# Patient Record
Sex: FEMALE | Race: WHITE | NOT HISPANIC OR LATINO | Employment: PART TIME | URBAN - METROPOLITAN AREA
[De-identification: names, ages, dates, MRNs, and addresses within clinical notes are randomized per-mention and may not be internally consistent; named-entity substitution may affect disease eponyms.]

---

## 2017-01-04 ENCOUNTER — HOSPITAL ENCOUNTER (EMERGENCY)
Facility: CLINIC | Age: 27
Discharge: STILL ADMITTED OR ADMINISTRATIVE | End: 2017-01-06

## 2017-02-27 ENCOUNTER — HOSPITAL ENCOUNTER (INPATIENT)
Facility: CLINIC | Age: 27
LOS: 3 days | Discharge: HOME | DRG: 897 | End: 2017-03-02
Attending: EMERGENCY MEDICINE | Admitting: FAMILY MEDICINE
Payer: COMMERCIAL

## 2017-02-27 DIAGNOSIS — L03.114 CELLULITIS OF LEFT UPPER EXTREMITY: Primary | ICD-10-CM

## 2017-02-27 DIAGNOSIS — F11.93 OPIATE WITHDRAWAL (H): ICD-10-CM

## 2017-02-27 DIAGNOSIS — F11.229 OPIOID DEPENDENCE WITH INTOXICATION WITH COMPLICATION (H): ICD-10-CM

## 2017-02-27 DIAGNOSIS — F19.10 POLYSUBSTANCE ABUSE (H): ICD-10-CM

## 2017-02-27 LAB
ALCOHOL BREATH TEST: 0.2 (ref 0–0.01)
AMPHETAMINES UR QL SCN: ABNORMAL
BARBITURATES UR QL: ABNORMAL
BENZODIAZ UR QL: ABNORMAL
CANNABINOIDS UR QL SCN: ABNORMAL
COCAINE UR QL: ABNORMAL
ETHANOL UR QL SCN: ABNORMAL
HCG UR QL: NEGATIVE
OPIATES UR QL SCN: ABNORMAL

## 2017-02-27 PROCEDURE — 80307 DRUG TEST PRSMV CHEM ANLYZR: CPT | Performed by: FAMILY MEDICINE

## 2017-02-27 PROCEDURE — 76882 US LMTD JT/FCL EVL NVASC XTR: CPT | Mod: LT | Performed by: EMERGENCY MEDICINE

## 2017-02-27 PROCEDURE — 25000132 ZZH RX MED GY IP 250 OP 250 PS 637: Performed by: EMERGENCY MEDICINE

## 2017-02-27 PROCEDURE — 12800008 ZZH R&B CD ADULT

## 2017-02-27 PROCEDURE — 82075 ASSAY OF BREATH ETHANOL: CPT | Performed by: EMERGENCY MEDICINE

## 2017-02-27 PROCEDURE — 25000132 ZZH RX MED GY IP 250 OP 250 PS 637: Performed by: CLINICAL NURSE SPECIALIST

## 2017-02-27 PROCEDURE — HZ2ZZZZ DETOXIFICATION SERVICES FOR SUBSTANCE ABUSE TREATMENT: ICD-10-PCS | Performed by: FAMILY MEDICINE

## 2017-02-27 PROCEDURE — 99285 EMERGENCY DEPT VISIT HI MDM: CPT | Mod: 25 | Performed by: EMERGENCY MEDICINE

## 2017-02-27 PROCEDURE — 99285 EMERGENCY DEPT VISIT HI MDM: CPT | Mod: Z6 | Performed by: EMERGENCY MEDICINE

## 2017-02-27 PROCEDURE — 80320 DRUG SCREEN QUANTALCOHOLS: CPT | Performed by: FAMILY MEDICINE

## 2017-02-27 PROCEDURE — 81025 URINE PREGNANCY TEST: CPT | Performed by: FAMILY MEDICINE

## 2017-02-27 RX ORDER — CEPHALEXIN 500 MG/1
500 CAPSULE ORAL EVERY 6 HOURS SCHEDULED
Status: DISCONTINUED | OUTPATIENT
Start: 2017-02-28 | End: 2017-03-02

## 2017-02-27 RX ORDER — NICOTINE 21 MG/24HR
1 PATCH, TRANSDERMAL 24 HOURS TRANSDERMAL ONCE
Status: COMPLETED | OUTPATIENT
Start: 2017-02-27 | End: 2017-02-27

## 2017-02-27 RX ORDER — HYDROXYZINE HYDROCHLORIDE 25 MG/1
25-50 TABLET, FILM COATED ORAL EVERY 4 HOURS PRN
Status: DISCONTINUED | OUTPATIENT
Start: 2017-02-27 | End: 2017-03-02 | Stop reason: HOSPADM

## 2017-02-27 RX ORDER — LORAZEPAM 1 MG/1
1-2 TABLET ORAL EVERY 4 HOURS PRN
Status: DISCONTINUED | OUTPATIENT
Start: 2017-02-27 | End: 2017-03-02 | Stop reason: HOSPADM

## 2017-02-27 RX ORDER — ACETAMINOPHEN 325 MG/1
650 TABLET ORAL EVERY 4 HOURS PRN
Status: DISCONTINUED | OUTPATIENT
Start: 2017-02-27 | End: 2017-03-02 | Stop reason: HOSPADM

## 2017-02-27 RX ORDER — CALCIUM CARBONATE 500 MG/1
1 TABLET, CHEWABLE ORAL PRN
Status: ON HOLD | COMMUNITY
End: 2017-03-01

## 2017-02-27 RX ORDER — BISACODYL 10 MG
10 SUPPOSITORY, RECTAL RECTAL DAILY PRN
Status: DISCONTINUED | OUTPATIENT
Start: 2017-02-27 | End: 2017-03-02 | Stop reason: HOSPADM

## 2017-02-27 RX ORDER — SULFAMETHOXAZOLE/TRIMETHOPRIM 800-160 MG
1 TABLET ORAL 2 TIMES DAILY
Status: DISCONTINUED | OUTPATIENT
Start: 2017-02-27 | End: 2017-02-27

## 2017-02-27 RX ORDER — ONDANSETRON 4 MG/1
4 TABLET, ORALLY DISINTEGRATING ORAL EVERY 6 HOURS PRN
Status: DISCONTINUED | OUTPATIENT
Start: 2017-02-27 | End: 2017-03-02 | Stop reason: HOSPADM

## 2017-02-27 RX ORDER — TRAZODONE HYDROCHLORIDE 50 MG/1
50 TABLET, FILM COATED ORAL
Status: DISCONTINUED | OUTPATIENT
Start: 2017-02-27 | End: 2017-03-02 | Stop reason: HOSPADM

## 2017-02-27 RX ORDER — CALCIUM CARBONATE 500 MG/1
500 TABLET, CHEWABLE ORAL 2 TIMES DAILY PRN
Status: DISCONTINUED | OUTPATIENT
Start: 2017-02-27 | End: 2017-03-02 | Stop reason: HOSPADM

## 2017-02-27 RX ORDER — NICOTINE 21 MG/24HR
1 PATCH, TRANSDERMAL 24 HOURS TRANSDERMAL DAILY
Status: DISCONTINUED | OUTPATIENT
Start: 2017-02-28 | End: 2017-03-02 | Stop reason: HOSPADM

## 2017-02-27 RX ORDER — SULFAMETHOXAZOLE/TRIMETHOPRIM 800-160 MG
1 TABLET ORAL 2 TIMES DAILY
Status: DISCONTINUED | OUTPATIENT
Start: 2017-02-28 | End: 2017-03-02

## 2017-02-27 RX ORDER — ALUMINA, MAGNESIA, AND SIMETHICONE 2400; 2400; 240 MG/30ML; MG/30ML; MG/30ML
30 SUSPENSION ORAL EVERY 4 HOURS PRN
Status: DISCONTINUED | OUTPATIENT
Start: 2017-02-27 | End: 2017-03-02 | Stop reason: HOSPADM

## 2017-02-27 RX ORDER — IBUPROFEN 600 MG/1
600 TABLET, FILM COATED ORAL EVERY 6 HOURS PRN
Status: DISCONTINUED | OUTPATIENT
Start: 2017-02-27 | End: 2017-03-02 | Stop reason: HOSPADM

## 2017-02-27 RX ORDER — IBUPROFEN 200 MG
400 TABLET ORAL PRN
COMMUNITY
End: 2017-12-06

## 2017-02-27 RX ORDER — CEPHALEXIN 500 MG/1
500 CAPSULE ORAL EVERY 6 HOURS SCHEDULED
Status: DISCONTINUED | OUTPATIENT
Start: 2017-02-27 | End: 2017-02-27

## 2017-02-27 RX ADMIN — TRAZODONE HYDROCHLORIDE 50 MG: 50 TABLET ORAL at 22:40

## 2017-02-27 RX ADMIN — CEPHALEXIN 500 MG: 500 CAPSULE ORAL at 18:47

## 2017-02-27 RX ADMIN — SULFAMETHOXAZOLE AND TRIMETHOPRIM 1 TABLET: 800; 160 TABLET ORAL at 19:34

## 2017-02-27 RX ADMIN — NICOTINE 1 PATCH: 21 PATCH, EXTENDED RELEASE TRANSDERMAL at 20:17

## 2017-02-27 RX ADMIN — CEPHALEXIN 500 MG: 500 CAPSULE ORAL at 23:57

## 2017-02-27 ASSESSMENT — ENCOUNTER SYMPTOMS
FEVER: 0
ABDOMINAL PAIN: 0
SHORTNESS OF BREATH: 0

## 2017-02-27 ASSESSMENT — ACTIVITIES OF DAILY LIVING (ADL)
DRESS: STREET CLOTHES;INDEPENDENT
ORAL_HYGIENE: INDEPENDENT
LAUNDRY: WITH SUPERVISION
GROOMING: INDEPENDENT

## 2017-02-27 NOTE — ED PROVIDER NOTES
History     Chief Complaint   Patient presents with     Addiction Problem     Detox from heroin and alcohol, last use today at 2 pm, last drink one hour ago, has bed on 3A.     Wound Infection     Left arm redness at injection site.     PRADIP Wilder is a 26 year old female presents to the Emergency Department today looking for detox.  She states that currently her drug of choice is heroin.  She is using 1 to 2 g a day via injection.  Last use was a couple hours prior to arrival.  She s been using this for 2 years.  She does state that she uses meth occasionally, a few times a month.  Last use of meth was yesterday.  She also uses marijuana, and alcohol occasionally.  She states she drinks alcohol about once every couple of weeks.  She states that she will kind of use anything that is available.  This includes benzos, though she states that the use of these is not common.  She states she s been through detox in the past.  She states she has had long-term issues with substances.  She denies any suicidal ideation.  Her only acute complaint is pain near her left elbow which she attributes to injecting drugs.  She states that she has had a little bit of pain for a few days, though yesterday when she rolled up her sleeve she noticed quite a bit of redness in the area.  She states the redness looks improved today, though the area is still uncomfortable.  She denies any fevers or any other acute complaints.  Last use was a couple of hours ago and she denies any withdrawal symptoms at this time.  This part of the document was transcribed by Jade Erazo, Medical Scribe.       I have reviewed the Medications, Allergies, Past Medical and Surgical History, and Social History in the [employer] system.    PAST MEDICAL HISTORY:   Past Medical History   Diagnosis Date     Anxiety      Chickenpox      Depression      Depressive disorder      GERD (gastroesophageal reflux disease)      Substance abuse        PAST SURGICAL HISTORY:  History reviewed. No pertinent past surgical history.    FAMILY HISTORY:   Family History   Problem Relation Age of Onset     Allergies Mother      Depression Mother      Asthma No family hx of      C.A.D. No family hx of      DIABETES No family hx of      Hypertension No family hx of      CEREBROVASCULAR DISEASE No family hx of      Breast Cancer No family hx of      Cancer - colorectal No family hx of      Alcohol/Drug Mother      Prostate Cancer Paternal Grandfather        SOCIAL HISTORY:   Social History   Substance Use Topics     Smoking status: Current Every Day Smoker     Packs/day: 1.00     Years: 10.00     Smokeless tobacco: Not on file     Alcohol use Yes     Current Facility-Administered Medications   Medication     cephALEXin (KEFLEX) capsule 500 mg     sulfamethoxazole-trimethoprim (BACTRIM DS/SEPTRA DS) 800-160 MG per tablet 1 tablet     Current Outpatient Prescriptions   Medication     calcium carbonate (TUMS) 500 MG chewable tablet     ibuprofen (ADVIL/MOTRIN) 200 MG tablet        Allergies   Allergen Reactions     Cats      Dogs          Review of Systems   Constitutional: Negative for fever.   Respiratory: Negative for shortness of breath.    Cardiovascular: Negative for chest pain.   Gastrointestinal: Negative for abdominal pain.   All other systems reviewed and are negative.      Physical Exam   Pulse: 109  Heart Rate: 109  Temp: 97.2  F (36.2  C)  Resp: 16  Weight: 63.5 kg (140 lb)  SpO2: 99 %  Physical Exam   Constitutional: No distress.   Appears under the influence   HENT:   Head: Atraumatic.   Mouth/Throat: Oropharynx is clear and moist.   Eyes: Pupils are equal, round, and reactive to light. No scleral icterus.   Cardiovascular: Normal heart sounds and intact distal pulses.    Mildly tachy   Pulmonary/Chest: Breath sounds normal. No respiratory distress.   Abdominal: Soft. There is no tenderness.   Musculoskeletal: She exhibits tenderness. She exhibits no edema.        Arms:  Skin: Skin is  warm. No rash noted. She is not diaphoretic. There is erythema.       ED Course     ED Course     Procedures  Results for orders placed during the hospital encounter of 02/27/17   POC US SOFT TISSUE    Impression No notable fluid collection.             Critical Care time:  none               Labs Ordered and Resulted from Time of ED Arrival Up to the Time of Departure from the ED   DRUG ABUSE SCREEN 6 CHEM DEP URINE ([facility]) - Abnormal; Notable for the following:        Result Value    Amphetamine Qual Urine   (*)     Value: Positive   Cutoff for a positive amphetamine is greater than 500 ng/mL. This is an   unconfirmed screening result to be used for medical purposes only.      Cannabinoids Qual Urine   (*)     Value: Positive   Cutoff for a positive cannabinoid is greater than 50 ng/mL. This is an   unconfirmed screening result to be used for medical purposes only.      Ethanol Qual Urine   (*)     Value: Positive   Cutoff for a positive urine ethanol is greater than 0.05 g/dL.  This is an   unconfirmed screening result to be used for medical purposes only.      Opiates Qualitative Urine   (*)     Value: Positive   Cutoff for a positive opiate is greater than 300 ng/mL. This is an unconfirmed   screening result to be used for medical purposes only.      All other components within normal limits   ALCOHOL BREATH TEST POCT - Abnormal; Notable for the following:     Alcohol Breath Test 0.197 (*)     All other components within normal limits   HCG QUALITATIVE URINE       Assessments & Plan (with Medical Decision Making)   Pt has been using/drinking today. She denies physical complaints other than arm pain. No fluid collection (abscess) seen on bedside u/s -- looks to be cellulitic. Will start keflex and bactrim. Will admit to inpt detox for acute stabilization and treatment.    I have reviewed the nursing notes.    I have reviewed the findings, diagnosis, plan and need for follow up with the patient.    New  Prescriptions    No medications on file       Final diagnoses:   Cellulitis of left upper extremity   Polysubstance abuse   Opioid dependence with intoxication with complication (H)       2/27/2017   [facility], [place], EMERGENCY DEPARTMENT     Phyllis Pandey MD  02/27/17 6739

## 2017-02-27 NOTE — IP AVS SNAPSHOT
[facility]    [address] [place] [id number]    Phone:  [phone]                                       After Visit Summary   2/27/2017    Domenico Wilder    MRN: [id number]           After Visit Summary Signature Page     I have received my discharge instructions, and my questions have been answered. I have discussed any challenges I see with this plan with the nurse or doctor.    ..........................................................................................................................................  Patient/Patient Representative Signature      ..........................................................................................................................................  Patient Representative Print Name and Relationship to Patient    ..................................................               ................................................  Date                                            Time    ..........................................................................................................................................  Reviewed by Signature/Title    ...................................................              ..............................................  Date                                                            Time

## 2017-02-27 NOTE — IP AVS SNAPSHOT
MRN:[id number]                      After Visit Summary   2/27/2017    Domenico Wilder    MRN: [id number]           Thank you!     Thank you for choosing [facility] for your care. Our goal is always to provide you with excellent care.        Patient Information     Date Of Birth          1990        About your hospital stay     You were admitted on:  February 27, 2017 You last received care in the:  [facility]    You were discharged on:  March 2, 2017       Who to Call     For medical emergencies, please call 911.  For non-urgent questions about your medical care, please call your primary care provider or clinic, [phone]          Attending Provider     Provider Specialty    Phyllis Pandey MD Emergency Medicine    Amer, Troy Long MD Family Practice       Primary Care Provider Office Phone # Fax #    [facility] [phone] [address] [facility] [address]        Further instructions from your care team              Behavioral Discharge Planning and Instructions  THANK YOU FOR CHOOSING THE [address]  [phone]    Summary: The patient was detoxed with methadone, getting 30 mg, 40 mg and 40 mg over 3 days. She had moderate withdrawal despite the methadone. She was given some tizanidine, gabapentin. She was on Keflex and Bactrim for a skin infection from her IV injection sites. The patient was stable medically and improved by 03/02/2017. She was to meet with case management to finalize outpatient treatment. She was to go to [facility] tomorrow, 03/03/2017, for methadone maintenance.       Recommendation:  Residential Treatment, psychotherapy, sober support group engagement and active work with a sponsor or  through [place] Recovery Connection (see below for contact information).    Main Diagnosis:   Opioid Dependence    Major Treatments, Procedures and Findings:  You have withdrawn from  opiates  using buprenorphine.  You have had a chemical dependency assessment.  You have had labs drawn and those results have been reviewed with you. Please take a copy of your lab work with you to your next primary care physician appointment.    Symptoms to Report:  If you experience more anxiety, confusion, sleeplessness, deep sadness or thoughts of suicide, notify your treatment team or notify your primary care physician. IF ANY OF THE SYMPTOMS YOU ARE EXPERIENCING ARE A MEDICAL EMERGENCY CALL 911 IMMEDIATELY.     Lifestyle Adjustment: Adjust your lifestyle to get enough sleep, relaxation, exercise and  good nutrition. Continue to develop healthy coping skills to decrease stress and promote a sober living environment. Do not use alcohol, illegal drugs or addictive medications other than what is currently prescribed. AA, NA, and  Sponsor are excellent resources for support. There has been an increase in opioid overdoses and deaths recently. Even after a short period of no drug use a persons tolerance level is lowered. It is not safe to think a person can use the same amount of drugs as they did prior to their period of no drug use. Returning to your drug using environment and contact with your drug using friends puts you at high risk for relapse as well as death associated with an overdose. Www.harmreduction.org    Methadone Follow-Up: March 3rd   Specialized Treatment Services - [facility]      DISCHARGE RESOURCES:  -SMART Recovery - self management for addiction recovery:  www.smartrecovery.org    -Pathways ~ A Health Crisis Resource & Support Center: 369.299.7836.  -[facility] [phone]   -[facility] Behavioral Intake 882-307-1068 or 737-885-3321.  -Crisis Intervention: 733.348.4411 or 477-463-9706 (TTY: 558.186.9267).  Call anytime.  -Suicide Awareness Voices of Education (SAVE) (www.save.org): 212-225-OEVB (9131)  -National Suicide Prevention Line (www.mentalhealthmn.org):  906-014-DKVK (8203)  -National [place] on Mental Illness (www.mn.jadyn.org): 272.335.7838 or 308-510-9655.  -Ruzr1mbnq: text the word LIFE to 76064 for immediate support and crisis intervention  -Mental Health Consumer/Survivor Network of [place] (www.mhcsn.net): 791.403.6692 or 254-013-7267  -Mental Health Association of [place] (www.mentalhealth.org): 946.963.1881 or 405-478-9371     -Substance Abuse and Mental Health Services (www.samhsa.gov)  -Harm Reduction Coalition (www. Harmreduction.org)  -www.prescribetoprevent.org or http://prescribetoprevent.org/video  -Poison control 0-829-530-0821   **[place] Opioid Prevention Coalition: www.opioidcoalition.org    Sober Support Group Information:  AA/NA & Sponsor/Support  -Alcoholics Anonymous (www.alcoholics-anonymous.org): for local information 24 hours/day  -AA Intergroup service office in [place] (http://www.aastpaul.org/) 138.419.4576  -AA Intergroup service office in [facility]: 266.982.8296. (http://www.aamin[place].org/)  -Narcotics Anonymous (www.nam[place].org) (174) 491-9760   **Sober Fun Activities: www.soberCourtview Mediaactivities.[place]/[facility]//[facility] Recovery Connection ([facility])  [facility] connects people seeking recovery to resources that help foster and sustain long-term recovery.  Whether you are seeking resources for treatment, transportation, housing, job training, education, health care or other pathways to recovery, [facility] is a great place to start.    Phone: 531.943.4264.  www.[place]AppGratis.[employer] (Great listing of all types of recovery and non-recovery related resources)    General Medication Instructions:   See your medication sheet(s) for instructions.   Take all medicines as directed.  Make no changes unless your doctor suggests them.   Go to all your doctor visits.  Be sure to have all your required lab tests. This way, your medicines can be refilled on time.  Do not use any drugs not prescribed by your provider.  AA/NA and Sponsors are  "excellent resources for support  Avoid alcohol.    Any follow up concerns:  Nursing questions call the [address] [phone]  Medical Record call [phone]  Outpatient Behavioral Intake call [phone]    The entire treatment team has appreciated the opportunity to work with renee Acuña.  We wish you the best in the future and with your lifelong recovery goals. Please bring this discharge folder with you to all follow up appointments.  It contains your lab results, diagnosis, medication list and discharge recommendations.    THANK YOU FOR CHOOSING THE [facility] .      Pending Results     No orders found from 2017 to 2017.            Statement of Approval     Ordered          17 0821  I have reviewed and agree with all the recommendations and orders detailed in this document.  EFFECTIVE NOW     Approved and electronically signed by:  Troy Fiore MD             Admission Information     Date & Time Provider Department Dept. Phone    2017 Troy Fiore MD [facility] [phone]      Your Vitals Were     Blood Pressure Pulse Temperature Respirations Height Weight    140/95 (BP Location: Left arm) 91 98.6  F (37  C) (Oral) 16 1.651 m (5' 5\") 63.5 kg (140 lb)    Last Period Pulse Oximetry BMI (Body Mass Index)             (LMP Unknown) 100% 23.3 kg/m2         MyChart Information     [employer] lets you send messages to your doctor, view your test results, renew your prescriptions, schedule appointments and more. To sign up, go to [email or web address] . Click on \"Log in\" on the left side of the screen, which will take you to the Welcome page. Then click on \"Sign up Now\" on the right side of the page.     You will be asked to enter the access code listed below, as well as some personal information. Please follow the directions to create your username and password.     Your access code is: [email or web address]  Expires: " 2017  9:58 AM     Your access code will  in 90 days. If you need help or a new code, please call your [facility] clinic or [phone].        Care EveryWhere ID     This is your Care EveryWhere ID. This could be used by other organizations to access your [facility] medical records  [id number]           Review of your medicines      START taking        Dose / Directions    hydrOXYzine 25 MG tablet   Commonly known as:  ATARAX        Dose:  25-50 mg   Take 1-2 tablets (25-50 mg) by mouth every 4 hours as needed for anxiety   Quantity:  60 tablet   Refills:  1       traZODone 50 MG tablet   Commonly known as:  DESYREL        Dose:  50 mg   Take 1 tablet (50 mg) by mouth nightly as needed for sleep   Quantity:  30 tablet   Refills:  0         CONTINUE these medicines which have NOT CHANGED        Dose / Directions    ibuprofen 200 MG tablet   Commonly known as:  ADVIL/MOTRIN        Dose:  400 mg   Take 400 mg by mouth as needed for mild pain   Refills:  0         STOP taking     calcium carbonate 500 MG chewable tablet   Commonly known as:  TUMS           METHADONE HCL PO                Where to get your medicines      These medications were sent to [facility] [address]  [address] [address]     Phone:  [phone]     hydrOXYzine 25 MG tablet    traZODone 50 MG tablet                Protect others around you: Learn how to safely use, store and throw away your medicines at www.disposemymeds.org.             Medication List: This is a list of all your medications and when to take them. Check marks below indicate your daily home schedule. Keep this list as a reference.      Medications           Morning Afternoon Evening Bedtime As Needed    hydrOXYzine 25 MG tablet   Commonly known as:  ATARAX   Take 1-2 tablets (25-50 mg) by mouth every 4 hours as needed for anxiety   Last time this was given:  50 mg on 3/1/2017  8:19 AM                                 ibuprofen 200 MG tablet   Commonly known as:  ADVIL/MOTRIN   Take 400 mg by mouth as needed for mild pain   Last time this was given:  600 mg on 3/1/2017  8:19 AM                                traZODone 50 MG tablet   Commonly known as:  DESYREL   Take 1 tablet (50 mg) by mouth nightly as needed for sleep   Last time this was given:  50 mg on 2/28/2017  9:35 PM

## 2017-02-28 LAB
ALBUMIN SERPL-MCNC: 3.8 G/DL (ref 3.4–5)
ALP SERPL-CCNC: 116 U/L (ref 40–150)
ALT SERPL W P-5'-P-CCNC: 16 U/L (ref 0–50)
ANION GAP SERPL CALCULATED.3IONS-SCNC: 8 MMOL/L (ref 3–14)
AST SERPL W P-5'-P-CCNC: 19 U/L (ref 0–45)
BASOPHILS # BLD AUTO: 0 10E9/L (ref 0–0.2)
BASOPHILS NFR BLD AUTO: 0.4 %
BILIRUB SERPL-MCNC: 0.4 MG/DL (ref 0.2–1.3)
BUN SERPL-MCNC: 13 MG/DL (ref 7–30)
CALCIUM SERPL-MCNC: 9 MG/DL (ref 8.5–10.1)
CHLORIDE SERPL-SCNC: 100 MMOL/L (ref 94–109)
CO2 SERPL-SCNC: 29 MMOL/L (ref 20–32)
CREAT SERPL-MCNC: 0.75 MG/DL (ref 0.52–1.04)
DIFFERENTIAL METHOD BLD: NORMAL
EOSINOPHIL # BLD AUTO: 0.3 10E9/L (ref 0–0.7)
EOSINOPHIL NFR BLD AUTO: 4.1 %
ERYTHROCYTE [DISTWIDTH] IN BLOOD BY AUTOMATED COUNT: 12.9 % (ref 10–15)
GFR SERPL CREATININE-BSD FRML MDRD: NORMAL ML/MIN/1.7M2
GLUCOSE SERPL-MCNC: 94 MG/DL (ref 70–99)
HBV SURFACE AB SERPL IA-ACNC: 49.33 M[IU]/ML
HBV SURFACE AG SERPL QL IA: NONREACTIVE
HCT VFR BLD AUTO: 44.9 % (ref 35–47)
HCV AB SERPL QL IA: ABNORMAL
HGB BLD-MCNC: 15.1 G/DL (ref 11.7–15.7)
HIV 1+2 AB+HIV1 P24 AG SERPL QL IA: NORMAL
IMM GRANULOCYTES # BLD: 0 10E9/L (ref 0–0.4)
IMM GRANULOCYTES NFR BLD: 0.1 %
LYMPHOCYTES # BLD AUTO: 2 10E9/L (ref 0.8–5.3)
LYMPHOCYTES NFR BLD AUTO: 27.4 %
MCH RBC QN AUTO: 29.6 PG (ref 26.5–33)
MCHC RBC AUTO-ENTMCNC: 33.6 G/DL (ref 31.5–36.5)
MCV RBC AUTO: 88 FL (ref 78–100)
MONOCYTES # BLD AUTO: 0.5 10E9/L (ref 0–1.3)
MONOCYTES NFR BLD AUTO: 7.6 %
NEUTROPHILS # BLD AUTO: 4.3 10E9/L (ref 1.6–8.3)
NEUTROPHILS NFR BLD AUTO: 60.4 %
NRBC # BLD AUTO: 0 10*3/UL
NRBC BLD AUTO-RTO: 0 /100
PLATELET # BLD AUTO: 276 10E9/L (ref 150–450)
POTASSIUM SERPL-SCNC: 3.4 MMOL/L (ref 3.4–5.3)
PROT SERPL-MCNC: 7.6 G/DL (ref 6.8–8.8)
RBC # BLD AUTO: 5.1 10E12/L (ref 3.8–5.2)
SODIUM SERPL-SCNC: 137 MMOL/L (ref 133–144)
T4 FREE SERPL-MCNC: 1.41 NG/DL (ref 0.76–1.46)
TSH SERPL DL<=0.005 MIU/L-ACNC: 0.33 MU/L (ref 0.4–4)
WBC # BLD AUTO: 7.2 10E9/L (ref 4–11)

## 2017-02-28 PROCEDURE — 25000132 ZZH RX MED GY IP 250 OP 250 PS 637: Performed by: CLINICAL NURSE SPECIALIST

## 2017-02-28 PROCEDURE — 87340 HEPATITIS B SURFACE AG IA: CPT | Performed by: CLINICAL NURSE SPECIALIST

## 2017-02-28 PROCEDURE — 36415 COLL VENOUS BLD VENIPUNCTURE: CPT | Performed by: CLINICAL NURSE SPECIALIST

## 2017-02-28 PROCEDURE — 25000132 ZZH RX MED GY IP 250 OP 250 PS 637: Performed by: FAMILY MEDICINE

## 2017-02-28 PROCEDURE — 87389 HIV-1 AG W/HIV-1&-2 AB AG IA: CPT | Performed by: CLINICAL NURSE SPECIALIST

## 2017-02-28 PROCEDURE — 84443 ASSAY THYROID STIM HORMONE: CPT | Performed by: CLINICAL NURSE SPECIALIST

## 2017-02-28 PROCEDURE — 99222 1ST HOSP IP/OBS MODERATE 55: CPT | Mod: AI | Performed by: FAMILY MEDICINE

## 2017-02-28 PROCEDURE — 86706 HEP B SURFACE ANTIBODY: CPT | Performed by: CLINICAL NURSE SPECIALIST

## 2017-02-28 PROCEDURE — 80053 COMPREHEN METABOLIC PANEL: CPT | Performed by: CLINICAL NURSE SPECIALIST

## 2017-02-28 PROCEDURE — 86803 HEPATITIS C AB TEST: CPT | Performed by: CLINICAL NURSE SPECIALIST

## 2017-02-28 PROCEDURE — 12800008 ZZH R&B CD ADULT

## 2017-02-28 PROCEDURE — 85025 COMPLETE CBC W/AUTO DIFF WBC: CPT | Performed by: CLINICAL NURSE SPECIALIST

## 2017-02-28 PROCEDURE — 84439 ASSAY OF FREE THYROXINE: CPT | Performed by: CLINICAL NURSE SPECIALIST

## 2017-02-28 PROCEDURE — 87522 HEPATITIS C REVRS TRNSCRPJ: CPT | Performed by: CLINICAL NURSE SPECIALIST

## 2017-02-28 RX ORDER — METHADONE HYDROCHLORIDE 5 MG/5ML
30 SOLUTION ORAL ONCE
Status: COMPLETED | OUTPATIENT
Start: 2017-02-28 | End: 2017-02-28

## 2017-02-28 RX ADMIN — SULFAMETHOXAZOLE AND TRIMETHOPRIM 1 TABLET: 800; 160 TABLET ORAL at 19:59

## 2017-02-28 RX ADMIN — IBUPROFEN 600 MG: 600 TABLET ORAL at 20:00

## 2017-02-28 RX ADMIN — SULFAMETHOXAZOLE AND TRIMETHOPRIM 1 TABLET: 800; 160 TABLET ORAL at 09:46

## 2017-02-28 RX ADMIN — CEPHALEXIN 500 MG: 500 CAPSULE ORAL at 06:05

## 2017-02-28 RX ADMIN — CEPHALEXIN 500 MG: 500 CAPSULE ORAL at 12:28

## 2017-02-28 RX ADMIN — HYDROXYZINE HYDROCHLORIDE 50 MG: 25 TABLET ORAL at 20:00

## 2017-02-28 RX ADMIN — METHADONE HYDROCHLORIDE 30 MG: 5 SOLUTION ORAL at 09:45

## 2017-02-28 RX ADMIN — NICOTINE 1 PATCH: 21 PATCH, EXTENDED RELEASE TRANSDERMAL at 09:46

## 2017-02-28 RX ADMIN — CEPHALEXIN 500 MG: 500 CAPSULE ORAL at 18:16

## 2017-02-28 RX ADMIN — TRAZODONE HYDROCHLORIDE 50 MG: 50 TABLET ORAL at 21:35

## 2017-02-28 ASSESSMENT — ACTIVITIES OF DAILY LIVING (ADL)
GROOMING: INDEPENDENT
ORAL_HYGIENE: INDEPENDENT
DRESS: INDEPENDENT

## 2017-02-28 NOTE — H&P
DATE OF ADMISSION:  02/27/2017       HISTORY OF PRESENT ILLNESS:  Domenico Wilder is a 26-year-old female from [place].  She has a 5-year-old daughter who is staying with her parents.  She is currently unemployed.  She enters [facility] for detoxification and treatment.  She has a history of opioid dependence and has been using for some time.  She went to treatment at [facility] and [facility] in 2016.  She was on Suboxone maintenance from Dr. Hutton.  This was after [facility] and she did well for a period of time.  She did not comply with the Suboxone program and relapsed.  She then entered a methadone maintenance program at [facility].  Her dosage of methadone was small, only getting up to 40 mg per day.  In the meantime, she was supplementing with heroin.  She continued to use.  She would like to get back to using methadone at the [facility] program.  She needs to find a treatment program where she can be safe and not subsequently use when she is on the methadone program.  So a methadone friendly treatment program would be ideal for her.  Another option would be to try again Suboxone maintenance through a Suboxone friendly treatment program.  She last used heroin yesterday.  She can be given methadone today.  She is beginning to feel symptoms of opioid withdrawal.  In addition, she has also used IV meth occasionally, alcohol and marijuana.      PAST MEDICAL HISTORY:  Medical illnesses:  Depression in the past treated with Zoloft and Wellbutrin.  Currently on no medications.  One vaginal delivery.  Denies heart disease, lung disease, liver disease, kidney disease, diabetes or epilepsy.  She had elevated liver functions in the past but was told she does not have hepatitis C.  Will check this again.      PAST SURGICAL HISTORY:  None.  One vaginal delivery.      MEDICATIONS PRIOR TO ADMISSION:  None.      REVIEW OF SYSTEMS:   SKIN:  Erythema left arm from IV drug use.   HEAD:  No headaches.   EYES:  No visual  disturbance.   EARS:  No hearing loss.   MOUTH AND THROAT:  No difficulty swallowing.   PULMONARY:  No cough or shortness of breath.   CARDIOVASCULAR:  No chest pain.   GASTROINTESTINAL:  No nausea, vomiting, diarrhea or constipation.   GENITOURINARY:  Negative.  Patient has amenorrhea for the past couple of years.   MUSCULOSKELETAL:  Negative, no active joint inflammation.   NEUROLOGIC:  Negative.      PHYSICAL EXAMINATION:   VITAL SIGNS:  Temperature 98.4, pulse 97, respirations are 16, blood pressure is 123/69.   GENERAL:  This is a well-developed, well-nourished 26-year-old female in no apparent distress, alert and cooperative and oriented to time, person, and place.   SKIN:  Erythema, left arm near the antecubital fossa without evidence of abscess formation.  Track marks are present.   HEAD:  Normal.   EYES:  Pupils are mid size.  Conjunctivae normal.  Sclerae normal.   EARS:  Normal.   NOSE:  Normal.   MOUTH AND THROAT:  Lips normal.  Tongue normal.  Pharynx normal.   NECK:  Supple.  No masses, no thyroid enlargement.  Lymph nodes normal anterior and posterior cervical region.   PULMONARY:  Lungs clear to auscultation, good inspiration, good expiration.  No wheezes, no rhonchi, no rales.   CARDIOVASCULAR:  Heart regular rate and rhythm, no murmurs.  Good peripheral pulses, no edema.   GASTROINTESTINAL:  Abdomen soft, no distention, tenderness or rigidity.  Bowel sounds normal.  No masses, no organomegaly.   EXTREMITIES:  Full range of motion of all extremities.  No inflammation of the ankles, knees, hips, hands, shoulders or elbows bilaterally.   NEUROLOGIC:  Motor normal.  Sensory normal.  Coordination normal.   MENTAL STATUS:  Oriented to time, place, person and situation.  Attitude is cooperative.  Insight fair, judgment fair.      ASSESSMENT:  Opiate dependence with withdrawal.      PLAN:  Inpatient detoxification.  Will begin with buprenorphine and pursue treatment at a methadone [facility]  program with methadone maintenance at [facility].  An alternative would be a Suboxone friendly program with Suboxone maintenance.  Check labs including TSH to evaluate amenorrhea.  Check liver functions.  Check hepatitis screening.         RHIANNA ROJAS MD             D: 2017 09:47   T: 2017 10:33   MT: loc      Name:     RAUL HERNANDES   MRN:      [id number]        Account:      [id number]   :      1990           Admitted:     [id number]      Document: [id number]

## 2017-02-28 NOTE — PHARMACY-ADMISSION MEDICATION HISTORY
"Admission Medication History status for the 2/27/2017 admission is complete.  See EPIC admission navigator for Prior to Admission medications.    Medication history sources:  Patient     Medication history source reliability: Good    Medication adherence:  Good    Changes made to PTA medication list (reason)  Added:  -tums as needed for heartburn (per patient); last taken over a week ago  -advil as needed for pain (per patient); last taken \"about 2 days ago\"  Deleted: None  Changed: None    Additional medication history information (including reliability of information, actions taken by pharmacist):   -Patient was a good historian, but could not recall which [facility] pharmacy she uses in [place] (although she does not currently use any prescription medications)    Time spent in this activity: 5 minutes    Medication history completed by: RED [facility] Intern    Prior to Admission medications    Medication Sig Last Dose Taking? Auth Provider   calcium carbonate (TUMS) 500 MG chewable tablet Take 1 chew tab by mouth as needed for heartburn Past Week at Unknown time Yes Unknown, Entered By History   ibuprofen (ADVIL/MOTRIN) 200 MG tablet Take 400 mg by mouth as needed for mild pain Past Month at Unknown time Yes Unknown, Entered By History       "

## 2017-02-28 NOTE — PROGRESS NOTES
Pt [facility] from [facility] where she came today for opiate detox.  Pt states she has been using 1 - 2 gram IV heroin daily, last use 12:30 p.m. Today.  Pt also states she started Methadone 40 mg daily through [facility] three weeks ago (this has not been confirmed) - last use 2 days ago.  Pt reports 2 yr heroin history.  Pt has been to [facility] detox x 3 (last July, 2016) and has a residential treatment history x 5 going back to her teenage years.  Stressors include recent loss of her job, her home and custody of her 5 yr old son (her parents have temporary custody).  She currently is staying at various friends homes.  States that she can live with her boyfriend's mother if she is sober.      In addition to opiates, pt's Utox positive for:  amphetamines - pt reports using Meth via IV / smoking 3 - 5 x per month, last uses was yesterday  ETOH - .19 in ED, pt reports drinking 2 - 3 x per month, and that today was unusual  cannabinoids - pt reports smoking marijuana weekly    Denies SI/SIB/HI/halluciantions.  History of SIB on L forearm via cutting 10 + years ago.  On the unit, pt is calm and cooperative.  Opiate withdrawal score is 4.    L arm cellulitis r/t IV drug use.  Ultrasound in ED negative for drainable fluid.  Started on Keflex and Bactrim in ED, to be continued on unit.      Trazodone 50 mg at HS

## 2017-02-28 NOTE — PLAN OF CARE
Problem: General Plan of Care (Inpatient Behavioral)  Goal: Individualization/Patient Specific Goal (IP Behavioral)  The patient and/or their representative will achieve their patient-specific goals related to the plan of care.    The patient-specific goals include: Patient will identify reason(s) for hospitalization from their perspective.  Patient will identify a goal for discharge.  Patient will identify triggers that may increase their risk for relapse.  Patient will identify coping skills they can do to stay well.   Patient will identify their support system to demonstrate readiness for discharge.        Illnesses Management & Recovery  Patient identified - as trigger for relapse.  Patient identified - as a general wellness strategy.  Patient identified - as a warning sign that they are in danger of relapse.  Patient identified - as someone they cont on to get feedback from.  Patient identified - as a way to take action when in danger of relapse.  Patient identified - as a way to cope with stress or other symptoms.

## 2017-02-28 NOTE — PLAN OF CARE
Problem: General Plan of Care (Inpatient Behavioral)  Goal: Team Discussion  Team Plan:   BEHAVIORAL TEAM DISCUSSION     Continued Stay Criteria/Rationale: Heroin detox  Plan: Treatment  Participants: Dr. Fiore; Lindsey Herndon, [facility], LADC  Summary/Recommendation: Writer met with patient to establish a discharge plan. She reports daily use of heroin, has been in treatment several times within the last year. She is in the methadone maintenance program at [facility] and plans to continue to work with them. She is possibly interested in treatment, it would be difficult for her to do an OP program as she does not have an active drivers license.  Discussed programming at [facility], they have an opiate track and will allow patient's to be on methadone/Suboxone maintenance, however they are a 90-day program and she is not sure she even wants to do a 28-30 day program.  Coached patient to fill out paperwork so that a CD assessment can be completed if she wants to do any level of programming. Patient does not require Rule 25 funding, she has [employer].  Medical/Physical: Deferred to medicine  Progress: Initial

## 2017-02-28 NOTE — PROGRESS NOTES
02/27/17 2148   Patient Belongings   Did you bring any home meds/supplements to the hospital?  No   Patient Belongings other (see comments)   Disposition of Belongings storage bin, locked drawer, security   Belongings Search Yes   Clothing Search Yes   Second Staff giovanna munoz   storage bin: 2 tote bags, sharps bag: pencil sharpener, dental floss  No cell phone, no wallet  ADMISSION:  I am responsible for any personal items that are not sent to the safe or pharmacy. [facility] is not responsible for loss, theft or damage of any property in my possession.    Patient Signature _____________________ Date/Time _____________________    Staff Signature _______________________ Date/Time _____________________    2nd Staff person, if patient is unable/unwilling to sign  ___________________________________ Date/Time _____________________  DISCHARGE:  All personal items have been returned to me.    Patient Signature _____________________ Date/Time _____________________    Staff Signature _______________________ Date/Time _____________________

## 2017-03-01 PROCEDURE — 25000132 ZZH RX MED GY IP 250 OP 250 PS 637: Performed by: CLINICAL NURSE SPECIALIST

## 2017-03-01 PROCEDURE — 25000132 ZZH RX MED GY IP 250 OP 250 PS 637: Performed by: FAMILY MEDICINE

## 2017-03-01 PROCEDURE — 25000125 ZZHC RX 250: Performed by: CLINICAL NURSE SPECIALIST

## 2017-03-01 PROCEDURE — 25000128 H RX IP 250 OP 636: Performed by: PSYCHIATRY & NEUROLOGY

## 2017-03-01 PROCEDURE — 12800008 ZZH R&B CD ADULT

## 2017-03-01 PROCEDURE — 99231 SBSQ HOSP IP/OBS SF/LOW 25: CPT | Performed by: FAMILY MEDICINE

## 2017-03-01 RX ORDER — SULFAMETHOXAZOLE/TRIMETHOPRIM 800-160 MG
1 TABLET ORAL 2 TIMES DAILY
Qty: 14 TABLET | Refills: 0 | Status: SHIPPED | OUTPATIENT
Start: 2017-03-01 | End: 2017-03-02

## 2017-03-01 RX ORDER — CEPHALEXIN 500 MG/1
500 CAPSULE ORAL EVERY 6 HOURS
Qty: 28 CAPSULE | Refills: 0 | Status: SHIPPED | OUTPATIENT
Start: 2017-03-01 | End: 2017-03-02

## 2017-03-01 RX ORDER — HYDROXYZINE HYDROCHLORIDE 25 MG/1
25-50 TABLET, FILM COATED ORAL EVERY 4 HOURS PRN
Qty: 60 TABLET | Refills: 1 | Status: SHIPPED | OUTPATIENT
Start: 2017-03-01 | End: 2017-12-06

## 2017-03-01 RX ORDER — METHADONE HYDROCHLORIDE 5 MG/5ML
40 SOLUTION ORAL ONCE
Status: COMPLETED | OUTPATIENT
Start: 2017-03-01 | End: 2017-03-01

## 2017-03-01 RX ORDER — TRAZODONE HYDROCHLORIDE 50 MG/1
50 TABLET, FILM COATED ORAL
Qty: 30 TABLET | Refills: 0 | Status: SHIPPED | OUTPATIENT
Start: 2017-03-01 | End: 2017-12-06

## 2017-03-01 RX ORDER — GABAPENTIN 300 MG/1
300 CAPSULE ORAL 3 TIMES DAILY
Status: DISCONTINUED | OUTPATIENT
Start: 2017-03-01 | End: 2017-03-02

## 2017-03-01 RX ADMIN — HYDROXYZINE HYDROCHLORIDE 50 MG: 25 TABLET ORAL at 08:19

## 2017-03-01 RX ADMIN — CALCIUM CARBONATE (ANTACID) CHEW TAB 500 MG 500 MG: 500 CHEW TAB at 16:25

## 2017-03-01 RX ADMIN — ONDANSETRON 4 MG: 4 TABLET, ORALLY DISINTEGRATING ORAL at 14:44

## 2017-03-01 RX ADMIN — SULFAMETHOXAZOLE AND TRIMETHOPRIM 1 TABLET: 800; 160 TABLET ORAL at 20:39

## 2017-03-01 RX ADMIN — CEPHALEXIN 500 MG: 500 CAPSULE ORAL at 20:40

## 2017-03-01 RX ADMIN — CEPHALEXIN 500 MG: 500 CAPSULE ORAL at 11:11

## 2017-03-01 RX ADMIN — IBUPROFEN 600 MG: 600 TABLET ORAL at 08:19

## 2017-03-01 RX ADMIN — METHADONE HYDROCHLORIDE 40 MG: 5 SOLUTION ORAL at 11:11

## 2017-03-01 RX ADMIN — PROCHLORPERAZINE EDISYLATE 10 MG: 5 INJECTION INTRAMUSCULAR; INTRAVENOUS at 19:52

## 2017-03-01 RX ADMIN — CEPHALEXIN 500 MG: 500 CAPSULE ORAL at 00:07

## 2017-03-01 RX ADMIN — CEPHALEXIN 500 MG: 500 CAPSULE ORAL at 05:59

## 2017-03-01 RX ADMIN — TIZANIDINE 8 MG: 4 TABLET ORAL at 14:44

## 2017-03-01 RX ADMIN — TIZANIDINE 8 MG: 4 TABLET ORAL at 12:17

## 2017-03-01 RX ADMIN — TIZANIDINE 8 MG: 4 TABLET ORAL at 20:39

## 2017-03-01 RX ADMIN — GABAPENTIN 300 MG: 300 CAPSULE ORAL at 14:44

## 2017-03-01 RX ADMIN — LORAZEPAM 1 MG: 1 TABLET ORAL at 20:40

## 2017-03-01 RX ADMIN — NICOTINE 1 PATCH: 21 PATCH, EXTENDED RELEASE TRANSDERMAL at 08:19

## 2017-03-01 RX ADMIN — GABAPENTIN 300 MG: 300 CAPSULE ORAL at 20:39

## 2017-03-01 RX ADMIN — GABAPENTIN 300 MG: 300 CAPSULE ORAL at 12:17

## 2017-03-01 RX ADMIN — CALCIUM CARBONATE (ANTACID) CHEW TAB 500 MG 500 MG: 500 CHEW TAB at 11:34

## 2017-03-01 RX ADMIN — SULFAMETHOXAZOLE AND TRIMETHOPRIM 1 TABLET: 800; 160 TABLET ORAL at 08:19

## 2017-03-01 ASSESSMENT — ACTIVITIES OF DAILY LIVING (ADL): GROOMING: INDEPENDENT

## 2017-03-01 ASSESSMENT — ENCOUNTER SYMPTOMS
NO PATIENT REPORTED PAIN: 1
ANOREXIA: 1
DIETARY ISSUES: ADEQUATE INTAKE
WEAKNESS: 1
ACTIVITY IMPAIRMENT: NORMAL

## 2017-03-01 NOTE — PROGRESS NOTES
Writer met with pt to discuss aftercare. Pt reports she wants to do OP treatment at this time and can utilize a cab to treatment through her insurance. Spoke with pt about doing inpatient treatment at [facility] where she can stay on methadone/Suboxone. Pt was hesitant but paused and reflected that this might be a good choice for her. Pt was tearful and seemed confused about what to do. Encouraged her to complete her paperwork so that Rule 25 assessment can be complete and faxed to treatment location. Pt acknowledged that she will have this complete by this afternoon.  CM continues.     Update: Pt still has not turned in paperwork, reports she is not feeling well. Will follow up Thursday, 3/2.

## 2017-03-01 NOTE — PROGRESS NOTES
"Domenico Wilder is a 26 year old female patient.  1. Cellulitis of left upper extremity    2. Polysubstance abuse    3. Opioid dependence with intoxication with complication (H)    4. Opiate withdrawal (H)      Past Medical History   Diagnosis Date     Anxiety      Chickenpox      Depression      Depressive disorder      GERD (gastroesophageal reflux disease)      Substance abuse      Current Outpatient Prescriptions   Medication Sig Dispense Refill     hydrOXYzine (ATARAX) 25 MG tablet Take 1-2 tablets (25-50 mg) by mouth every 4 hours as needed for anxiety 60 tablet 1     traZODone (DESYREL) 50 MG tablet Take 1 tablet (50 mg) by mouth nightly as needed for sleep 30 tablet 0     cephALEXin (KEFLEX) 500 MG capsule Take 1 capsule (500 mg) by mouth every 6 hours 28 capsule 0     sulfamethoxazole-trimethoprim (BACTRIM DS/SEPTRA DS) 800-160 MG per tablet Take 1 tablet by mouth 2 times daily 14 tablet 0     Allergies   Allergen Reactions     Cats      Dogs      Active Problems:    Opiate withdrawal (H)    Blood pressure 122/83, pulse 85, temperature 98.3  F (36.8  C), temperature source Oral, resp. rate 16, height 5' 5\" (1.651 m), weight 140 lb (63.5 kg), SpO2 100 %, not currently breastfeeding.    Subjective:  Symptoms:  Worsening.  She reports malaise, weakness, anorexia and anxiety.    Diet:  Adequate intake.    Activity level: Normal.    Pain:  She reports no pain.      Objective:  General Appearance:  Uncomfortable, ill-appearing and in no acute distress.    Vital signs: (most recent): Blood pressure 122/83, pulse 85, temperature 98.3  F (36.8  C), temperature source Oral, resp. rate 16, height 5' 5\" (1.651 m), weight 140 lb (63.5 kg), SpO2 100 %, not currently breastfeeding.  Vital signs are normal.    Lungs:  Normal respiratory rate and normal effort.  Breath sounds clear to auscultation.    Heart: Normal rate.  Regular rhythm.    Neurological: Patient is alert and oriented to person, place and time.  Normal " strength.    Skin:  Warm and dry.      Assessment:    Condition: In serious condition.  Improving.   (Opiate Dependence and Withdrawal   ).     Plan:   (Methadone 40 mg today  Discharge tomorrow  [facility] Methadone program    20 minutes were spent with patient with more than 50% of time spent in counseling and coordination of care ).       Troy Fiore  3/1/2017

## 2017-03-02 VITALS
HEIGHT: 65 IN | WEIGHT: 140 LBS | OXYGEN SATURATION: 100 % | SYSTOLIC BLOOD PRESSURE: 134 MMHG | DIASTOLIC BLOOD PRESSURE: 85 MMHG | HEART RATE: 95 BPM | BODY MASS INDEX: 23.32 KG/M2 | TEMPERATURE: 97.5 F | RESPIRATION RATE: 16 BRPM

## 2017-03-02 LAB
HCV RNA SERPL NAA+PROBE-ACNC: NORMAL [IU]/ML
HCV RNA SERPL NAA+PROBE-LOG IU: NORMAL LOG IU/ML

## 2017-03-02 PROCEDURE — 99238 HOSP IP/OBS DSCHRG MGMT 30/<: CPT | Performed by: FAMILY MEDICINE

## 2017-03-02 PROCEDURE — 25000125 ZZHC RX 250: Performed by: CLINICAL NURSE SPECIALIST

## 2017-03-02 PROCEDURE — 25000132 ZZH RX MED GY IP 250 OP 250 PS 637: Performed by: FAMILY MEDICINE

## 2017-03-02 PROCEDURE — 25000132 ZZH RX MED GY IP 250 OP 250 PS 637: Performed by: CLINICAL NURSE SPECIALIST

## 2017-03-02 RX ORDER — METHADONE HYDROCHLORIDE 5 MG/5ML
40 SOLUTION ORAL ONCE
Status: COMPLETED | OUTPATIENT
Start: 2017-03-02 | End: 2017-03-02

## 2017-03-02 RX ADMIN — CEPHALEXIN 500 MG: 500 CAPSULE ORAL at 05:56

## 2017-03-02 RX ADMIN — ONDANSETRON 4 MG: 4 TABLET, ORALLY DISINTEGRATING ORAL at 05:59

## 2017-03-02 RX ADMIN — Medication 40 MG: at 09:02

## 2017-03-02 RX ADMIN — SULFAMETHOXAZOLE AND TRIMETHOPRIM 1 TABLET: 800; 160 TABLET ORAL at 09:55

## 2017-03-02 RX ADMIN — TIZANIDINE 8 MG: 4 TABLET ORAL at 09:55

## 2017-03-02 RX ADMIN — CEPHALEXIN 500 MG: 500 CAPSULE ORAL at 00:03

## 2017-03-02 RX ADMIN — GABAPENTIN 300 MG: 300 CAPSULE ORAL at 09:54

## 2017-03-02 ASSESSMENT — ACTIVITIES OF DAILY LIVING (ADL)
DRESS: STREET CLOTHES
ORAL_HYGIENE: INDEPENDENT
GROOMING: INDEPENDENT

## 2017-03-02 NOTE — DISCHARGE INSTRUCTIONS
Behavioral Discharge Planning and Instructions  THANK YOU FOR CHOOSING THE [facility]  3[facility]  [phone]    Summary: The patient was detoxed with methadone, getting 30 mg, 40 mg and 40 mg over 3 days. She had moderate withdrawal despite the methadone. She was given some tizanidine, gabapentin. She was on Keflex and Bactrim for a skin infection from her IV injection sites. The patient was stable medically and improved by 03/02/2017. She was to meet with case management to finalize outpatient treatment. She was to go to [facility] tomorrow, 03/03/2017, for methadone maintenance.       Recommendation:  Residential Treatment, psychotherapy, sober support group engagement and active work with a sponsor or  through [place] [facility] (see below for contact information).    Main Diagnosis:   Opioid Dependence    Major Treatments, Procedures and Findings:  You have withdrawn from opiates  using buprenorphine.  You have had a chemical dependency assessment.  You have had labs drawn and those results have been reviewed with you. Please take a copy of your lab work with you to your next primary care physician appointment.    Symptoms to Report:  If you experience more anxiety, confusion, sleeplessness, deep sadness or thoughts of suicide, notify your treatment team or notify your primary care physician. IF ANY OF THE SYMPTOMS YOU ARE EXPERIENCING ARE A MEDICAL EMERGENCY CALL 911 IMMEDIATELY.     Lifestyle Adjustment: Adjust your lifestyle to get enough sleep, relaxation, exercise and  good nutrition. Continue to develop healthy coping skills to decrease stress and promote a sober living environment. Do not use alcohol, illegal drugs or addictive medications other than what is currently prescribed. AA, NA, and  Sponsor are excellent resources for support. There has been an increase in opioid overdoses and deaths recently. Even after a short period of no drug use a persons tolerance level  is lowered. It is not safe to think a person can use the same amount of drugs as they did prior to their period of no drug use. Returning to your drug using environment and contact with your drug using friends puts you at high risk for relapse as well as death associated with an overdose. Www.harmreduction.org    Methadone Follow-Up: March 3rd   Specialized Treatment Services - [facility]      DISCHARGE RESOURCES:  -SMART Recovery - self management for addiction recovery:  www.smartrecovery.org    -Pathways ~ A Health Crisis Resource & Support Center: 581.552.5756.  -[facility] [phone]   -[facility] Behavioral Intake 716-189-8197 or 309-070-0886.  -Crisis Intervention: 234.614.6165 or 232-178-8889 (TTY: 758.540.5980).  Call anytime.  -Suicide Awareness Voices of Education (SAVE) (www.save.org): 123-131-RSQA (2590)  -National Suicide Prevention Line (www.mentalhealthmn.org): 095-407-KDRC (9103)  -National [place] on Mental Illness (www.[place].jadyn.org): 249.599.9303 or 231-383-6578.  -Uszw0cega: text the word LIFE to 71054 for immediate support and crisis intervention  -Mental Health Consumer/Survivor Network of [place] (www.mhcsn.net): 219.304.9648 or 949-677-1901  -Mental Health Association of [place] (www.mentalhealth.org): 274.439.5435 or 840-633-7758     -Substance Abuse and Mental Health Services (www.samhsa.gov)  -Harm Reduction Coalition (www. Harmreduction.org)  -www.prescribetoprevent.org or http://prescribetoprevent.org/video  -Poison control 9-775-075-3441   **[place] Opioid Prevention Coalition: www.opioidcoalition.org    Sober Support Group Information:  AA/NA & Sponsor/Support  -Alcoholics Anonymous (www.alcoholics-anonymous.org): for local information 24 hours/day  -AA Intergroup service office in [place] (http://www.aastpaul.org/) 164.547.9537  -AA Intergroup service office in [facility]: 401.320.4826. (http://www.a[place].org/)  -Narcotics Anonymous  (www.[facility].org) (693) 328-6360   **Sober Fun Activities: www.soberMedSolutionsactivities.Slide/[facility]//[facility] ([facility])  [facility] connects people seeking recovery to resources that help foster and sustain long-term recovery.  Whether you are seeking resources for treatment, transportation, housing, job training, education, health care or other pathways to recovery, [facility] is a great place to start.    Phone: 830.168.7625.  www.[place] (Great listing of all types of recovery and non-recovery related resources)    General Medication Instructions:   See your medication sheet(s) for instructions.   Take all medicines as directed.  Make no changes unless your doctor suggests them.   Go to all your doctor visits.  Be sure to have all your required lab tests. This way, your medicines can be refilled on time.  Do not use any drugs not prescribed by your provider.  AA/NA and Sponsors are excellent resources for support  Avoid alcohol.    Any follow up concerns:  Nursing questions call the [address] [phone]  Medical Record call [phone]  Outpatient Behavioral Intake call [phone]    The entire treatment team has appreciated the opportunity to work with you Domenico.  We wish you the best in the future and with your lifelong recovery goals. Please bring this discharge folder with you to all follow up appointments.  It contains your lab results, diagnosis, medication list and discharge recommendations.    THANK YOU FOR CHOOSING THE [facility] .

## 2017-03-02 NOTE — PROGRESS NOTES
Patient discharged at 11:37 to home with follow up appointment with [facility] tomorrow at 11:00 AM. All patient belongings from room, locker, and security sent with patient. This RN went over discharge instructions, teachings, patient's labs, and unit recommendations. This RN went over patient's prescribed medications being sent home with patient by pharmacy. Patient verbalizes and demonstrates understanding of all teachings. Patient denies thoughts of harm towards self or others. Patient denies auditory/visual hallucinations. Pt denies any current medication side effect or medical issues at this time. Patient denies any further questions and is now discharged at this time.

## 2017-03-02 NOTE — PLAN OF CARE
"Problem: General Plan of Care (Inpatient Behavioral)  Goal: Individualization/Patient Specific Goal (IP Behavioral)  The patient and/or their representative will achieve their patient-specific goals related to the plan of care.    The patient-specific goals include: Patient will identify reason(s) for hospitalization from their perspective.  Patient will identify a goal for discharge.  Patient will identify triggers that may increase their risk for relapse.  Patient will identify coping skills they can do to stay well.   Patient will identify their support system to demonstrate readiness for discharge.   MERCEDES Wilder is a 26 year old year old female with a chief complaint of Addiction Problem  S = Situation:   Withdrawal, nausea, vomiting  B  = Background:   Pt complaining of nausea 1 hour after zofran administration--complained of upset stomach, thinking it was from acid--requested and given tums.  Pt ate a small amount of food for dinner--vomited.  Pt given ice pack for head, rinsed mouth.  Lying in bed.  Pt had another emesis 2 hours later--200 cc yellow gastric fluid.  A  =  Assessment:   Vital Signs: /81  Pulse 73  Temp 98.1  F (36.7  C) (Oral)  Resp 16  Ht 1.651 m (5' 5\")  Wt 63.5 kg (140 lb)  LMP  (LMP Unknown)  SpO2 100%  BMI 23.3 kg/m2  Pt is nauseated, continues to vomit.  Lying in bed, unwilling to eat, will take meds with sips.  R =   Request or Recommendation:   Dr. Wilkinson called with symptom report, IM compazine ordered and administered.  Will continue to monitor                    "

## 2017-03-02 NOTE — DISCHARGE SUMMARY
DATE OF DISCHARGE:  2017      Raul Wilder is a 26-year-old female admitted to [facility] for detoxification.  She lives in [place].  She has a 5-year-old daughter staying with her parents.  She is unemployed.  She has a history of opioid dependence.  She has been to treatment at [facility].  She has been on Suboxone maintenance but continued to relapse.  She recently entered a methadone maintenance program at [facility] and wishes to continue.  She has been using heroin while her methadone dose has been slowly increasing.  She has been only on 40 mg per day.  She is interested in continuing the methadone and going to outpatient treatment.      The patient was detoxed with methadone, getting 30 mg, 40 mg and 40 mg over 3 days.  She had moderate withdrawal despite the methadone.  She was given some tizanidine, gabapentin.  She was on Keflex and Bactrim for a skin infection from her IV injection sites.      The patient was stable medically and improved by 2017.  She was to meet with case management to finalize outpatient treatment.  She was to go to [facility] tomorrow, 2017, for methadone maintenance.      Laboratory work during the course of the hospital stay showed a normal chem profile and CBC.  Her hepatitis C RNA was negative but her hepatitis C antibody was positive.  Her HIV was negative.      She will be discharged on no medications.      DISCHARGE DIAGNOSIS:  Opioid dependence.      Thirty minutes were spent with the patient and record in completion of this discharge summary with over 50% of time spent in counseling and coordination of care.         RHIANNA ROJAS MD             D: 2017 08:24   T: 2017 08:53   MT: BELL      Name:     RAUL WILDER   MRN:      [id number]        Account:        [id number]   :      1990           Admit Date:                                       Discharge Date:       Document: [id number]       cc: Phyllis  Katherin Lockhart DO

## 2017-12-06 ENCOUNTER — TELEPHONE (OUTPATIENT)
Dept: BEHAVIORAL HEALTH | Facility: CLINIC | Age: 27
End: 2017-12-06

## 2017-12-06 ENCOUNTER — HOSPITAL ENCOUNTER (INPATIENT)
Facility: CLINIC | Age: 27
LOS: 7 days | Discharge: HOME | End: 2017-12-13
Attending: EMERGENCY MEDICINE | Admitting: PSYCHIATRY & NEUROLOGY
Payer: MEDICAID

## 2017-12-06 DIAGNOSIS — F11.20 OPIOID USE DISORDER, SEVERE, DEPENDENCE (H): Primary | ICD-10-CM

## 2017-12-06 DIAGNOSIS — F11.20 OPIATE DEPENDENCE, CONTINUOUS (H): ICD-10-CM

## 2017-12-06 PROBLEM — F19.20 CHEMICAL DEPENDENCY (H): Status: ACTIVE | Noted: 2017-12-06

## 2017-12-06 LAB
ALCOHOL BREATH TEST: 0.04 (ref 0–0.01)
AMPHETAMINES UR QL SCN: NEGATIVE
BARBITURATES UR QL: NEGATIVE
BENZODIAZ UR QL: NEGATIVE
CANNABINOIDS UR QL SCN: POSITIVE
COCAINE UR QL: NEGATIVE
ETHANOL UR QL SCN: NEGATIVE
HCG UR QL: NEGATIVE
OPIATES UR QL SCN: POSITIVE

## 2017-12-06 PROCEDURE — 25000132 ZZH RX MED GY IP 250 OP 250 PS 637: Performed by: EMERGENCY MEDICINE

## 2017-12-06 PROCEDURE — 81025 URINE PREGNANCY TEST: CPT | Performed by: FAMILY MEDICINE

## 2017-12-06 PROCEDURE — 82075 ASSAY OF BREATH ETHANOL: CPT | Performed by: EMERGENCY MEDICINE

## 2017-12-06 PROCEDURE — 99285 EMERGENCY DEPT VISIT HI MDM: CPT | Mod: 25 | Performed by: EMERGENCY MEDICINE

## 2017-12-06 PROCEDURE — 80320 DRUG SCREEN QUANTALCOHOLS: CPT | Performed by: FAMILY MEDICINE

## 2017-12-06 PROCEDURE — 25000132 ZZH RX MED GY IP 250 OP 250 PS 637: Performed by: PSYCHIATRY & NEUROLOGY

## 2017-12-06 PROCEDURE — 80307 DRUG TEST PRSMV CHEM ANLYZR: CPT | Performed by: FAMILY MEDICINE

## 2017-12-06 PROCEDURE — HZ2ZZZZ DETOXIFICATION SERVICES FOR SUBSTANCE ABUSE TREATMENT: ICD-10-PCS | Performed by: PSYCHIATRY & NEUROLOGY

## 2017-12-06 PROCEDURE — 12800012 ZZH R&B CD MH INTERMEDIATE ADULT

## 2017-12-06 PROCEDURE — 25000125 ZZHC RX 250: Performed by: PSYCHIATRY & NEUROLOGY

## 2017-12-06 PROCEDURE — 25000125 ZZHC RX 250: Performed by: EMERGENCY MEDICINE

## 2017-12-06 PROCEDURE — 99284 EMERGENCY DEPT VISIT MOD MDM: CPT | Mod: Z6 | Performed by: EMERGENCY MEDICINE

## 2017-12-06 RX ORDER — ALUMINA, MAGNESIA, AND SIMETHICONE 2400; 2400; 240 MG/30ML; MG/30ML; MG/30ML
30 SUSPENSION ORAL EVERY 4 HOURS PRN
Status: DISCONTINUED | OUTPATIENT
Start: 2017-12-06 | End: 2017-12-13 | Stop reason: HOSPADM

## 2017-12-06 RX ORDER — BUPRENORPHINE 2 MG/1
4 TABLET SUBLINGUAL ONCE
Status: COMPLETED | OUTPATIENT
Start: 2017-12-06 | End: 2017-12-06

## 2017-12-06 RX ORDER — HYDROXYZINE HYDROCHLORIDE 25 MG/1
50 TABLET, FILM COATED ORAL ONCE
Status: COMPLETED | OUTPATIENT
Start: 2017-12-06 | End: 2017-12-06

## 2017-12-06 RX ORDER — DIAZEPAM 5 MG
5-20 TABLET ORAL EVERY 30 MIN PRN
Status: DISCONTINUED | OUTPATIENT
Start: 2017-12-06 | End: 2017-12-08

## 2017-12-06 RX ORDER — BISACODYL 10 MG
10 SUPPOSITORY, RECTAL RECTAL DAILY PRN
Status: DISCONTINUED | OUTPATIENT
Start: 2017-12-06 | End: 2017-12-13 | Stop reason: HOSPADM

## 2017-12-06 RX ORDER — ONDANSETRON 4 MG/1
4 TABLET, ORALLY DISINTEGRATING ORAL ONCE
Status: COMPLETED | OUTPATIENT
Start: 2017-12-06 | End: 2017-12-06

## 2017-12-06 RX ORDER — ONDANSETRON 4 MG/1
4 TABLET, ORALLY DISINTEGRATING ORAL EVERY 6 HOURS PRN
Status: DISCONTINUED | OUTPATIENT
Start: 2017-12-06 | End: 2017-12-13 | Stop reason: HOSPADM

## 2017-12-06 RX ORDER — NALOXONE HYDROCHLORIDE 0.4 MG/ML
.1-.4 INJECTION, SOLUTION INTRAMUSCULAR; INTRAVENOUS; SUBCUTANEOUS
Status: DISCONTINUED | OUTPATIENT
Start: 2017-12-06 | End: 2017-12-13 | Stop reason: HOSPADM

## 2017-12-06 RX ORDER — TRAZODONE HYDROCHLORIDE 50 MG/1
50 TABLET, FILM COATED ORAL
Status: DISCONTINUED | OUTPATIENT
Start: 2017-12-06 | End: 2017-12-13 | Stop reason: HOSPADM

## 2017-12-06 RX ORDER — CHLORDIAZEPOXIDE HYDROCHLORIDE 25 MG/1
75 CAPSULE, GELATIN COATED ORAL ONCE
Status: COMPLETED | OUTPATIENT
Start: 2017-12-06 | End: 2017-12-06

## 2017-12-06 RX ORDER — HYDROXYZINE HYDROCHLORIDE 25 MG/1
25-50 TABLET, FILM COATED ORAL EVERY 4 HOURS PRN
Status: DISCONTINUED | OUTPATIENT
Start: 2017-12-06 | End: 2017-12-13 | Stop reason: HOSPADM

## 2017-12-06 RX ORDER — BUPRENORPHINE 2 MG/1
4 TABLET SUBLINGUAL 2 TIMES DAILY
Status: DISCONTINUED | OUTPATIENT
Start: 2017-12-07 | End: 2017-12-11

## 2017-12-06 RX ORDER — GABAPENTIN 300 MG/1
300 CAPSULE ORAL ONCE
Status: COMPLETED | OUTPATIENT
Start: 2017-12-06 | End: 2017-12-06

## 2017-12-06 RX ORDER — NICOTINE 21 MG/24HR
1 PATCH, TRANSDERMAL 24 HOURS TRANSDERMAL DAILY
Status: DISCONTINUED | OUTPATIENT
Start: 2017-12-06 | End: 2017-12-13 | Stop reason: HOSPADM

## 2017-12-06 RX ORDER — LANOLIN ALCOHOL/MO/W.PET/CERES
100 CREAM (GRAM) TOPICAL DAILY
Status: DISPENSED | OUTPATIENT
Start: 2017-12-07 | End: 2017-12-10

## 2017-12-06 RX ORDER — ONDANSETRON 2 MG/ML
4 INJECTION INTRAMUSCULAR; INTRAVENOUS ONCE
Status: DISCONTINUED | OUTPATIENT
Start: 2017-12-06 | End: 2017-12-06

## 2017-12-06 RX ORDER — FOLIC ACID 1 MG/1
1 TABLET ORAL DAILY
Status: DISCONTINUED | OUTPATIENT
Start: 2017-12-07 | End: 2017-12-13 | Stop reason: HOSPADM

## 2017-12-06 RX ORDER — MULTIPLE VITAMINS W/ MINERALS TAB 9MG-400MCG
1 TAB ORAL DAILY
Status: DISCONTINUED | OUTPATIENT
Start: 2017-12-07 | End: 2017-12-13 | Stop reason: HOSPADM

## 2017-12-06 RX ORDER — ATENOLOL 50 MG/1
50 TABLET ORAL DAILY PRN
Status: DISCONTINUED | OUTPATIENT
Start: 2017-12-06 | End: 2017-12-13 | Stop reason: HOSPADM

## 2017-12-06 RX ORDER — ACETAMINOPHEN 325 MG/1
650 TABLET ORAL EVERY 4 HOURS PRN
Status: DISCONTINUED | OUTPATIENT
Start: 2017-12-06 | End: 2017-12-13 | Stop reason: HOSPADM

## 2017-12-06 RX ADMIN — GABAPENTIN 300 MG: 300 CAPSULE ORAL at 12:46

## 2017-12-06 RX ADMIN — CLONIDINE 1 PATCH: 0.1 PATCH TRANSDERMAL at 17:21

## 2017-12-06 RX ADMIN — TRAZODONE HYDROCHLORIDE 50 MG: 50 TABLET ORAL at 21:21

## 2017-12-06 RX ADMIN — BUPRENORPHINE HCL 4 MG: 2 TABLET SUBLINGUAL at 21:21

## 2017-12-06 RX ADMIN — ACETAMINOPHEN 650 MG: 325 TABLET, FILM COATED ORAL at 21:21

## 2017-12-06 RX ADMIN — CHLORDIAZEPOXIDE HYDROCHLORIDE 75 MG: 25 CAPSULE ORAL at 12:48

## 2017-12-06 RX ADMIN — ALUMINUM HYDROXIDE, MAGNESIUM HYDROXIDE, AND DIMETHICONE 30 ML: 400; 400; 40 SUSPENSION ORAL at 18:10

## 2017-12-06 RX ADMIN — ONDANSETRON 4 MG: 4 TABLET, ORALLY DISINTEGRATING ORAL at 14:12

## 2017-12-06 RX ADMIN — ONDANSETRON 4 MG: 4 TABLET, ORALLY DISINTEGRATING ORAL at 21:21

## 2017-12-06 RX ADMIN — HYDROXYZINE HYDROCHLORIDE 50 MG: 25 TABLET ORAL at 12:47

## 2017-12-06 RX ADMIN — DIAZEPAM 5 MG: 5 TABLET ORAL at 17:21

## 2017-12-06 RX ADMIN — BUPRENORPHINE HCL 4 MG: 2 TABLET SUBLINGUAL at 17:21

## 2017-12-06 RX ADMIN — DIAZEPAM 10 MG: 5 TABLET ORAL at 20:35

## 2017-12-06 ASSESSMENT — ACTIVITIES OF DAILY LIVING (ADL)
DRESS: STREET CLOTHES;SCRUBS (BEHAVIORAL HEALTH);INDEPENDENT
ORAL_HYGIENE: INDEPENDENT
LAUNDRY: WITH SUPERVISION
GROOMING: INDEPENDENT

## 2017-12-06 NOTE — PHARMACY-ADMISSION MEDICATION HISTORY
Admission medication history interview status for the 12/6/2017 admission is complete. See Epic admission navigator for allergy information, pharmacy, prior to admission medications and immunization status.     Medication history interview sources:  Patient, patient's mother    Changes made to PTA medication list (reason)  Added: none  Deleted: ibuprofen  Changed: none    Additional medication history information (including reliability of information, actions taken by pharmacist): The patient and her mother were reliable historians. The patient reported she does not take any prescription medications, over the counter products, vitamins or supplements.      Prior to Admission medications    None         Medication history completed by: Leila Ferrara, PharmD, BCPP

## 2017-12-06 NOTE — IP AVS SNAPSHOT
[facility]    [address] [place] [id number]    Phone:  [phone]                                       After Visit Summary   12/6/2017    Domenico Wilder    MRN: [id number]           After Visit Summary Signature Page     I have received my discharge instructions, and my questions have been answered. I have discussed any challenges I see with this plan with the nurse or doctor.    ..........................................................................................................................................  Patient/Patient Representative Signature      ..........................................................................................................................................  Patient Representative Print Name and Relationship to Patient    ..................................................               ................................................  Date                                            Time    ..........................................................................................................................................  Reviewed by Signature/Title    ...................................................              ..............................................  Date                                                            Time

## 2017-12-06 NOTE — PROGRESS NOTES
12/06/17 1621   Patient Belongings   Did you bring any home meds/supplements to the hospital?  No   Patient Belongings other (see comments)   Disposition of Belongings Other (see comment)   Belongings Search Yes   Clothing Search Yes   Second Staff Dilma     STORAGE BIN:   2 sweatshirts  A             Admission:  I am responsible for any personal items that are not sent to the safe or pharmacy.  [facility] is not responsible for loss, theft or damage of any property in my possession.    Signature:  _________________________________ Date: _______  Time: _____                                              Staff Signature:  ____________________________ Date: ________  Time: _____      2nd Staff person, if patient is unable/unwilling to sign:    Signature: ________________________________ Date: ________  Time: _____   Discharge:  [facility] has returned all of my personal belongings:    Signature: _________________________________ Date: ________  Time: _____                                          Staff Signature:  ____________________________ Date: ________  Time: _____

## 2017-12-06 NOTE — IP AVS SNAPSHOT
MRN:[id number]                      After Visit Summary   12/6/2017    Domenico Wilder    MRN: [id number]           Thank you!     Thank you for choosing [facility] for your care. Our goal is always to provide you with excellent care.        Patient Information     Date Of Birth          1990        Designated Caregiver       Most Recent Value    Caregiver    Will someone help with your care after discharge? no      About your hospital stay     You were admitted on:  December 6, 2017 You last received care in the:  [facility]    You were discharged on:  December 13, 2017       Who to Call     For medical emergencies, please call 911.  For non-urgent questions about your medical care, please call your primary care provider or clinic, [phone]          Attending Provider     Provider Specialty    Yoan Reyes MD Emergency Medicine    Pablo, Marita Paul MD Pediatrics       Primary Care Provider Office Phone # Fax #    [facility] [phone]      Your next 10 appointments already scheduled     Dec 14, 2017  7:15 AM CST   Treatment with ADULT LODGING PLUS E   [facility] ([facility])    [address] [place] [address]   [phone]            Dec 15, 2017  7:15 AM CST   Treatment with ADULT LODGING PLUS E   [facility] ([facility])    [address] [place] [address]   [phone]            Dec 16, 2017  7:15 AM CST   Treatment with ADULT LODGING PLUS E   [facility] ([facility])    [address] [place] [address]   [phone]            Dec 17, 2017  7:15 AM CST   Treatment with ADULT LODGING PLUS E   [facility] ([facility])     [facility] [address] [address]   [phone]            Dec 18, 2017  7:15 AM CST   Treatment with ADULT LODGING PLUS E   [facility] ([facility])    [address] [place] [address]   [phone]            Dec 19, 2017  7:15 AM CST   Treatment with ADULT LODGING PLUS E   [facility] ([facility])    [address] [place] [address]   [phone]            Dec 20, 2017  7:15 AM CST   Treatment with ADULT LODGING PLUS E   [facility] ([facility])    [address] [place] [address]   [phone]            Dec 21, 2017  7:15 AM CST   Treatment with ADULT LODGING PLUS E   [facility] ([facility])    [address] [place] [address]   [phone]            Dec 22, 2017  7:15 AM CST   Treatment with ADULT LODGING PLUS E   [facility] ([facility])    [address] [place] [address]   [phone]            Dec 23, 2017  7:15 AM CST   Treatment with ADULT LODGING PLUS E   [facility] ([facility])    [address] [place] [address]   [phone]              Further instructions from your care team       Behavioral Discharge Planning and Instructions  THANK YOU FOR CHOOSING THE [facility]  [facility] [place]  [phone]    Summary:   You were admitted to [facility] on 1/6/2017 for detoxification from heroin and alcohol. You are being discharged directly to [facility] on 12/13/2017.  A medical examination was performed that included lab work. Please take care and make your recovery a daily priority,  Meño.  "    Treatment Recommendations per STEVE Arriaga on 12/10/2017: \"Dual disorder treatment.\" Following completion of [facility], recommending [facility] Dual Disorder Outpatient Treatment Program.    Main Diagnosis:  Opioid Dependence    Major Treatments, Procedures and Findings:  You have withdrawn from *** using the appropriate protocols.  You have had blood drawn, and the results have been reviewed with you.  Please take a copy of your lab work with you to your next primary care provider appointment.    Symptoms to Report:  If you experience more anxiety, confusion, sleeplessness, deep sadness or thoughts of suicide, notify your treatment team or notify your primary care physician. IF ANY OF THE SYMPTOMS YOU ARE EXPERIENCING ARE A MEDICAL EMERGENCY CALL 911 IMMEDIATELY.     Primary Provider:  Patient states that she will follow up in less than one month's time [facility] future medical/medication concerns.  Medication-related follow up: [facility] with Dr Manriquez Friday, December 22nd, 2017 at 0900.   [address] [place] [address]  [phone]    Lifestyle Adjustment & Health Action Plan:  1.   Create a daily schedule  2.   Eat Healthy  3.   Plan Enjoyable Sober Activities  4.   Use Problem Solving Skills and Deal with Issues as they Arise.   5.   Be Physically Active  6.   Take your medications as prescribed  7.   Get enough restful sleep  8.   Practice Relaxation  9.   Spend time with Supportive People  10. No use of alcohol, illegal drugs or addictive medications other than what is currently prescribed.   11. AA, NA Sponsor are excellent resources for support    Resources:  [facility] Crisis Resource & Support Center:  651.217.2013.   Support Group:  AA/NA and Sponsor/support.  SMART Recovery - self management for addiction recovery:  www.smartrecovery.org  National [place] on Mental Illness (www.mn.jadyn.org): 803.456.9187 or " 201.149.4238.  Alcoholics Anonymous (www.alcoholics-anonymous.org): Check your phone book for your local chapter.  Crisis Connection:  847.104.8353 or 1-465.645.8246. Call anytime for help.  [facility]:  882.271.2566 or 1-385.521.6245.  Suicide Awareness Voices of Education (SAVE) (www.save.org): 842-982-SAVE (3804).  National Suicide Prevention Line (www.mentalhealthmn.org): 946-546-QJMJ (6157).  Mental Health Consumer/Survivor Network of [place] (www.mhcsn.net): 473.167.6826 or 133-851-3509.  Mental Health Association [place] (www.mentalhealth.org): 149.885.3928 or 739-422-0426.     Substance Abuse and Mental Health Services (www.samhsa.gov).    [facility] ([facility])  [facility] connects people seeking recovery to resources that help foster and sustain long-term recovery.  Whether you are seeking resources for treatment, transportation, housing, job training, education, health care or other pathways to recovery, [facility] is a great place to start.  966.778.5344. www.[facility].org    General Medication Instructions:  See your medication papers for instructions. Take all medicines as directed.  Make no changes unless your doctor suggests them. Go to all your doctor visits.  Be sure to have all your required lab tests. This way, your medicines can be refilled on time. Do not use any drugs not prescribed by your provider.  AA/NA and Sponsors are excellent resources for support. Avoid alcohol at all costs!  THANK YOU FOR CHOOSING THE [facility]  The treatment team has appreciated the opportunity to work with you.  We wish you the best in the future. Please bring this discharge folder with you to all follow  up appointments - it contains your lab results, diagnosis, medication list and discharge recommendations.  For follow up questions:  Detox Nursing [phone], Past medical records [phone], Readmission [phone]          Pending Results     No orders found from  "2017 to 2017.            Admission Information     Date & Time Department Dept. Phone    2017 [facility] [phone]      Your Vitals Were     Blood Pressure Pulse Temperature Respirations Height Weight    100/65 81 98.4  F (36.9  C) 16 1.651 m (5' 5\") 64.4 kg (142 lb 1 oz)    Last Period Pulse Oximetry BMI (Body Mass Index)             2017 99% 23.64 kg/m2         [facility] Information     [employer] lets you send messages to your doctor, view your test results, renew your prescriptions, schedule appointments and more. To sign up, go to [email or web address] . Click on \"Log in\" on the left side of the screen, which will take you to the Welcome page. Then click on \"Sign up Now\" on the right side of the page.     You will be asked to enter the access code listed below, as well as some personal information. Please follow the directions to create your username and password.     Your access code is: [email or web address]  Expires: 3/11/2018 10:25 AM     Your access code will  in 90 days. If you need help or a new code, please call your [place] [facility] clinic or [phone].        Care EveryWhere ID     This is your Care EveryWhere ID. This could be used by other organizations to access your [place] [facility] medical records  [id number]        Equal Access to Services     [facility] AH: Hadii jennifer sheppard hadasho Sojuanali, waaxda luqadaha, qaybta kaalmada adeegyada, nolan stack . So [facility] [phone].    ATENCIÓN: Si habla español, tiene a velez disposición servicios gratuitos de asistencia lingüística. Llame al [phone].    We comply with applicable federal civil rights laws and [place] laws. We do not discriminate on the basis of race, color, national origin, age, disability, sex, sexual orientation, or gender identity.               Review of your medicines      UNREVIEWED medicines. Ask your doctor about these medicines        Dose / Directions    alum & mag " hydroxide-simethicone 200-200-20 MG/5ML Susp suspension   Commonly known as:  MYLANTA/MAALOX        Dose:  30 mL   Take 30 mLs by mouth every 6 hours as needed for indigestion   Refills:  0       guaiFENesin 20 mg/mL Soln solution   Commonly known as:  ROBITUSSIN        Dose:  10 mL   Take 10 mLs by mouth every 4 hours as needed for cough   Refills:  0       IBUPROFEN PO        Dose:  400 mg   Take 400 mg by mouth every 6 hours as needed for moderate pain   Refills:  0       loratadine 10 MG tablet   Commonly known as:  CLARITIN        Dose:  10 mg   Take 10 mg by mouth daily as needed for allergies   Refills:  0       MELATONIN PO        Dose:  3 mg   Take 3 mg by mouth nightly as needed   Refills:  0       phenol-menthol 14.5 MG lozenge        Dose:  1 lozenge   Place 1 lozenge inside cheek every 2 hours as needed for moderate pain   Refills:  0       senna-docusate 8.6-50 MG per tablet   Commonly known as:  SENOKOT-S;PERICOLACE        Dose:  2 tablet   Take 2 tablets by mouth daily as needed for constipation   Refills:  0         START taking        Dose / Directions    acetaminophen 325 MG tablet   Commonly known as:  TYLENOL   Used for:  Opioid use disorder, severe, dependence (H)        Dose:  325-650 mg   Take 1-2 tablets (325-650 mg) by mouth every 4 hours as needed for mild pain   Quantity:  100 tablet   Refills:  0       buprenorphine HCl-naloxone HCl 8-2 MG per film   Commonly known as:  SUBOXONE   Used for:  Opioid use disorder, severe, dependence (H)        Place one strip under tongue each morning, and half strip under tongue each evening   Quantity:  17 Film   Refills:  0       cloNIDine 0.1 MG/24HR WK patch   Commonly known as:  CATAPRES-TTS1   Used for:  Opioid use disorder, severe, dependence (H)        Dose:  1 patch   Place 1 patch onto the skin once a week   Quantity:  4 patch   Refills:  0       hydrOXYzine 25 MG tablet   Commonly known as:  ATARAX   Used for:  Opioid use disorder, severe,  dependence (H)        Dose:  25-50 mg   Take 1-2 tablets (25-50 mg) by mouth every 8 hours as needed for anxiety   Quantity:  120 tablet   Refills:  0       naloxone 1 mg/mL for intranasal kit (2 syringes with 2 mucosal atomizer device)   Commonly known as:  NARCAN   Used for:  Opioid use disorder, severe, dependence (H)        In opioid overdose put cone in nostril and push 1/2 of contents into each nostril.  Repeat every 3 min if no response until help arrives.   Quantity:  1 kit   Refills:  1       traZODone 50 MG tablet   Commonly known as:  DESYREL   Used for:  Opioid use disorder, severe, dependence (H)        Dose:   mg   Take 1-2 tablets ( mg) by mouth nightly as needed for sleep   Quantity:  60 tablet   Refills:  0            Where to get your medicines      These medications were sent to [facility] [place], [place] - [address]  [address] [place] [address], [facility] [address]     Phone:  [phone]     acetaminophen 325 MG tablet    cloNIDine 0.1 MG/24HR WK patch    hydrOXYzine 25 MG tablet    naloxone 1 mg/mL for intranasal kit (2 syringes with 2 mucosal atomizer device)    traZODone 50 MG tablet         Some of these will need a paper prescription and others can be bought over the counter. Ask your nurse if you have questions.     Bring a paper prescription for each of these medications     buprenorphine HCl-naloxone HCl 8-2 MG per film                Protect others around you: Learn how to safely use, store and throw away your medicines at www.disposemymeds.org.             Medication List: This is a list of all your medications and when to take them. Check marks below indicate your daily home schedule. Keep this list as a reference.      Medications           Morning Afternoon Evening Bedtime As Needed    acetaminophen 325 MG tablet   Commonly known as:  TYLENOL   Take 1-2 tablets (325-650 mg) by mouth every 4 hours as needed for mild pain   Last time this was given:   650 mg on 12/10/2017 12:24 PM                                alum & mag hydroxide-simethicone 200-200-20 MG/5ML Susp suspension   Commonly known as:  MYLANTA/MAALOX   Take 30 mLs by mouth every 6 hours as needed for indigestion                                buprenorphine HCl-naloxone HCl 8-2 MG per film   Commonly known as:  SUBOXONE   Place one strip under tongue each morning, and half strip under tongue each evening                                cloNIDine 0.1 MG/24HR WK patch   Commonly known as:  CATAPRES-TTS1   Place 1 patch onto the skin once a week   Last time this was given:  1 patch on 12/6/2017  5:21 PM                                guaiFENesin 20 mg/mL Soln solution   Commonly known as:  ROBITUSSIN   Take 10 mLs by mouth every 4 hours as needed for cough                                hydrOXYzine 25 MG tablet   Commonly known as:  ATARAX   Take 1-2 tablets (25-50 mg) by mouth every 8 hours as needed for anxiety   Last time this was given:  50 mg on 12/12/2017  9:45 PM                                IBUPROFEN PO   Take 400 mg by mouth every 6 hours as needed for moderate pain                                loratadine 10 MG tablet   Commonly known as:  CLARITIN   Take 10 mg by mouth daily as needed for allergies                                MELATONIN PO   Take 3 mg by mouth nightly as needed                                naloxone 1 mg/mL for intranasal kit (2 syringes with 2 mucosal atomizer device)   Commonly known as:  NARCAN   In opioid overdose put cone in nostril and push 1/2 of contents into each nostril.  Repeat every 3 min if no response until help arrives.                                phenol-menthol 14.5 MG lozenge   Place 1 lozenge inside cheek every 2 hours as needed for moderate pain                                senna-docusate 8.6-50 MG per tablet   Commonly known as:  SENOKOT-S;PERICOLACE   Take 2 tablets by mouth daily as needed for constipation                                 traZODone 50 MG tablet   Commonly known as:  DESYREL   Take 1-2 tablets ( mg) by mouth nightly as needed for sleep   Last time this was given:  50 mg on 12/12/2017  9:45 PM

## 2017-12-06 NOTE — ED PROVIDER NOTES
History     Chief Complaint   Patient presents with     Addiction Problem     Seeking detox for daily IV heroin use of 1-2 grams per day.  Last use last night.  Drinks 1 pint of alcohol every other day.  Took several sips this morning.  Bed being held per intake.      PRADIP Wilder is a 27 F now presenting for alcohol/opiate withdrawal. Bed available and held.   Has been consuming 1 pint of alcohol for the last 2 months, which 1-2 g of IV heroine for the last year. No other drugs.  Denies any medical problems and takes no regular medications.  Feels (very badly) Feels craving for alcohol, feels abdominal sickness, nausea, cramping, all  consistent with prior episodes of opiate withdrawal.   No chest pain or shortness of breath. No headache. No visual changes. No trauma.    This part of the medical record was transcribed by Adolph Penny, Medical Scribe, from a dictation done by Yoan Reyes MD.     Past Medical History:   Diagnosis Date     Anxiety      Chickenpox      Depression      Depressive disorder      GERD (gastroesophageal reflux disease)      Substance abuse        History reviewed. No pertinent surgical history.    Family History   Problem Relation Age of Onset     Allergies Mother      Depression Mother      Asthma No family hx of      C.A.D. No family hx of      DIABETES No family hx of      Hypertension No family hx of      CEREBROVASCULAR DISEASE No family hx of      Breast Cancer No family hx of      Cancer - colorectal No family hx of      Alcohol/Drug Mother      Prostate Cancer Paternal Grandfather        Social History   Substance Use Topics     Smoking status: Current Every Day Smoker     Packs/day: 1.00     Years: 10.00     Smokeless tobacco: Never Used     Alcohol use Yes         I have reviewed the Medications, Allergies, Past Medical and Surgical History, and Social History in the Epic system.    Review of Systems  ROS: 14 point ROS neg other than the symptoms noted above in the  "HPI.    Physical Exam   BP: 116/84  Pulse: 80  Temp: 97.4  F (36.3  C)  Resp: 20  Height: 165.1 cm (5' 5\")  Weight: 64.4 kg (142 lb 1 oz)  SpO2: 97 %      Physical Exam  GEN: Well appearing, non toxic, cooperative and conversant.   HEENT: The head is normocephalic and atraumatic. Pupils are equal round and reactive to light. Extraocular motions are intact. There is no facial swelling. The neck is nontender and supple.   CV: Regular rate a  PULM: unlabored  ABD: Soft, nontender,   EXT: Full range of motion.  No edema.  NEURO: Romberg negative. No fasciculations, no tremors.Cranial nerves II through XII are intact and symmetric. Bilateral upper and lower extremities grossly show full range of motion without any focal deficits.   SKIN: No rashes, ecchymosis, or lacerations  PSYCH: Calm and cooperative, interactive.     ED Course     ED Course     Procedures               Labs Ordered and Resulted from Time of ED Arrival Up to the Time of Departure from the ED   DRUG ABUSE SCREEN 6 CHEM DEP URINE ([facility]) - Abnormal; Notable for the following:        Result Value    Cannabinoids Qual Urine Positive (*)     Opiates Qualitative Urine Positive (*)     All other components within normal limits   ALCOHOL BREATH TEST POCT - Abnormal; Notable for the following:     Alcohol Breath Test 0.038 (*)     All other components within normal limits   HCG QUALITATIVE URINE            Assessments & Plan (with Medical Decision Making)   27 F presenting for detox (bed held) for opiate/alcohol withdrawal  Medically clear.  Treatment medications as ordered including, Librium and hydroxyzine for symptom management.   Awaiting bed placement and will continue to closely monitor.  Drug screen reviewed, hcg neg    This part of the medical record was transcribed by Madalyn Benson Scribe, from a dictation done by Yoan Reyes MD.       I have reviewed the nursing notes.    I have reviewed the findings, diagnosis, plan and need for follow up " with the patient.    There are no discharge medications for this patient.      Final diagnoses:   Opiate dependence, continuous (H)       12/6/2017   [facility], [place], EMERGENCY DEPARTMENT     Yoan Reyes MD  12/06/17 2778

## 2017-12-06 NOTE — TELEPHONE ENCOUNTER
S: Pt in ED looking for detox on 3a    B: Pt has been using 1-2mg heroin daily for the last year. Pt also drinking about 1pt of hard ETOh daily for the las couple of months. BA .03. Pt has no history of medical issues    A: Voluntary    R: Pt accepted to 3a under Pablo

## 2017-12-07 PROBLEM — F11.23 OPIOID DEPENDENCE WITH WITHDRAWAL (H): Status: ACTIVE | Noted: 2017-02-27

## 2017-12-07 PROBLEM — F11.20 OPIOID USE DISORDER, SEVERE, DEPENDENCE (H): Status: ACTIVE | Noted: 2017-12-07

## 2017-12-07 LAB
ALBUMIN SERPL-MCNC: 4.1 G/DL (ref 3.4–5)
ALP SERPL-CCNC: 103 U/L (ref 40–150)
ALT SERPL W P-5'-P-CCNC: 14 U/L (ref 0–50)
ANION GAP SERPL CALCULATED.3IONS-SCNC: 11 MMOL/L (ref 3–14)
AST SERPL W P-5'-P-CCNC: 11 U/L (ref 0–45)
BILIRUB SERPL-MCNC: 0.5 MG/DL (ref 0.2–1.3)
BUN SERPL-MCNC: 13 MG/DL (ref 7–30)
CALCIUM SERPL-MCNC: 9.2 MG/DL (ref 8.5–10.1)
CHLORIDE SERPL-SCNC: 102 MMOL/L (ref 94–109)
CHOLEST SERPL-MCNC: 177 MG/DL
CO2 SERPL-SCNC: 26 MMOL/L (ref 20–32)
CREAT SERPL-MCNC: 0.56 MG/DL (ref 0.52–1.04)
ERYTHROCYTE [DISTWIDTH] IN BLOOD BY AUTOMATED COUNT: 13.2 % (ref 10–15)
GFR SERPL CREATININE-BSD FRML MDRD: >90 ML/MIN/1.7M2
GGT SERPL-CCNC: 18 U/L (ref 0–40)
GLUCOSE SERPL-MCNC: 107 MG/DL (ref 70–99)
HBV CORE AB SERPL QL IA: NONREACTIVE
HBV SURFACE AG SERPL QL IA: NONREACTIVE
HCT VFR BLD AUTO: 42.7 % (ref 35–47)
HCV AB SERPL QL IA: REACTIVE
HDLC SERPL-MCNC: 50 MG/DL
HGB BLD-MCNC: 14.5 G/DL (ref 11.7–15.7)
HIV 1+2 AB+HIV1 P24 AG SERPL QL IA: NONREACTIVE
LDLC SERPL CALC-MCNC: 114 MG/DL
MCH RBC QN AUTO: 28.6 PG (ref 26.5–33)
MCHC RBC AUTO-ENTMCNC: 34 G/DL (ref 31.5–36.5)
MCV RBC AUTO: 84 FL (ref 78–100)
NONHDLC SERPL-MCNC: 127 MG/DL
PLATELET # BLD AUTO: 349 10E9/L (ref 150–450)
POTASSIUM SERPL-SCNC: 3.3 MMOL/L (ref 3.4–5.3)
PROT SERPL-MCNC: 8.3 G/DL (ref 6.8–8.8)
RBC # BLD AUTO: 5.07 10E12/L (ref 3.8–5.2)
SODIUM SERPL-SCNC: 139 MMOL/L (ref 133–144)
TRIGL SERPL-MCNC: 63 MG/DL
WBC # BLD AUTO: 10.9 10E9/L (ref 4–11)

## 2017-12-07 PROCEDURE — 87522 HEPATITIS C REVRS TRNSCRPJ: CPT | Performed by: PSYCHIATRY & NEUROLOGY

## 2017-12-07 PROCEDURE — 87340 HEPATITIS B SURFACE AG IA: CPT | Performed by: PSYCHIATRY & NEUROLOGY

## 2017-12-07 PROCEDURE — 25000125 ZZHC RX 250: Performed by: PSYCHIATRY & NEUROLOGY

## 2017-12-07 PROCEDURE — 80053 COMPREHEN METABOLIC PANEL: CPT | Performed by: PSYCHIATRY & NEUROLOGY

## 2017-12-07 PROCEDURE — 25000132 ZZH RX MED GY IP 250 OP 250 PS 637: Performed by: PSYCHIATRY & NEUROLOGY

## 2017-12-07 PROCEDURE — 99223 1ST HOSP IP/OBS HIGH 75: CPT | Mod: AI | Performed by: PSYCHIATRY & NEUROLOGY

## 2017-12-07 PROCEDURE — 36415 COLL VENOUS BLD VENIPUNCTURE: CPT | Performed by: PSYCHIATRY & NEUROLOGY

## 2017-12-07 PROCEDURE — 82977 ASSAY OF GGT: CPT | Performed by: PSYCHIATRY & NEUROLOGY

## 2017-12-07 PROCEDURE — 86704 HEP B CORE ANTIBODY TOTAL: CPT | Performed by: PSYCHIATRY & NEUROLOGY

## 2017-12-07 PROCEDURE — 12800012 ZZH R&B CD MH INTERMEDIATE ADULT

## 2017-12-07 PROCEDURE — 80061 LIPID PANEL: CPT | Performed by: PSYCHIATRY & NEUROLOGY

## 2017-12-07 PROCEDURE — 85027 COMPLETE CBC AUTOMATED: CPT | Performed by: PSYCHIATRY & NEUROLOGY

## 2017-12-07 PROCEDURE — 86803 HEPATITIS C AB TEST: CPT | Performed by: PSYCHIATRY & NEUROLOGY

## 2017-12-07 PROCEDURE — 87389 HIV-1 AG W/HIV-1&-2 AB AG IA: CPT | Performed by: PSYCHIATRY & NEUROLOGY

## 2017-12-07 RX ADMIN — DIAZEPAM 10 MG: 5 TABLET ORAL at 08:19

## 2017-12-07 RX ADMIN — HYDROXYZINE HYDROCHLORIDE 50 MG: 25 TABLET ORAL at 22:24

## 2017-12-07 RX ADMIN — HYDROXYZINE HYDROCHLORIDE 50 MG: 25 TABLET ORAL at 16:07

## 2017-12-07 RX ADMIN — HYDROXYZINE HYDROCHLORIDE 50 MG: 25 TABLET ORAL at 04:32

## 2017-12-07 RX ADMIN — DIAZEPAM 10 MG: 5 TABLET ORAL at 04:32

## 2017-12-07 RX ADMIN — DIAZEPAM 10 MG: 5 TABLET ORAL at 11:31

## 2017-12-07 RX ADMIN — ONDANSETRON 4 MG: 4 TABLET, ORALLY DISINTEGRATING ORAL at 04:11

## 2017-12-07 RX ADMIN — DIAZEPAM 10 MG: 5 TABLET ORAL at 00:13

## 2017-12-07 RX ADMIN — TRAZODONE HYDROCHLORIDE 50 MG: 50 TABLET ORAL at 02:15

## 2017-12-07 RX ADMIN — TRAZODONE HYDROCHLORIDE 50 MG: 50 TABLET ORAL at 22:24

## 2017-12-07 RX ADMIN — BUPRENORPHINE HCL 4 MG: 2 TABLET SUBLINGUAL at 20:20

## 2017-12-07 RX ADMIN — HYDROXYZINE HYDROCHLORIDE 50 MG: 25 TABLET ORAL at 02:15

## 2017-12-07 RX ADMIN — BUPRENORPHINE HCL 4 MG: 2 TABLET SUBLINGUAL at 08:19

## 2017-12-07 RX ADMIN — DIAZEPAM 10 MG: 5 TABLET ORAL at 20:20

## 2017-12-07 RX ADMIN — ONDANSETRON 4 MG: 4 TABLET, ORALLY DISINTEGRATING ORAL at 11:31

## 2017-12-07 RX ADMIN — ONDANSETRON 4 MG: 4 TABLET, ORALLY DISINTEGRATING ORAL at 20:20

## 2017-12-07 RX ADMIN — DIAZEPAM 10 MG: 5 TABLET ORAL at 02:15

## 2017-12-07 RX ADMIN — BUPRENORPHINE HCL 4 MG: 2 TABLET SUBLINGUAL at 16:07

## 2017-12-07 RX ADMIN — ACETAMINOPHEN 650 MG: 325 TABLET, FILM COATED ORAL at 08:19

## 2017-12-07 ASSESSMENT — ACTIVITIES OF DAILY LIVING (ADL)
GROOMING: INDEPENDENT
ORAL_HYGIENE: INDEPENDENT
DRESS: INDEPENDENT
LAUNDRY: WITH SUPERVISION

## 2017-12-07 NOTE — PROGRESS NOTES
CASE MANAGEMENT NOTE    Writer checked in with patient who was in bed, to discuss discharge planning. Patient appeared to be in withdrawal discomfort. She reports homelessness in [place] for the last few months and is requesting assistance with residential treatment placement. Rule 25 funding to be requested through [facility]. Patient was directed to contact the business office for assistance with [place] application when able.     Candis Del Real) ESTEVAN Arias, [facility]  3A Psychotherapist  [phone]

## 2017-12-07 NOTE — PROGRESS NOTES
"CLINICAL NUTRITION SERVICES - ASSESSMENT NOTE     Nutrition Prescription    RECOMMENDATIONS FOR MDs/PROVIDERS TO ORDER:  None    Malnutrition Status:    Unable to assess    Recommendations already ordered by Registered Dietitian (RD):  Trial supplements BID between meals    Future/Additional Recommendations:  Recommend RD follow up with patient when she is feeling better to obtain nutrition history and current intake/ supplement acceptance.     REASON FOR ASSESSMENT  Domenico Wilder is a/an 27 year old female assessed by the dietitian for Admission Nutrition Risk Screen for unintentional loss of 10# or more in the past two months and reduced oral intake over the last month    NUTRITION HISTORY  Unable to obtain nutrition history from patient. Pt was moaning in bed during attempted visit. Chart review suggests reduced po intake prior to admission.    CURRENT NUTRITION ORDERS  Diet: Regular  Intake/Tolerance: Patient was still not feeling when this writer visited this morning.    LABS  Labs reviewed    MEDICATIONS  Medications reviewed    ANTHROPOMETRICS  Height: 165.1 cm (5' 5\")  Most Recent Weight: 64.4 kg (142 lb 1 oz)    IBW: 57 kg  BMI: Normal BMI  Weight History: No evidence of recent weight loss.  Wt Readings from Last 5 Encounters:   12/06/17 64.4 kg (142 lb 1 oz)   02/27/17 63.5 kg (140 lb)   07/12/16 61.2 kg (135 lb)   02/09/10 54.4 kg (120 lb) (39 %)*   06/25/09 64.9 kg (143 lb) (78 %)*     * Growth percentiles are based on CDC 2-20 Years data.       Dosing Weight: 64 kg    ASSESSED NUTRITION NEEDS  Estimated Energy Needs: 1500-4654 kcals/day (25 - 30 kcals/kg)  Justification: Maintenance  Estimated Protein Needs: 51-64 grams protein/day (0.8 - 1 grams of pro/kg)  Justification: Maintenance  Estimated Fluid Needs: 1 mL/kcal or per MD goals   Justification: Maintenance    PHYSICAL FINDINGS  See malnutrition section below.     MALNUTRITION  % Intake: < 75% for > 7 days (non-severe)  % Weight Loss: None " noted  Subcutaneous Fat Loss: Unable to assess  Muscle Loss: Unable to assess  Fluid Accumulation/Edema: None noted  Malnutrition Diagnosis: Unable to determine due to unable to complete physical assessment    NUTRITION DIAGNOSIS  Inadequate oral intake related to reduced appetite, substance use as evidenced by limited po intake since admission with chart review suggesting decreased po intake prior to admission as well.      INTERVENTIONS  Implementation  Nutrition Education: Not appropriate at this time due to patient condition   Medical food supplement therapy     Goals  Patient to consume % of nutritionally adequate meal trays TID, or the equivalent with supplements/snacks.     Monitoring/Evaluation  Progress toward goals will be monitored and evaluated per protocol.      Brisa Hemphill RD

## 2017-12-07 NOTE — PLAN OF CARE
Problem: Substance Withdrawal  Goal: Substance Withdrawal    Problem: Substance Withdrawal   Goal: Substance Withdrawal   1) Patient will achieve medical stabilization of acute withdrawal sx.  2) Patient will remain safe and free from injury  3) Patient will demonstrate improvement of ADLs (appetite, hygiene)  4) Verbalize reduction of fear or anxiety to a manageable level.  5) Verbalize knowledge of substance abuse as a disease  6) Verbalize risks and negative effects related to drug ingestion  7) Demonstrate participation in unit programming and attends specific substance use group therapy (i.e AA meetings)  8) Accept referral to substance abuse treatment  9) Express sense of regaining some control of situation/life (possible by verbalizing alternative coping mechanisms as alternatives to substance use in response to stress)   Signs and symptoms of listed problems will be absent or manageable.   Outcome: Declining  S:  Domenico is a 28 yo F, (mother of one), who comes to [facility] seeking detox from opiates and alcohol.  She reports that she has been drinking a pint of Vodka a day for the last few months.  Her last drink was today (12/6/17) at 10:00 am .  She states that she has been injecting heroin, 1-2 gms daily for the last 2 yrs.  She states that her last use of heroin was yesterday (12/6/17) sometime around 18:00 pm.  She has numerous track marks on both forearms and the backs of her hands .  She is also a 1/2 ppd cigarette smoker since her early teens. She reports using marijuana and meth occasionally.    She states that she has been depressed but she denies thoughts of self harm, suicide ideation or thoughts of harming anyone else.  She receives emotional support from her boyfriend and her parents.  Her parents are caring for her five year old son.  She states that she has been couch hopping.  She states that her boyfriend also uses heroin.    B:  Epic history lists h/o depression, anxiety, GERD, SA and Chicken  Pox.    A:  Pt in moderate alcohol withdrawal and severe opiate withdrawal AEB MSSA scores of 9 & 12 and COWS scores of 20 & 11.  R:  Obtained admission orders.  Provided with a nutritious meal (pt refused) and encouraged increased fluid intake.  Oriented to unit and schedule including lab work to be collected.  Introduced to IM&R Treatment program. Counseled on smoking cessation.  Administered diazepam 5 mg and Buprenorphine 4 mg once.  Later complained of heartburn and Maalox 30 ml was given.  Administered Tylenol 650 mg, trazodone 50 mg Zofran 4 mg and (a second dose of) Buprenorphine 4 mg. Continue to monitor and medicate as ordered and indicated.

## 2017-12-07 NOTE — H&P
"Addiction Medicine History and Physical    Domenico Wilder MRN# [id number]   Age: 27 year old YOB: 1990     Date of Admission:  12/6/2017  Date of H&P:   December 7, 2017    Primary care provider: Saurabh Brandon          Assessment and Plan:   Assessment:   Principal Problem:    Opioid dependence with withdrawal (H)  Active Problems:    Chemical dependency (H)    Opioid use disorder, severe, dependence (H)    History of depression and anxiety      Plan:   Medications:  - Buprenorphine 4 mg BID  - Clonidine patch 0.1mg weekly  - Folic acid 1 mg daily  - Thiamine 100 mg daily x3 doses total  - Nicoderm 14 mg patch  - Valium per [facility] protocol  - Comfort meds, see orders  Dispo planning ongoing; see CM notes for details, but patient states open to treatment recommendations, unclear if she desires maintenance medication at this time          Chief Complaint:   \"I can't do it anymore.\"     History is obtained from the patient, and chart review          History of Present Illness:   This patient is a 27 year old single, unemployed female who is currently homeless, with history of alcohol and opioid use disorder (prior MAT with buprenorphine and methadone), who presented to our ED seeking detoxification from alcohol and opioids. Reports that she has been using heroin and alcohol daily and that she can't do this anymore. There was no acute event that prompted admission other than decision to make a change. Interested in treatment following discharge as she has found this to be helpful in the past. Currently, has no specific treatment facility identified and is aware that Case Management will be working with her on this. In the past has found sober friends and going to meetings to be helpful in maintaining sobriety following discharge from treatment facilities.     Currently reports physical symptoms of pain, chills, nausea and vomiting as well as feeling anxious. Denies any concerning injection sites. "     Reports a history of depression and anxiety that predate substance use and are also a reason she uses. Does not feel either have ever been well controlled. Discussed improved chance of mental health treatment success with increasing remission from use. No safety concerns at this time.            Past Medical History:     I have reviewed this patient's past medical history, see HPI, and  Past Medical History:   Diagnosis Date     Anxiety      Chickenpox      Depression      Depressive disorder      GERD (gastroesophageal reflux disease)            Past Surgical History:     History reviewed. No pertinent surgical history.          Social History:   I have reviewed this patient's social history, see HPI.          Family History:     I have reviewed this patient's family history, and it is not directly pertinent to present encounter for detoxification.  Family History   Problem Relation Age of Onset     Allergies Mother      Depression Mother      Asthma No family hx of      C.A.D. No family hx of      DIABETES No family hx of      Hypertension No family hx of      CEREBROVASCULAR DISEASE No family hx of      Breast Cancer No family hx of      Cancer - colorectal No family hx of      Alcohol/Drug Mother      Prostate Cancer Paternal Grandfather             Immunizations:     Immunization History   Administered Date(s) Administered     Influenza (IIV3) PF 10/06/2008     TDAP Vaccine (Adacel) 10/06/2008           Allergies:     All allergies reviewed and addressed  Allergies   Allergen Reactions     Cats      Dogs              Medications:     No prescriptions prior to admission.     [place]  query result:  Indicates no controlled prescriptions reported in previous 12 months.         Review of Systems:   Complete ROS performed and is negative except as indicated in HPI, and elsewhere in this note.           Physical Exam:     Vitals were reviewed  Patient Vitals for the past 12 hrs:   BP Temp Temp src Pulse Resp  "  12/07/17 0433 (!) 124/92 98.2  F (36.8  C) Oral 88 18   12/07/17 0206 (!) 135/100 98.8  F (37.1  C) Oral 80 16   12/07/17 0010 129/89 99.8  F (37.7  C) Tympanic 81 18   12/06/17 2028 135/84 99.7  F (37.6  C) Oral 62 16     Constitutional:   Tired, somnolent, non-toxic     Eyes:   Anicteric, no injection, PERRL     ENT:   Clear rhinorrhea, mmm     Lungs:   no increased work of breathing and clear to auscultation     Cardiovascular:   well-perfused, no murmur, no edema     Abdomen:   Active bowel sounds, soft, NT, no HSM     Neurologic:   No tremor, normal tone, gait not assessed     Skin:   Flushed, warm, anicteric, no infectious signs     MENTAL STATUS EXAM  Appearance: in scrubs, ungroomed, in bed wrapped in blanket  Attitude: in withdrawal distress but patient and cooperative  Behavior: no agitation or slowing  Eye Contact: intermittent  Speech: soft-spoken, coherent, no pressuring  Orientation: oriented to person , place, time and situation  Mood:  \"okay\"  Affect: tired and patiently irritable  Thought Process: appears goal-directed, no FOI or REANNA  Suicidal Ideation: denies thought/intent/plan  Hallucination: denies  Insight: partial, capable of continued improvement  Judgment: adequate for safety         Data:   All laboratory data reviewed  Results for orders placed or performed during the hospital encounter of 12/06/17   Drug abuse screen 6 urine (tox)   Result Value Ref Range    Amphetamine Qual Urine Negative NEG^Negative    Barbiturates Qual Urine Negative NEG^Negative    Benzodiazepine Qual Urine Negative NEG^Negative    Cannabinoids Qual Urine Positive (A) NEG^Negative    Cocaine Qual Urine Negative NEG^Negative    Ethanol Qual Urine Negative NEG^Negative    Opiates Qualitative Urine Positive (A) NEG^Negative   HCG qualitative urine   Result Value Ref Range    HCG Qual Urine Negative NEG^Negative   GGT   Result Value Ref Range    GGT 18 0 - 40 U/L   CBC with platelets   Result Value Ref Range    WBC " 10.9 4.0 - 11.0 10e9/L    RBC Count 5.07 3.8 - 5.2 10e12/L    Hemoglobin 14.5 11.7 - 15.7 g/dL    Hematocrit 42.7 35.0 - 47.0 %    MCV 84 78 - 100 fl    MCH 28.6 26.5 - 33.0 pg    MCHC 34.0 31.5 - 36.5 g/dL    RDW 13.2 10.0 - 15.0 %    Platelet Count 349 150 - 450 10e9/L   Comprehensive metabolic panel   Result Value Ref Range    Sodium 139 133 - 144 mmol/L    Potassium 3.3 (L) 3.4 - 5.3 mmol/L    Chloride 102 94 - 109 mmol/L    Carbon Dioxide 26 20 - 32 mmol/L    Anion Gap 11 3 - 14 mmol/L    Glucose 107 (H) 70 - 99 mg/dL    Urea Nitrogen 13 7 - 30 mg/dL    Creatinine 0.56 0.52 - 1.04 mg/dL    GFR Estimate >90 >60 mL/min/1.7m2    GFR Estimate If Black >90 >60 mL/min/1.7m2    Calcium 9.2 8.5 - 10.1 mg/dL    Bilirubin Total 0.5 0.2 - 1.3 mg/dL    Albumin 4.1 3.4 - 5.0 g/dL    Protein Total 8.3 6.8 - 8.8 g/dL    Alkaline Phosphatase 103 40 - 150 U/L    ALT 14 0 - 50 U/L    AST 11 0 - 45 U/L   Lipid panel   Result Value Ref Range    Cholesterol 177 <200 mg/dL    Triglycerides 63 <150 mg/dL    HDL Cholesterol 50 >49 mg/dL    LDL Cholesterol Calculated 114 (H) <100 mg/dL    Non HDL Cholesterol 127 <130 mg/dL   Alcohol breath test POCT   Result Value Ref Range    Alcohol Breath Test 0.038 (A) 0.00 - 0.01      Attestation:  I have reviewed today's vital signs, notes, medications, and labs/studies pertinent to this encounter.    Indication for hospitalization is opioid and alcohol use disorders with physical opioid dependence and withdrawal; high degree of complexity due to potential withdrawal morbidity, complicated by co-occurring opioid and alcohol withdrawal, anxiety and depression history, presently unmanaged.    Osmin Shah MD  Pediatrics/Addiction Medicine

## 2017-12-07 NOTE — PROGRESS NOTES
Patient monitored for withdrawal from opioids and alcohol. MSSA scores 10 and 9. COWS 11 and 10. She appears unkempt, ill, and has spent the shift laying in bed. Reporting generalized aches that were partially relieved with scheduled buprenorphine and prn Tylenol this morning. Prn ondansetron given at 1130 for nausea. Fluids and food encouraged. Ginger ale give. Patient encouraged to work on CD assessment paperwork once feeling able and stated that she is interested in some form of CD treatment after detox.

## 2017-12-07 NOTE — PROGRESS NOTES
CASE MANAGEMENT NOTE    Writer checked with patient who was in bed, to initiate discharge planning. He reports a plan to return to [facility] for residential treatment and signed an JONNY. Writer left voicemail for Alda, [phone], awaiting reply.      Candis Del Real) ESTEVAN Arias, [facility]  3A Psychotherapist  [phone]

## 2017-12-08 PROCEDURE — 12800012 ZZH R&B CD MH INTERMEDIATE ADULT

## 2017-12-08 PROCEDURE — 99231 SBSQ HOSP IP/OBS SF/LOW 25: CPT | Performed by: PSYCHIATRY & NEUROLOGY

## 2017-12-08 PROCEDURE — 25000125 ZZHC RX 250: Performed by: PSYCHIATRY & NEUROLOGY

## 2017-12-08 PROCEDURE — 25000132 ZZH RX MED GY IP 250 OP 250 PS 637: Performed by: PSYCHIATRY & NEUROLOGY

## 2017-12-08 RX ORDER — BUPRENORPHINE 2 MG/1
4 TABLET SUBLINGUAL
Status: COMPLETED | OUTPATIENT
Start: 2017-12-08 | End: 2017-12-09

## 2017-12-08 RX ADMIN — ONDANSETRON 4 MG: 4 TABLET, ORALLY DISINTEGRATING ORAL at 04:54

## 2017-12-08 RX ADMIN — BUPRENORPHINE HCL 4 MG: 2 TABLET SUBLINGUAL at 08:29

## 2017-12-08 RX ADMIN — ACETAMINOPHEN 650 MG: 325 TABLET, FILM COATED ORAL at 04:54

## 2017-12-08 RX ADMIN — TRAZODONE HYDROCHLORIDE 50 MG: 50 TABLET ORAL at 21:50

## 2017-12-08 RX ADMIN — DIAZEPAM 10 MG: 5 TABLET ORAL at 08:29

## 2017-12-08 RX ADMIN — HYDROXYZINE HYDROCHLORIDE 50 MG: 25 TABLET ORAL at 21:50

## 2017-12-08 RX ADMIN — DIAZEPAM 10 MG: 5 TABLET ORAL at 04:53

## 2017-12-08 RX ADMIN — ACETAMINOPHEN 650 MG: 325 TABLET, FILM COATED ORAL at 00:30

## 2017-12-08 RX ADMIN — DIAZEPAM 10 MG: 5 TABLET ORAL at 00:30

## 2017-12-08 RX ADMIN — BUPRENORPHINE HCL 4 MG: 2 TABLET SUBLINGUAL at 16:08

## 2017-12-08 RX ADMIN — ONDANSETRON 4 MG: 4 TABLET, ORALLY DISINTEGRATING ORAL at 14:17

## 2017-12-08 RX ADMIN — TRAZODONE HYDROCHLORIDE 50 MG: 50 TABLET ORAL at 00:30

## 2017-12-08 ASSESSMENT — ACTIVITIES OF DAILY LIVING (ADL)
ORAL_HYGIENE: INDEPENDENT
DRESS: INDEPENDENT
GROOMING: INDEPENDENT

## 2017-12-08 NOTE — PROGRESS NOTES
Addiction Medicine Progress Note          Assessment and Plan:   Assessment:   Principal Problem:    Opioid dependence with withdrawal (H), improved  Active Problems:    Opioid use disorder, severe, dependence (H), plans to pursue buprenorphine maintenance    History of depression/anxiety    History of Hep C (+), awaiting quantitative RNA      Plan:   PRN buprenorphine dose 4 mg at 8:00 pm tonight  If dose given, convert to 4 mg tid  Patient to call Dr. Hutton' office to arrange for maintenance appointment  Dispo planning ongoing; see CM notes for details, but patient desires residential treatment plus buprenorphine; working on insurance and assessment         Interval History:   No acute events. Initial buprenorphine helped, but never fully out of withdrawal. This morning with myalgias, nausea, anxiety. Still, much more interactive. Able to state clear desire to try buprenorphine maintenance with Dr. Hutton with whom she has worked in the past.           Review of Systems:   Complete ROS performed and is negative except as indicated in interval history, and elsewhere in this note.           Medications:   Current Facility-Administered Medications   Medication     diazepam (VALIUM) tablet 5-20 mg     atenolol (TENORMIN) tablet 50 mg     thiamine tablet 100 mg     folic acid (FOLVITE) tablet 1 mg     multivitamin, therapeutic with minerals (THERA-VIT-M) tablet 1 tablet     hydrOXYzine (ATARAX) tablet 25-50 mg     acetaminophen (TYLENOL) tablet 650 mg     alum & mag hydroxide-simethicone (MYLANTA ES/MAALOX  ES) suspension 30 mL     magnesium hydroxide (MILK OF MAGNESIA) suspension 30 mL     bisacodyl (DULCOLAX) Suppository 10 mg     traZODone (DESYREL) tablet 50 mg     nicotine Patch in Place     nicotine patch REMOVAL     nicotine (NICODERM CQ) 14 MG/24HR 24 hr patch 1 patch     ondansetron (ZOFRAN-ODT) ODT tab 4 mg     cloNIDine (CATAPRES-TTS) Patch in Place     [START ON 12/13/2017] cloNIDine (CATAPRES-TTS) patch  "REMOVAL     cloNIDine (CATAPRES-TTS1) 0.1 MG/24HR WK patch 1 patch     buprenorphine (SUBUTEX) sublingual tablet 4 mg     naloxone (NARCAN) injection 0.1-0.4 mg          Physical Exam:     Vitals were reviewed  Patient Vitals for the past 12 hrs:   BP Temp Temp src Pulse Resp   12/08/17 0447 113/84 99.4  F (37.4  C) Oral 96 16   12/08/17 0022 124/82 98  F (36.7  C) Oral 62 16   12/07/17 2006 (!) 128/95 99.5  F (37.5  C) Tympanic 69 16     Constitutional:   Alert, ill but non-toxic appearing     Eyes:   Anicteric, no injection, (+) mydriasis, PERRL     ENT:   Clear rhinorrhea, mmm     Lungs:   no increased work of breathing and clear to auscultation     Cardiovascular:   Well-perfused, no murmur, no edema     Neurologic:   No tremor, normal tone, gait and coordination intact     Skin:   No infectious signs, soft, NT, no HSM     MENTAL STATUS EXAM  Appearance: in scrubs, ungroomed  Attitude: engaged, cooperative  Behavior: no agitation or slowing  Eye Contact: focused on examiner  Speech: coherent, no pressuring  Orientation: oriented to person , place, time and situation  Mood:  \"better\"  Affect: mildly anxious, otherwise bright, pleasant  Thought Process: goal-directed, no FOI or REANNA  Suicidal Ideation: denies thought/intent/plan  Hallucination: denies  Insight: partial, capable of continued improvement  Judgment: adequate for safety          Data:   All laboratory data reviewed, HIV, Hep B negative/NR. Hep C positive (expected), await HCV RNA.    Attestation:I have reviewed today's vital signs, notes, medications, and labs/studies pertinent to this encounter.     Osmin Shah MD  Pediatrics/Addiction Medicine    "

## 2017-12-08 NOTE — PROGRESS NOTES
CLINICAL NUTRITION SERVICES BRIEF NOTE    Met with pt to obtain full nutrition history today. For complete assessment, see RD note 12/7.     Nutrition History:   Patient reports she had been homeless PTA and did not have food available unless she was at her parents house. Her intake was up and down depending on the day, suspects she was eating <75% of nutritional needs x 1-2 months.     Current intake: patient reports currently she has been having a lot of nausea and hasn't been able to eat much beyond crackers. She feels better today and would like to try eating more for meals.     Implementation  Intervention:   Nutrition Education - encouraged patient to start with small, frequent meals to manage nausea and improve energy intake as she begins to feel better.   Medical food/supplement - changed order to Ensure Clear berry with breakfast.     Monitoring/Evaluation  Progress toward goals will be monitored and evaluated per protocol.    Alysia Iglesias RD, LD  Unit Pager: [phone]

## 2017-12-08 NOTE — PROGRESS NOTES
CASE MANAGEMENT NOTE    Writer checked in with patient this afternoon, she was working on her paperwork. Patient informed that case management will meet with her over the weekend to assist with placement and discharge plans.    Candis Arias MA (Cindy), [facility]  3A Psychotherapist  [phone]

## 2017-12-09 PROCEDURE — 25000132 ZZH RX MED GY IP 250 OP 250 PS 637: Performed by: PSYCHIATRY & NEUROLOGY

## 2017-12-09 PROCEDURE — 25000125 ZZHC RX 250: Performed by: PSYCHIATRY & NEUROLOGY

## 2017-12-09 PROCEDURE — 12800012 ZZH R&B CD MH INTERMEDIATE ADULT

## 2017-12-09 RX ADMIN — BUPRENORPHINE HCL 4 MG: 2 TABLET SUBLINGUAL at 16:09

## 2017-12-09 RX ADMIN — HYDROXYZINE HYDROCHLORIDE 50 MG: 25 TABLET ORAL at 22:43

## 2017-12-09 RX ADMIN — ONDANSETRON 4 MG: 4 TABLET, ORALLY DISINTEGRATING ORAL at 22:43

## 2017-12-09 RX ADMIN — ACETAMINOPHEN 650 MG: 325 TABLET, FILM COATED ORAL at 20:19

## 2017-12-09 RX ADMIN — BUPRENORPHINE HCL 4 MG: 2 TABLET SUBLINGUAL at 08:05

## 2017-12-09 RX ADMIN — BUPRENORPHINE HCL 4 MG: 2 TABLET SUBLINGUAL at 04:37

## 2017-12-09 RX ADMIN — HYDROXYZINE HYDROCHLORIDE 50 MG: 25 TABLET ORAL at 20:19

## 2017-12-09 RX ADMIN — TRAZODONE HYDROCHLORIDE 50 MG: 50 TABLET ORAL at 22:43

## 2017-12-09 RX ADMIN — HYDROXYZINE HYDROCHLORIDE 50 MG: 25 TABLET ORAL at 12:41

## 2017-12-09 RX ADMIN — ONDANSETRON 4 MG: 4 TABLET, ORALLY DISINTEGRATING ORAL at 04:36

## 2017-12-09 RX ADMIN — ONDANSETRON 4 MG: 4 TABLET, ORALLY DISINTEGRATING ORAL at 12:41

## 2017-12-09 ASSESSMENT — ACTIVITIES OF DAILY LIVING (ADL)
ORAL_HYGIENE: INDEPENDENT
DRESS: INDEPENDENT
GROOMING: INDEPENDENT

## 2017-12-09 NOTE — PROGRESS NOTES
Patient has been out and about on the unit. She attended AA meeting. Her opiate withdrawal scores this teena: 5 and 5. VItal signs WNLs. She interacts appropriately.

## 2017-12-10 PROCEDURE — 25000132 ZZH RX MED GY IP 250 OP 250 PS 637: Performed by: PSYCHIATRY & NEUROLOGY

## 2017-12-10 PROCEDURE — H0001 ALCOHOL AND/OR DRUG ASSESS: HCPCS

## 2017-12-10 PROCEDURE — 12800012 ZZH R&B CD MH INTERMEDIATE ADULT

## 2017-12-10 PROCEDURE — 25000125 ZZHC RX 250: Performed by: PSYCHIATRY & NEUROLOGY

## 2017-12-10 RX ADMIN — ONDANSETRON 4 MG: 4 TABLET, ORALLY DISINTEGRATING ORAL at 20:18

## 2017-12-10 RX ADMIN — TRAZODONE HYDROCHLORIDE 50 MG: 50 TABLET ORAL at 20:19

## 2017-12-10 RX ADMIN — ACETAMINOPHEN 650 MG: 325 TABLET, FILM COATED ORAL at 08:38

## 2017-12-10 RX ADMIN — ACETAMINOPHEN 650 MG: 325 TABLET, FILM COATED ORAL at 12:24

## 2017-12-10 RX ADMIN — HYDROXYZINE HYDROCHLORIDE 50 MG: 25 TABLET ORAL at 20:19

## 2017-12-10 RX ADMIN — HYDROXYZINE HYDROCHLORIDE 50 MG: 25 TABLET ORAL at 08:38

## 2017-12-10 RX ADMIN — ONDANSETRON 4 MG: 4 TABLET, ORALLY DISINTEGRATING ORAL at 07:19

## 2017-12-10 RX ADMIN — BUPRENORPHINE HCL 4 MG: 2 TABLET SUBLINGUAL at 07:19

## 2017-12-10 RX ADMIN — BUPRENORPHINE HCL 4 MG: 2 TABLET SUBLINGUAL at 16:14

## 2017-12-10 RX ADMIN — NICOTINE 1 PATCH: 14 PATCH, EXTENDED RELEASE TRANSDERMAL at 08:38

## 2017-12-10 ASSESSMENT — ACTIVITIES OF DAILY LIVING (ADL)
DRESS: SCRUBS (BEHAVIORAL HEALTH);INDEPENDENT
ORAL_HYGIENE: INDEPENDENT
GROOMING: INDEPENDENT

## 2017-12-10 NOTE — PROGRESS NOTES
Patient out and about on the unit. Appropriate and interacts with well with staff and other patients.
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
## 2017-12-10 NOTE — PROGRESS NOTES
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 is significant criminal justice system involvement.    REASONS SEVERITY WAS ASSIGNED - (What support does the person have for making changes? What structure/stability does the person have in his or her daily life that will increase the likelihood that changes can be sustained? What problems exist in the person s environment that will jeopardize getting/staying clean and sober?)     Pt is not engaged in meaningful/creativie /structured activities/homeless/lacks sober environment.         Client Choice/Exceptions   Would you like services specific to language, age, gender, culture, Restorationism preference, race, ethnicity, sexual orientation or disability?  No    What particular treatment choices and options would you like to have? Recommend Residential addiction and mental health tx.    Do you have a preference for a particular treatment program? [facility]ibly [facility].     Criteria for Diagnosis     Criteria for Diagnosis  DSM-5 Criteria for Substance Use Disorder  Instructions: Determine whether the client currently meets the criteria for Substance Use Disorder using the diagnostic criteria in the DSM-V pp.[phone]. Current means during the most recent 12 months outside a facility that controls access to  substances    Category of Substance Severity (ICD-10 Code / DSM 5 Code)     Alcohol Use Disorder Moderate  (F10.20) (303.90)   Cannabis Use Disorder NA   Hallucinogen Use Disorder NA   Inhalant Use Disorder NA   Opioid Use Disorder Severe   (F11.20) (304.00)   Sedative, Hypnotic, or Anxiolytic Use Disorder NA   Stimulant Related Disorder NA   Tobacco Use Disorder Moderate   (F17.200) (305.1)   Other (or unknown) Substance Use Disorder NA       Collateral Contact Summary   Number of contacts made: 0    Contact with referring person:  Yes, ER.    If court related records were reviewed, summarize here: NA    Information from collateral contacts supported/largely agreed with information from the client and associated risk ratings.      Rule 25 Assessment Summary and Plan   's Recommendation    1/Complete residential tx  2/ follow all recommendations made by tx clinical staff   3/ Abstain from all mood altering chemicals  4/Keep all scheduled Appointments      Collateral Contacts     Name:      H&P   Relationship:       Phone Number:     Releases:         Marita Shah MD Physician Signed Psychiatry H&P   Date of Service: 12/7/2017  5:55 AM Creation Time: 12/7/2017  5:55 AM      Expand All Collapse All    []Hide copied text  []Hover for attribution information  Addiction Medicine History and Physical     Domenico Wilder MRN# [id number]   Age: 27 year old YOB: 1990      Date of Admission:                                      12/6/2017  Date of H&P:                                                         December 7, 2017     Primary care provider: Saurabh Brandon           Assessment and Plan:   Assessment:    Principal Problem:    Opioid dependence with withdrawal (H)  Active Problems:    Chemical dependency (H)    Opioid use disorder, severe, dependence (H)    History of depression and anxiety      Plan:    Medications:  - Buprenorphine 4 mg BID  - Clonidine patch 0.1mg weekly  -  "Folic acid 1 mg daily  - Thiamine 100 mg daily x3 doses total  - Nicoderm 14 mg patch  - Valium per [facility] protocol  - Comfort meds, see orders  Dispo planning ongoing; see CM notes for details, but patient states open to treatment recommendations, unclear if she desires maintenance medication at this time               Chief Complaint:    \"I can't do it anymore.\"      History is obtained from the patient, and chart review               History of Present Illness:   This patient is a 27 year old single, unemployed female who is currently homeless, with history of alcohol and opioid use disorder (prior MAT with buprenorphine and methadone), who presented to our ED seeking detoxification from alcohol and opioids. Reports that she has been using heroin and alcohol daily and that she can't do this anymore. There was no acute event that prompted admission other than decision to make a change. Interested in treatment following discharge as she has found this to be helpful in the past. Currently, has no specific treatment facility identified and is aware that Case Management will be working with her on this. In the past has found sober friends and going to meetings to be helpful in maintaining sobriety following discharge from treatment facilities.      Currently reports physical symptoms of pain, chills, nausea and vomiting as well as feeling anxious. Denies any concerning injection sites.      Reports a history of depression and anxiety that predate substance use and are also a reason she uses. Does not feel either have ever been well controlled. Discussed improved chance of mental health treatment success with increasing remission from use. No safety concerns at this time.             Past Medical History:       I have reviewed this patient's past medical history, see HPI, and       Past Medical History:   Diagnosis Date     Anxiety       Chickenpox       Depression       Depressive disorder       GERD (gastroesophageal " reflux disease)             Past Surgical History:       Past Surgical History    History reviewed. No pertinent surgical history.              Social History:   I have reviewed this patient's social history, see HPI.           Family History:      I have reviewed this patient's family history, and it is not directly pertinent to present encounter for detoxification.   Family History          Family History   Problem Relation Age of Onset     Allergies Mother       Depression Mother       Asthma No family hx of       C.A.D. No family hx of       DIABETES No family hx of       Hypertension No family hx of       CEREBROVASCULAR DISEASE No family hx of       Breast Cancer No family hx of       Cancer - colorectal No family hx of       Alcohol/Drug Mother       Prostate Cancer Paternal Grandfather                   Immunizations:           Immunization History   Administered Date(s) Administered     Influenza (IIV3) PF 10/06/2008     TDAP Vaccine (Adacel) 10/06/2008           Allergies:      All allergies reviewed and addressed       Allergies   Allergen Reactions     Cats       Dogs                Medications:       Prescriptions Prior to Admission    No prescriptions prior to admission.         [place]  query result:  Indicates no controlled prescriptions reported in previous 12 months.              Review of Systems:    Complete ROS performed and is negative except as indicated in HPI, and elsewhere in this note.            Physical Exam:      Vitals were reviewed  Patient Vitals for the past 12 hrs:    BP Temp Temp src Pulse Resp   12/07/17 0433 (!) 124/92 98.2  F (36.8  C) Oral 88 18   12/07/17 0206 (!) 135/100 98.8  F (37.1  C) Oral 80 16   12/07/17 0010 129/89 99.8  F (37.7  C) Tympanic 81 18   12/06/17 2028 135/84 99.7  F (37.6  C) Oral 62 16      Constitutional:    Tired, somnolent, non-toxic      Eyes:    Anicteric, no injection, PERRL      ENT:    Clear rhinorrhea, mmm      Lungs:    no increased work of  "breathing and clear to auscultation      Cardiovascular:    well-perfused, no murmur, no edema      Abdomen:    Active bowel sounds, soft, NT, no HSM      Neurologic:    No tremor, normal tone, gait not assessed      Skin:    Flushed, warm, anicteric, no infectious signs      MENTAL STATUS EXAM  Appearance: in scrubs, ungroomed, in bed wrapped in blanket  Attitude: in withdrawal distress but patient and cooperative  Behavior: no agitation or slowing  Eye Contact: intermittent  Speech: soft-spoken, coherent, no pressuring  Orientation: oriented to person , place, time and situation  Mood:  \"okay\"  Affect: tired and patiently irritable  Thought Process: appears goal-directed, no FOI or REANNA  Suicidal Ideation: denies thought/intent/plan  Hallucination: denies  Insight: partial, capable of continued improvement  Judgment: adequate for safety          Data:   All laboratory data reviewed        Results for orders placed or performed during the hospital encounter of 12/06/17   Drug abuse screen 6 urine (tox)   Result Value Ref Range     Amphetamine Qual Urine Negative NEG^Negative     Barbiturates Qual Urine Negative NEG^Negative     Benzodiazepine Qual Urine Negative NEG^Negative     Cannabinoids Qual Urine Positive (A) NEG^Negative     Cocaine Qual Urine Negative NEG^Negative     Ethanol Qual Urine Negative NEG^Negative     Opiates Qualitative Urine Positive (A) NEG^Negative   HCG qualitative urine   Result Value Ref Range     HCG Qual Urine Negative NEG^Negative   GGT   Result Value Ref Range     GGT 18 0 - 40 U/L   CBC with platelets   Result Value Ref Range     WBC 10.9 4.0 - 11.0 10e9/L     RBC Count 5.07 3.8 - 5.2 10e12/L     Hemoglobin 14.5 11.7 - 15.7 g/dL     Hematocrit 42.7 35.0 - 47.0 %     MCV 84 78 - 100 fl     MCH 28.6 26.5 - 33.0 pg     MCHC 34.0 31.5 - 36.5 g/dL     RDW 13.2 10.0 - 15.0 %     Platelet Count 349 150 - 450 10e9/L   Comprehensive metabolic panel   Result Value Ref Range     Sodium 139 133 - " 144 mmol/L     Potassium 3.3 (L) 3.4 - 5.3 mmol/L     Chloride 102 94 - 109 mmol/L     Carbon Dioxide 26 20 - 32 mmol/L     Anion Gap 11 3 - 14 mmol/L     Glucose 107 (H) 70 - 99 mg/dL     Urea Nitrogen 13 7 - 30 mg/dL     Creatinine 0.56 0.52 - 1.04 mg/dL     GFR Estimate >90 >60 mL/min/1.7m2     GFR Estimate If Black >90 >60 mL/min/1.7m2     Calcium 9.2 8.5 - 10.1 mg/dL     Bilirubin Total 0.5 0.2 - 1.3 mg/dL     Albumin 4.1 3.4 - 5.0 g/dL     Protein Total 8.3 6.8 - 8.8 g/dL     Alkaline Phosphatase 103 40 - 150 U/L     ALT 14 0 - 50 U/L     AST 11 0 - 45 U/L   Lipid panel   Result Value Ref Range     Cholesterol 177 <200 mg/dL     Triglycerides 63 <150 mg/dL     HDL Cholesterol 50 >49 mg/dL     LDL Cholesterol Calculated 114 (H) <100 mg/dL     Non HDL Cholesterol 127 <130 mg/dL   Alcohol breath test POCT   Result Value Ref Range     Alcohol Breath Test 0.038 (A) 0.00 - 0.01      Attestation:  I have reviewed today's vital signs, notes, medications, and labs/studies pertinent to this encounter.     Indication for hospitalization is opioid and alcohol use disorders with physical opioid dependence and withdrawal; high degree of complexity due to potential withdrawal morbidity, complicated by co-occurring opioid and alcohol withdrawal, anxiety and depression history, presently unmanaged.     Osmin Shah MD  Pediatrics/Addiction Medicine          Revision History       Date/Time User Provider Type Action     12/7/2017 11:55 AM Marita Shah MD Physician Sign     12/7/2017 11:45 AM Marita Shah MD Physician Share     12/7/2017 10:49 AM Marita Shah MD Physician Share     12/7/2017  8:52 AM Virginia Canada MD Resident Share     12/7/2017  6:02 AM Marita Shah MD Physician Share     View Details Report          Routing History       Date/Time From To Method     12/7/2017 11:55 AM Marita Shah MD [facility], [facility] Fax                          Collateral Contacts     Name:       Relationship:       Phone Number:       Releases:         Ashley Alba, RN Registered Nurse Addendum Chemical Dependency Plan of Care   Date of Service: 12/6/2017  6:32 PM Creation Time: 12/6/2017  6:32 PM         Problem: Substance Withdrawal  Goal: Substance Withdrawal    Problem: Substance Withdrawal   Goal: Substance Withdrawal   1) Patient will achieve medical stabilization of acute withdrawal sx.  2) Patient will remain safe and free from injury  3) Patient will demonstrate improvement of ADLs (appetite, hygiene)  4) Verbalize reduction of fear or anxiety to a manageable level.  5) Verbalize knowledge of substance abuse as a disease  6) Verbalize risks and negative effects related to drug ingestion  7) Demonstrate participation in unit programming and attends specific substance use group therapy (i.e AA meetings)  8) Accept referral to substance abuse treatment  9) Express sense of regaining some control of situation/life (possible by verbalizing alternative coping mechanisms as alternatives to substance use in response to stress)   Signs and symptoms of listed problems will be absent or manageable.   Outcome: Declining  S:  Domenico is a 28 yo F, (mother of one), who comes to [facility] seeking detox from opiates and alcohol.  She reports that she has been drinking a pint of Vodka a day for the last few months.  Her last drink was today (12/6/17) at 10:00 am .  She states that she has been injecting heroin, 1-2 gms daily for the last 2 yrs.  She states that her last use of heroin was yesterday (12/6/17) sometime around 18:00 pm.  She has numerous track marks on both forearms and the backs of her hands .  She is also a 1/2 ppd cigarette smoker since her early teens. She reports using marijuana and meth occasionally.     She states that she has been depressed but she denies thoughts of self harm, suicide ideation or thoughts of harming anyone else.  She receives emotional  support from her boyfriend and her parents.  Her parents are caring for her five year old son.  She states that she has been couch hopping.  She states that her boyfriend also uses heroin.     B:  Epic history lists h/o depression, anxiety, GERD, SA and Chicken Pox.     A:  Pt in moderate alcohol withdrawal and severe opiate withdrawal AEB MSSA scores of 9 & 12 and COWS scores of 20 & 11.  R:  Obtained admission orders.  Provided with a nutritious meal (pt refused) and encouraged increased fluid intake.  Oriented to unit and schedule including lab work to be collected.  Introduced to IM&R Treatment program. counseled on smoking cessation.  Administered diazepam 5 mg and Buprenorphine 4 mg once.  Later complained of heartburn and Maalox 30 ml was given.  Administered Tylenol 650 mg, trazodone 50 mg Zofran 4 mg and (a second dose of) Buprenorphine 4 mg. Continue to monitor and medicate as ordered and indicated.                                                                       ollateral Contacts      A problematic pattern of alcohol/drug use leading to clinically significant impairment or distress, as manifested by at least two of the following, occurring within a 12-month period:    Alcohol/drug is often taken in larger amounts or over a longer period than was intended.  There is a persistent desire or unsuccessful efforts to cut down or control alcohol/drug use  A great deal of time is spent in activities necessary to obtain alcohol, use alcohol, or recover from its effects.  Craving, or a strong desire or urge to use alcohol/drug  Recurrent alcohol/drug use resulting in a failure to fulfill major role obligations at work, school or home.  Continued alcohol use despite having persistent or recurrent social or interpersonal problems caused or exacerbated by the effects of alcohol/drug.  Important social, occupational, or recreational activities are given up or reduced because of alcohol/drug use.  Recurrent  alcohol/drug use in situations in which it is physically hazardous.  Alcohol/drug use is continued despite knowledge of having a persistent or recurrent physical or psychological problem that is likely to have been caused or exacerbated by alcohol.  Tolerance, as defined by either of the following: A need for markedly increased amounts of alcohol/drug to achieve intoxication or desired effect.  Withdrawal, as manifested by either of the following: The characteristic withdrawal syndrome for alcohol/drug (refer to Criteria A and B of the criteria set for alcohol/drug withdrawal).      Specify if: In early remission:  After full criteria for alcohol/drug use disorder were previously met, none of the criteria for alcohol/drug use disorder have been met for at least 3 months but for less than 12 months (with the exception that Criterion A4,  Craving or a strong desire or urge to use alcohol/drug  may be met).     In sustained remission:   After full criteria for alcohol use disorder were previously met, non of the criteria for alcohol/drug use disorder have been met at any time during a period of 12 months or longer (with the exception that Criterion A4,  Craving or strong desire or urge to use alcohol/drug  may be met).   Specify if:   This additional specifier is used if the individual is in an environment where access to alcohol is restricted.    Mild: Presence of 2-3 symptoms    Moderate: Presence of 4-5 symptoms    Severe: Presence of 6 or more symptoms

## 2017-12-10 NOTE — PLAN OF CARE
"Problem: Substance Withdrawal  Goal: Substance Withdrawal    Problem: Substance Withdrawal   Goal: Substance Withdrawal   1) Patient will achieve medical stabilization of acute withdrawal sx.  2) Patient will remain safe and free from injury  3) Patient will demonstrate improvement of ADLs (appetite, hygiene)  4) Verbalize reduction of fear or anxiety to a manageable level.  5) Verbalize knowledge of substance abuse as a disease  6) Verbalize risks and negative effects related to drug ingestion  7) Demonstrate participation in unit programming and attends specific substance use group therapy (i.e AA meetings)  8) Accept referral to substance abuse treatment  9) Express sense of regaining some control of situation/life (possible by verbalizing alternative coping mechanisms as alternatives to substance use in response to stress)   Signs and symptoms of listed problems will be absent or manageable.   Outcome: Improving  Pt presents with full-range affect, reports mood is \"a little anxious\", is visible and social in the milieu. Patient is under opiate withdrawal monitoring via COWS scale, scores today 10 and 10, with many minor symptoms (agitation, anxiety, piloerection, body aches). VSS. Patient is girlfriend of another patient on the unit, and they have kept appropriate boundaries this shift. Patient states she is \"feeling a bit better\", and states \"this is the first day i've actually been able to eat something\". No other concerns at this time, will continue to monitor.       "

## 2017-12-10 NOTE — PROGRESS NOTES
This writer met with pt to complete assessment. Pt is homeless and does not have insurance. Pt is requesting residential tx. Pt has mental health issues since age of 14/at 14 she OD'd and was admitted to  Adolescent tx placed on medications and pt had therapy sessions. Pt denies any SI/SX currently. Pt has 5 year old son parents have custody. Pt has been in tx multiple times but always felt pressured to get help. This time pt says I want to get clean and sober for me and my son.Pt signed all forms for [facility]. I am recommending dual diagnosis treatment. Ptis  not opposed  to recommendation but if [facility] does not fund dual program pt is interested in [facility].Pt's S/O is here in detox as well and is requesting residential tx/possibly [facility].

## 2017-12-11 LAB
HCV RNA SERPL NAA+PROBE-ACNC: NORMAL [IU]/ML
HCV RNA SERPL NAA+PROBE-LOG IU: NORMAL LOG IU/ML

## 2017-12-11 PROCEDURE — 25000125 ZZHC RX 250: Performed by: PSYCHIATRY & NEUROLOGY

## 2017-12-11 PROCEDURE — 25000132 ZZH RX MED GY IP 250 OP 250 PS 637: Performed by: PSYCHIATRY & NEUROLOGY

## 2017-12-11 PROCEDURE — 12800012 ZZH R&B CD MH INTERMEDIATE ADULT

## 2017-12-11 RX ORDER — BUPRENORPHINE AND NALOXONE 8; 2 MG/1; MG/1
FILM, SOLUBLE BUCCAL; SUBLINGUAL
Qty: 17 FILM | Refills: 0 | Status: SHIPPED | OUTPATIENT
Start: 2017-12-11 | End: 2017-12-18

## 2017-12-11 RX ORDER — BUPRENORPHINE 2 MG/1
4 TABLET SUBLINGUAL ONCE
Status: DISCONTINUED | OUTPATIENT
Start: 2017-12-11 | End: 2017-12-11 | Stop reason: CLARIF

## 2017-12-11 RX ORDER — HYDROXYZINE HYDROCHLORIDE 25 MG/1
25-50 TABLET, FILM COATED ORAL EVERY 8 HOURS PRN
Qty: 120 TABLET | Refills: 0 | Status: SHIPPED | OUTPATIENT
Start: 2017-12-11 | End: 2018-01-02

## 2017-12-11 RX ORDER — ACETAMINOPHEN 325 MG/1
325-650 TABLET ORAL EVERY 4 HOURS PRN
Qty: 100 TABLET | Refills: 0 | Status: SHIPPED | OUTPATIENT
Start: 2017-12-11 | End: 2018-04-06

## 2017-12-11 RX ORDER — BUPRENORPHINE 2 MG/1
4 TABLET SUBLINGUAL DAILY
Status: DISCONTINUED | OUTPATIENT
Start: 2017-12-11 | End: 2017-12-13 | Stop reason: HOSPADM

## 2017-12-11 RX ORDER — BUPRENORPHINE 8 MG/1
8 TABLET SUBLINGUAL DAILY
Status: DISCONTINUED | OUTPATIENT
Start: 2017-12-11 | End: 2017-12-13 | Stop reason: HOSPADM

## 2017-12-11 RX ORDER — TRAZODONE HYDROCHLORIDE 50 MG/1
50-100 TABLET, FILM COATED ORAL
Qty: 60 TABLET | Refills: 0 | Status: SHIPPED | OUTPATIENT
Start: 2017-12-11 | End: 2018-01-02

## 2017-12-11 RX ORDER — FLUCONAZOLE 150 MG/1
150 TABLET ORAL ONCE
Status: COMPLETED | OUTPATIENT
Start: 2017-12-11 | End: 2017-12-11

## 2017-12-11 RX ADMIN — HYDROXYZINE HYDROCHLORIDE 50 MG: 25 TABLET ORAL at 16:21

## 2017-12-11 RX ADMIN — FLUCONAZOLE 150 MG: 150 TABLET ORAL at 12:28

## 2017-12-11 RX ADMIN — ONDANSETRON 4 MG: 4 TABLET, ORALLY DISINTEGRATING ORAL at 08:27

## 2017-12-11 RX ADMIN — FOLIC ACID 1 MG: 1 TABLET ORAL at 08:55

## 2017-12-11 RX ADMIN — HYDROXYZINE HYDROCHLORIDE 50 MG: 25 TABLET ORAL at 21:31

## 2017-12-11 RX ADMIN — MULTIPLE VITAMINS W/ MINERALS TAB 1 TABLET: TAB at 08:56

## 2017-12-11 RX ADMIN — BUPRENORPHINE HCL 4 MG: 2 TABLET SUBLINGUAL at 16:21

## 2017-12-11 RX ADMIN — TRAZODONE HYDROCHLORIDE 50 MG: 50 TABLET ORAL at 21:32

## 2017-12-11 RX ADMIN — BUPRENORPHINE HCL 8 MG: 8 TABLET SUBLINGUAL at 08:56

## 2017-12-11 RX ADMIN — NICOTINE 1 PATCH: 14 PATCH, EXTENDED RELEASE TRANSDERMAL at 08:56

## 2017-12-11 ASSESSMENT — ACTIVITIES OF DAILY LIVING (ADL)
DRESS: INDEPENDENT
GROOMING: INDEPENDENT
ORAL_HYGIENE: INDEPENDENT

## 2017-12-11 NOTE — PROGRESS NOTES
"CASE MANAGEMENT NOTE    Writer met with patient twice to discuss discharge planning. Patient requested placement in [facility] however recommendations made on 12/10 by Regina Kahn [facility] include \"dual diagnosis treatment.\" Writer requested LP screen by Charissa Spicer at patient's request however due to recommendations made, dual diagnosis treatment will be pursued. Patient was offered treatment options. Call received from [facility] Rule 25 stated funding request should be submitted to [facility]. Patient agreeing to remain in detox so writer can provide further assistance with discharge planning.    Candis Del Real) ESTEVAN Arias, [facility]  3A Psychotherapist  [phone]   "

## 2017-12-11 NOTE — PROGRESS NOTES
CASE MANAGEMENT NOTE    Rule 25 assessment completed by Regina Kahn on 12/10 faxed to [facility] requesting R25 funding for MI/CD residential treatment program. Case management continues.    Candis Del Real) ESTEVAN Arias, [facility]  3A Psychotherapist  [phone]

## 2017-12-12 PROCEDURE — 25000132 ZZH RX MED GY IP 250 OP 250 PS 637: Performed by: PSYCHIATRY & NEUROLOGY

## 2017-12-12 PROCEDURE — 99231 SBSQ HOSP IP/OBS SF/LOW 25: CPT | Performed by: PSYCHIATRY & NEUROLOGY

## 2017-12-12 PROCEDURE — 12800012 ZZH R&B CD MH INTERMEDIATE ADULT

## 2017-12-12 RX ADMIN — HYDROXYZINE HYDROCHLORIDE 50 MG: 25 TABLET ORAL at 21:45

## 2017-12-12 RX ADMIN — TRAZODONE HYDROCHLORIDE 50 MG: 50 TABLET ORAL at 21:45

## 2017-12-12 RX ADMIN — HYDROXYZINE HYDROCHLORIDE 50 MG: 25 TABLET ORAL at 13:27

## 2017-12-12 RX ADMIN — FOLIC ACID 1 MG: 1 TABLET ORAL at 07:49

## 2017-12-12 RX ADMIN — BUPRENORPHINE HCL 8 MG: 8 TABLET SUBLINGUAL at 07:49

## 2017-12-12 RX ADMIN — BUPRENORPHINE HCL 4 MG: 2 TABLET SUBLINGUAL at 16:17

## 2017-12-12 RX ADMIN — NICOTINE 1 PATCH: 14 PATCH, EXTENDED RELEASE TRANSDERMAL at 07:49

## 2017-12-12 ASSESSMENT — ACTIVITIES OF DAILY LIVING (ADL)
DRESS: INDEPENDENT
GROOMING: INDEPENDENT
ORAL_HYGIENE: INDEPENDENT

## 2017-12-12 NOTE — PROGRESS NOTES
"CASE MANAGEMENT NOTE    UPDATE:  [facility], [facility], [facility] all have few week waiting list. Patient is homeless, asking for placement in [facility].     See Dr. Shah 12/12 Progress Note:  \"wait list for most dual programs is a few weeks and patient would be well served by something such as LP followed by dual diagnosis IOP here at .\"      Second consult with Charissa Smiley regarding request for LP placement, and after second review, writer informed that patient is screened eligible. Based on 12/10 recommendations for a \"dual diagnosis treatment\" by [facility] Regina Kahn, LP may reassess and if it is determined that a dual diagnosis program is needed, patient would be transferred.    ORIGINAL:  Rule 25 assessment faxed to [facility] followed by voicemail, requesting R25 funding for [facility].     Candis Del Real) ESTEVAN Arias, [facility]  3A Psychotherapist  [phone]   "

## 2017-12-12 NOTE — PROGRESS NOTES
Addiction Medicine Progress Note          Assessment and Plan:   Assessment:   Principal Problem:    Opioid dependence with withdrawal (H)  Active Problems:    Chemical dependency (H)    Opioid use disorder, severe, dependence (H)   History of anxiety and depression      Plan:   Continue current care  Dispo planning ongoing; see CM notes for details, but wait list for most dual programs is a few weeks and patient would be well served by something such as LP followed by dual diagnosis IOP here at .         Interval History:   No acute events. Patient considered discharging yesterday when male peer was leaving, but ultimately decided to stay, in part to await funding for dual diagnosis placement. She made appointments at [facility] for primary care and maintenance. Yesterday first day at 12 mg buprenorphine. Today patient feeling much better but frustrated that she may not be able to go to LP, moreso that she may have to wait to enter treatment much longer than she anticipated. Describes vaginal discharge as more like BV which she has had in the past (frothy, malodorous, no itching, no dysuria, flank/pelvic pain or fever).            Review of Systems:   Complete ROS performed and is negative except as indicated in interval history, and elsewhere in this note.           Medications:     Current Facility-Administered Medications   Medication     buprenorphine (SUBUTEX) sublingual tablet 8 mg     buprenorphine (SUBUTEX) sublingual tablet 4 mg     atenolol (TENORMIN) tablet 50 mg     folic acid (FOLVITE) tablet 1 mg     multivitamin, therapeutic with minerals (THERA-VIT-M) tablet 1 tablet     hydrOXYzine (ATARAX) tablet 25-50 mg     acetaminophen (TYLENOL) tablet 650 mg     alum & mag hydroxide-simethicone (MYLANTA ES/MAALOX  ES) suspension 30 mL     magnesium hydroxide (MILK OF MAGNESIA) suspension 30 mL     bisacodyl (DULCOLAX) Suppository 10 mg     traZODone (DESYREL) tablet 50 mg     nicotine Patch in Place     nicotine  patch REMOVAL     nicotine (NICODERM CQ) 14 MG/24HR 24 hr patch 1 patch     ondansetron (ZOFRAN-ODT) ODT tab 4 mg     cloNIDine (CATAPRES-TTS) Patch in Place     [START ON 12/13/2017] cloNIDine (CATAPRES-TTS) patch REMOVAL     cloNIDine (CATAPRES-TTS1) 0.1 MG/24HR WK patch 1 patch     naloxone (NARCAN) injection 0.1-0.4 mg          Physical Exam:     Vitals were reviewed  Patient Vitals for the past 12 hrs:   BP Temp Temp src Pulse Resp   12/11/17 2010 116/76 97.6  F (36.4  C) Oral 72 16     Constitutional:   Alert, non-toxic, no distress     Eyes:   Anicteric, no injection, PERRL     ENT:   No rhinorrhea, mmm     Lungs:   no increased work of breathing and clear to auscultation     Cardiovascular:   Well-perfused, no murmur, no edema     Neurologic:   No tremor, normal tone, gait and coordination intact     Skin:   No jaundice, no flushing     MENTAL STATUS EXAM  Appearance: in scrubs, fair grooming  Attitude: engaged, cooperative  Behavior: no agitation or slowing  Eye Contact: focused on examiner  Speech: coherent , no pressuring  Orientation: oriented to person , place, time and situation  Mood:  Better, just frustrated  Affect: calmer, bright, full range  Thought Process: goal-directed, no FOI or REANNA  Suicidal Ideation: denies thought/intent/plan  Hallucination: denies  Insight: partial, improved, capable of continued improvement  Judgment: adequate for safety          Data:   All laboratory data reviewed, quantitative HCV RNA returned non-detectable.    Attestation:  I have reviewed today's vital signs, notes, medications, and labs/studies pertinent to this encounter.    Osmin Shah MD  Pediatrics/Addiction Medicine

## 2017-12-13 ENCOUNTER — HOSPITAL ENCOUNTER (OUTPATIENT)
Dept: BEHAVIORAL HEALTH | Facility: CLINIC | Age: 27
End: 2017-12-13
Attending: PSYCHIATRY & NEUROLOGY
Payer: MEDICAID

## 2017-12-13 VITALS
WEIGHT: 142.06 LBS | RESPIRATION RATE: 16 BRPM | SYSTOLIC BLOOD PRESSURE: 100 MMHG | HEART RATE: 81 BPM | BODY MASS INDEX: 23.67 KG/M2 | DIASTOLIC BLOOD PRESSURE: 65 MMHG | TEMPERATURE: 98.4 F | OXYGEN SATURATION: 99 % | HEIGHT: 65 IN

## 2017-12-13 PROCEDURE — 25000132 ZZH RX MED GY IP 250 OP 250 PS 637: Performed by: PSYCHIATRY & NEUROLOGY

## 2017-12-13 PROCEDURE — 10020000 ZZH LODGING PLUS FACILITY CHARGE ADULT

## 2017-12-13 RX ORDER — MAGNESIUM HYDROXIDE/ALUMINUM HYDROXICE/SIMETHICONE 120; 1200; 1200 MG/30ML; MG/30ML; MG/30ML
30 SUSPENSION ORAL EVERY 6 HOURS PRN
COMMUNITY
End: 2018-01-02

## 2017-12-13 RX ORDER — LORATADINE 10 MG/1
10 TABLET ORAL DAILY PRN
COMMUNITY
End: 2018-01-02

## 2017-12-13 RX ORDER — AMOXICILLIN 250 MG
2 CAPSULE ORAL DAILY PRN
COMMUNITY
End: 2018-01-02

## 2017-12-13 RX ADMIN — NICOTINE 1 PATCH: 14 PATCH, EXTENDED RELEASE TRANSDERMAL at 07:56

## 2017-12-13 RX ADMIN — BUPRENORPHINE HCL 8 MG: 8 TABLET SUBLINGUAL at 07:56

## 2017-12-13 RX ADMIN — FOLIC ACID 1 MG: 1 TABLET ORAL at 07:56

## 2017-12-13 RX ADMIN — BUPRENORPHINE HCL 4 MG: 2 TABLET SUBLINGUAL at 14:41

## 2017-12-13 ASSESSMENT — ACTIVITIES OF DAILY LIVING (ADL)
ORAL_HYGIENE: INDEPENDENT
GROOMING: INDEPENDENT
DRESS: INDEPENDENT

## 2017-12-13 NOTE — PROGRESS NOTES
Name: Domenico Wilder  Date: 12/13/2017  Medical Record: [id number]    Envelope Number: [id number]    List of Contents (List each item separately in new row):   Naloxone Kit     Admission:  I am responsible for any personal items that are not sent to the safe or pharmacy.  [facility] is not responsible for loss, theft or damage of any property in my possession.      Patient Signature:  ___________________________________________       Date/Time:__________________________    Staff Signature: __________________________________       Date/Time:__________________________    2nd Staff person, if patient is unable/unwilling to sign:      __________________________________________________________       Date/Time: __________________________      Discharge:  [facility] has returned all of my personal belongings:    Patient Signature: ________________________________________     Date/Time: ____________________________________    Staff Signature: ______________________________________     Date/Time:_____________________________________

## 2017-12-13 NOTE — PROGRESS NOTES
[facility] Health Assessment      Vital signs:     LMP 11/29/2017      Transfer from     Counselor: Mohan  Drug of Choice: Heroin, Alcohol, marjuiana  Last use: 12/6/2017  Home clinic/MD: [facility], [facility] - Dr. Gatica  Psychiatrist/therapist: none    Medical history/current conditions:  none    H&P Screen:  H&P within the last 90 days: Yes.  Date: 12/13/2017 Location:       Mental Health diagnosis: depression, anxiety  Medication compliant?: not on psychotropic meds  Recent sucidal thoughts? no     When? na  Current thought of self-harm? no    Plan? na    Pain assessment:   Pt. Experiencing pain at this time?  No      Nursing Assessment Summary:  none    On-going nursing intervention required?   No    Acute care visit recommended: no

## 2017-12-13 NOTE — PROGRESS NOTES
Initial Services Plan        Before your first treatment group, please do the following    Immediate health & safety concerns: Go to the emergency room if you start to have withdrawal symptoms.  Look for a support network (such as AA, NA, DBT group, a Rastafarian group, etc.)    Suggestions for client during the time between intake & completion of treatment plan:  Tour your treatment center (unit or outpatient clinic).  Introduce yourself to the treatment group.  Spend time getting to know your peers.    Client issues to be addressed in the first treatment sessions:  Other fear of talking in groups.  Am concerned about being homeless      Lamar Gatica RN  12/13/2017  4:33 PM

## 2017-12-13 NOTE — PROGRESS NOTES
CASE MANAGEMENT NOTE    Rule 25 funding/CPA received by outpatient intake from [facility]. Patient to transfer directly to [facility] at 4 PM.    Candis Del Real) ESTEVAN Arias, [facility]  3A Psychotherapist  [phone]
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
## 2017-12-13 NOTE — DISCHARGE INSTRUCTIONS
"Behavioral Discharge Planning and Instructions  THANK YOU FOR CHOOSING THE [facility]  [facility]  [phone]    Summary:   You were admitted to [facility] on 1/6/2017 for detoxification from heroin and alcohol. You are being discharged directly to [facility] on 12/13/2017.  A medical examination was performed that included lab work. Please take care and make your recovery a daily priority, Miss Wilder.     Treatment Recommendations per STEVE Arriaga on 12/10/2017: \"Dual disorder treatment.\" Following completion of [facility], recommending [facility] Dual Disorder Outpatient Treatment Program.    Main Diagnosis:  Opioid Dependence    Major Treatments, Procedures and Findings:  You have withdrawn from *** using the appropriate protocols.  You have had blood drawn, and the results have been reviewed with you.  Please take a copy of your lab work with you to your next primary care provider appointment.    Symptoms to Report:  If you experience more anxiety, confusion, sleeplessness, deep sadness or thoughts of suicide, notify your treatment team or notify your primary care physician. IF ANY OF THE SYMPTOMS YOU ARE EXPERIENCING ARE A MEDICAL EMERGENCY CALL 911 IMMEDIATELY.     Primary Provider:  Patient states that she will follow up in less than one month's time with Dr. Prince of [facility] for future medical/medication concerns.  Medication-related follow up: [facility] with Dr Manriquez Friday, December 22nd, 2017 at 0900.   [address] [place] [address]  [phone]    Lifestyle Adjustment & Health Action Plan:  1.   Create a daily schedule  2.   Eat Healthy  3.   Plan Enjoyable Sober Activities  4.   Use Problem Solving Skills and Deal with Issues as they Arise.   5.   Be Physically Active  6.   Take your medications as prescribed  7.   Get enough restful sleep  8.   Practice Relaxation  9.   Spend time with Supportive People  10. No use of alcohol, illegal " drugs or addictive medications other than what is currently prescribed.   11. AA, NA Sponsor are excellent resources for support    Resources:  [facility] Crisis Resource & Support Center:  822.287.5266.   Support Group:  AA/NA and Sponsor/support.  SMART Recovery - self management for addiction recovery:  www.smartrecovery.org  National [place] on Mental Illness (www.[place].jadyn.org): 399.571.8667 or 742-112-8320.  Alcoholics Anonymous (www.alcoholics-anonymous.org): Check your phone book for your local chapter.  Crisis Connection:  431.848.5768 or 1-538.147.6456. Call anytime for help.  [facility] Emergency Services:  662.694.1058 or 1-858.299.7629.  Suicide Awareness Voices of Education (SAVE) (www.save.org): 545-786-SAVE (8183).  National Suicide Prevention Line (www.mentalPaste[employer] Group (PTG)[place].org): 442-816-SAGB (1003).  Mental Health Consumer/Survivor Network of [place] (www.mhcsn.net): 882.596.3173 or 504-976-5787.  Mental Health Association of [place] (www.mentalhealth.org): 412.623.5890 or 615-919-2492.     Substance Abuse and Mental Health Services (www.samhsa.gov).    [place] [facility] ([facility])  [facility] connects people seeking recovery to resources that help foster and sustain long-term recovery.  Whether you are seeking resources for treatment, transportation, housing, job training, education, health care or other pathways to recovery, [facility] is a great place to start.  761.839.6593. www.[facility].org    General Medication Instructions:  See your medication papers for instructions. Take all medicines as directed.  Make no changes unless your doctor suggests them. Go to all your doctor visits.  Be sure to have all your required lab tests. This way, your medicines can be refilled on time. Do not use any drugs not prescribed by your provider.  AA/NA and Sponsors are excellent resources for support. Avoid alcohol at all costs!  THANK YOU FOR CHOOSING THE [facility]  The treatment team has  appreciated the opportunity to work with you.  We wish you the best in the future. Please bring this discharge folder with you to all follow  up appointments - it contains your lab results, diagnosis, medication list and discharge recommendations.  For follow up questions:  Detox Nursing [phone], Past medical records [phone], Readmission [phone]

## 2017-12-13 NOTE — PROGRESS NOTES
Diet & BMI Assessment     Are you on a special diet?    No   Do you have any concerns regarding your nutritional status?   No   Have you had any appetite changes in the last 3 months?       No   Have you had any weight loss or weight gain in the last 3 months?    If yes, how much weight gain or loss:    If weight patient gains or loses is more than 10 pounds, refer to primary care provider for assessment.        No   Was the patient informed of BMI?    Normal, No Intervention   Yes

## 2017-12-14 ENCOUNTER — HOSPITAL ENCOUNTER (OUTPATIENT)
Dept: BEHAVIORAL HEALTH | Facility: CLINIC | Age: 27
End: 2017-12-14
Attending: FAMILY MEDICINE
Payer: MEDICAID

## 2017-12-14 PROCEDURE — 10020000 ZZH LODGING PLUS FACILITY CHARGE ADULT

## 2017-12-14 PROCEDURE — H2035 A/D TX PROGRAM, PER HOUR: HCPCS | Mod: HQ

## 2017-12-14 NOTE — PROGRESS NOTES
Pt admitted to LP yesterday afternoon and attended 1st group today. Pt shared she has been drinking since she was 14. She was sober 2 yrs when she became pregnant. Pt reports her son lives with her parents, she has lost her house and car. Pt welcomed by peers and staff and given group materials. Pt appears at this time to want sobriety. Pt to complete paperwork and meet with pt to complete safety plan, review assessments and develop tx plan.

## 2017-12-14 NOTE — DISCHARGE SUMMARY
Addiction Medicine Discharge Summary    Domenico Wilder MRN# [id number]   Age: 27 year old YOB: 1990     Date of Admission:  12/6/2017  Date of Discharge::  12/13/2017  4:00 PM  Admitting Physician:  Osmin Shah MD  Discharge Physician:  Osmin Shah MD     Primary care provider: [facility], but will be transfering care to [facility]          Admission Diagnoses:   Principal Problem:    Opioid dependence with withdrawal (H)  Active Problems:    Opioid use disorder, severe, dependence (H)         Discharge Diagnosis:   Principal Problem:    Opioid dependence with withdrawal (H), resolved  Active Problems:    Chemical dependency (H)    Opioid use disorder, severe, dependence (H), plans maintenance therapy at [facility]    Hepatitis C antibody positive, no detectable viral load          Procedures:   All laboratory data reviewed  Results for orders placed or performed during the hospital encounter of 12/06/17   Drug abuse screen 6 urine (tox)   Result Value Ref Range    Amphetamine Qual Urine Negative NEG^Negative    Barbiturates Qual Urine Negative NEG^Negative    Benzodiazepine Qual Urine Negative NEG^Negative    Cannabinoids Qual Urine Positive (A) NEG^Negative    Cocaine Qual Urine Negative NEG^Negative    Ethanol Qual Urine Negative NEG^Negative    Opiates Qualitative Urine Positive (A) NEG^Negative   HCG qualitative urine   Result Value Ref Range    HCG Qual Urine Negative NEG^Negative   GGT   Result Value Ref Range    GGT 18 0 - 40 U/L   CBC with platelets   Result Value Ref Range    WBC 10.9 4.0 - 11.0 10e9/L    RBC Count 5.07 3.8 - 5.2 10e12/L    Hemoglobin 14.5 11.7 - 15.7 g/dL    Hematocrit 42.7 35.0 - 47.0 %    MCV 84 78 - 100 fl    MCH 28.6 26.5 - 33.0 pg    MCHC 34.0 31.5 - 36.5 g/dL    RDW 13.2 10.0 - 15.0 %    Platelet Count 349 150 - 450 10e9/L   Comprehensive metabolic panel   Result Value Ref Range    Sodium 139 133 - 144 mmol/L    Potassium 3.3 (L) 3.4 - 5.3 mmol/L     Chloride 102 94 - 109 mmol/L    Carbon Dioxide 26 20 - 32 mmol/L    Anion Gap 11 3 - 14 mmol/L    Glucose 107 (H) 70 - 99 mg/dL    Urea Nitrogen 13 7 - 30 mg/dL    Creatinine 0.56 0.52 - 1.04 mg/dL    GFR Estimate >90 >60 mL/min/1.7m2    GFR Estimate If Black >90 >60 mL/min/1.7m2    Calcium 9.2 8.5 - 10.1 mg/dL    Bilirubin Total 0.5 0.2 - 1.3 mg/dL    Albumin 4.1 3.4 - 5.0 g/dL    Protein Total 8.3 6.8 - 8.8 g/dL    Alkaline Phosphatase 103 40 - 150 U/L    ALT 14 0 - 50 U/L    AST 11 0 - 45 U/L   Lipid panel   Result Value Ref Range    Cholesterol 177 <200 mg/dL    Triglycerides 63 <150 mg/dL    HDL Cholesterol 50 >49 mg/dL    LDL Cholesterol Calculated 114 (H) <100 mg/dL    Non HDL Cholesterol 127 <130 mg/dL   HIV Antigen Antibody Combo   Result Value Ref Range    HIV Antigen Antibody Combo Nonreactive NR^Nonreactive       Hepatitis B core antibody   Result Value Ref Range    Hepatitis B Core Sheila Nonreactive NR^Nonreactive   Hepatitis B surface antigen   Result Value Ref Range    Hep B Surface Agn Nonreactive NR^Nonreactive   Hepatitis C Screen Reflex to HCV RNA Quant and Genotype   Result Value Ref Range    Hepatitis C Antibody Reactive (A) NR^Nonreactive   Hepatitis C RNA Quantitative   Result Value Ref Range    HCV RNA Quant IU/ml HCV RNA Not Detected HCVND^HCV RNA Not Detected [IU]/mL    Log of HCV RNA Qt Not Calculated <1.2 Log IU/mL   Alcohol breath test POCT   Result Value Ref Range    Alcohol Breath Test 0.038 (A) 0.00 - 0.01          Medications Prior to Admission:     No prescriptions prior to admission.         [place]  query result:  Indicates no controlled prescriptions reported in previous 12 months.             Discharge Medications:     Discharge Medication List as of 12/13/2017  4:37 PM      START taking these medications    Details   acetaminophen (TYLENOL) 325 MG tablet Take 1-2 tablets (325-650 mg) by mouth every 4 hours as needed for mild pain, Disp-100 tablet, R-0, E-Prescribe      hydrOXYzine  (ATARAX) 25 MG tablet Take 1-2 tablets (25-50 mg) by mouth every 8 hours as needed for anxiety, Disp-120 tablet, R-0, E-Prescribe      traZODone (DESYREL) 50 MG tablet Take 1-2 tablets ( mg) by mouth nightly as needed for sleep, Disp-60 tablet, R-0, E-Prescribe      cloNIDine (CATAPRES-TTS1) 0.1 MG/24HR WK patch Place 1 patch onto the skin once a week, Disp-4 patch, R-0, E-Prescribe      naloxone (NARCAN) 1 mg/mL for intranasal kit (2 syringes with 2 mucosal atomizer device) In opioid overdose put cone in nostril and push 1/2 of contents into each nostril.  Repeat every 3 min if no response until help arrives., Disp-1 kit, R-1, E-PrescribeFor intranasal administration: Dispense 2 naloxone injection 2 mg/2 mL syringes.  Incl ude 2 mucosal atomization devices (MAD-Nasal).      buprenorphine HCl-naloxone HCl (SUBOXONE) 8-2 MG per film Place one strip under tongue each morning, and half strip under tongue each evening, Disp-17 Film, R-0, Local [employer] prescription until appointment at [facility]         CONTINUE these medications which have NOT CHANGED    Details   IBUPROFEN PO Take 400 mg by mouth every 6 hours as needed for moderate pain, Historical      alum & mag hydroxide-simethicone (MYLANTA/MAALOX) 200-200-20 MG/5ML SUSP suspension Take 30 mLs by mouth every 6 hours as needed for indigestion, Historical      guaiFENesin (ROBITUSSIN) 20 mg/mL SOLN solution Take 10 mLs by mouth every 4 hours as needed for cough, Historical      phenol-menthol (CEPASTAT) 14.5 MG lozenge Place 1 lozenge inside cheek every 2 hours as needed for moderate pain, Historical      loratadine (CLARITIN) 10 MG tablet Take 10 mg by mouth daily as needed for allergies, Historical      senna-docusate (SENOKOT-S;PERICOLACE) 8.6-50 MG per tablet Take 2 tablets by mouth daily as needed for constipation, Historical      MELATONIN PO Take 3 mg by mouth nightly as needed, Historical                   Consultations:   No consultations were  requested during this admission          Brief History of Illness:   This patient is a 27 year old single, unemployed female who is currently homeless, with history of alcohol and opioid use disorder (prior MAT with buprenorphine and methadone), who presented to our ED seeking detoxification from alcohol and opioids. Reports that she has been using heroin and alcohol daily and that she can't do this anymore. There was no acute event that prompted admission other than decision to make a change. Interested in treatment following discharge as she has found this to be helpful in the past. Currently, has no specific treatment facility identified and is aware that Case Management will be working with her on this. In the past has found sober friends and going to meetings to be helpful in maintaining sobriety following discharge from treatment facilities.      Currently reports physical symptoms of pain, chills, nausea and vomiting as well as feeling anxious. Denies any concerning injection sites.      Reports a history of depression and anxiety that predate substance use and are also a reason she uses. Does not feel either have ever been well controlled. Discussed improved chance of mental health treatment success with increasing remission from use. No safety concerns at this time.            Hospital Course:   Opioid withdrawal managed with buprenorphine and adjunctive clonidine. Buprenorphine uptitrated to resolve withdrawal and satisfactorily manage opioid cravings. Patient elected buprenorphine maintenance at [facility] where she will also transfer primary care. She is in a relationship with another individual currently seeking treatment, and struggled with remaining in hospital and following her own treatment plan. Ultimately she remained until she could discharge to treatment bed, and worked hard to stay focused on her own well-being. Known hepatitis C positive, confirmed no detectable viral load this admission.           Discharge Instructions and Follow-Up:   Discharge diet: Regular   Discharge activity: Activity as tolerated   Discharge follow-up: See AVS; includes clinic intake at [facility]           Discharge Disposition:   Discharged, with direct escort to [facility]      Attestation:  I have reviewed today's vital signs, notes, medications, and labs/studies pertinent to this admission    Osmin Shah MD  Pediatrics/Addiction Medicine

## 2017-12-15 ENCOUNTER — HOSPITAL ENCOUNTER (OUTPATIENT)
Dept: BEHAVIORAL HEALTH | Facility: CLINIC | Age: 27
End: 2017-12-15
Attending: FAMILY MEDICINE
Payer: MEDICAID

## 2017-12-15 PROCEDURE — H2035 A/D TX PROGRAM, PER HOUR: HCPCS | Mod: HQ

## 2017-12-15 PROCEDURE — H2035 A/D TX PROGRAM, PER HOUR: HCPCS

## 2017-12-15 PROCEDURE — 10020000 ZZH LODGING PLUS FACILITY CHARGE ADULT
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
## 2017-12-15 NOTE — PROGRESS NOTES
Name: Domenico Wilder  Date: 12/14/2017  Medical Record: [id number]    Envelope Number: [id number]    List of Contents (List each item separately in new row):   Oral analgesic    Admission:  I am responsible for any personal items that are not sent to the safe or pharmacy.  [facility] is not responsible for loss, theft or damage of any property in my possession.      Patient Signature:  ___________________________________________       Date/Time:__________________________    Staff Signature: __________________________________       Date/Time:__________________________    2nd Staff person, if patient is unable/unwilling to sign:      __________________________________________________________       Date/Time: __________________________      Discharge:  [facility] has returned all of my personal belongings:    Patient Signature: ________________________________________     Date/Time: ____________________________________    Staff Signature: ______________________________________     Date/Time:_____________________________________

## 2017-12-15 NOTE — PROGRESS NOTES
Requested an acute care appointment for patient to be seen in the [facility] for evaluation of anxiety and depression symptoms. Counselors aware.

## 2017-12-16 ENCOUNTER — HOSPITAL ENCOUNTER (OUTPATIENT)
Dept: BEHAVIORAL HEALTH | Facility: CLINIC | Age: 27
End: 2017-12-16
Attending: FAMILY MEDICINE
Payer: MEDICAID

## 2017-12-16 PROCEDURE — 10020000 ZZH LODGING PLUS FACILITY CHARGE ADULT

## 2017-12-16 PROCEDURE — H2035 A/D TX PROGRAM, PER HOUR: HCPCS | Mod: HQ

## 2017-12-17 ENCOUNTER — HOSPITAL ENCOUNTER (OUTPATIENT)
Dept: BEHAVIORAL HEALTH | Facility: CLINIC | Age: 27
End: 2017-12-17
Attending: FAMILY MEDICINE
Payer: MEDICAID

## 2017-12-17 PROCEDURE — H2035 A/D TX PROGRAM, PER HOUR: HCPCS | Mod: HQ

## 2017-12-17 PROCEDURE — 10020000 ZZH LODGING PLUS FACILITY CHARGE ADULT

## 2017-12-18 ENCOUNTER — TELEPHONE (OUTPATIENT)
Dept: ADDICTION MEDICINE | Facility: CLINIC | Age: 27
End: 2017-12-18

## 2017-12-18 ENCOUNTER — OFFICE VISIT (OUTPATIENT)
Dept: BEHAVIORAL HEALTH | Facility: CLINIC | Age: 27
End: 2017-12-18
Payer: MEDICAID

## 2017-12-18 ENCOUNTER — HOSPITAL ENCOUNTER (OUTPATIENT)
Dept: BEHAVIORAL HEALTH | Facility: CLINIC | Age: 27
End: 2017-12-18
Attending: FAMILY MEDICINE
Payer: MEDICAID

## 2017-12-18 VITALS
OXYGEN SATURATION: 90 % | RESPIRATION RATE: 16 BRPM | BODY MASS INDEX: 24.71 KG/M2 | DIASTOLIC BLOOD PRESSURE: 58 MMHG | TEMPERATURE: 98.3 F | SYSTOLIC BLOOD PRESSURE: 102 MMHG | HEART RATE: 90 BPM | WEIGHT: 148.5 LBS

## 2017-12-18 DIAGNOSIS — R76.8 POSITIVE HEPATITIS C ANTIBODY TEST: ICD-10-CM

## 2017-12-18 DIAGNOSIS — F11.20 OPIOID USE DISORDER, SEVERE, DEPENDENCE (H): ICD-10-CM

## 2017-12-18 DIAGNOSIS — F41.1 GENERALIZED ANXIETY DISORDER: Primary | ICD-10-CM

## 2017-12-18 DIAGNOSIS — F33.9 EPISODE OF RECURRENT MAJOR DEPRESSIVE DISORDER, UNSPECIFIED DEPRESSION EPISODE SEVERITY (H): ICD-10-CM

## 2017-12-18 DIAGNOSIS — F11.20 UNCOMPLICATED OPIOID DEPENDENCE (H): ICD-10-CM

## 2017-12-18 DIAGNOSIS — F17.200 TOBACCO DEPENDENCE SYNDROME: ICD-10-CM

## 2017-12-18 PROCEDURE — 10020000 ZZH LODGING PLUS FACILITY CHARGE ADULT

## 2017-12-18 PROCEDURE — 99214 OFFICE O/P EST MOD 30 MIN: CPT | Performed by: NURSE PRACTITIONER

## 2017-12-18 PROCEDURE — H2035 A/D TX PROGRAM, PER HOUR: HCPCS | Mod: HQ

## 2017-12-18 RX ORDER — BUPRENORPHINE AND NALOXONE 8; 2 MG/1; MG/1
FILM, SOLUBLE BUCCAL; SUBLINGUAL
Qty: 5 FILM | Refills: 0 | Status: SHIPPED | OUTPATIENT
Start: 2017-12-18 | End: 2017-12-28

## 2017-12-18 RX ORDER — SERTRALINE HYDROCHLORIDE 25 MG/1
25 TABLET, FILM COATED ORAL DAILY
Qty: 30 TABLET | Refills: 3 | Status: SHIPPED | OUTPATIENT
Start: 2017-12-18 | End: 2018-04-06

## 2017-12-18 RX ORDER — BUPROPION HYDROCHLORIDE 150 MG/1
150 TABLET ORAL EVERY MORNING
Qty: 90 TABLET | Refills: 3 | Status: SHIPPED | OUTPATIENT
Start: 2017-12-18 | End: 2018-01-02

## 2017-12-18 NOTE — MR AVS SNAPSHOT
After Visit Summary   12/18/2017    Domenico Wilder    MRN: [id number]           Patient Information     Date Of Birth          1990        Visit Information        Provider Department      12/18/2017 8:30 Michelle Gibbons APRN [facility]        Today's Diagnoses     Generalized anxiety disorder    -  1    Episode of recurrent major depressive disorder, unspecified depression episode severity (H)        Uncomplicated opioid dependence (H)          Care Instructions    Please have the nurses set up an appt with Dr Fiore or one of the Addiction providers here for Suboxone  Start Wellbutrin 150 mg daily and Zoloft 25 mg daily, increase Zoloft to 50 mg after one week            Follow-ups after your visit        Additional Services     ADDICTION MEDICINE REFERRAL       The Addiction Medicine Service is prepared to provide consultation for and, if necessary, ongoing care for patients with the disease of addiction, ages 16 and up.     For acute detox, please send your patient to the ER or contact [facility] at [phone].     Common problems that may warrant referral to the Addiction Medicine Service are:  Alcohol use disorder - diagnosis, treatment referral, acute and protracted withdrawal management, and ongoing medication assisted treatment including Antabuse and Naltrexone.  Opioid Use Disorder - medication assisted treatment including Buprenorphine (Suboxone) or extended release Naltrexone (Vivitrol)  Benzodiazepine dependence - extended outpatient detoxification  Many other issues pertaining to addiction, relapse, and recovery    Please contact our scheduling staff at [phone] with any questions.    Referrals to the Addiction Medicine Service assume that the referring provider has discussed the referral with the patient.  Referral will be reviewed and if appropriate, the patient will be contacted to schedule appointment.  If not  appropriate, the provider will be contacted with further information.    Please answer the following questions so we can better service your patient:    Drug of choice:heroin  Do they have a [facility] PCP:  No     Please bring the following to your appointment:  >>   List of current medications   >>   Any relevant history                  Your next 10 appointments already scheduled     Dec 19, 2017  7:15 AM CST   Treatment with ADULT LODGING PLUS E   [facility] ([facility])    [address] [place] [address]   [phone]            Dec 20, 2017  7:15 AM CST   Treatment with ADULT LODGING PLUS E   [facility] ([facility])    [address] [place] [address]   [phone]            Dec 21, 2017  7:15 AM CST   Treatment with ADULT LODGING PLUS E   [facility] ([facility])    [address] [place] [address]   [phone]            Dec 22, 2017  7:15 AM CST   Treatment with ADULT LODGING PLUS E   [facility] ([facility])    [address] [place] [address]   [phone]            Dec 23, 2017  7:15 AM CST   Treatment with ADULT LODGING PLUS E   [facility] ([facility])    [address] [place] [address]   [phone]            Dec 24, 2017  7:15 AM CST   Treatment with ADULT LODGING PLUS E   [facility] ([facility])    [address] [place] [address]   [phone]            Dec 25, 2017  7:15 AM CST   Treatment with ADULT LODGING PLUS E   [facility] ([facility])    [facility]  "[place] [address] [place], [place] [address] [place] [address]   [phone]            Dec 26, 2017  7:15 AM CST   Treatment with ADULT LODGING PLUS E   [facility] ([facility])    [place] [address] [place] [address]   [phone]            Dec 27, 2017  7:15 AM CST   Treatment with ADULT LODGING PLUS E   [facility] ([facility])    [place] [address] [address]   [phone]            Dec 28, 2017  7:15 AM CST   Treatment with ADULT LODGING PLUS E   [facility] ([facility])    [place] [address]   [phone]              Who to contact     If you have questions or need follow up information about today's clinic visit or your schedule please contact [facility] directly at [phone].  Normal or non-critical lab and imaging results will be communicated to you by [employer], letter or phone within 4 business days after the clinic has received the results. If you do not hear from us within 7 days, please contact the clinic through [employer] or phone. If you have a critical or abnormal lab result, we will notify you by phone as soon as possible.  Submit refill requests through [employer] or call your pharmacy and they will forward the refill request to us. Please allow 3 business days for your refill to be completed.          Additional Information About Your Visit        [employer] Information     [employer] lets you send messages to your doctor, view your test results, renew your prescriptions, schedule appointments and more. To sign up, go to [email or web address] . Click on \"Log in\" on the left side of the screen, which will take you to the Welcome page. Then click on \"Sign up Now\" on the right side of the page.     You will be asked to enter the access code listed " below, as well as some personal information. Please follow the directions to create your username and password.     Your access code is: [email or web address]  Expires: 3/11/2018 10:25 AM     Your access code will  in 90 days. If you need help or a new code, please call your [facility] clinic or [phone].        Care EveryWhere ID     This is your Care EveryWhere ID. This could be used by other organizations to access your [facility] medical records  [id number]        Your Vitals Were     Pulse Temperature Respirations Last Period Pulse Oximetry BMI (Body Mass Index)    90 98.3  F (36.8  C) (Oral) 16 2017 90% 24.71 kg/m2       Blood Pressure from Last 3 Encounters:   17 102/58   17 100/65   17 134/85    Weight from Last 3 Encounters:   17 148 lb 8 oz (67.4 kg)   17 142 lb 1 oz (64.4 kg)   17 140 lb (63.5 kg)              We Performed the Following     ADDICTION MEDICINE REFERRAL          Today's Medication Changes          These changes are accurate as of: 17  8:50 AM.  If you have any questions, ask your nurse or doctor.               Start taking these medicines.        Dose/Directions    buPROPion 150 MG 24 hr tablet   Commonly known as:  WELLBUTRIN XL   Used for:  Episode of recurrent major depressive disorder, unspecified depression episode severity (H), Generalized anxiety disorder   Started by:  Michelle Kaye APRN CNP        Dose:  150 mg   Take 1 tablet (150 mg) by mouth every morning   Quantity:  90 tablet   Refills:  3       sertraline 25 MG tablet   Commonly known as:  ZOLOFT   Used for:  Generalized anxiety disorder, Episode of recurrent major depressive disorder, unspecified depression episode severity (H)   Started by:  Michelle Kaye APRN CNP        Dose:  25 mg   Take 1 tablet (25 mg) by mouth daily Increase to 50 mg after one week   Quantity:  30 tablet   Refills:  3            Where to get your medicines      These medications were  sent to [place], [place] - [address]  [address], [facility] [address]     Phone:  [phone]     buPROPion 150 MG 24 hr tablet    sertraline 25 MG tablet                Primary Care Provider Office Phone # Fax #    [facility] [phone] [address]  [facility] [address]        Equal Access to Services     LAUREL SHANKS : Hadii aad ku hadasho Soomaali, waaxda luqadaha, qaybta kaalmada adeegyada, waxay idiin hayaan adeeg kharash la'sara ah. So [facility] [phone].    ATENCIÓN: Si habla español, tiene a velez disposición servicios gratuitos de asistencia lingüística. Dino al [phone].    We comply with applicable federal civil rights laws and [place] laws. We do not discriminate on the basis of race, color, national origin, age, disability, sex, sexual orientation, or gender identity.            Thank you!     Thank you for choosing [facility]  for your care. Our goal is always to provide you with excellent care. Hearing back from our patients is one way we can continue to improve our services. Please take a few minutes to complete the written survey that you may receive in the mail after your visit with us. Thank you!             Your Updated Medication List - Protect others around you: Learn how to safely use, store and throw away your medicines at www.disposemymeds.org.          This list is accurate as of: 12/18/17  8:50 AM.  Always use your most recent med list.                   Brand Name Dispense Instructions for use Diagnosis    acetaminophen 325 MG tablet    TYLENOL    100 tablet    Take 1-2 tablets (325-650 mg) by mouth every 4 hours as needed for mild pain    Opioid use disorder, severe, dependence (H)       alum & mag hydroxide-simethicone 200-200-20 MG/5ML Susp suspension    MYLANTA/MAALOX     Take 30 mLs by mouth every 6 hours as needed for indigestion        buprenorphine HCl-naloxone  HCl 8-2 MG per film    SUBOXONE    17 Film    Place one strip under tongue each morning, and half strip under tongue each evening    Opioid use disorder, severe, dependence (H)       buPROPion 150 MG 24 hr tablet    WELLBUTRIN XL    90 tablet    Take 1 tablet (150 mg) by mouth every morning    Episode of recurrent major depressive disorder, unspecified depression episode severity (H), Generalized anxiety disorder       cloNIDine 0.1 MG/24HR WK patch    CATAPRES-TTS1    4 patch    Place 1 patch onto the skin once a week    Opioid use disorder, severe, dependence (H)       guaiFENesin 20 mg/mL Soln solution    ROBITUSSIN     Take 10 mLs by mouth every 4 hours as needed for cough        hydrOXYzine 25 MG tablet    ATARAX    120 tablet    Take 1-2 tablets (25-50 mg) by mouth every 8 hours as needed for anxiety    Opioid use disorder, severe, dependence (H)       IBUPROFEN PO      Take 400 mg by mouth every 6 hours as needed for moderate pain        loratadine 10 MG tablet    CLARITIN     Take 10 mg by mouth daily as needed for allergies        MELATONIN PO      Take 3 mg by mouth nightly as needed        naloxone 1 mg/mL for intranasal kit (2 syringes with 2 mucosal atomizer device)    NARCAN    1 kit    In opioid overdose put cone in nostril and push 1/2 of contents into each nostril.  Repeat every 3 min if no response until help arrives.    Opioid use disorder, severe, dependence (H)       phenol-menthol 14.5 MG lozenge      Place 1 lozenge inside cheek every 2 hours as needed for moderate pain        senna-docusate 8.6-50 MG per tablet    SENOKOT-S;PERICOLACE     Take 2 tablets by mouth daily as needed for constipation        sertraline 25 MG tablet    ZOLOFT    30 tablet    Take 1 tablet (25 mg) by mouth daily Increase to 50 mg after one week    Generalized anxiety disorder, Episode of recurrent major depressive disorder, unspecified depression episode severity (H)       traZODone 50 MG tablet    DESYREL    60  tablet    Take 1-2 tablets ( mg) by mouth nightly as needed for sleep    Opioid use disorder, severe, dependence (H)

## 2017-12-18 NOTE — PROGRESS NOTES
SUBJECTIVE:                                                    Domenico Wilder is a 27 year old female with a history of anxiety and depression, opiate dependence, alcohol use disorder, and and marijuana abuse, in treatment at [facility],  who presents to clinic today for the following health issues:    Patient was admitted to  for treatment of opiate use disorder. Evidently, she presented to the ER on 12/6 seeking detoxification from alcohol and opiates and reported daily use of heroin and opiates. Patient was detoxified on buprenorphine and adjunctive clonidine and admitted to [facility] after one week in detox. She currently denies any lingering withdrawal symptoms aside from some diaphoresis at night.   is scheduled to see the [facility] for management of Suboxone but is hoping to see an Addiction Medicine provider here at [facility] instead. She is currently on Suboxone films 8 mg QAM and 4 mg QHS.    Patient presents today requesting help for anxiety and depression.  has struggled with both anxiety and depression for years and has been on Zoloft and Wellbutrin in the past, which she felt were effective. States her primary symptom currently is anxiety, denies significant panic symptoms. Denies any suicidal thoughts or psychotic symptoms. She states she has a medical PCP through [facility] she sees regularly and has an appt after treatment. There is a hx of a positive HCV antibody, negative viral load on 12/7.              Mental Health Follow up Anxiety and Depression      Complicating factors: in treatment, per chart homeless and unemployed, has a six year old son who resides with her mother  Significant life event:  No   Current substance abuse:  None  Anxiety or Panic symptoms:  No    Sleep - sleeping well, taking MTN and Trazodone PRN which are helping  Appetite - good, eating well  Exercise - 2 mile walk last night, trying to exercise daily    Smoking - yes, 1 ppd x 10 years. thinking of  "quitting  Alcohol - prior to admission  Street drugs - Methamphetamine \"here and there,IV heroin  Marijuana - yes  Caffeine - coffee but no pop    PHQ-9  English PHQ-9   Any Language                 Problems taking medications regularly: No    Medication side effects: none    Diet: regular (no restrictions)  Social History   Substance Use Topics     Smoking status: Current Every Day Smoker     Packs/day: 1.00     Years: 10.00     Smokeless tobacco: Never Used     Alcohol use Yes        Problem list and histories reviewed & adjusted, as indicated.  Additional history: as documented    Patient Active Problem List   Diagnosis     Depression     Insomnia     GERD (gastroesophageal reflux disease)     Opioid dependence with withdrawal (H)     Chemical dependency (H)     Opioid use disorder, severe, dependence (H)     No past surgical history on file.    Social History   Substance Use Topics     Smoking status: Current Every Day Smoker     Packs/day: 1.00     Years: 10.00     Smokeless tobacco: Never Used     Alcohol use Yes     Family History   Problem Relation Age of Onset     Allergies Mother      Depression Mother      Asthma No family hx of      C.A.D. No family hx of      DIABETES No family hx of      Hypertension No family hx of      CEREBROVASCULAR DISEASE No family hx of      Breast Cancer No family hx of      Cancer - colorectal No family hx of      Alcohol/Drug Mother      Prostate Cancer Paternal Grandfather            Current Outpatient Prescriptions   Medication Sig Dispense Refill     IBUPROFEN PO Take 400 mg by mouth every 6 hours as needed for moderate pain       alum & mag hydroxide-simethicone (MYLANTA/MAALOX) 200-200-20 MG/5ML SUSP suspension Take 30 mLs by mouth every 6 hours as needed for indigestion       guaiFENesin (ROBITUSSIN) 20 mg/mL SOLN solution Take 10 mLs by mouth every 4 hours as needed for cough       phenol-menthol (CEPASTAT) 14.5 MG lozenge Place 1 lozenge inside cheek every 2 hours as " needed for moderate pain       loratadine (CLARITIN) 10 MG tablet Take 10 mg by mouth daily as needed for allergies       senna-docusate (SENOKOT-S;PERICOLACE) 8.6-50 MG per tablet Take 2 tablets by mouth daily as needed for constipation       MELATONIN PO Take 3 mg by mouth nightly as needed       acetaminophen (TYLENOL) 325 MG tablet Take 1-2 tablets (325-650 mg) by mouth every 4 hours as needed for mild pain 100 tablet 0     hydrOXYzine (ATARAX) 25 MG tablet Take 1-2 tablets (25-50 mg) by mouth every 8 hours as needed for anxiety 120 tablet 0     traZODone (DESYREL) 50 MG tablet Take 1-2 tablets ( mg) by mouth nightly as needed for sleep 60 tablet 0     cloNIDine (CATAPRES-TTS1) 0.1 MG/24HR WK patch Place 1 patch onto the skin once a week 4 patch 0     naloxone (NARCAN) 1 mg/mL for intranasal kit (2 syringes with 2 mucosal atomizer device) In opioid overdose put cone in nostril and push 1/2 of contents into each nostril.  Repeat every 3 min if no response until help arrives. 1 kit 1     buprenorphine HCl-naloxone HCl (SUBOXONE) 8-2 MG per film Place one strip under tongue each morning, and half strip under tongue each evening 17 Film 0     Allergies   Allergen Reactions     Cats      Dogs      Recent Labs   Lab Test  12/07/17   0733  02/28/17   0802   LDL  114*   --    HDL  50   --    TRIG  63   --    ALT  14  16   CR  0.56  0.75   GFRESTIMATED  >90  >90  Non African American GFR Calc     GFRESTBLACK  >90  >90  African American GFR Calc     POTASSIUM  3.3*  3.4   TSH   --   0.33*      BP Readings from Last 3 Encounters:   07/13/16 (!) 133/96   08/08/15 137/88   02/09/10 104/70    Wt Readings from Last 3 Encounters:   07/12/16 135 lb (61.2 kg)   02/09/10 120 lb (54.4 kg) (39 %)*   06/25/09 143 lb (64.9 kg) (78 %)*     * Growth percentiles are based on [facility] 2-20 Years data.        Labs reviewed in EPIC  Problem list, Medication list, Allergies, and Medical/Social/Surgical histories reviewed in EPIC and updated  as appropriate.     ROS: Constitutional, neuro, ENT, endocrine, pulmonary, cardiac, gastrointestinal, genitourinary, musculoskeletal, integument and psychiatric systems are negative, except as otherwise noted above in the HPI.       OBJECTIVE:                                                    LMP 11/29/2017  There is no height or weight on file to calculate BMI.  GENERAL: healthy, alert, well nourished, well hydrated, no distress  EYES: Eyes grossly normal to inspection, extraocular movements - intact, and PERRL    Mental Status Assessment:  Appearance:   Appropriate   Eye Contact:   Good   Psychomotor Behavior: Normal   Attitude:   Cooperative   Orientation:   All  Speech   Rate / Production: Normal    Volume:  Normal   Mood:    Normal  Affect:    Appropriate   Thought Content:  Clear   Thought Form:  Coherent  Logical   Insight:    Good   Attention Span and Concentration:  limited  Recent and Remote Memory:  intact  Fund of Knowledge: appropriate  Muscle Strength and Tone: normal   Suicidal Ideation: reports no thoughts, no intention  Hallucination: No  Paranoid-No  Manic-No  Panic-No  Self harm-No      See [facility] notes         ASSESSMENT/PLAN:                                                    (F41.1) Generalized anxiety disorder  (primary encounter diagnosis)  Comment: Started Zoloft 25 mg daily to increase to 50 mg after one week  Start Wellbutrin  mg daily  Encouraged her to follow up with her PCP and consider psychotherapy as well  Plan: sertraline (ZOLOFT) 25 MG tablet, buPROPion         (WELLBUTRIN XL) 150 MG 24 hr tablet            (F33.9) Episode of recurrent major depressive disorder, unspecified depression episode severity (H)  Comment: Medications initiated as above  Encouraged her to seek counseling and therapy and continue positive coping skills.  Plan: sertraline (ZOLOFT) 25 MG tablet, buPROPion         (WELLBUTRIN XL) 150 MG 24 hr tablet            (F11.20) Uncomplicated opioid dependence  (H)  Comment: In treatment, encouraged to continue treatment  Patient states she wishes to see an Addiction Medicine provider through [place], was scheduled at [facility].  Will place referral and  staff to follow up with scheduling  Plan: ADDICTION MEDICINE REFERRAL            (F17.200) Tobacco dependence syndrome  Comment: Cessation encouraged  Plan: Patient is considering cessation    (R76.8) Positive hepatitis C antibody test  Comment: undetected viral load  Plan: Follow up outpatient        Patient Instructions:  Patient Instructions   Please have the nurses set up an appt with Dr Fiore or one of the Addiction providers here for Suboxone  Start Wellbutrin 150 mg daily and Zoloft 25 mg daily, increase Zoloft to 50 mg after one week                  JOSE [facility] PRIMARY CARE

## 2017-12-18 NOTE — PROGRESS NOTES
Acknowledgement of Current Treatment Plan     1. I have reviewed my treatment plan with my counselor on 12/18/2017. I agree with the plan as it is written in the electronic health record.    Name Signature   Domenico Wilder     Name of Therapist / Counselor    STEVE Hobbs       2. I have completed and reviewed my Safety Plan with my counselor and signed this on 12/18/2017. I have been given the hard copy of this plan.    Patient signature:  ________________________________________________________________________    Signatures required for any additional Problems, Goals, and/or Interventions added to treatment plan:    I have been given a copy of the addition to my treatment plan in Dimension _____ on [date           ] and I agree with this as it is written in the electronic record.     Patient signature:   ________________________________________________________________________    I have been given a copy of the addition to my treatment plan in Dimension _____ on [date           ] and I agree with this as it is written in the electronic record.      Patient signature:   ________________________________________________________________________    I have been given a copy of the addition to my treatment plan in Dimension _____ on [date          ] and I agree with this as it is written in the electronic record.     Patient signature:   ________________________________________________________________________    I have been given a copy of the addition to my treatment plan in Dimension _____ on [date         ] and I agree with this as it is written in the electronic record.      Patient signature:   ________________________________________________________________________

## 2017-12-18 NOTE — PROGRESS NOTES
Scheduled an appointment for patient to see Dr. Fiore for suboxone maintenance per approval of [facility] provider, Dr. Kaye. Patient aware of appointment on 12/28. [facility] clinic suboxone maintenance appointment cancelled at this time per patient request. Requested a suboxone bridge from Dr. Fiore so patient will have enough suboxone to last until her appointment.

## 2017-12-18 NOTE — NURSING NOTE
"Chief Complaint   Patient presents with     RECHECK       Initial /58  Pulse 90  Temp 98.3  F (36.8  C) (Oral)  Resp 16  Wt 148 lb 8 oz (67.4 kg)  LMP 11/29/2017  SpO2 90%  BMI 24.71 kg/m2 Estimated body mass index is 24.71 kg/(m^2) as calculated from the following:    Height as of 12/6/17: 5' 5\" (1.651 m).    Weight as of this encounter: 148 lb 8 oz (67.4 kg).  Medication Reconciliation: complete    "

## 2017-12-18 NOTE — PATIENT INSTRUCTIONS
Please have the nurses set up an appt with Dr Fiore or one of the Addiction providers here for Suboxone  Start Wellbutrin 150 mg daily and Zoloft 25 mg daily, increase Zoloft to 50 mg after one week

## 2017-12-18 NOTE — TELEPHONE ENCOUNTER
----- Message from Cristina Burns sent at 12/18/2017 12:02 PM CST -----  Regarding: Addiction Med Referral  Please review.

## 2017-12-19 ENCOUNTER — HOSPITAL ENCOUNTER (OUTPATIENT)
Dept: BEHAVIORAL HEALTH | Facility: CLINIC | Age: 27
End: 2017-12-19
Attending: FAMILY MEDICINE
Payer: MEDICAID

## 2017-12-19 PROCEDURE — 10020000 ZZH LODGING PLUS FACILITY CHARGE ADULT

## 2017-12-19 PROCEDURE — H2035 A/D TX PROGRAM, PER HOUR: HCPCS | Mod: HQ

## 2017-12-19 NOTE — PROGRESS NOTES
"LATE ENTRY FOR 12/18/17:  DIM 3 RISK 2  Pt presented \"Book of Me\" in group. She shared negative self-talk-I can never do anything right, I am a failure; accomplishments-birthed a beautiful son, graduated from  a yr early; reframed negative self-talk to positive-I made mistakes and I'm still a good person; positive physical assests-healthy in good, shape; emotional-understanding loving; special abilities- smart supportive friend; relationships- parents, brother, son; strengths- persistent, and I keep trying. Pt received supportive feedback from peers and staff. Pt seems to grasp importance of thinking about herself in a positive way. Pt to complete consequences assignment and present in group.     "

## 2017-12-19 NOTE — TELEPHONE ENCOUNTER
Pt is scheduled to see Dr. Fiore on 12/28/17 @ 1:30pm.  Writer closing encounter no follow up needed.      Jaime Riggs

## 2017-12-20 ENCOUNTER — HOSPITAL ENCOUNTER (OUTPATIENT)
Dept: BEHAVIORAL HEALTH | Facility: CLINIC | Age: 27
End: 2017-12-20
Attending: FAMILY MEDICINE
Payer: MEDICAID

## 2017-12-20 PROCEDURE — 10020000 ZZH LODGING PLUS FACILITY CHARGE ADULT

## 2017-12-20 PROCEDURE — H2035 A/D TX PROGRAM, PER HOUR: HCPCS | Mod: HQ
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
## 2017-12-21 ENCOUNTER — HOSPITAL ENCOUNTER (OUTPATIENT)
Dept: BEHAVIORAL HEALTH | Facility: CLINIC | Age: 27
End: 2017-12-21
Attending: FAMILY MEDICINE
Payer: MEDICAID

## 2017-12-21 PROCEDURE — H2035 A/D TX PROGRAM, PER HOUR: HCPCS

## 2017-12-21 PROCEDURE — 10020000 ZZH LODGING PLUS FACILITY CHARGE ADULT

## 2017-12-21 PROCEDURE — H2035 A/D TX PROGRAM, PER HOUR: HCPCS | Mod: HQ

## 2017-12-21 NOTE — PROGRESS NOTES
"12/21/17  Pt shared an assignment focusing on consequences of her use.  She identified values violated to include family, parenting, honesty, obeying-the-law, healthy relationships and health.  Emotional consequences include disappointed, stupid, \"heartbroken\", depressed, hopeless, defeated, ashamed and lost.  Pt appeared honest and genuine when sharing.  Pt was provided feedback and support from peers and counselors.  Pt to continue program.    "
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
## 2017-12-22 ENCOUNTER — HOSPITAL ENCOUNTER (OUTPATIENT)
Dept: BEHAVIORAL HEALTH | Facility: CLINIC | Age: 27
End: 2017-12-22
Attending: FAMILY MEDICINE
Payer: MEDICAID

## 2017-12-22 PROCEDURE — H2035 A/D TX PROGRAM, PER HOUR: HCPCS | Mod: HQ

## 2017-12-22 PROCEDURE — 10020000 ZZH LODGING PLUS FACILITY CHARGE ADULT

## 2017-12-22 NOTE — PROGRESS NOTES
Patient Safety Plan Template      Name:   Domenico Rasmussen  YOB: 1990 Age:  27 MR Number:  [id number]   Step 1: Warning signs (Thoughts, images, mood, situation, behavior) that a crisis may be developin.  Isolating myself   2. Overwhelming thoughts of using again   3. Being overly anxious and depressed   Step 2: Internal coping strategies - Things I can do to take my mind off of my problems without contacting another person (relaxation technique, physical activity):   1. Yoga and deep breathing       2. Listen to calming music   3.  Go to the gym    Step 3: People and social settings that provide distraction:   1. Name: Red Phone: [phone]   2. Name: Dontrell Phone: [phone]   3. Place: AA/NA 4. Place: The gym       The one thing that is most important to me and worth living for is:    Step 4: People whom I can ask for help:   1. Name: Thi MELO Phone: [phone]   2. Name: Dontrell GREEN Phone: [phone]   3. Name: Robert vargas  Phone:    Step 5: Professionals or agencies I can contact during a crisis:   1. Clinician Name: Saurabh Grant   Phone: [phone]   Clinician Pager or Emergency Contact #:              3. Local Urgent Care Services: [facility]  Urgent Care Services Address:   Urgent Care Services Phone:    4. Suicide Prevention Lifeline Phone: [phone]   Step 6: Making the environment safe:   1. Stay away from old using friends   2. Stay away from using places   Safety Plan Template 2008 Gabriela Aguayo and Troy Aquino is reprinted with the express permission of the authors. No portion of the Safety Plan Template may be reproduced without the express, written permission. You can contact the authors at [email or web address] or [email or web address].

## 2017-12-23 ENCOUNTER — HOSPITAL ENCOUNTER (OUTPATIENT)
Dept: BEHAVIORAL HEALTH | Facility: CLINIC | Age: 27
End: 2017-12-23
Attending: FAMILY MEDICINE
Payer: MEDICAID

## 2017-12-23 PROCEDURE — 10020000 ZZH LODGING PLUS FACILITY CHARGE ADULT

## 2017-12-23 PROCEDURE — H2035 A/D TX PROGRAM, PER HOUR: HCPCS | Mod: HQ

## 2017-12-24 ENCOUNTER — HOSPITAL ENCOUNTER (OUTPATIENT)
Dept: BEHAVIORAL HEALTH | Facility: CLINIC | Age: 27
End: 2017-12-24
Attending: FAMILY MEDICINE
Payer: MEDICAID

## 2017-12-24 PROCEDURE — 10020000 ZZH LODGING PLUS FACILITY CHARGE ADULT

## 2017-12-24 PROCEDURE — H2035 A/D TX PROGRAM, PER HOUR: HCPCS | Mod: HQ

## 2017-12-25 ENCOUNTER — HOSPITAL ENCOUNTER (OUTPATIENT)
Dept: BEHAVIORAL HEALTH | Facility: CLINIC | Age: 27
End: 2017-12-25
Attending: FAMILY MEDICINE
Payer: MEDICAID

## 2017-12-25 PROCEDURE — 10020000 ZZH LODGING PLUS FACILITY CHARGE ADULT

## 2017-12-25 PROCEDURE — H2035 A/D TX PROGRAM, PER HOUR: HCPCS | Mod: HQ

## 2017-12-26 ENCOUNTER — HOSPITAL ENCOUNTER (OUTPATIENT)
Dept: BEHAVIORAL HEALTH | Facility: CLINIC | Age: 27
End: 2017-12-26
Attending: FAMILY MEDICINE
Payer: MEDICAID

## 2017-12-26 ENCOUNTER — TELEPHONE (OUTPATIENT)
Dept: FAMILY MEDICINE | Facility: CLINIC | Age: 27
End: 2017-12-26

## 2017-12-26 PROCEDURE — 10020000 ZZH LODGING PLUS FACILITY CHARGE ADULT

## 2017-12-26 PROCEDURE — H2035 A/D TX PROGRAM, PER HOUR: HCPCS | Mod: HQ
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
## 2017-12-26 NOTE — TELEPHONE ENCOUNTER
PA required on: Suboxone 8-2mg films  NDC: [address]-63  Patient Insurance: [place] (medical assistance)  Insurance BIN: [id number]   Insurance PCN: [id number]  Insurance ID: [id number]  Insurance phone number: [phone]   Pharmacy NPI: [id number]    Please make sure to back date this prior auth request to 12/24/2017    Please let us know if it's approved or denied. Thank You!    Deloris Hutton, Lio   [facility]  [phone]

## 2017-12-27 ENCOUNTER — TELEPHONE (OUTPATIENT)
Dept: BEHAVIORAL HEALTH | Facility: CLINIC | Age: 27
End: 2017-12-27

## 2017-12-27 ENCOUNTER — HOSPITAL ENCOUNTER (OUTPATIENT)
Dept: BEHAVIORAL HEALTH | Facility: CLINIC | Age: 27
End: 2017-12-27
Attending: FAMILY MEDICINE
Payer: MEDICAID

## 2017-12-27 PROCEDURE — H2035 A/D TX PROGRAM, PER HOUR: HCPCS | Mod: HQ

## 2017-12-27 PROCEDURE — 10020000 ZZH LODGING PLUS FACILITY CHARGE ADULT

## 2017-12-27 NOTE — TELEPHONE ENCOUNTER
A phone call was made as follow-up to the Family Program mailing for the week of 1/2/2018. Message left or spoke in person to individuals on JONNY.

## 2017-12-27 NOTE — TELEPHONE ENCOUNTER
A phone call was made as follow-up to the Family Program mailing for the week of 1/2/2-18. Message left or spoke in person to individuals on JONNY.

## 2017-12-28 ENCOUNTER — TELEPHONE (OUTPATIENT)
Dept: BEHAVIORAL HEALTH | Facility: CLINIC | Age: 27
End: 2017-12-28

## 2017-12-28 ENCOUNTER — OFFICE VISIT (OUTPATIENT)
Dept: ADDICTION MEDICINE | Facility: CLINIC | Age: 27
End: 2017-12-28
Payer: MEDICAID

## 2017-12-28 ENCOUNTER — HOSPITAL ENCOUNTER (OUTPATIENT)
Dept: BEHAVIORAL HEALTH | Facility: CLINIC | Age: 27
End: 2017-12-28
Attending: FAMILY MEDICINE
Payer: MEDICAID

## 2017-12-28 VITALS
SYSTOLIC BLOOD PRESSURE: 102 MMHG | WEIGHT: 142 LBS | OXYGEN SATURATION: 98 % | RESPIRATION RATE: 18 BRPM | DIASTOLIC BLOOD PRESSURE: 66 MMHG | BODY MASS INDEX: 23.63 KG/M2 | TEMPERATURE: 98.3 F | HEART RATE: 96 BPM

## 2017-12-28 DIAGNOSIS — F11.20 OPIOID USE DISORDER, SEVERE, DEPENDENCE (H): Primary | ICD-10-CM

## 2017-12-28 LAB
AMPHETAMINES UR QL: NOT DETECTED NG/ML
BARBITURATES UR QL SCN: NOT DETECTED NG/ML
BENZODIAZ UR QL SCN: ABNORMAL NG/ML
BUPRENORPHINE UR QL: ABNORMAL NG/ML
CANNABINOIDS UR QL: NOT DETECTED NG/ML
COCAINE UR QL SCN: NOT DETECTED NG/ML
D-METHAMPHET UR QL: NOT DETECTED NG/ML
HCG UR QL: POSITIVE
METHADONE UR QL SCN: NOT DETECTED NG/ML
OPIATES UR QL SCN: NOT DETECTED NG/ML
OXYCODONE UR QL SCN: NOT DETECTED NG/ML
PCP UR QL SCN: NOT DETECTED NG/ML
PROPOXYPH UR QL: NOT DETECTED NG/ML
TRICYCLICS UR QL SCN: NOT DETECTED NG/ML

## 2017-12-28 PROCEDURE — 10020000 ZZH LODGING PLUS FACILITY CHARGE ADULT

## 2017-12-28 PROCEDURE — H2035 A/D TX PROGRAM, PER HOUR: HCPCS | Mod: HQ

## 2017-12-28 PROCEDURE — 80306 DRUG TEST PRSMV INSTRMNT: CPT | Performed by: FAMILY MEDICINE

## 2017-12-28 PROCEDURE — 81025 URINE PREGNANCY TEST: CPT | Performed by: FAMILY MEDICINE

## 2017-12-28 PROCEDURE — 99214 OFFICE O/P EST MOD 30 MIN: CPT | Performed by: FAMILY MEDICINE

## 2017-12-28 RX ORDER — BUPRENORPHINE 8 MG/1
TABLET SUBLINGUAL
Qty: 21 EACH | Refills: 0 | Status: SHIPPED | OUTPATIENT
Start: 2017-12-28 | End: 2018-01-11

## 2017-12-28 RX ORDER — POLYETHYLENE GLYCOL 3350 17 G/17G
1 POWDER, FOR SOLUTION ORAL DAILY
Qty: 255 G | Refills: 1 | Status: SHIPPED | OUTPATIENT
Start: 2017-12-28 | End: 2018-01-02

## 2017-12-28 RX ORDER — BUPRENORPHINE AND NALOXONE 8; 2 MG/1; MG/1
FILM, SOLUBLE BUCCAL; SUBLINGUAL
Qty: 21 FILM | Refills: 0 | Status: SHIPPED | OUTPATIENT
Start: 2017-12-28 | End: 2017-12-28

## 2017-12-28 NOTE — TELEPHONE ENCOUNTER
PA Initiation    Medication: suboxone 8-2mg films- INITIATED  Insurance Company: [place] Medicaid ([facility] - Phone [phone] Fax [phone]  Pharmacy Filling the Rx: [place], [place] - [address]  Filling Pharmacy Phone: [phone]  Filling Pharmacy Fax:    Start Date: 12/28/2017

## 2017-12-28 NOTE — TELEPHONE ENCOUNTER
A phone call was made as follow-up to the Family Program mailing for the week of 1/2/2017. Message left or spoke in person to individuals on JONNY.

## 2017-12-28 NOTE — MR AVS SNAPSHOT
After Visit Summary   12/28/2017    Domenico Wilder    MRN: [id number]           Patient Information     Date Of Birth          1990        Visit Information        Provider Department      12/28/2017 1:30 PM Troy Fiore MD [facility]        Today's Diagnoses     Opioid use disorder, severe, dependence (H)    -  1       Follow-ups after your visit        Your next 10 appointments already scheduled     Dec 28, 2017  1:30 PM CST   New Visit with Troy Fiore MD   [facility] ([facility])    [address]   [address]  [facility] [address]   [phone]            Dec 29, 2017  7:15 AM CST   Treatment with ADULT LODGING PLUS E   [facility] ([facility])    [address] [place] [address]   [phone]            Dec 30, 2017  7:15 AM CST   Treatment with ADULT LODGING PLUS E   [facility] ([facility])    [address] [place] [address]   [phone]            Dec 31, 2017  7:15 AM CST   Treatment with ADULT LODGING PLUS E   [facility] ([facility])    [address] [place] [address]   [phone]            Jan 01, 2018  7:15 AM CST   Treatment with ADULT LODGING PLUS E   [facility] ([facility])    [address] [place] [address]   [phone]            Jan 02, 2018  7:15 AM CST   Treatment with ADULT LODGING PLUS E   [facility] ([facility])    [address] [place] [address]   [phone]            Jan 03, 2018  7:15 AM CST   Treatment with ADULT LODGING PLUS E   [facility] ([facility])    [facility] [address] [address]   [phone]            Jan 04, 2018  7:15 AM CST   Treatment with ADULT LODGING PLUS E   [facility] ([facility])    [facility] [address] [address]   [phone]            Jan 05, 2018  7:15 AM CST   Treatment with ADULT LODGING PLUS E   [facility] ([facility])    [address] [place] [address]   [phone]            Jan 06, 2018  7:15 AM CST   Treatment with ADULT LODGING PLUS E   [facility] ([facility])    [address] [place] [address]   [phone]              Future tests that were ordered for you today     Open Standing Orders        Priority Remaining Interval Expires Ordered    Urine Drugs of Abuse Screen Panel 13 Routine 100/100  12/28/2018 12/28/2017            Who to contact     If you have questions or need follow up information about today's clinic visit or your schedule please contact [facility] directly at [phone].  Normal or non-critical lab and imaging results will be communicated to you by Smart Energyhart, letter or phone within 4 business days after the clinic has received the results. If you do not hear from us within 7 days, please contact the clinic through Smart Energyhart or phone. If you have a critical or abnormal lab result, we will notify you by phone as soon as possible.  Submit refill requests through kontoblick or call your pharmacy and they will forward the refill request to us. Please allow 3 business days for your refill to be completed.          Additional Information About Your Visit        kontoblick Information     kontoblick lets you send messages to your doctor, view your test results, renew your  "prescriptions, schedule appointments and more. To sign up, go to [email or web address] . Click on \"Log in\" on the left side of the screen, which will take you to the Welcome page. Then click on \"Sign up Now\" on the right side of the page.     You will be asked to enter the access code listed below, as well as some personal information. Please follow the directions to create your username and password.     Your access code is: [email or web address]  Expires: 3/11/2018 10:25 AM     Your access code will  in 90 days. If you need help or a new code, please call your [facility] clinic or [phone].        Care EveryWhere ID     This is your Care EveryWhere ID. This could be used by other organizations to access your [facility] medical records  [id number]        Your Vitals Were     Pulse Temperature Respirations Last Period Pulse Oximetry BMI (Body Mass Index)    96 98.3  F (36.8  C) (Oral) 18 2017 98% 23.63 kg/m2       Blood Pressure from Last 3 Encounters:   17 102/66   17 102/58   17 100/65    Weight from Last 3 Encounters:   17 142 lb (64.4 kg)   17 148 lb 8 oz (67.4 kg)   17 142 lb 1 oz (64.4 kg)              We Performed the Following     HCG qualitative urine - CSC and Range          Where to get your medicines      Some of these will need a paper prescription and others can be bought over the counter.  Ask your nurse if you have questions.     Bring a paper prescription for each of these medications     buprenorphine HCl-naloxone HCl 8-2 MG per film          Primary Care Provider Office Phone # Fax #    [facility] [phone] [address]  [facility] [address]        Equal Access to Services     LAUREL SHANKS : Herve Trevino, wajeremie luqadaha, qaybta kaalmada charles, nolan gurrola. [facility] [phone].    ATENCIÓN: Si habla español, tiene a velez disposición servicios gratuitos de " andrewa lingüística. Dino al [phone].    We comply with applicable federal civil rights laws and [place] laws. We do not discriminate on the basis of race, color, national origin, age, disability, sex, sexual orientation, or gender identity.            Thank you!     Thank you for choosing [facility]  for your care. Our goal is always to provide you with excellent care. Hearing back from our patients is one way we can continue to improve our services. Please take a few minutes to complete the written survey that you may receive in the mail after your visit with us. Thank you!             Your Updated Medication List - Protect others around you: Learn how to safely use, store and throw away your medicines at www.disposemymeds.org.          This list is accurate as of: 12/28/17  1:28 PM.  Always use your most recent med list.                   Brand Name Dispense Instructions for use Diagnosis    acetaminophen 325 MG tablet    TYLENOL    100 tablet    Take 1-2 tablets (325-650 mg) by mouth every 4 hours as needed for mild pain    Opioid use disorder, severe, dependence (H)       alum & mag hydroxide-simethicone 200-200-20 MG/5ML Susp suspension    MYLANTA/MAALOX     Take 30 mLs by mouth every 6 hours as needed for indigestion        buprenorphine HCl-naloxone HCl 8-2 MG per film    SUBOXONE    21 Film    Place one strip under tongue each morning, and half strip under tongue each evening    Opioid use disorder, severe, dependence (H)       buPROPion 150 MG 24 hr tablet    WELLBUTRIN XL    90 tablet    Take 1 tablet (150 mg) by mouth every morning    Episode of recurrent major depressive disorder, unspecified depression episode severity (H), Generalized anxiety disorder       cloNIDine 0.1 MG/24HR WK patch    CATAPRES-TTS1    4 patch    Place 1 patch onto the skin once a week    Opioid use disorder, severe, dependence (H)       guaiFENesin 20 mg/mL Soln solution    ROBITUSSIN      Take 10 mLs by mouth every 4 hours as needed for cough        hydrOXYzine 25 MG tablet    ATARAX    120 tablet    Take 1-2 tablets (25-50 mg) by mouth every 8 hours as needed for anxiety    Opioid use disorder, severe, dependence (H)       IBUPROFEN PO      Take 400 mg by mouth every 6 hours as needed for moderate pain        loratadine 10 MG tablet    CLARITIN     Take 10 mg by mouth daily as needed for allergies        MELATONIN PO      Take 3 mg by mouth nightly as needed        naloxone 1 mg/mL for intranasal kit (2 syringes with 2 mucosal atomizer device)    NARCAN    1 kit    In opioid overdose put cone in nostril and push 1/2 of contents into each nostril.  Repeat every 3 min if no response until help arrives.    Opioid use disorder, severe, dependence (H)       phenol-menthol 14.5 MG lozenge      Place 1 lozenge inside cheek every 2 hours as needed for moderate pain        senna-docusate 8.6-50 MG per tablet    SENOKOT-S;PERICOLACE     Take 2 tablets by mouth daily as needed for constipation        sertraline 25 MG tablet    ZOLOFT    30 tablet    Take 1 tablet (25 mg) by mouth daily Increase to 50 mg after one week    Generalized anxiety disorder, Episode of recurrent major depressive disorder, unspecified depression episode severity (H)       traZODone 50 MG tablet    DESYREL    60 tablet    Take 1-2 tablets ( mg) by mouth nightly as needed for sleep    Opioid use disorder, severe, dependence (H)

## 2017-12-28 NOTE — TELEPHONE ENCOUNTER
Per [place] Medicaid prior auth dept, no request has been received for this. Please address at your earliest convenience. Note also that the pateint received thsi medication already on 12/24/17 and this needs to be dated to start on 12/24/17.      Prior Authorization Retail Medication Request  Medication/Dose: suboxone 8-2mg films  Diagnosis and ICD code: Opioid use disorder, severe, dependence f11.20   New/Renewal/Insurance Change PA: New   Previously Tried and Failed Therapies:      Insurance ID (if provided): [id number]  Insurance Phone (if provided): [phone]      You will see a separate prior auth request for Buprenorphine tablets shortly- both meds need prior auth.     Roslyn Pool CPhT      [facility]  [address] [place] [address]    Phone [phone]  Fax [phone]

## 2017-12-29 ENCOUNTER — HOSPITAL ENCOUNTER (OUTPATIENT)
Dept: BEHAVIORAL HEALTH | Facility: CLINIC | Age: 27
End: 2017-12-29
Attending: FAMILY MEDICINE
Payer: MEDICAID

## 2017-12-29 PROCEDURE — H2035 A/D TX PROGRAM, PER HOUR: HCPCS | Mod: HQ

## 2017-12-29 PROCEDURE — 10020000 ZZH LODGING PLUS FACILITY CHARGE ADULT

## 2017-12-29 NOTE — TELEPHONE ENCOUNTER
Prior Authorization Approval    Authorization Effective Date: 12/24/2017  Authorization Expiration Date: 3/17/2018  Medication: suboxone 8-2mg films- Approved   Approved Dose/Quantity:    Reference #:     Insurance Company: [place] Medicaid ([facility]) - Phone [phone] Fax [phone]  Expected CoPay:       CoPay Card Available:      Foundation Assistance Needed:    Which Pharmacy is filling the prescription (Not needed for infusion/clinic administered): [facility] [place], [place] - [address]  Pharmacy Notified: Yes  Patient Notified: No

## 2017-12-29 NOTE — PROGRESS NOTES
Name: Domenico Wilder  Date: 12/29/2017  Medical Record: [id number]    Envelope Number: [id number]    List of Contents (List each item separately in new row):   Clonidine 0.1mg Patches,  Hydroxyzine HCL  25mg     Admission:  I am responsible for any personal items that are not sent to the safe or pharmacy.  [facility] is not responsible for loss, theft or damage of any property in my possession.      Patient Signature:  ___________________________________________       Date/Time:__________________________    Staff Signature: __________________________________       Date/Time:__________________________    2nd Staff person, if patient is unable/unwilling to sign:      __________________________________________________________       Date/Time: __________________________      Discharge:  [facility] has returned all of my personal belongings:    Patient Signature: ________________________________________     Date/Time: ____________________________________    Staff Signature: ______________________________________     Date/Time:_____________________________________

## 2017-12-29 NOTE — PROGRESS NOTES
Patient scheduled for [facility] for 1/2/2018 @ 10:45AM (45 minute appointment). Asked about stopping medications prior to appointment and was told by clinic RN that no medications are stopped prior to first prenatal appointment.

## 2017-12-30 ENCOUNTER — HOSPITAL ENCOUNTER (OUTPATIENT)
Dept: BEHAVIORAL HEALTH | Facility: CLINIC | Age: 27
End: 2017-12-30
Attending: FAMILY MEDICINE
Payer: MEDICAID

## 2017-12-30 PROCEDURE — H2035 A/D TX PROGRAM, PER HOUR: HCPCS | Mod: HQ

## 2017-12-30 PROCEDURE — 10020000 ZZH LODGING PLUS FACILITY CHARGE ADULT

## 2017-12-30 NOTE — PROGRESS NOTES
"  SUBJECTIVE:   Domenico Wilder is a 27 year old female who presents to clinic today for the following health issues:      ADDICTION MEDICINE NOTE:    LODGING PLUS PATIENT - 1ST TIME IN LODGING PLUS    LONG HISTORY OF ADDICTION    HEROIN DRUG OF CHOICE  SEVERAL PREVIOUS TREATMENTS    HAS BEEN ON METHADONE MAINTENANCE AND SUBOXONE MAINTENANCE    FAILED BOTH AS \"WASN'T READY\"    NOW LIKE TREATMENT AND IS MOTIVATED  WANTS SUBOXONE MAINTENANCE    MISSED LAST PERIOD HCG A COUPLE WEEKS AGO NEG    TODAY HCG POSITIVE  PATIENT WAS UNAWARE    DISCUSSED AT LENGTH OPIOID DEPENDENCE AND PREGNANCY, NEED FOR SUBUTEX    SUBUTEX ORDERED AT 12 MG PER DAY    QUESTIONS ANSWERED    RE-CHECK 2 WEEKS        Problem list and histories reviewed & adjusted, as indicated.  Additional history: as documented    Patient Active Problem List   Diagnosis     Depression     Insomnia     GERD (gastroesophageal reflux disease)     Opioid dependence with withdrawal (H)     Chemical dependency (H)     Opioid use disorder, severe, dependence (H)     No past surgical history on file.    Social History   Substance Use Topics     Smoking status: Current Every Day Smoker     Packs/day: 1.00     Years: 10.00     Smokeless tobacco: Never Used     Alcohol use Yes     Family History   Problem Relation Age of Onset     Allergies Mother      Depression Mother      Alcohol/Drug Mother      Prostate Cancer Paternal Grandfather      Asthma No family hx of      C.A.D. No family hx of      DIABETES No family hx of      Hypertension No family hx of      CEREBROVASCULAR DISEASE No family hx of      Breast Cancer No family hx of      Cancer - colorectal No family hx of          Current Outpatient Prescriptions   Medication Sig Dispense Refill     polyethylene glycol (MIRALAX) powder Take 17 g (1 capful) by mouth daily 255 g 1     buprenorphine (SUBUTEX) 8 MG SUBL sublingual tablet Take 1 tab in morning and 1/2 tab in evening 21 each 0     sertraline (ZOLOFT) 25 MG tablet " Take 1 tablet (25 mg) by mouth daily Increase to 50 mg after one week 30 tablet 3     buPROPion (WELLBUTRIN XL) 150 MG 24 hr tablet Take 1 tablet (150 mg) by mouth every morning 90 tablet 3     IBUPROFEN PO Take 400 mg by mouth every 6 hours as needed for moderate pain       alum & mag hydroxide-simethicone (MYLANTA/MAALOX) 200-200-20 MG/5ML SUSP suspension Take 30 mLs by mouth every 6 hours as needed for indigestion       guaiFENesin (ROBITUSSIN) 20 mg/mL SOLN solution Take 10 mLs by mouth every 4 hours as needed for cough       phenol-menthol (CEPASTAT) 14.5 MG lozenge Place 1 lozenge inside cheek every 2 hours as needed for moderate pain       loratadine (CLARITIN) 10 MG tablet Take 10 mg by mouth daily as needed for allergies       senna-docusate (SENOKOT-S;PERICOLACE) 8.6-50 MG per tablet Take 2 tablets by mouth daily as needed for constipation       MELATONIN PO Take 3 mg by mouth nightly as needed       acetaminophen (TYLENOL) 325 MG tablet Take 1-2 tablets (325-650 mg) by mouth every 4 hours as needed for mild pain 100 tablet 0     hydrOXYzine (ATARAX) 25 MG tablet Take 1-2 tablets (25-50 mg) by mouth every 8 hours as needed for anxiety 120 tablet 0     traZODone (DESYREL) 50 MG tablet Take 1-2 tablets ( mg) by mouth nightly as needed for sleep 60 tablet 0     cloNIDine (CATAPRES-TTS1) 0.1 MG/24HR WK patch Place 1 patch onto the skin once a week 4 patch 0     naloxone (NARCAN) 1 mg/mL for intranasal kit (2 syringes with 2 mucosal atomizer device) In opioid overdose put cone in nostril and push 1/2 of contents into each nostril.  Repeat every 3 min if no response until help arrives. 1 kit 1     Allergies   Allergen Reactions     Cats      Dogs      Labs reviewed in EPIC      Reviewed and updated as needed this visit by clinical staff     Reviewed and updated as needed this visit by Provider         ROS:      OBJECTIVE:     /66  Pulse 96  Temp 98.3  F (36.8  C) (Oral)  Resp 18  Wt 142 lb (64.4  kg)  LMP 11/29/2017  SpO2 98%  BMI 23.63 kg/m2  Body mass index is 23.63 kg/(m^2).       ROS:  Constitutional, HEENT, cardiovascular, pulmonary, gi and gu systems are negative, except as otherwise noted.    /66  Pulse 96  Temp 98.3  F (36.8  C) (Oral)  Resp 18  Wt 142 lb (64.4 kg)  LMP 11/29/2017  SpO2 98%  BMI 23.63 kg/m2  EXAM:  GENERAL APPEARANCE: healthy, alert and no distress  EYES: Eyes grossly normal to inspection, PERRL and conjunctivae and sclerae normal  NEURO: Normal strength and tone, mentation intact and speech normal  PSYCH: mentation appears normal and affect normal/bright  MENTAL STATUS EXAM:  Appearance/Behavior: No apparent distress and Casually groomed  Speech: Normal  Mood/Affect: normal affect  Insight: Adequate        Diagnostic Test Results:  Results for orders placed or performed in visit on 12/28/17   HCG qualitative urine - CSC and Range   Result Value Ref Range    HCG Qual Urine Positive (A) NEG^Negative   Urine Drugs of Abuse Screen Panel 13   Result Value Ref Range    Cannabinoids (71-cyx-2-carboxy-9-THC) Not Detected NDET^Not Detected ng/mL    Phencyclidine (Phencyclidine) Not Detected NDET^Not Detected ng/mL    Cocaine (Benzoylecgonine) Not Detected NDET^Not Detected ng/mL    Methamphetamine (d-Methamphetamine) Not Detected NDET^Not Detected ng/mL    Opiates (Morphine) Not Detected NDET^Not Detected ng/mL    Amphetamine (d-Amphetamine) Not Detected NDET^Not Detected ng/mL    Benzodiazepines (Nordiazepam) Detected, Abnormal Result (A) NDET^Not Detected ng/mL    Tricyclic Antidepressants (Desipramine) Not Detected NDET^Not Detected ng/mL    Methadone (Methadone) Not Detected NDET^Not Detected ng/mL    Barbiturates (Butalbital) Not Detected NDET^Not Detected ng/mL    Oxycodone (Oxycodone) Not Detected NDET^Not Detected ng/mL    Propoxyphene (Norpropoxyphene) Not Detected NDET^Not Detected ng/mL    Buprenorphine (Buprenorphine) Detected, Abnormal Result (A) NDET^Not  Detected ng/mL       ASSESSMENT:   OPIOID DEPENDENCE  PREGNANCY    PLAN:       ICD-10-CM    1. Opioid use disorder, severe, dependence (H) F11.20 Urine Drugs of Abuse Screen Panel 13     HCG qualitative urine - CSC and Range     Urine Drugs of Abuse Screen Panel 13     polyethylene glycol (MIRALAX) powder     buprenorphine (SUBUTEX) 8 MG SUBL sublingual tablet     DISCONTINUED: buprenorphine HCl-naloxone HCl (SUBOXONE) 8-2 MG per film           MEDICATIONS:   Orders Placed This Encounter   Medications     DISCONTD: buprenorphine HCl-naloxone HCl (SUBOXONE) 8-2 MG per film     Sig: Place one strip under tongue each morning, and half strip under tongue each evening     Dispense:  21 Film     Refill:  0     polyethylene glycol (MIRALAX) powder     Sig: Take 17 g (1 capful) by mouth daily     Dispense:  255 g     Refill:  1     buprenorphine (SUBUTEX) 8 MG SUBL sublingual tablet     Sig: Take 1 tab in morning and 1/2 tab in evening     Dispense:  21 each     Refill:  0          - Continue other medications without change  FUTURE APPOINTMENTS:       - Follow-up visit in 2 WEEKS    Troy Fiore MD  [facility]

## 2017-12-31 ENCOUNTER — HOSPITAL ENCOUNTER (OUTPATIENT)
Dept: BEHAVIORAL HEALTH | Facility: CLINIC | Age: 27
End: 2017-12-31
Attending: FAMILY MEDICINE
Payer: MEDICAID

## 2017-12-31 PROCEDURE — H2035 A/D TX PROGRAM, PER HOUR: HCPCS | Mod: HQ

## 2017-12-31 PROCEDURE — 10020000 ZZH LODGING PLUS FACILITY CHARGE ADULT

## 2018-01-01 ENCOUNTER — HOSPITAL ENCOUNTER (OUTPATIENT)
Dept: BEHAVIORAL HEALTH | Facility: CLINIC | Age: 28
End: 2018-01-01
Attending: FAMILY MEDICINE
Payer: MEDICAID

## 2018-01-01 LAB — BENZODIAZ UR QL: NEGATIVE

## 2018-01-01 PROCEDURE — 80307 DRUG TEST PRSMV CHEM ANLYZR: CPT | Performed by: FAMILY MEDICINE

## 2018-01-01 PROCEDURE — H2035 A/D TX PROGRAM, PER HOUR: HCPCS | Mod: HQ

## 2018-01-01 PROCEDURE — 10020000 ZZH LODGING PLUS FACILITY CHARGE ADULT

## 2018-01-02 ENCOUNTER — APPOINTMENT (OUTPATIENT)
Dept: ULTRASOUND IMAGING | Facility: CLINIC | Age: 28
End: 2018-01-02
Attending: EMERGENCY MEDICINE
Payer: MEDICAID

## 2018-01-02 ENCOUNTER — HOSPITAL ENCOUNTER (EMERGENCY)
Facility: CLINIC | Age: 28
Discharge: HOME | End: 2018-01-03
Attending: EMERGENCY MEDICINE | Admitting: EMERGENCY MEDICINE
Payer: MEDICAID

## 2018-01-02 ENCOUNTER — HOSPITAL ENCOUNTER (OUTPATIENT)
Dept: BEHAVIORAL HEALTH | Facility: CLINIC | Age: 28
End: 2018-01-02
Attending: FAMILY MEDICINE
Payer: MEDICAID

## 2018-01-02 ENCOUNTER — PRENATAL OFFICE VISIT (OUTPATIENT)
Dept: NURSING | Facility: CLINIC | Age: 28
End: 2018-01-02
Payer: MEDICAID

## 2018-01-02 VITALS
WEIGHT: 140.3 LBS | TEMPERATURE: 98 F | DIASTOLIC BLOOD PRESSURE: 77 MMHG | HEART RATE: 77 BPM | BODY MASS INDEX: 23.38 KG/M2 | HEIGHT: 65 IN | SYSTOLIC BLOOD PRESSURE: 109 MMHG

## 2018-01-02 DIAGNOSIS — O21.9 NAUSEA/VOMITING IN PREGNANCY: ICD-10-CM

## 2018-01-02 DIAGNOSIS — O03.9 MISCARRIAGE: ICD-10-CM

## 2018-01-02 DIAGNOSIS — Z34.90 SUPERVISION OF NORMAL PREGNANCY: Primary | ICD-10-CM

## 2018-01-02 PROBLEM — Z23 NEED FOR TDAP VACCINATION: Status: ACTIVE | Noted: 2018-01-02

## 2018-01-02 PROBLEM — F10.230 ALCOHOL DEPENDENCE WITH UNCOMPLICATED WITHDRAWAL (H): Status: ACTIVE | Noted: 2017-12-06

## 2018-01-02 LAB
ABO + RH BLD: NORMAL
ABO + RH BLD: NORMAL
ALBUMIN UR-MCNC: NEGATIVE MG/DL
APPEARANCE UR: CLEAR
BASOPHILS # BLD AUTO: 0 10E9/L (ref 0–0.2)
BASOPHILS NFR BLD AUTO: 0.2 %
BILIRUB UR QL STRIP: NEGATIVE
BLOOD BANK CMNT PATIENT-IMP: NORMAL
BLOOD BANK CMNT PATIENT-IMP: NORMAL
COLOR UR AUTO: YELLOW
DIFFERENTIAL METHOD BLD: NORMAL
EOSINOPHIL # BLD AUTO: 0 10E9/L (ref 0–0.7)
EOSINOPHIL NFR BLD AUTO: 0.2 %
ERYTHROCYTE [DISTWIDTH] IN BLOOD BY AUTOMATED COUNT: 12.9 % (ref 10–15)
ERYTHROCYTE [DISTWIDTH] IN BLOOD BY AUTOMATED COUNT: 13.5 % (ref 10–15)
FETAL CELL SCN BLD QL ROSETTE: NORMAL
GLUCOSE UR STRIP-MCNC: NEGATIVE MG/DL
HBV SURFACE AG SERPL QL IA: NONREACTIVE
HCT VFR BLD AUTO: 42.3 % (ref 35–47)
HCT VFR BLD AUTO: 42.5 % (ref 35–47)
HGB BLD-MCNC: 13.9 G/DL (ref 11.7–15.7)
HGB BLD-MCNC: 14.3 G/DL (ref 11.7–15.7)
HGB UR QL STRIP: NEGATIVE
HIV 1+2 AB+HIV1 P24 AG SERPL QL IA: NONREACTIVE
IMM GRANULOCYTES # BLD: 0 10E9/L (ref 0–0.4)
IMM GRANULOCYTES NFR BLD: 0.2 %
KETONES UR STRIP-MCNC: NEGATIVE MG/DL
LEUKOCYTE ESTERASE UR QL STRIP: NEGATIVE
LYMPHOCYTES # BLD AUTO: 3.3 10E9/L (ref 0.8–5.3)
LYMPHOCYTES NFR BLD AUTO: 49.6 %
MCH RBC QN AUTO: 28.6 PG (ref 26.5–33)
MCH RBC QN AUTO: 29.2 PG (ref 26.5–33)
MCHC RBC AUTO-ENTMCNC: 32.9 G/DL (ref 31.5–36.5)
MCHC RBC AUTO-ENTMCNC: 33.6 G/DL (ref 31.5–36.5)
MCV RBC AUTO: 87 FL (ref 78–100)
MCV RBC AUTO: 87 FL (ref 78–100)
MONOCYTES # BLD AUTO: 0.5 10E9/L (ref 0–1.3)
MONOCYTES NFR BLD AUTO: 7.2 %
NEUTROPHILS # BLD AUTO: 2.8 10E9/L (ref 1.6–8.3)
NEUTROPHILS NFR BLD AUTO: 42.6 %
NITRATE UR QL: NEGATIVE
NRBC # BLD AUTO: 0 10*3/UL
NRBC BLD AUTO-RTO: 0 /100
PH UR STRIP: 6 PH (ref 5–7)
PLATELET # BLD AUTO: 273 10E9/L (ref 150–450)
PLATELET # BLD AUTO: 294 10E9/L (ref 150–450)
RBC # BLD AUTO: 4.86 10E12/L (ref 3.8–5.2)
RBC # BLD AUTO: 4.9 10E12/L (ref 3.8–5.2)
RH IG VIALS RECOM PATIENT: NORMAL
SOURCE: NORMAL
SP GR UR STRIP: 1.02 (ref 1–1.03)
UROBILINOGEN UR STRIP-ACNC: 0.2 EU/DL (ref 0.2–1)
WBC # BLD AUTO: 6.5 10E9/L (ref 4–11)
WBC # BLD AUTO: 6.7 10E9/L (ref 4–11)

## 2018-01-02 PROCEDURE — H2035 A/D TX PROGRAM, PER HOUR: HCPCS | Mod: HQ

## 2018-01-02 PROCEDURE — 84702 CHORIONIC GONADOTROPIN TEST: CPT | Performed by: EMERGENCY MEDICINE

## 2018-01-02 PROCEDURE — 87086 URINE CULTURE/COLONY COUNT: CPT | Performed by: OBSTETRICS & GYNECOLOGY

## 2018-01-02 PROCEDURE — 87389 HIV-1 AG W/HIV-1&-2 AB AG IA: CPT | Performed by: OBSTETRICS & GYNECOLOGY

## 2018-01-02 PROCEDURE — 99284 EMERGENCY DEPT VISIT MOD MDM: CPT | Mod: 25 | Performed by: EMERGENCY MEDICINE

## 2018-01-02 PROCEDURE — 86780 TREPONEMA PALLIDUM: CPT | Performed by: OBSTETRICS & GYNECOLOGY

## 2018-01-02 PROCEDURE — 99284 EMERGENCY DEPT VISIT MOD MDM: CPT | Mod: Z6 | Performed by: EMERGENCY MEDICINE

## 2018-01-02 PROCEDURE — 81003 URINALYSIS AUTO W/O SCOPE: CPT | Performed by: OBSTETRICS & GYNECOLOGY

## 2018-01-02 PROCEDURE — 86762 RUBELLA ANTIBODY: CPT | Performed by: OBSTETRICS & GYNECOLOGY

## 2018-01-02 PROCEDURE — 86900 BLOOD TYPING SEROLOGIC ABO: CPT | Performed by: EMERGENCY MEDICINE

## 2018-01-02 PROCEDURE — 25000132 ZZH RX MED GY IP 250 OP 250 PS 637: Performed by: EMERGENCY MEDICINE

## 2018-01-02 PROCEDURE — 85027 COMPLETE CBC AUTOMATED: CPT | Performed by: OBSTETRICS & GYNECOLOGY

## 2018-01-02 PROCEDURE — 86850 RBC ANTIBODY SCREEN: CPT | Performed by: OBSTETRICS & GYNECOLOGY

## 2018-01-02 PROCEDURE — G0499 HEPB SCREEN HIGH RISK INDIV: HCPCS | Performed by: OBSTETRICS & GYNECOLOGY

## 2018-01-02 PROCEDURE — 85025 COMPLETE CBC W/AUTO DIFF WBC: CPT | Performed by: EMERGENCY MEDICINE

## 2018-01-02 PROCEDURE — 86901 BLOOD TYPING SEROLOGIC RH(D): CPT | Performed by: OBSTETRICS & GYNECOLOGY

## 2018-01-02 PROCEDURE — 99207 ZZC NO CHARGE NURSE ONLY: CPT

## 2018-01-02 PROCEDURE — 86900 BLOOD TYPING SEROLOGIC ABO: CPT | Performed by: OBSTETRICS & GYNECOLOGY

## 2018-01-02 PROCEDURE — 36415 COLL VENOUS BLD VENIPUNCTURE: CPT | Performed by: OBSTETRICS & GYNECOLOGY

## 2018-01-02 PROCEDURE — 10020000 ZZH LODGING PLUS FACILITY CHARGE ADULT

## 2018-01-02 PROCEDURE — 76817 TRANSVAGINAL US OBSTETRIC: CPT

## 2018-01-02 PROCEDURE — 85461 HEMOGLOBIN FETAL: CPT | Performed by: EMERGENCY MEDICINE

## 2018-01-02 PROCEDURE — 86901 BLOOD TYPING SEROLOGIC RH(D): CPT | Mod: 91 | Performed by: EMERGENCY MEDICINE

## 2018-01-02 RX ORDER — ACETAMINOPHEN 325 MG/1
975 TABLET ORAL ONCE
Status: COMPLETED | OUTPATIENT
Start: 2018-01-02 | End: 2018-01-02

## 2018-01-02 RX ORDER — BUPROPION HYDROCHLORIDE 150 MG/1
150 TABLET ORAL EVERY MORNING
Status: ON HOLD | COMMUNITY
Start: 2017-12-18 | End: 2018-04-10

## 2018-01-02 RX ORDER — PYRIDOXINE HCL (VITAMIN B6) 25 MG
25 TABLET ORAL 3 TIMES DAILY
Qty: 100 TABLET | Refills: 1 | Status: SHIPPED | OUTPATIENT
Start: 2018-01-02 | End: 2018-04-06

## 2018-01-02 RX ORDER — PRENATAL VIT/IRON FUM/FOLIC AC 27MG-0.8MG
1 TABLET ORAL DAILY
Qty: 100 TABLET | Refills: 3 | Status: SHIPPED | OUTPATIENT
Start: 2018-01-02 | End: 2018-04-06

## 2018-01-02 RX ADMIN — ACETAMINOPHEN 975 MG: 325 TABLET, FILM COATED ORAL at 22:56

## 2018-01-02 ASSESSMENT — ENCOUNTER SYMPTOMS: BACK PAIN: 1

## 2018-01-02 NOTE — PROGRESS NOTES
Patient:  Domenico Wilder            Adult CD Progress Note and Treatment Plan Review     Attendance  Please refer to OP BEH CD Adult Attendance Record Documentation Flowsheet    Support group attended this week: yes    Reporting sobriety:  yes    Treatment Plan     Treatment Plan Review competed on:  1/2/18       Client preferred learning style: Visual  Hands on  Demonstration    Staff Members contributing   Laverne Knight [facility], Sumi Crockett [facility], William Castro [facility],  nurse, additional staff counselors, Zeynep Weems-therapist                        Received Supervision: yes    Client: contributed to goals and plan.    Client received copy of plan/revised plan: Yes    Client agrees with plan/revised plan: Yes    Changes to Treatment Plan: No    New Goals added since last review:  none    Goals worked on since last review  Continuing stabilization, physical health, grief/loss, spirituality, relationships, aftercare planning, education about addiction, family week, 1.1 therapy    Strategies effective: yes    Strategies need these changes:  none    ASAM Risk Rating:    Dimension 1  0  Pt denies symptoms of withdrawal at this time.  She will continue subutex with Dr Fiore.    Dimension 2  0  Pt found she is pregnant and has an appointment with the OBGYN clinic this date.  Pt denies any medical issues that would impair her ability to participate in programming.  She is able to continue to access medical care as needed.     Dimension 3  2  Pt denies any significant mood changes this week.  She reports feeling some stress in relation to adjusting to finding she is pregnant, housing and finances.  Pt is meeting with therapist, Zeynep Weems.  Pt attended the grief/loss focus group, facilitated by Miki PATEL, to gain support for her losses.  Pt denies any thoughts of suicide or self harm at this time.     Dimension 4  1  Pt reports feeling motivated by her son and her pregnancy.  She appears to be gaining insight  into her addiction and the negative impact of her use on herself and others.      Dimension 5  4  Pt rated her cravings at a 3, on a scale of 1 to 10, ten being high.  Pt reports nothing is hindering her from being successful in [facility] this week.  Pt reports she has mixed feelings about her relationship with the father of her child, as the relationship has been unhealthy.  Pt reports she plans to focus on herself, her son and her unborn child.  Pt plans to attend relapse prevention workshops.  Pt attended the spiritual care focus group and is working toward developing more meaningful spirituality.  Pt has appeared emotionally constricted, but is working toward emotional connection and regulation.  Pt reports she is open to enter a women's extended care program following [facility].  Information has been provided to [facility].      Dimension 6   4  Pt invited concerned persons to participate in the family week program beginning this date.  She is attending 12 step meetings and spending time with female peers.  Pt is seeking a safe sober living environment.    Any changes in Vulnerable Adult Status?  No  If yes, add to treatment plan and individual abuse prevention plan.    Family Involvement:   Family week this week with mother and boyfriend attending.     Data:   client did participate  Pt attends lectures and participates in discussions following.  Lectures discussed this week included: Experience, Strength and Hope, Nutrition and Guilt/Shame.  Intervention:   Aftercare planning  Counselor feedback  Education  Emotional management  Group feedback  Relapse prevention    Assessment:   Stages of Change Model  Contemplation  Preparation/Determination    Appears/Sounds:  Cooperative  Motivated  Engaged    Plan:  Focus on recovery environment  Monitor emotional/physical health    STEVE Lin

## 2018-01-02 NOTE — NURSING NOTE
"Chief Complaint   Patient presents with     Prenatal Care     new ob teaching and labs       Initial /77  Pulse 77  Temp 98  F (36.7  C)  Ht 5' 5\" (1.651 m)  Wt 140 lb 4.8 oz (63.6 kg)  LMP 11/22/2017  BMI 23.35 kg/m2 Estimated body mass index is 23.35 kg/(m^2) as calculated from the following:    Height as of this encounter: 5' 5\" (1.651 m).    Weight as of this encounter: 140 lb 4.8 oz (63.6 kg).  Medication Reconciliation: complete    "

## 2018-01-02 NOTE — ED AVS SNAPSHOT
[facility], Emergency Department    [address]    [place] [address]    Phone:  [phone]    Fax:  [phone]                                       Domenico Wilder   MRN: [id number]    Department:  [facility], Emergency Department   Date of Visit:  2018           Patient Information     Date Of Birth          1990        Your diagnoses for this visit were:     Miscarriage        You were seen by Jerardo Alfaro MD.        Discharge Instructions       Please make an appointment to follow up with OB/Gyn--[facility] (phone: [phone]) as soon as possible.  In 48 hours get another bHCG test to make sure it remains low and not rising.  Results for orders placed or performed during the hospital encounter of 18   US OB < 14 Weeks w Transvaginal    Narrative    US OB <14 WEEKS WITH TRANSVAGINAL SINGLE  2018 11:54 PM     INDICATION: New pregnancy with sudden onset of back pain and vaginal  bleeding, unknown LMP. Positive pregnancy test.    COMPARISON: None.    FINDINGS: Transabdominal followed by endovaginal ultrasound to better  evaluate for early pregnancy. No visible IUP. Endometrial stripe  measures 0.9 cm in thickness. The ovaries are negative. No suspicious  adnexal masses or free fluid.      Impression    IMPRESSION:  1. Negative pelvic ultrasound. No visible IUP.  2. Given the positive pregnancy test findings could be due to an early  pregnancy not yet visualized or fetal demise/complete .  Ectopic pregnancy cannot be completely excluded but there are no  suspicious findings for this.  3. Recommend correlation with serial quantitative beta-hCG levels and  short-term follow-up ultrasound as needed.   CBC with platelets differential   Result Value Ref Range    WBC 6.7 4.0 - 11.0 10e9/L    RBC Count 4.86 3.8 - 5.2 10e12/L    Hemoglobin 13.9 11.7 - 15.7 g/dL    Hematocrit 42.3 35.0 - 47.0 %    MCV 87 78 - 100 fl    MCH 28.6 26.5 - 33.0 pg    MCHC 32.9  31.5 - 36.5 g/dL    RDW 12.9 10.0 - 15.0 %    Platelet Count 273 150 - 450 10e9/L    Diff Method Automated Method     % Neutrophils 42.6 %    % Lymphocytes 49.6 %    % Monocytes 7.2 %    % Eosinophils 0.2 %    % Basophils 0.2 %    % Immature Granulocytes 0.2 %    Nucleated RBCs 0 0 /100    Absolute Neutrophil 2.8 1.6 - 8.3 10e9/L    Absolute Lymphocytes 3.3 0.8 - 5.3 10e9/L    Absolute Monocytes 0.5 0.0 - 1.3 10e9/L    Absolute Eosinophils 0.0 0.0 - 0.7 10e9/L    Absolute Basophils 0.0 0.0 - 0.2 10e9/L    Abs Immature Granulocytes 0.0 0 - 0.4 10e9/L    Absolute Nucleated RBC 0.0    UA reflex to Microscopic and Culture   Result Value Ref Range    Color Urine Yellow     Appearance Urine Clear     Glucose Urine Negative NEG^Negative mg/dL    Bilirubin Urine Negative NEG^Negative    Ketones Urine Negative NEG^Negative mg/dL    Specific Gravity Urine 1.024 1.003 - 1.035    Blood Urine Small (A) NEG^Negative    pH Urine 6.5 5.0 - 7.0 pH    Protein Albumin Urine Negative NEG^Negative mg/dL    Urobilinogen mg/dL Normal 0.0 - 2.0 mg/dL    Nitrite Urine Negative NEG^Negative    Leukocyte Esterase Urine Negative NEG^Negative    Source Midstream Urine     RBC Urine 1 0 - 2 /HPF    WBC Urine 1 0 - 2 /HPF    Mucous Urine Present (A) NEG^Negative /LPF   HCG QUANTitative pregnancy   Result Value Ref Range    HCG Quantitative Serum 2 0 - 5 IU/L   Rho (D) immune globulin (RhoGam) Lab Study   Result Value Ref Range    Rhogam Order Order received    Rh Immune Globulin Study   Result Value Ref Range    ABO O     RH(D) Pos     Fetal Blood Screen Canceled, Test credited     Blood Bank Comment       Not suitable for Rh Immune Globulin  Patient is Rh positive.      Amount of RHIG Required Not suitable for Rh Immune Globulin          Completed Spontaneous Miscarriage  Today's exams show your pregnancy has ended suddenly. This can be emotionally difficult. There is little that can be done to change the way you feel. But understand that  miscarriages are common.  About 1 or 2 out of every 10 pregnancies end this way. Some end even before you know you are pregnant. This happens for a number of reasons, and usually the cause is never known. It s important you know that it is not your fault. It didn t happen because you did anything wrong.  Having sex or exercising does not cause a miscarriage. These activities are usually safe unless you have pain or bleeding or your doctor tells you to stop. Even minor falls won t cause a miscarriage. Miscarriages happen because things were not developing as they were supposed to.  It appears that your miscarriage is complete. All tissue from the pregnancy should have passed out of your uterus. If some of the pregnancy tissue remains in the uterus, you will probably have more cramping and bleeding. The bleeding can be light spotting or like a period, but it is usually not heavy. You may also pass some tissue.  After you have recovered, you should still be able to get pregnant again. But before trying, talk with your healthcare provider.  Home care  Follow these tips to take of yourself at home:    You can go back to your normal activities if you don t have heavy bleeding or pain.    You may have some cramping and bleeding, but it shouldn t be severe.  Until the bleeding stops completely and to prevent infection:    Don t have sex until your healthcare provider says it s OK    Use sanitary napkins instead of tampons.    Don t douche.  Having a miscarriage can be very difficult emotionally. It is natural to feel sadness or grief. It may help to talk about your feelings with family and friends, or with a counselor.  Follow-up care  Make an appointment to see your healthcare provider in 1 to 2 weeks for a checkup.  If cramping and bleeding return and continue for more than a few days, call your healthcare provider or return here for an exam. To prevent infection in the uterus, your provider might need to take out any  tissue that remains. Or you may be given medicine to take at home to help your body expel the rest of the tissue.  If you had an ultrasound, a radiologist will review it. You will be told of any new findings that may affect your care.  Call 911  Call 911 if you have:    Severe pain and very heavy bleeding    Severe lightheadedness, passing out, or fainting    Rapid heart rate    Difficulty breathing    Confusion or difficulty waking up  When to seek medical advice  Call your healthcare provider right away if any of these occur:    Heavy bleeding. This means soaking 1 new pad an hour over 3 hours.    Bleeding that doesn t stop after 10 days    Foul-smelling vaginal discharge    Fever of 100.4 F (38 C) or higher, or as directed by your healthcare provider    Pain in your lower belly (abdomen) that gets worse    Weakness or dizziness  Date Last Reviewed: 9/1/2016 2000-2017 [employer]. [address] [place] [address]. All rights reserved. This information is not intended as a substitute for professional medical care. Always follow your healthcare professional's instructions.          Future Appointments        Provider Department Dept Phone Center    1/3/2018 7:15 AM [facility] [phone] [place]    1/4/2018 7:15 AM [facility] [phone] [place]    1/5/2018 7:15 AM [facility] [phone] [place]    1/6/2018 7:15 AM [facility] [phone] [place]    1/7/2018 7:15 AM [facility] [phone] [place]    1/8/2018 7:15 AM [facility] [phone] [place]    1/9/2018 7:15 AM [facility] [phone] [place]    1/9/2018 4:00 PM [facility] ROOM 1  [facility] [phone] [facility]    1/9/2018 4:45 PM JOSE Thomas [facility] [phone] [facility]    1/11/2018 3:30 PM Troy Fiore MD [facility] [phone] [facility]    2/6/2018 12:00 PM JOSE Thomas [facility] [phone] [facility]      24 Hour Appointment Hotline       To make an appointment at any [facility], call [phone] ([phone]). If you don't have a family doctor or clinic, we will help you find one. [facility] are conveniently located to serve the needs of you and your family.             Review of your medicines      Our records show that you are taking the medicines listed below. If these are incorrect, please call your family doctor or clinic.        Dose / Directions Last dose taken    acetaminophen 325 MG tablet   Commonly known as:  TYLENOL   Dose:  325-650 mg   Quantity:  100 tablet        Take 1-2 tablets (325-650 mg) by mouth every 4 hours as needed for mild pain   Refills:  0        buprenorphine 8 MG Subl sublingual tablet   Commonly known as:  SUBUTEX   Quantity:  21 each        Take 1 tab in morning and 1/2 tab in evening   Refills:  0        buPROPion 150 MG 24 hr tablet   Commonly known as:  WELLBUTRIN XL        Refills:  0        doxylamine 25 MG Tabs tablet   Commonly known as:  UNISOM   Dose:  25 mg   Quantity:  14 each        Take 1 tablet (25 mg) by mouth At Bedtime   Refills:  0        prenatal multivitamin plus iron 27-0.8 MG Tabs per tablet   Dose:  1 tablet   Quantity:  100 tablet        Take 1 tablet by mouth daily   Refills:  3        pyridOXINE 25 MG tablet   Commonly known as:  vitamin B-6   Dose:  25 mg   Quantity:  100 tablet        Take 1 tablet (25 mg) by mouth 3 times daily Take with unisom for nausea/vomiting   Refills:  1        sertraline 25 MG tablet   Commonly known as:  ZOLOFT   Dose:  25 mg   Quantity:  30 tablet        Take 1 tablet (25 mg)  "by mouth daily Increase to 50 mg after one week   Refills:  3                Procedures and tests performed during your visit     CBC with platelets differential    HCG QUANTitative pregnancy    Rh Immune Globulin Study    Rho (D) immune globulin (RhoGam) Lab Study    UA reflex to Microscopic and Culture    US OB < 14 Weeks w Transvaginal      Orders Needing Specimen Collection     None      Pending Results     Date and Time Order Name Status Description    1/2/2018 2243 US OB < 14 Weeks w Transvaginal Preliminary     1/2/2018 1121 URINE CULTURE AEROBIC BACTERIAL In process     1/2/2018 1121 ANTI TREPONEMA In process     1/2/2018 1121 RUBELLA ANTIBODY IGG QUANTITATIVE In process             Pending Culture Results     Date and Time Order Name Status Description    1/2/2018 1121 URINE CULTURE AEROBIC BACTERIAL In process             Pending Results Instructions     If you had any lab results that were not finalized at the time of your Discharge, you can call the ED Lab Result RN at [phone]. You will be contacted by this team for any positive Lab results or changes in treatment. The nurses are available 7 days a week from 10A to 6:30P.  You can leave a message 24 hours per day and they will return your call.        Thank you for choosing [facility]       Thank you for choosing [facility] for your care. Our goal is always to provide you with excellent care. Hearing back from our patients is one way we can continue to improve our services. Please take a few minutes to complete the written survey that you may receive in the mail after you visit with us. Thank you!        [employer] Information     [employer] lets you send messages to your doctor, view your test results, renew your prescriptions, schedule appointments and more. To sign up, go to [email or web address] . Click on \"Log in\" on the left side of the screen, which will take you to the Welcome page. Then click on \"Sign up Now\" on the right side of the page.     You " will be asked to enter the access code listed below, as well as some personal information. Please follow the directions to create your username and password.     Your access code is: [email or web address]  Expires: 3/11/2018 10:25 AM     Your access code will  in 90 days. If you need help or a new code, please call your [facility] clinic or [phone].        Care EveryWhere ID     This is your Care EveryWhere ID. This could be used by other organizations to access your [facility] medical records  [id number]        Equal Access to Services     : Herve Trevino, justino gallego, funmi soto, nolan stack . So [facility] [phone].    ATENCIÓN: Si habla español, tiene a velez disposición servicios gratuitos de asistencia lingüística. Dino al [phone].    We comply with applicable federal civil rights laws and [place] laws. We do not discriminate on the basis of race, color, national origin, age, disability, sex, sexual orientation, or gender identity.            After Visit Summary       This is your record. Keep this with you and show to your community pharmacist(s) and doctor(s) at your next visit.
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
## 2018-01-02 NOTE — ED AVS SNAPSHOT
[facility] [place], Emergency Department    [address]    [facility] [address]    Phone:  [phone]    Fax:  [phone]                                       Domenico Wilder   MRN: [id number]    Department:  [facility], Emergency Department   Date of Visit:  1/2/2018           After Visit Summary Signature Page     I have received my discharge instructions, and my questions have been answered. I have discussed any challenges I see with this plan with the nurse or doctor.    ..........................................................................................................................................  Patient/Patient Representative Signature      ..........................................................................................................................................  Patient Representative Print Name and Relationship to Patient    ..................................................               ................................................  Date                                            Time    ..........................................................................................................................................  Reviewed by Signature/Title    ...................................................              ..............................................  Date                                                            Time

## 2018-01-02 NOTE — MR AVS SNAPSHOT
After Visit Summary   1/2/2018    Domenico Wilder    MRN: [id number]           Patient Information     Date Of Birth          1990        Visit Information        Provider Department      1/2/2018 10:00 AM RD OB [place]URSE [place]UC[place] [facility]        Today's Diagnoses     Supervision of normal pregnancy    -  1    Nausea/vomiting in pregnancy           Follow-ups after your visit        Your next 10 appointments already scheduled     Jan 03, 2018  7:15 AM CST   Treatment with ADULT LODGING PLUS E   [facility] ([facility])    [address] [place] [address]   [phone]            Jan 04, 2018  7:15 AM CST   Treatment with ADULT LODGING PLUS E   [facility] ([facility])    [address] [place] [address]   [phone]            Jan 05, 2018  7:15 AM CST   Treatment with ADULT LODGING PLUS E   [facility] ([facility])    [address] [place] [address]   [phone]            Jan 06, 2018  7:15 AM CST   Treatment with ADULT LODGING PLUS E   [facility] ([facility])    [address] [place] [address]   [phone]            Jan 07, 2018  7:15 AM CST   Treatment with ADULT LODGING PLUS E   [facility] ([facility])    [address] [place] [address]   [phone]            Jan 08, 2018  7:15 AM CST   Treatment with ADULT LODGING PLUS E   [facility] ([facility])    [address] [place] [address]   [phone]            Jan 09, 2018  7:15 AM CST   Treatment with ADULT LODGING PLUS E   [facility] ([facility])    [address] [id number] [phone]            Jan 09, 2018  4:00 PM CST   US OB < 14 WEEKS SINGLE with RDUS1   [facility] [place]    [address] [facility]  [address]  [facility] [address] [phone]           Please bring a list of your medicines (including vitamins, minerals and over-the-counter drugs). Also, tell your doctor about any allergies you may have. Wear comfortable clothes and leave your valuables at home.  If you re less than 20 weeks drink four 8-ounce glasses of fluid an hour before your exam. If you need to empty your bladder before your exam, try to release only a little urine. Then, drink another glass of fluid.  You may have up to two family members in the exam room. If you bring a small child, an adult must be there to care for him or her.  Please call the Imaging Department at your exam site with any questions.            Jan 09, 2018  4:45 PM CST   SHORT with JOSE Cottrell CNM   [facility] [place]    [address] [place] [address]  [address]  [facility] [id number] [phone]            Jan 11, 2018  3:30 PM CST   Return Visit with Troy Fiore MD   [facility] ([facility])    [address] [place] [address]  [address]  [facility] [id number] [phone]              Future tests that were ordered for you today     Open Future Orders        Priority Expected Expires Ordered    US OB < 14 weeks, single,  for dating (FMF216) Routine  4/2/2018 1/2/2018            Who to contact     If you have questions or need follow up information about today's clinic visit or your schedule please contact [facility] directly at [phone].  Normal or non-critical lab and imaging results will be communicated to you by MyChart, letter or phone within 4 business  "days after the clinic has received the results. If you do not hear from us within 7 days, please contact the clinic through EDUS or phone. If you have a critical or abnormal lab result, we will notify you by phone as soon as possible.  Submit refill requests through EDUS or call your pharmacy and they will forward the refill request to us. Please allow 3 business days for your refill to be completed.          Additional Information About Your Visit        EDUS Information     EDUS lets you send messages to your doctor, view your test results, renew your prescriptions, schedule appointments and more. To sign up, go to [email or web address] . Click on \"Log in\" on the left side of the screen, which will take you to the Welcome page. Then click on \"Sign up Now\" on the right side of the page.     You will be asked to enter the access code listed below, as well as some personal information. Please follow the directions to create your username and password.     Your access code is: [email or web address]  Expires: 3/11/2018 10:25 AM     Your access code will  in 90 days. If you need help or a new code, please call your [place] clinic or [phone].        Care EveryWhere ID     This is your Care EveryWhere ID. This could be used by other organizations to access your [facility] medical records  [id number]        Your Vitals Were     Pulse Temperature Height Last Period BMI (Body Mass Index)       77 98  F (36.7  C) 5' 5\" (1.651 m) 11/15/2017 23.35 kg/m2        Blood Pressure from Last 3 Encounters:   18 109/77   17 102/66   17 102/58    Weight from Last 3 Encounters:   18 140 lb 4.8 oz (63.6 kg)   17 142 lb (64.4 kg)   17 148 lb 8 oz (67.4 kg)              We Performed the Following     ABO/RH Type and Screen     Anti Treponema     CBC with Platelets     Hepatitis B surface antigen     HIV Antigen Antibody Combo     Rubella Antibody IgG Quantitative     UA without Microscopic "     Urine Culture Aerobic Bacterial          Today's Medication Changes          These changes are accurate as of: 1/2/18 12:39 PM.  If you have any questions, ask your nurse or doctor.               Start taking these medicines.        Dose/Directions    doxylamine 25 MG Tabs tablet   Commonly known as:  UNISOM   Used for:  Nausea/vomiting in pregnancy        Dose:  25 mg   Take 1 tablet (25 mg) by mouth At Bedtime   Quantity:  14 each   Refills:  0       prenatal multivitamin plus iron 27-0.8 MG Tabs per tablet   Used for:  Supervision of normal pregnancy        Dose:  1 tablet   Take 1 tablet by mouth daily   Quantity:  100 tablet   Refills:  3       pyridOXINE 25 MG tablet   Commonly known as:  vitamin B-6   Used for:  Nausea/vomiting in pregnancy        Dose:  25 mg   Take 1 tablet (25 mg) by mouth 3 times daily Take with unisom for nausea/vomiting   Quantity:  100 tablet   Refills:  1         These medicines have changed or have updated prescriptions.        Dose/Directions    buprenorphine 8 MG Subl sublingual tablet   Commonly known as:  SUBUTEX   This may have changed:    - how much to take  - additional instructions   Used for:  Opioid use disorder, severe, dependence (H)        Take 1 tab in morning and 1/2 tab in evening   Quantity:  21 each   Refills:  0            Where to get your medicines      These medications were sent to [facility] [address]  [address] [address]     Phone:  [phone]     doxylamine 25 MG Tabs tablet    prenatal multivitamin plus iron 27-0.8 MG Tabs per tablet    pyridOXINE 25 MG tablet                Primary Care Provider Office Phone # Fax #    Saurabh [facility] [phone] [address] [facility]  [facility] [address]        Equal Access to Services     LAUREL SHANKS : Herve Trevino, wajeremie gallego, qanolan humphreys.  [facility] [phone].    ATENCIÓN: Si parmjit lau, tiene a velez disposición servicios gratuitos de asistencia lingüística. Dino ortiz [phone].    We comply with applicable federal civil rights laws and [place] laws. We do not discriminate on the basis of race, color, national origin, age, disability, sex, sexual orientation, or gender identity.            Thank you!     Thank you for choosing [facility]  for your care. Our goal is always to provide you with excellent care. Hearing back from our patients is one way we can continue to improve our services. Please take a few minutes to complete the written survey that you may receive in the mail after your visit with us. Thank you!             Your Updated Medication List - Protect others around you: Learn how to safely use, store and throw away your medicines at www.disposemymeds.org.          This list is accurate as of: 1/2/18 12:39 PM.  Always use your most recent med list.                   Brand Name Dispense Instructions for use Diagnosis    acetaminophen 325 MG tablet    TYLENOL    100 tablet    Take 1-2 tablets (325-650 mg) by mouth every 4 hours as needed for mild pain    Opioid use disorder, severe, dependence (H)       buprenorphine 8 MG Subl sublingual tablet    SUBUTEX    21 each    Take 1 tab in morning and 1/2 tab in evening    Opioid use disorder, severe, dependence (H)       buPROPion 150 MG 24 hr tablet    WELLBUTRIN XL          doxylamine 25 MG Tabs tablet    UNISOM    14 each    Take 1 tablet (25 mg) by mouth At Bedtime    Nausea/vomiting in pregnancy       prenatal multivitamin plus iron 27-0.8 MG Tabs per tablet     100 tablet    Take 1 tablet by mouth daily    Supervision of normal pregnancy       pyridOXINE 25 MG tablet    vitamin B-6    100 tablet    Take 1 tablet (25 mg) by mouth 3 times daily Take with unisom for nausea/vomiting    Nausea/vomiting in pregnancy       sertraline 25 MG tablet    ZOLOFT    30 tablet    Take 1  tablet (25 mg) by mouth daily Increase to 50 mg after one week    Generalized anxiety disorder, Episode of recurrent major depressive disorder, unspecified depression episode severity (H)

## 2018-01-03 ENCOUNTER — HOSPITAL ENCOUNTER (OUTPATIENT)
Dept: BEHAVIORAL HEALTH | Facility: CLINIC | Age: 28
End: 2018-01-03
Attending: FAMILY MEDICINE
Payer: MEDICAID

## 2018-01-03 ENCOUNTER — TELEPHONE (OUTPATIENT)
Dept: MIDWIFE SERVICES | Facility: CLINIC | Age: 28
End: 2018-01-03

## 2018-01-03 VITALS
OXYGEN SATURATION: 97 % | HEART RATE: 74 BPM | SYSTOLIC BLOOD PRESSURE: 105 MMHG | RESPIRATION RATE: 14 BRPM | WEIGHT: 140 LBS | DIASTOLIC BLOOD PRESSURE: 72 MMHG | BODY MASS INDEX: 23.3 KG/M2 | TEMPERATURE: 98.3 F

## 2018-01-03 LAB
ABO + RH BLD: NORMAL
ABO + RH BLD: NORMAL
ALBUMIN UR-MCNC: NEGATIVE MG/DL
APPEARANCE UR: CLEAR
B-HCG SERPL-ACNC: 2 IU/L (ref 0–5)
BACTERIA SPEC CULT: NORMAL
BILIRUB UR QL STRIP: NEGATIVE
BLD GP AB SCN SERPL QL: NORMAL
BLOOD BANK CMNT PATIENT-IMP: NORMAL
COLOR UR AUTO: YELLOW
GLUCOSE UR STRIP-MCNC: NEGATIVE MG/DL
HGB UR QL STRIP: ABNORMAL
KETONES UR STRIP-MCNC: NEGATIVE MG/DL
LEUKOCYTE ESTERASE UR QL STRIP: NEGATIVE
MUCOUS THREADS #/AREA URNS LPF: PRESENT /LPF
NITRATE UR QL: NEGATIVE
PH UR STRIP: 6.5 PH (ref 5–7)
RBC #/AREA URNS AUTO: 1 /HPF (ref 0–2)
RUBV IGG SERPL IA-ACNC: 18 IU/ML
SOURCE: ABNORMAL
SP GR UR STRIP: 1.02 (ref 1–1.03)
SPECIMEN EXP DATE BLD: NORMAL
SPECIMEN SOURCE: NORMAL
T PALLIDUM IGG+IGM SER QL: NEGATIVE
UROBILINOGEN UR STRIP-MCNC: NORMAL MG/DL (ref 0–2)
WBC #/AREA URNS AUTO: 1 /HPF (ref 0–2)

## 2018-01-03 PROCEDURE — H2035 A/D TX PROGRAM, PER HOUR: HCPCS | Mod: HQ

## 2018-01-03 PROCEDURE — 81001 URINALYSIS AUTO W/SCOPE: CPT | Performed by: EMERGENCY MEDICINE

## 2018-01-03 PROCEDURE — 10020000 ZZH LODGING PLUS FACILITY CHARGE ADULT

## 2018-01-03 NOTE — TELEPHONE ENCOUNTER
Patient was seen in ER yesterday for bleeding in early pregnancy. US showed no IUP and quant was 2. [facility] is calling to see if she needs follow-up I did recommend that they repeat a UPT in their facility in 1-2 weeks to assure it remains negative. If -, nothing additional. If +, to call clinic. Is there anything else that you would like to do or recommend at this time?  Tete Chadwick

## 2018-01-03 NOTE — ED PROVIDER NOTES
"  History     Chief Complaint   Patient presents with     Back Pain     middle lower back pain started an hour ago, no changes in bowel and bladder     Vaginal Bleeding     15 mins ago noted vag'l bleed \"pinkish red\" noted bleed when wiping. Recently just took pregnancy test last week \"positive\"     HPI  Domenico Wilder is a 27 year old female , currently pregnant, with a history of GERD, depression, anxiety and polysubstance abuse (alcohol, heroin). She is currently in [facility] for CD treatment and presents to the ED with back pain and vaginal bleeding.  Patient reports that she developed some lower back pain tonight and also later started experiencing vaginal bleeding.  Patient reports that her last menstrual period ended on 2017; patient reports that this was the first menstrual period she had in 2 years as she has a history of irregular periods.  She does have an ultrasound scheduled for 2018.  She denies any other complaints currently.    Past Medical History:   Diagnosis Date     Anxiety      Chickenpox      Depression      Depressive disorder      GERD (gastroesophageal reflux disease)      Substance abuse        Past Surgical History:   Procedure Laterality Date     NO HISTORY OF SURGERY         Family History   Problem Relation Age of Onset     Allergies Mother      Depression Mother      Alcohol/Drug Mother      Prostate Cancer Paternal Grandfather      DIABETES Maternal Grandfather      non insulin dependent       Social History   Substance Use Topics     Smoking status: Former Smoker     Packs/day: 1.00     Years: 10.00     Start date: 2017     Smokeless tobacco: Never Used     Alcohol use Yes     No current facility-administered medications for this encounter.      Current Outpatient Prescriptions   Medication     buPROPion (WELLBUTRIN XL) 150 MG 24 hr tablet     buprenorphine (SUBUTEX) 8 MG SUBL sublingual tablet     sertraline (ZOLOFT) 25 MG tablet     HYDROXYZINE HCL PO     " benzocaine (ORAJEL MAXIMUM STRENGTH) 20 % GEL gel     oseltamivir (TAMIFLU) 75 MG capsule     Prenatal Vit-Fe Fumarate-FA (PRENATAL MULTIVITAMIN PLUS IRON) 27-0.8 MG TABS per tablet     pyridOXINE (VITAMIN B-6) 25 MG tablet     doxylamine (UNISOM) 25 MG TABS tablet     acetaminophen (TYLENOL) 325 MG tablet        Allergies   Allergen Reactions     Cats      Dogs       I have reviewed the Medications, Allergies, Past Medical and Surgical History, and Social History in the Epic system.    Review of Systems   Genitourinary: Positive for vaginal bleeding.   Musculoskeletal: Positive for back pain (lower).   All other systems reviewed and are negative.      Physical Exam   BP: 123/89  Pulse: 74  Heart Rate: 87  Temp: 97.6  F (36.4  C)  Resp: 16  Weight: 63.5 kg (140 lb)  SpO2: 100 %      Physical Exam   Constitutional: She is oriented to person, place, and time. She appears well-developed and well-nourished. No distress.   HENT:   Head: Normocephalic and atraumatic.   Eyes: No scleral icterus.   Neck: Normal range of motion. Neck supple.   Cardiovascular: Normal rate.    Pulmonary/Chest: Effort normal.   Abdominal: Soft. There is no tenderness.   Genitourinary: No vaginal discharge found.   Neurological: She is alert and oriented to person, place, and time.   Skin: Skin is warm and dry. No rash noted. She is not diaphoretic. No erythema. No pallor.       ED Course     ED Course     Procedures     10:40 PM  The patient was seen and examined by Dr. Alfaro in Room 4.               Critical Care time:  none         Results for orders placed or performed during the hospital encounter of 01/02/18   US OB < 14 Weeks w Transvaginal    Narrative    US OB <14 WEEKS WITH TRANSVAGINAL SINGLE  1/2/2018 11:54 PM     INDICATION: New pregnancy with sudden onset of back pain and vaginal  bleeding, unknown LMP. Positive pregnancy test.    COMPARISON: None.    FINDINGS: Transabdominal followed by endovaginal ultrasound to better  evaluate  for early pregnancy. No visible IUP. Endometrial stripe  measures 0.9 cm in thickness. The ovaries are negative. No suspicious  adnexal masses or free fluid.      Impression    IMPRESSION:  1. Negative pelvic ultrasound. No visible IUP.  2. Given the positive pregnancy test findings could be due to an early  pregnancy not yet visualized or fetal demise/complete .  Ectopic pregnancy cannot be completely excluded but there are no  suspicious findings for this.  3. Recommend correlation with serial quantitative beta-hCG levels and  short-term follow-up ultrasound as needed.    ROBERTO SEE MD   CBC with platelets differential   Result Value Ref Range    WBC 6.7 4.0 - 11.0 10e9/L    RBC Count 4.86 3.8 - 5.2 10e12/L    Hemoglobin 13.9 11.7 - 15.7 g/dL    Hematocrit 42.3 35.0 - 47.0 %    MCV 87 78 - 100 fl    MCH 28.6 26.5 - 33.0 pg    MCHC 32.9 31.5 - 36.5 g/dL    RDW 12.9 10.0 - 15.0 %    Platelet Count 273 150 - 450 10e9/L    Diff Method Automated Method     % Neutrophils 42.6 %    % Lymphocytes 49.6 %    % Monocytes 7.2 %    % Eosinophils 0.2 %    % Basophils 0.2 %    % Immature Granulocytes 0.2 %    Nucleated RBCs 0 0 /100    Absolute Neutrophil 2.8 1.6 - 8.3 10e9/L    Absolute Lymphocytes 3.3 0.8 - 5.3 10e9/L    Absolute Monocytes 0.5 0.0 - 1.3 10e9/L    Absolute Eosinophils 0.0 0.0 - 0.7 10e9/L    Absolute Basophils 0.0 0.0 - 0.2 10e9/L    Abs Immature Granulocytes 0.0 0 - 0.4 10e9/L    Absolute Nucleated RBC 0.0    UA reflex to Microscopic and Culture   Result Value Ref Range    Color Urine Yellow     Appearance Urine Clear     Glucose Urine Negative NEG^Negative mg/dL    Bilirubin Urine Negative NEG^Negative    Ketones Urine Negative NEG^Negative mg/dL    Specific Gravity Urine 1.024 1.003 - 1.035    Blood Urine Small (A) NEG^Negative    pH Urine 6.5 5.0 - 7.0 pH    Protein Albumin Urine Negative NEG^Negative mg/dL    Urobilinogen mg/dL Normal 0.0 - 2.0 mg/dL    Nitrite Urine Negative NEG^Negative     Leukocyte Esterase Urine Negative NEG^Negative    Source Midstream Urine     RBC Urine 1 0 - 2 /HPF    WBC Urine 1 0 - 2 /HPF    Mucous Urine Present (A) NEG^Negative /LPF   HCG QUANTitative pregnancy   Result Value Ref Range    HCG Quantitative Serum 2 0 - 5 IU/L   Rho (D) immune globulin (RhoGam) Lab Study   Result Value Ref Range    Rhogam Order Order received    Rh Immune Globulin Study   Result Value Ref Range    ABO O     RH(D) Pos     Fetal Blood Screen Canceled, Test credited     Blood Bank Comment       Not suitable for Rh Immune Globulin  Patient is Rh positive.      Amount of RHIG Required Not suitable for Rh Immune Globulin      Medications   acetaminophen (TYLENOL) tablet 975 mg (975 mg Oral Given 1/2/18 5505)            Labs Ordered and Resulted from Time of ED Arrival Up to the Time of Departure from the ED   UA MACROSCOPIC WITH REFLEX TO MICRO AND CULTURE - Abnormal; Notable for the following:        Result Value    Blood Urine Small (*)     Mucous Urine Present (*)     All other components within normal limits   CBC WITH PLATELETS DIFFERENTIAL   HCG QUANTITATIVE PREGNANCY   RHOGAM ORDER   RH IMMUNE GLOBULIN STUDY            Assessments & Plan (with Medical Decision Making)   This is a 27-year-old female patient coming into emergency room with pink vaginal discharge.  She has not had a significant amount of vaginal bleeding at this time.  She believes that she was pregnant after taking a home pregnancy test.  Here in the emergency room she is found that if beta-hCG of 2.  Her ultrasound shows no intrauterine pregnancy.  I believe that her symptoms are associated with a miscarriage.  Patient was provided with aftercare instructions to follow-up with the women's clinic in 48-72 hours for repeat of her beta hCG.  At this time I believe she can be discharged safely with no further workup needed here in the emergency room.    I have reviewed the nursing notes.    I have reviewed the findings, diagnosis,  plan and need for follow up with the patient.    Discharge Medication List as of 1/3/2018 12:48 AM          Final diagnoses:   Miscarriage     I, Carlyle Grant, am serving as a trained medical scribe to document services personally performed by Jerardo Alfaro MD, based on the provider's statements to me.   Jerardo PAGAN MD, was physically present and have reviewed and verified the accuracy of this note documented by Carlyle Grant.       1/2/2018   [facility], [place], EMERGENCY DEPARTMENT     Jerardo Alfaro MD  01/10/18 1919

## 2018-01-03 NOTE — DISCHARGE INSTRUCTIONS
Please make an appointment to follow up with OB/Gyn--[facility] (phone: [phone]) as soon as possible.  In 48 hours get another bHCG test to make sure it remains low and not rising.  Results for orders placed or performed during the hospital encounter of 18   US OB < 14 Weeks w Transvaginal    Narrative    US OB <14 WEEKS WITH TRANSVAGINAL SINGLE  2018 11:54 PM     INDICATION: New pregnancy with sudden onset of back pain and vaginal  bleeding, unknown LMP. Positive pregnancy test.    COMPARISON: None.    FINDINGS: Transabdominal followed by endovaginal ultrasound to better  evaluate for early pregnancy. No visible IUP. Endometrial stripe  measures 0.9 cm in thickness. The ovaries are negative. No suspicious  adnexal masses or free fluid.      Impression    IMPRESSION:  1. Negative pelvic ultrasound. No visible IUP.  2. Given the positive pregnancy test findings could be due to an early  pregnancy not yet visualized or fetal demise/complete .  Ectopic pregnancy cannot be completely excluded but there are no  suspicious findings for this.  3. Recommend correlation with serial quantitative beta-hCG levels and  short-term follow-up ultrasound as needed.   CBC with platelets differential   Result Value Ref Range    WBC 6.7 4.0 - 11.0 10e9/L    RBC Count 4.86 3.8 - 5.2 10e12/L    Hemoglobin 13.9 11.7 - 15.7 g/dL    Hematocrit 42.3 35.0 - 47.0 %    MCV 87 78 - 100 fl    MCH 28.6 26.5 - 33.0 pg    MCHC 32.9 31.5 - 36.5 g/dL    RDW 12.9 10.0 - 15.0 %    Platelet Count 273 150 - 450 10e9/L    Diff Method Automated Method     % Neutrophils 42.6 %    % Lymphocytes 49.6 %    % Monocytes 7.2 %    % Eosinophils 0.2 %    % Basophils 0.2 %    % Immature Granulocytes 0.2 %    Nucleated RBCs 0 0 /100    Absolute Neutrophil 2.8 1.6 - 8.3 10e9/L    Absolute Lymphocytes 3.3 0.8 - 5.3 10e9/L    Absolute Monocytes 0.5 0.0 - 1.3 10e9/L    Absolute Eosinophils 0.0 0.0 - 0.7 10e9/L    Absolute Basophils  0.0 0.0 - 0.2 10e9/L    Abs Immature Granulocytes 0.0 0 - 0.4 10e9/L    Absolute Nucleated RBC 0.0    UA reflex to Microscopic and Culture   Result Value Ref Range    Color Urine Yellow     Appearance Urine Clear     Glucose Urine Negative NEG^Negative mg/dL    Bilirubin Urine Negative NEG^Negative    Ketones Urine Negative NEG^Negative mg/dL    Specific Gravity Urine 1.024 1.003 - 1.035    Blood Urine Small (A) NEG^Negative    pH Urine 6.5 5.0 - 7.0 pH    Protein Albumin Urine Negative NEG^Negative mg/dL    Urobilinogen mg/dL Normal 0.0 - 2.0 mg/dL    Nitrite Urine Negative NEG^Negative    Leukocyte Esterase Urine Negative NEG^Negative    Source Midstream Urine     RBC Urine 1 0 - 2 /HPF    WBC Urine 1 0 - 2 /HPF    Mucous Urine Present (A) NEG^Negative /LPF   HCG QUANTitative pregnancy   Result Value Ref Range    HCG Quantitative Serum 2 0 - 5 IU/L   Rho (D) immune globulin (RhoGam) Lab Study   Result Value Ref Range    Rhogam Order Order received    Rh Immune Globulin Study   Result Value Ref Range    ABO O     RH(D) Pos     Fetal Blood Screen Canceled, Test credited     Blood Bank Comment       Not suitable for Rh Immune Globulin  Patient is Rh positive.      Amount of RHIG Required Not suitable for Rh Immune Globulin          Completed Spontaneous Miscarriage  Today's exams show your pregnancy has ended suddenly. This can be emotionally difficult. There is little that can be done to change the way you feel. But understand that miscarriages are common.  About 1 or 2 out of every 10 pregnancies end this way. Some end even before you know you are pregnant. This happens for a number of reasons, and usually the cause is never known. It s important you know that it is not your fault. It didn t happen because you did anything wrong.  Having sex or exercising does not cause a miscarriage. These activities are usually safe unless you have pain or bleeding or your doctor tells you to stop. Even minor falls won t cause a  miscarriage. Miscarriages happen because things were not developing as they were supposed to.  It appears that your miscarriage is complete. All tissue from the pregnancy should have passed out of your uterus. If some of the pregnancy tissue remains in the uterus, you will probably have more cramping and bleeding. The bleeding can be light spotting or like a period, but it is usually not heavy. You may also pass some tissue.  After you have recovered, you should still be able to get pregnant again. But before trying, talk with your healthcare provider.  Home care  Follow these tips to take of yourself at home:    You can go back to your normal activities if you don t have heavy bleeding or pain.    You may have some cramping and bleeding, but it shouldn t be severe.  Until the bleeding stops completely and to prevent infection:    Don t have sex until your healthcare provider says it s OK    Use sanitary napkins instead of tampons.    Don t douche.  Having a miscarriage can be very difficult emotionally. It is natural to feel sadness or grief. It may help to talk about your feelings with family and friends, or with a counselor.  Follow-up care  Make an appointment to see your healthcare provider in 1 to 2 weeks for a checkup.  If cramping and bleeding return and continue for more than a few days, call your healthcare provider or return here for an exam. To prevent infection in the uterus, your provider might need to take out any tissue that remains. Or you may be given medicine to take at home to help your body expel the rest of the tissue.  If you had an ultrasound, a radiologist will review it. You will be told of any new findings that may affect your care.  Call 911  Call 911 if you have:    Severe pain and very heavy bleeding    Severe lightheadedness, passing out, or fainting    Rapid heart rate    Difficulty breathing    Confusion or difficulty waking up  When to seek medical advice  Call your healthcare  provider right away if any of these occur:    Heavy bleeding. This means soaking 1 new pad an hour over 3 hours.    Bleeding that doesn t stop after 10 days    Foul-smelling vaginal discharge    Fever of 100.4 F (38 C) or higher, or as directed by your healthcare provider    Pain in your lower belly (abdomen) that gets worse    Weakness or dizziness  Date Last Reviewed: 9/1/2016 2000-2017 [employer]. [address] [place] [address], [place] [address]. All rights reserved. This information is not intended as a substitute for professional medical care. Always follow your healthcare professional's instructions.

## 2018-01-04 ENCOUNTER — HOSPITAL ENCOUNTER (OUTPATIENT)
Dept: BEHAVIORAL HEALTH | Facility: CLINIC | Age: 28
End: 2018-01-04
Attending: FAMILY MEDICINE
Payer: MEDICAID

## 2018-01-04 PROCEDURE — H2035 A/D TX PROGRAM, PER HOUR: HCPCS | Mod: HQ

## 2018-01-04 PROCEDURE — 10020000 ZZH LODGING PLUS FACILITY CHARGE ADULT

## 2018-01-04 NOTE — PROGRESS NOTES
D) Pt shared detailed chemical use history and listened as family shared their feelings of fear and pain. Pt has gone missing and neglected her son. Day 2 pt shared feeling abandoned by mother when mom was drinking.  Sadness over fights with boyfriend and being homeless. A) Pt seems to be seeking support from family system.  Everyone understands the importance of good communication focused on sharing feelings.  P) Pt to follow counselors' recommendations.  Family to attend Phase 2/Marlyn .

## 2018-01-05 ENCOUNTER — TRANSFERRED RECORDS (OUTPATIENT)
Dept: HEALTH INFORMATION MANAGEMENT | Facility: CLINIC | Age: 28
End: 2018-01-05

## 2018-01-05 ENCOUNTER — HOSPITAL ENCOUNTER (OUTPATIENT)
Dept: BEHAVIORAL HEALTH | Facility: CLINIC | Age: 28
End: 2018-01-05
Attending: FAMILY MEDICINE
Payer: MEDICAID

## 2018-01-05 PROCEDURE — 10020000 ZZH LODGING PLUS FACILITY CHARGE ADULT

## 2018-01-05 PROCEDURE — H2035 A/D TX PROGRAM, PER HOUR: HCPCS | Mod: HQ

## 2018-01-05 RX ORDER — OSELTAMIVIR PHOSPHATE 75 MG/1
75 CAPSULE ORAL DAILY
Qty: 7 CAPSULE | Refills: 0 | Status: SHIPPED | OUTPATIENT
Start: 2018-01-05 | End: 2018-04-06

## 2018-01-06 ENCOUNTER — HOSPITAL ENCOUNTER (OUTPATIENT)
Dept: BEHAVIORAL HEALTH | Facility: CLINIC | Age: 28
End: 2018-01-06
Attending: FAMILY MEDICINE
Payer: MEDICAID

## 2018-01-06 PROCEDURE — H2035 A/D TX PROGRAM, PER HOUR: HCPCS | Mod: HQ

## 2018-01-06 PROCEDURE — 10020000 ZZH LODGING PLUS FACILITY CHARGE ADULT

## 2018-01-06 NOTE — PROGRESS NOTES
Name: Domenico Wilder  Date: 1/6/2018  Medical Record: [id number]    Envelope Number: [id number]    List of Contents (List each item separately in new row):     Clonidine Patch 0.1mg/24hr    Admission:  I am responsible for any personal items that are not sent to the safe or pharmacy.  [facility] is not responsible for loss, theft or damage of any property in my possession.      Patient Signature:  ___________________________________________       Date/Time:__________________________    Staff Signature: __________________________________       Date/Time:__________________________    2nd Staff person, if patient is unable/unwilling to sign:      __________________________________________________________       Date/Time: __________________________      Discharge:  [facility] has returned all of my personal belongings:    Patient Signature: ________________________________________     Date/Time: ____________________________________    Staff Signature: ______________________________________     Date/Time:_____________________________________

## 2018-01-06 NOTE — PROGRESS NOTES
Name: Domenico Wilder  Date: 1/5/2018  Medical Record: [id number]    Envelope Number: [id number]    List of Contents (List each item separately in new row):   Naloxone kit    Admission:  I am responsible for any personal items that are not sent to the safe or pharmacy.  [facility] is not responsible for loss, theft or damage of any property in my possession.      Patient Signature:  ___________________________________________       Date/Time:__________________________    Staff Signature: __________________________________       Date/Time:__________________________    2nd Staff person, if patient is unable/unwilling to sign:      __________________________________________________________       Date/Time: __________________________      Discharge:  [facility] has returned all of my personal belongings:    Patient Signature: ________________________________________     Date/Time: ____________________________________    Staff Signature: ______________________________________     Date/Time:_____________________________________

## 2018-01-07 ENCOUNTER — HOSPITAL ENCOUNTER (OUTPATIENT)
Dept: BEHAVIORAL HEALTH | Facility: CLINIC | Age: 28
End: 2018-01-07
Attending: FAMILY MEDICINE
Payer: MEDICAID

## 2018-01-07 PROCEDURE — 10020000 ZZH LODGING PLUS FACILITY CHARGE ADULT

## 2018-01-07 PROCEDURE — H2035 A/D TX PROGRAM, PER HOUR: HCPCS | Mod: HQ

## 2018-01-08 ENCOUNTER — HOSPITAL ENCOUNTER (OUTPATIENT)
Dept: BEHAVIORAL HEALTH | Facility: CLINIC | Age: 28
End: 2018-01-08
Attending: FAMILY MEDICINE
Payer: MEDICAID

## 2018-01-08 PROCEDURE — 10020000 ZZH LODGING PLUS FACILITY CHARGE ADULT

## 2018-01-08 PROCEDURE — H2035 A/D TX PROGRAM, PER HOUR: HCPCS | Mod: HQ

## 2018-01-08 NOTE — PROGRESS NOTES
Pt reports she called [facility] and they will have a bed on Friday 1/12/19 and she would like to go to [facility] in [facility]. Pt discharges on 1/10/18, she eports she can stay with her Mom for 2 nights.

## 2018-01-08 NOTE — PROGRESS NOTES
1/5/18  Writer faxed Rule 25, updated progress note, and medication record to [facility].     STEVE Valle

## 2018-01-09 ENCOUNTER — HOSPITAL ENCOUNTER (OUTPATIENT)
Dept: BEHAVIORAL HEALTH | Facility: CLINIC | Age: 28
End: 2018-01-09
Attending: FAMILY MEDICINE
Payer: MEDICAID

## 2018-01-09 PROCEDURE — 10020000 ZZH LODGING PLUS FACILITY CHARGE ADULT

## 2018-01-09 PROCEDURE — H2035 A/D TX PROGRAM, PER HOUR: HCPCS | Mod: HQ

## 2018-01-09 NOTE — PROGRESS NOTES
Nursing Discharge Planning Meeting    Writer completed discharge planning meeting with patient. Discharge is planned for 1/10/2018.    Discussed appropriate follow up care to manage YADY and MH issues and to obtain medication refills. Patient given a copy of their current medications for reference. Questions answered and patient verbalized understanding of post-discharge follow up plan.  Patient has appt with Dr. Fiore on 1/11/2018.    Continue to support patient in discharge planning as needed to assure appropriate continuity of care.     Essential Oil Therapy  Patient used essential oil therapy during treatment program: No   If yes:   Was the essential oil therapy effective for managing sleep, pain, anxiety, or mood? Na&   Did the essential oil therapy trigger any cravings or urges to use drugs or alcohol? Na

## 2018-01-09 NOTE — PROGRESS NOTES
[address] [place] [address]          Domenico Wilder, 1990, was admitted for evaluation/treatment of chemical dependency at [facility].  This person took part in these program(s):    ______ The Inpatient Program   ______ The Outpatient Program   ___x__ The Lodging Plus Program   ______ Lodging Day Outpatient       Date admitted: 12/13/17  Date discharged: 01/10/18    Type of discharge:   ___x__ Satisfactory - completed evaluation / treatment   ______ Discharged without completing   ______ Behavioral discharge   ______ Transferred to another chemical dependency program   ______ Transferred to another type of service   ______ Left against medical advice (AMA) / Eloped       Comments:  Referred to [facility] outpatient program and [facility]      Counselor: STEVE Lin                       Date: 1/9/2018             Time: 3:15 PM

## 2018-01-09 NOTE — IP AVS SNAPSHOT
"                  MRN:[id number]                      After Visit Summary   1/9/2018    Domenico Wilder    MRN: [id number]           Visit Information        Provider Department      1/9/2018  7:15 AM ADULT LODGING PLUS [facility]        Your next 10 appointments already scheduled     Jan 09, 2018  4:00 PM CST   US OB < 14 WEEKS SINGLE with RDUS1   [facility] ([facility])    [address] [place] [address]  [address]  [facility] [id number] [phone]           Please bring a list of your medicines (including vitamins, minerals and over-the-counter drugs). Also, tell your doctor about any allergies you may have. Wear comfortable clothes and leave your valuables at home.  If you re less than 20 weeks drink four 8-ounce glasses of fluid an hour before your exam. If you need to empty your bladder before your exam, try to release only a little urine. Then, drink another glass of fluid.  You may have up to two family members in the exam room. If you bring a small child, an adult must be there to care for him or her.  Please call the Imaging Department at your exam site with any questions.            Jan 11, 2018  3:30 PM CST   Return Visit with Troy Fiore MD   [facility] ([facility])    [address] [place] [address]  [address] [place] [id number] [phone]            Feb 06, 2018 12:00 PM CST   New Prenatal with JOSE Cottrell CNM   [facility] ([facility])    [address] [place] [address]  [address]  [facility] [id number] [phone]              MyChart Information     [employer] lets you send messages to your doctor, view your test results, renew your prescriptions, schedule appointments and more. To sign up, go to [email or web address] . Click on \"Log in\" on the left side of the screen, which will take you to the Welcome page. Then click on \"Sign up Now\" on the " right side of the page.     You will be asked to enter the access code listed below, as well as some personal information. Please follow the directions to create your username and password.     Your access code is: [email or web address]  Expires: 3/11/2018 10:25 AM     Your access code will  in 90 days. If you need help or a new code, please call your [facility] clinic or [phone].        Care EveryWhere ID     This is your Care EveryWhere ID. This could be used by other organizations to access your [facility] medical records  [id number]        Equal Access to Services     [facility] : Herve Trevino, justino gallego, funmi soto, nolan gurrola. So [facility] [phone].    ATENCIÓN: Si habla español, tiene a velez disposición servicios gratuitos de asistencia lingüística. Chapisame al [phone].    We comply with applicable federal civil rights laws and [place] laws. We do not discriminate on the basis of race, color, national origin, age, disability, sex, sexual orientation, or gender identity.

## 2018-01-09 NOTE — PROGRESS NOTES
MICD Discharge Summary/Instructions     Patient: Domenico Wilder  MRN: [id number]   : 1990 Age: 27 year old Sex: female   -  Focus of Treatment / Discharge Recommendations    Personal Safety/ Management of Symptoms  * Follow your safety plan.  Report increased symptoms to your care team and /or go to the nearest Emergency Department.  * Call crisis lines as needed  [facility] [phone]                [facility] [phone]  [facility] [phone]              [facility] [phone]  [facility] [phone]              [facility] [place] [facility] [phone]  [facility] [place] [phone]          National Suicide Prevention [phone]  [facility] [phone]            Suicide Prevention [phone]  [facility] [phone]    Abstinence/Relapse Prevention  * Take all medicines as directed.  Carry a current list of medicines with you.  * Use coping skills: nutrition, rest, exercise, spirituality, journal, meditation, seek sober support, engage in activities you enjoy  * Do not use illicit (street) drugs, controlled substances (narcotics) or alcohol.    Develop/Improve Independent Living/Socialization Skills: ensure living environment is conducive to recovery    Community Resources/Supports and Discharge Planning:  Maintain abstinence from all mood altering substances, attend 2+ 12 step meetings per week, maintain contact with a program sponsor, enter the outpatient program at [facility] and follow recommendations, enter [place] sober house and follow guidelines of the house, continue 1.1 therapy,  maintain good physical and mental health care.   Follow up with psychiatrist / main caregiver: Suboxone maintenance with Dr Troy Fiore    Next visit: Dr Fiore 18                                                                           PCP [facility]  Follow up with your therapist: TBD  Go to group therapy and / or support groups at: outpatient  program, [facility], home community  See your medical doctor about:  Ongoing physical health care    Client Signature:_______________________   Date / Time:___________  Staff Signature:________________________   Date / Time:___________

## 2018-01-09 NOTE — IP AVS SNAPSHOT
Medication List       Patient:  RAUL HERNANDES   :  1990   Physician:  Saurabh Brandon           This is your record.  Keep this with you and show to your community pharmacist(s) and physician(s) at each visit.     Allergies:  CATS - (reactions not documented)     DOGS - (reactions not documented)               Medications  Valid as of: 2018 -  3:59 PM    Generic Name Brand Name Tablet Size Instructions for use    Acetaminophen TYLENOL 325 MG Take 1-2 tablets (325-650 mg) by mouth every 4 hours as needed for mild pain        Benzocaine ORAJEL MAXIMUM STRENGTH 20 % Take by mouth 4 times daily as needed for moderate pain        Buprenorphine HCl SUBUTEX 8 MG Take 1 tab in morning and 1/2 tab in evening        BuPROPion HCl WELLBUTRIN  MG         Doxylamine Succinate (Sleep) UNISOM 25 MG Take 1 tablet (25 mg) by mouth At Bedtime        HydrOXYzine HCl   Take 25-50 mg by mouth every 8 hours as needed (for anxiety)        Oseltamivir Phosphate TAMIFLU 75 MG Take 1 capsule (75 mg) by mouth daily        Prenatal Vit-Fe Fumarate-FA prenatal multivitamin plus iron 27-0.8 MG Take 1 tablet by mouth daily        Pyridoxine HCl vitamin B-6 25 MG Take 1 tablet (25 mg) by mouth 3 times daily Take with unisom for nausea/vomiting        Sertraline HCl ZOLOFT 25 MG Take 1 tablet (25 mg) by mouth daily Increase to 50 mg after one week        .           .           .           .

## 2018-01-10 NOTE — PROGRESS NOTES
1/10/18  Pt shared her relapse prevention plan and received a medallion from peers for program completion.  Pt and counselor met to review her plan for ongoing care.  Pt has an intake appointment for the [facility] outpatient program on 1/16/18.  Pt plans to enter [facility].  She meets with Dr Troy Fiore for suboxone maintenance on 1/11/18.  Pt discharged this date.

## 2018-01-11 ENCOUNTER — OFFICE VISIT (OUTPATIENT)
Dept: ADDICTION MEDICINE | Facility: CLINIC | Age: 28
End: 2018-01-11
Payer: MEDICAID

## 2018-01-11 VITALS
WEIGHT: 150.5 LBS | OXYGEN SATURATION: 92 % | DIASTOLIC BLOOD PRESSURE: 60 MMHG | BODY MASS INDEX: 25.04 KG/M2 | SYSTOLIC BLOOD PRESSURE: 102 MMHG | HEART RATE: 94 BPM | TEMPERATURE: 99.6 F | RESPIRATION RATE: 16 BRPM

## 2018-01-11 DIAGNOSIS — F11.20 OPIOID USE DISORDER, SEVERE, DEPENDENCE (H): ICD-10-CM

## 2018-01-11 LAB
AMPHETAMINES UR QL: NOT DETECTED NG/ML
BARBITURATES UR QL SCN: NOT DETECTED NG/ML
BENZODIAZ UR QL SCN: NOT DETECTED NG/ML
BUPRENORPHINE UR QL: ABNORMAL NG/ML
CANNABINOIDS UR QL: NOT DETECTED NG/ML
COCAINE UR QL SCN: NOT DETECTED NG/ML
D-METHAMPHET UR QL: NOT DETECTED NG/ML
METHADONE UR QL SCN: NOT DETECTED NG/ML
OPIATES UR QL SCN: NOT DETECTED NG/ML
OXYCODONE UR QL SCN: NOT DETECTED NG/ML
PCP UR QL SCN: NOT DETECTED NG/ML
PROPOXYPH UR QL: NOT DETECTED NG/ML
TRICYCLICS UR QL SCN: NOT DETECTED NG/ML

## 2018-01-11 PROCEDURE — 80306 DRUG TEST PRSMV INSTRMNT: CPT | Performed by: FAMILY MEDICINE

## 2018-01-11 PROCEDURE — 99214 OFFICE O/P EST MOD 30 MIN: CPT | Performed by: FAMILY MEDICINE

## 2018-01-11 RX ORDER — TRAZODONE HYDROCHLORIDE 100 MG/1
100 TABLET ORAL
Qty: 30 TABLET | Refills: 1 | Status: SHIPPED | OUTPATIENT
Start: 2018-01-11 | End: 2018-04-06

## 2018-01-11 RX ORDER — BUPRENORPHINE 8 MG/1
TABLET SUBLINGUAL
Qty: 48 TABLET | Refills: 0 | Status: SHIPPED | OUTPATIENT
Start: 2018-01-11 | End: 2018-01-11

## 2018-01-11 RX ORDER — BUPRENORPHINE AND NALOXONE 8; 2 MG/1; MG/1
FILM, SOLUBLE BUCCAL; SUBLINGUAL
Qty: 48 FILM | Refills: 0 | Status: SHIPPED | OUTPATIENT
Start: 2018-01-11 | End: 2018-02-12

## 2018-01-11 NOTE — PATIENT INSTRUCTIONS
Continue your Buprenorphine 8 mg /tabs       Follow up 1 month    A prescription has been sent to your pharmacy of choice.  If a prior authorization is required it may take several days to get your medication.  Please make sure your pharmacy had your contact information so they can contact you when it is ready to     You are at risk for overdose  ( including risk of death)  with return to use of opioids after a period of abstinence because your tolerance will have decreased dramatically.      It is strongly recommended that you abstain from alcohol, benzodiazepines (Xanax Valium, Klonipin) , THC, opioids and other drugs of abuse.  Use of these substances increases your risk of relapse for opioids.  Using these substances with Buprenorphine also incresaes your risk of overdose/death (especially alcohol/benzodiazepines).     You are encouraged to have some type of recovery program in addition to medication treatment.  Medication alone is generally not enough to lead to long term recovery.  This may include having some type of sober network, avoiding isolating, avoiding triggers (people, places, things you associate with using opioids).   Such supports may include Alcoholics Anonymous, Narcotics anonymous or other self help organizations as well as counseling.  We can help provide resources to these services.      Narcan kit prescriptions are available if you do not have one.       The addiction medicine clinic number is [phone].    If you cannot make your appointment please call the office and reschedule immediately. If you are out of medication a bridge can be sent to your pharmacy to last until the date of your rescheduled appointment. Our clinic is open from Monday-Friday 0800-4:30pm and there is not an ON CALL after hours service.  If medical care is needed after hours or on the weekend you will need to contact your primary care physician or go to an Urgent Care or ER.     [employer] messages and  telephone calls from patients are taken care of by the nursing team within 24 business hours if received between Monday 8am - Friday 4:30pm. Therefore if a refill/bridge is needed it is important to call in advance so you do not run out of medications.     [facility] does not accept [facility] or [employer] assistance insurance.

## 2018-01-11 NOTE — MR AVS SNAPSHOT
After Visit Summary   1/11/2018    Domenico Wilder    MRN: [id number]           Patient Information     Date Of Birth          1990        Visit Information        Provider Department      1/11/2018 3:30 PM Troy Fiore MD [facility]        Today's Diagnoses     Opioid use disorder, severe, dependence (H)          Care Instructions    Continue your Buprenorphine 8 mg /tabs       Follow up 1 month    A prescription has been sent to your pharmacy of choice.  If a prior authorization is required it may take several days to get your medication.  Please make sure your pharmacy had your contact information so they can contact you when it is ready to     You are at risk for overdose  ( including risk of death)  with return to use of opioids after a period of abstinence because your tolerance will have decreased dramatically.      It is strongly recommended that you abstain from alcohol, benzodiazepines (Xanax Valium, Klonipin) , THC, opioids and other drugs of abuse.  Use of these substances increases your risk of relapse for opioids.  Using these substances with Buprenorphine also incresaes your risk of overdose/death (especially alcohol/benzodiazepines).     You are encouraged to have some type of recovery program in addition to medication treatment.  Medication alone is generally not enough to lead to long term recovery.  This may include having some type of sober network, avoiding isolating, avoiding triggers (people, places, things you associate with using opioids).   Such supports may include Alcoholics Anonymous, Narcotics anonymous or other self help organizations as well as counseling.  We can help provide resources to these services.      Narcan kit prescriptions are available if you do not have one.       The addiction medicine clinic number is [phone].    If you cannot make your appointment please call the office and reschedule immediately. If you  "are out of medication a bridge can be sent to your pharmacy to last until the date of your rescheduled appointment. Our clinic is open from Monday-Friday 0800-4:30pm and there is not an ON CALL after hours service.  If medical care is needed after hours or on the weekend you will need to contact your primary care physician or go to an Urgent Care or ER.     [employer] messages and telephone calls from patients are taken care of by the nursing team within 24 business hours if received between Monday 8am - Friday 4:30pm. Therefore if a refill/bridge is needed it is important to call in advance so you do not run out of medications.     [facility] does not accept [employer] or [employer] assistance insurance.                      Follow-ups after your visit        Who to contact     If you have questions or need follow up information about today's clinic visit or your schedule please contact [facility] directly at [phone].  Normal or non-critical lab and imaging results will be communicated to you by Framer[employer]art, letter or phone within 4 business days after the clinic has received the results. If you do not hear from us within 7 days, please contact the clinic through [employer] or phone. If you have a critical or abnormal lab result, we will notify you by phone as soon as possible.  Submit refill requests through [employer] or call your pharmacy and they will forward the refill request to us. Please allow 3 business days for your refill to be completed.          Additional Information About Your Visit        Diaphonics Information     Diaphonics lets you send messages to your doctor, view your test results, renew your prescriptions, schedule appointments and more. To sign up, go to [email or web address] . Click on \"Log in\" on the left side of the screen, which will take you to the Welcome page. Then click on \"Sign up Now\" on the right side of the page.     You will be asked to " enter the access code listed below, as well as some personal information. Please follow the directions to create your username and password.     Your access code is: [email or web address]  Expires: 3/11/2018 10:25 AM     Your access code will  in 90 days. If you need help or a new code, please call your [place] clinic or [phone].        Care EveryWhere ID     This is your Care EveryWhere ID. This could be used by other organizations to access your [place] medical records  [id number]        Your Vitals Were     Pulse Temperature Respirations Last Period Pulse Oximetry BMI (Body Mass Index)    94 99.6  F (37.6  C) (Oral) 16 2017 92% 25.04 kg/m2       Blood Pressure from Last 3 Encounters:   18 102/60   18 105/72   18 109/77    Weight from Last 3 Encounters:   18 150 lb 8 oz (68.3 kg)   18 140 lb (63.5 kg)   18 140 lb 4.8 oz (63.6 kg)              Today, you had the following     No orders found for display         Today's Medication Changes          These changes are accurate as of: 18  3:47 PM.  If you have any questions, ask your nurse or doctor.               Start taking these medicines.        Dose/Directions    traZODone 100 MG tablet   Commonly known as:  DESYREL   Used for:  Opioid use disorder, severe, dependence (H)        Dose:  100 mg   Take 1 tablet (100 mg) by mouth nightly as needed for sleep   Quantity:  30 tablet   Refills:  1         These medicines have changed or have updated prescriptions.        Dose/Directions    buprenorphine 8 MG Subl sublingual tablet   Commonly known as:  SUBUTEX   This may have changed:    - how much to take  - additional instructions   Used for:  Opioid use disorder, severe, dependence (H)        Take 1 tab in morning and 1/2 tab in evening   Quantity:  48 tablet   Refills:  0            Where to get your medicines      These medications were sent to [facility] [place], [place] - [address], [facility] [address]     Phone:  [phone]     traZODone 100 MG tablet         Some of these will need a paper prescription and others can be bought over the counter.  Ask your nurse if you have questions.     Bring a paper prescription for each of these medications     buprenorphine 8 MG Subl sublingual tablet                Primary Care Provider Office Phone # Fax #    Saurabh [facility] [phone] [address]  [facility] [address]        Equal Access to Services     LAUREL SHANKS : Hadii aad ku hadasho Soomaali, waaxda luqadaha, qaybta kaalmada adeegyada, waxay idiin hayaan adeeg kharash lajourdan . So [facility] [phone].    ATENCIÓN: Si habla español, tiene a velez disposición servicios gratuitos de asistencia lingüística. Dino al [phone].    We comply with applicable federal civil rights laws and [place] laws. We do not discriminate on the basis of race, color, national origin, age, disability, sex, sexual orientation, or gender identity.            Thank you!     Thank you for choosing [facility]  for your care. Our goal is always to provide you with excellent care. Hearing back from our patients is one way we can continue to improve our services. Please take a few minutes to complete the written survey that you may receive in the mail after your visit with us. Thank you!             Your Updated Medication List - Protect others around you: Learn how to safely use, store and throw away your medicines at www.disposemymeds.org.          This list is accurate as of: 1/11/18  3:47 PM.  Always use your most recent med list.                   Brand Name Dispense Instructions for use Diagnosis    acetaminophen 325 MG tablet    TYLENOL    100 tablet    Take 1-2 tablets (325-650 mg) by mouth every 4 hours as needed for mild pain    Opioid use disorder, severe, dependence (H)       benzocaine 20 % Gel gel    ORAJEL MAXIMUM STRENGTH      Take by mouth 4 times daily as needed for moderate pain        buprenorphine 8 MG Subl sublingual tablet    SUBUTEX    48 tablet    Take 1 tab in morning and 1/2 tab in evening    Opioid use disorder, severe, dependence (H)       buPROPion 150 MG 24 hr tablet    WELLBUTRIN XL          doxylamine 25 MG Tabs tablet    UNISOM    14 each    Take 1 tablet (25 mg) by mouth At Bedtime    Nausea/vomiting in pregnancy       HYDROXYZINE HCL PO      Take 25-50 mg by mouth every 8 hours as needed (for anxiety)        oseltamivir 75 MG capsule    TAMIFLU    7 capsule    Take 1 capsule (75 mg) by mouth daily        prenatal multivitamin plus iron 27-0.8 MG Tabs per tablet     100 tablet    Take 1 tablet by mouth daily    Supervision of normal pregnancy       pyridOXINE 25 MG tablet    vitamin B-6    100 tablet    Take 1 tablet (25 mg) by mouth 3 times daily Take with unisom for nausea/vomiting    Nausea/vomiting in pregnancy       sertraline 25 MG tablet    ZOLOFT    30 tablet    Take 1 tablet (25 mg) by mouth daily Increase to 50 mg after one week    Generalized anxiety disorder, Episode of recurrent major depressive disorder, unspecified depression episode severity (H)       traZODone 100 MG tablet    DESYREL    30 tablet    Take 1 tablet (100 mg) by mouth nightly as needed for sleep    Opioid use disorder, severe, dependence (H)
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
## 2018-01-14 NOTE — PROGRESS NOTES
SUBJECTIVE:   Domenico Wilder is a 27 year old female who presents to clinic today for the following health issues:      ADDICTION MEDICINE NOTE:    FINISHED [facility]    HAD A MISCARRIAGE; NOW BACK ON SUBOXONE FILM    IN SOBER HOUSE IN [facility]    WILL GO TO Counts include 234 beds at the [facility] FOR TREATMENT     NO CRAVING    FELLS OK    SAD ABOUT MISCARRIAGE; DISCUSSED    CONTINUE  SAME    RE-CHECK 1 MONTH      Problem list and histories reviewed & adjusted, as indicated.  Additional history: as documented    Patient Active Problem List   Diagnosis     Depression     Insomnia     GERD (gastroesophageal reflux disease)     Opioid dependence with withdrawal (H)     Alcohol dependence with uncomplicated withdrawal (H)     Opioid use disorder, severe, dependence (H)     Alcoholism (H)     Anxiety     Major depressive disorder, recurrent, moderate (H)     Polysubstance abuse     Smoker     Need for Tdap vaccination     Past Surgical History:   Procedure Laterality Date     NO HISTORY OF SURGERY         Social History   Substance Use Topics     Smoking status: Former Smoker     Packs/day: 1.00     Years: 10.00     Start date: 12/28/2017     Smokeless tobacco: Never Used     Alcohol use Yes     Family History   Problem Relation Age of Onset     Allergies Mother      Depression Mother      Alcohol/Drug Mother      Prostate Cancer Paternal Grandfather      DIABETES Maternal Grandfather      non insulin dependent         Current Outpatient Prescriptions   Medication Sig Dispense Refill     traZODone (DESYREL) 100 MG tablet Take 1 tablet (100 mg) by mouth nightly as needed for sleep 30 tablet 1     buprenorphine HCl-naloxone HCl (SUBOXONE) 8-2 MG per film Take 1.5 film daily 48 Film 0     HYDROXYZINE HCL PO Take 25-50 mg by mouth every 8 hours as needed (for anxiety)       benzocaine (ORAJEL MAXIMUM STRENGTH) 20 % GEL gel Take by mouth 4 times daily as needed for moderate pain       oseltamivir (TAMIFLU) 75 MG capsule Take 1 capsule (75 mg) by mouth  daily 7 capsule 0     Prenatal Vit-Fe Fumarate-FA (PRENATAL MULTIVITAMIN PLUS IRON) 27-0.8 MG TABS per tablet Take 1 tablet by mouth daily 100 tablet 3     pyridOXINE (VITAMIN B-6) 25 MG tablet Take 1 tablet (25 mg) by mouth 3 times daily Take with unisom for nausea/vomiting 100 tablet 1     doxylamine (UNISOM) 25 MG TABS tablet Take 1 tablet (25 mg) by mouth At Bedtime (Patient not taking: Reported on 1/9/2018) 14 each 0     buPROPion (WELLBUTRIN XL) 150 MG 24 hr tablet        sertraline (ZOLOFT) 25 MG tablet Take 1 tablet (25 mg) by mouth daily Increase to 50 mg after one week 30 tablet 3     acetaminophen (TYLENOL) 325 MG tablet Take 1-2 tablets (325-650 mg) by mouth every 4 hours as needed for mild pain 100 tablet 0     Allergies   Allergen Reactions     Cats      Dogs      Labs reviewed in EPIC      Reviewed and updated as needed this visit by clinical staff     Reviewed and updated as needed this visit by Provider         ROS:      OBJECTIVE:     /60  Pulse 94  Temp 99.6  F (37.6  C) (Oral)  Resp 16  Wt 150 lb 8 oz (68.3 kg)  LMP 12/01/2017  SpO2 92%  BMI 25.04 kg/m2  Body mass index is 25.04 kg/(m^2).       ROS:  Constitutional, HEENT, cardiovascular, pulmonary, gi and gu systems are negative, except as otherwise noted.    /60  Pulse 94  Temp 99.6  F (37.6  C) (Oral)  Resp 16  Wt 150 lb 8 oz (68.3 kg)  LMP 12/01/2017  SpO2 92%  BMI 25.04 kg/m2  EXAM:  GENERAL APPEARANCE: healthy, alert and no distress  EYES: Eyes grossly normal to inspection, PERRL and conjunctivae and sclerae normal  NEURO: Normal strength and tone, mentation intact and speech normal  PSYCH: mentation appears normal and affect normal/bright  MENTAL STATUS EXAM:  Appearance/Behavior: No apparent distress and Casually groomed  Speech: Normal  Mood/Affect: normal affect  Insight: Adequate      MN  - NO ISSUES; CHECKED 1/11/18      Diagnostic Test Results:  Results for orders placed or performed in visit on 01/11/18    Urine Drugs of Abuse Screen Panel 13   Result Value Ref Range    Cannabinoids (90-vjo-2-carboxy-9-THC) Not Detected NDET^Not Detected ng/mL    Phencyclidine (Phencyclidine) Not Detected NDET^Not Detected ng/mL    Cocaine (Benzoylecgonine) Not Detected NDET^Not Detected ng/mL    Methamphetamine (d-Methamphetamine) Not Detected NDET^Not Detected ng/mL    Opiates (Morphine) Not Detected NDET^Not Detected ng/mL    Amphetamine (d-Amphetamine) Not Detected NDET^Not Detected ng/mL    Benzodiazepines (Nordiazepam) Not Detected NDET^Not Detected ng/mL    Tricyclic Antidepressants (Desipramine) Not Detected NDET^Not Detected ng/mL    Methadone (Methadone) Not Detected NDET^Not Detected ng/mL    Barbiturates (Butalbital) Not Detected NDET^Not Detected ng/mL    Oxycodone (Oxycodone) Not Detected NDET^Not Detected ng/mL    Propoxyphene (Norpropoxyphene) Not Detected NDET^Not Detected ng/mL    Buprenorphine (Buprenorphine) Detected, Abnormal Result (A) NDET^Not Detected ng/mL       ASSESSMENT:   OPIOID DEPENDENCE  PREGNANCY    PLAN:       ICD-10-CM    1. Opioid use disorder, severe, dependence (H) F11.20 traZODone (DESYREL) 100 MG tablet     Urine Drugs of Abuse Screen Panel 13     buprenorphine HCl-naloxone HCl (SUBOXONE) 8-2 MG per film     DISCONTINUED: buprenorphine (SUBUTEX) 8 MG SUBL sublingual tablet           MEDICATIONS:   Orders Placed This Encounter   Medications     DISCONTD: buprenorphine (SUBUTEX) 8 MG SUBL sublingual tablet     Sig: Take 1 tab in morning and 1/2 tab in evening     Dispense:  48 tablet     Refill:  0     traZODone (DESYREL) 100 MG tablet     Sig: Take 1 tablet (100 mg) by mouth nightly as needed for sleep     Dispense:  30 tablet     Refill:  1     buprenorphine HCl-naloxone HCl (SUBOXONE) 8-2 MG per film     Sig: Take 1.5 film daily     Dispense:  48 Film     Refill:  0          - Continue other medications without change  FUTURE APPOINTMENTS:       - Follow-up visit in 4 WEEKS    Troy Long  MD [place]  [facility] PRIMARY [facility]ARE

## 2018-02-12 ENCOUNTER — OFFICE VISIT (OUTPATIENT)
Dept: ADDICTION MEDICINE | Facility: CLINIC | Age: 28
End: 2018-02-12
Payer: MEDICAID

## 2018-02-12 VITALS
OXYGEN SATURATION: 97 % | TEMPERATURE: 99.1 F | RESPIRATION RATE: 18 BRPM | DIASTOLIC BLOOD PRESSURE: 78 MMHG | BODY MASS INDEX: 25.63 KG/M2 | SYSTOLIC BLOOD PRESSURE: 126 MMHG | WEIGHT: 154 LBS | HEART RATE: 97 BPM

## 2018-02-12 DIAGNOSIS — F33.9 EPISODE OF RECURRENT MAJOR DEPRESSIVE DISORDER, UNSPECIFIED DEPRESSION EPISODE SEVERITY (H): ICD-10-CM

## 2018-02-12 DIAGNOSIS — F41.1 GENERALIZED ANXIETY DISORDER: ICD-10-CM

## 2018-02-12 DIAGNOSIS — F11.20 OPIOID USE DISORDER, SEVERE, DEPENDENCE (H): ICD-10-CM

## 2018-02-12 LAB
AMPHETAMINES UR QL: NOT DETECTED NG/ML
BARBITURATES UR QL SCN: NOT DETECTED NG/ML
BENZODIAZ UR QL SCN: NOT DETECTED NG/ML
BUPRENORPHINE UR QL: ABNORMAL NG/ML
CANNABINOIDS UR QL: NOT DETECTED NG/ML
COCAINE UR QL SCN: NOT DETECTED NG/ML
D-METHAMPHET UR QL: NOT DETECTED NG/ML
METHADONE UR QL SCN: NOT DETECTED NG/ML
OPIATES UR QL SCN: NOT DETECTED NG/ML
OXYCODONE UR QL SCN: NOT DETECTED NG/ML
PCP UR QL SCN: NOT DETECTED NG/ML
PROPOXYPH UR QL: NOT DETECTED NG/ML
TRICYCLICS UR QL SCN: ABNORMAL NG/ML

## 2018-02-12 PROCEDURE — 80306 DRUG TEST PRSMV INSTRMNT: CPT | Performed by: FAMILY MEDICINE

## 2018-02-12 PROCEDURE — 99214 OFFICE O/P EST MOD 30 MIN: CPT | Performed by: FAMILY MEDICINE

## 2018-02-12 RX ORDER — BUPRENORPHINE AND NALOXONE 8; 2 MG/1; MG/1
FILM, SOLUBLE BUCCAL; SUBLINGUAL
Qty: 42 FILM | Refills: 0 | Status: SHIPPED | OUTPATIENT
Start: 2018-02-12 | End: 2018-04-06

## 2018-02-12 RX ORDER — SERTRALINE HYDROCHLORIDE 100 MG/1
100 TABLET, FILM COATED ORAL DAILY
Qty: 30 TABLET | Refills: 3 | Status: SHIPPED | OUTPATIENT
Start: 2018-02-12 | End: 2018-04-06

## 2018-02-12 NOTE — MR AVS SNAPSHOT
After Visit Summary   2/12/2018    Domenico Wilder    MRN: [id number]           Patient Information     Date Of Birth          1990        Visit Information        Provider Department      2/12/2018 11:00 AM Troy Fiore MD [facility]        Today's Diagnoses     Opioid use disorder, severe, dependence (H)        Generalized anxiety disorder        Episode of recurrent major depressive disorder, unspecified depression episode severity (H)          Care Instructions    Continue your Buprenorphine 8 mg  films/tabs  1.5 film daily     Follow up 4 weeks    A prescription has been sent to your pharmacy of choice.  If a prior authorization is required it may take several days to get your medication.  Please make sure your pharmacy had your contact information so they can contact you when it is ready to     You are at risk for overdose  ( including risk of death)  with return to use of opioids after a period of abstinence because your tolerance will have decreased dramatically.      It is strongly recommended that you abstain from alcohol, benzodiazepines (Xanax Valium, Klonipin) , THC, opioids and other drugs of abuse.  Use of these substances increases your risk of relapse for opioids.  Using these substances with Buprenorphine also incresaes your risk of overdose/death (especially alcohol/benzodiazepines).     You are encouraged to have some type of recovery program in addition to medication treatment.  Medication alone is generally not enough to lead to long term recovery.  This may include having some type of sober network, avoiding isolating, avoiding triggers (people, places, things you associate with using opioids).   Such supports may include Alcoholics Anonymous, Narcotics anonymous or other self help organizations as well as counseling.  We can help provide resources to these services.      Narcan kit prescriptions are available if you do not have one.    "    The addiction medicine clinic number is [phone].    If you cannot make your appointment please call the office and reschedule immediately. If you are out of medication a bridge can be sent to your pharmacy to last until the date of your rescheduled appointment. Our clinic is open from Monday-Friday 0800-4:30pm and there is not an ON CALL after hours service.  If medical care is needed after hours or on the weekend you will need to contact your primary care physician or go to an Urgent Care or ER.     [employer] messages and telephone calls from patients are taken care of by the nursing team within 24 business hours if received between Monday 8am - Friday 4:30pm. Therefore if a refill/bridge is needed it is important to call in advance so you do not run out of medications.     [facility] does not accept [employer] or [employer] Medical assistance insurance.                      Follow-ups after your visit        Who to contact     If you have questions or need follow up information about today's clinic visit or your schedule please contact [facility] PRIMARY CARE directly at [phone].  Normal or non-critical lab and imaging results will be communicated to you by In[employer], letter or phone within 4 business days after the clinic has received the results. If you do not hear from us within 7 days, please contact the clinic through [email or web address] or phone. If you have a critical or abnormal lab result, we will notify you by phone as soon as possible.  Submit refill requests through [employer] or call your pharmacy and they will forward the refill request to us. Please allow 3 business days for your refill to be completed.          Additional Information About Your Visit        [employer] Information     [employer] lets you send messages to your doctor, view your test results, renew your prescriptions, schedule appointments and more. To sign up, go to [email or web address] . Click on \"Log in\" " "on the left side of the screen, which will take you to the Welcome page. Then click on \"Sign up Now\" on the right side of the page.     You will be asked to enter the access code listed below, as well as some personal information. Please follow the directions to create your username and password.     Your access code is: [email or web address]  Expires: 3/11/2018 10:25 AM     Your access code will  in 90 days. If you need help or a new code, please call your [facility] or [phone].        Care EveryWhere ID     This is your Care EveryWhere ID. This could be used by other organizations to access your [facility] medical records  [id number]        Your Vitals Were     Pulse Temperature Respirations Last Period Pulse Oximetry BMI (Body Mass Index)    97 99.1  F (37.3  C) (Oral) 18 2017 97% 25.63 kg/m2       Blood Pressure from Last 3 Encounters:   18 126/78   18 102/60   18 105/72    Weight from Last 3 Encounters:   18 154 lb (69.9 kg)   18 150 lb 8 oz (68.3 kg)   18 140 lb (63.5 kg)              We Performed the Following     Urine Drugs of Abuse Screen Panel 13          Today's Medication Changes          These changes are accurate as of 18 11:16 AM.  If you have any questions, ask your nurse or doctor.               These medicines have changed or have updated prescriptions.        Dose/Directions    * sertraline 25 MG tablet   Commonly known as:  ZOLOFT   This may have changed:  Another medication with the same name was added. Make sure you understand how and when to take each.   Used for:  Generalized anxiety disorder, Episode of recurrent major depressive disorder, unspecified depression episode severity (H)   Changed by:  Troy Fiore MD        Dose:  25 mg   Take 1 tablet (25 mg) by mouth daily Increase to 50 mg after one week   Quantity:  30 tablet   Refills:  3       * sertraline 100 MG tablet   Commonly known as:  ZOLOFT   This may have changed:  You " were already taking a medication with the same name, and this prescription was added. Make sure you understand how and when to take each.   Used for:  Generalized anxiety disorder, Episode of recurrent major depressive disorder, unspecified depression episode severity (H)   Changed by:  Troy Fiore MD        Dose:  100 mg   Take 1 tablet (100 mg) by mouth daily   Quantity:  30 tablet   Refills:  3       * Notice:  This list has 2 medication(s) that are the same as other medications prescribed for you. Read the directions carefully, and ask your doctor or other care provider to review them with you.         Where to get your medicines      These medications were sent to [facility] [place], [place] - [address]  [address] [address]     Phone:  [phone]     sertraline 100 MG tablet         Some of these will need a paper prescription and others can be bought over the counter.  Ask your nurse if you have questions.     Bring a paper prescription for each of these medications     buprenorphine HCl-naloxone HCl 8-2 MG per film                Primary Care Provider Office Phone # Fax #    Saurabh [facility] [phone] [address]  [facility] [address]        Equal Access to Services     LAUREL SHANKS AH: Hadii jennifer sheppard hadasho Soomaali, waaxda luqadaha, qaybta kaalmada adeegyada, nolan gurrola. So [facility] [phone].    ATENCIÓN: Si habla español, tiene a velez disposición servicios gratuitos de asistencia lingüística. Dino al [phone].    We comply with applicable federal civil rights laws and [place] laws. We do not discriminate on the basis of race, color, national origin, age, disability, sex, sexual orientation, or gender identity.            Thank you!     Thank you for choosing [facility] PRIMARY CARE  for your care. Our goal is always to provide you with excellent care. Hearing  back from our patients is one way we can continue to improve our services. Please take a few minutes to complete the written survey that you may receive in the mail after your visit with us. Thank you!             Your Updated Medication List - Protect others around you: Learn how to safely use, store and throw away your medicines at www.disposemymeds.org.          This list is accurate as of 2/12/18 11:16 AM.  Always use your most recent med list.                   Brand Name Dispense Instructions for use Diagnosis    acetaminophen 325 MG tablet    TYLENOL    100 tablet    Take 1-2 tablets (325-650 mg) by mouth every 4 hours as needed for mild pain    Opioid use disorder, severe, dependence (H)       benzocaine 20 % Gel gel    ORAJEL MAXIMUM STRENGTH     Take by mouth 4 times daily as needed for moderate pain        buprenorphine HCl-naloxone HCl 8-2 MG per film    SUBOXONE    42 Film    Take 1.5 film daily    Opioid use disorder, severe, dependence (H)       buPROPion 150 MG 24 hr tablet    WELLBUTRIN XL          HYDROXYZINE HCL PO      Take 25-50 mg by mouth every 8 hours as needed (for anxiety)        oseltamivir 75 MG capsule    TAMIFLU    7 capsule    Take 1 capsule (75 mg) by mouth daily        prenatal multivitamin plus iron 27-0.8 MG Tabs per tablet     100 tablet    Take 1 tablet by mouth daily    Supervision of normal pregnancy       pyridOXINE 25 MG tablet    vitamin B-6    100 tablet    Take 1 tablet (25 mg) by mouth 3 times daily Take with unisom for nausea/vomiting    Nausea/vomiting in pregnancy       * sertraline 25 MG tablet    ZOLOFT    30 tablet    Take 1 tablet (25 mg) by mouth daily Increase to 50 mg after one week    Generalized anxiety disorder, Episode of recurrent major depressive disorder, unspecified depression episode severity (H)       * sertraline 100 MG tablet    ZOLOFT    30 tablet    Take 1 tablet (100 mg) by mouth daily    Generalized anxiety disorder, Episode of recurrent major  depressive disorder, unspecified depression episode severity (H)       traZODone 100 MG tablet    DESYREL    30 tablet    Take 1 tablet (100 mg) by mouth nightly as needed for sleep    Opioid use disorder, severe, dependence (H)       * Notice:  This list has 2 medication(s) that are the same as other medications prescribed for you. Read the directions carefully, and ask your doctor or other care provider to review them with you.

## 2018-02-12 NOTE — PATIENT INSTRUCTIONS
Continue your Buprenorphine 8 mg  films/tabs  1.5 film daily     Follow up 4 weeks    A prescription has been sent to your pharmacy of choice.  If a prior authorization is required it may take several days to get your medication.  Please make sure your pharmacy had your contact information so they can contact you when it is ready to     You are at risk for overdose  ( including risk of death)  with return to use of opioids after a period of abstinence because your tolerance will have decreased dramatically.      It is strongly recommended that you abstain from alcohol, benzodiazepines (Xanax Valium, Klonipin) , THC, opioids and other drugs of abuse.  Use of these substances increases your risk of relapse for opioids.  Using these substances with Buprenorphine also incresaes your risk of overdose/death (especially alcohol/benzodiazepines).     You are encouraged to have some type of recovery program in addition to medication treatment.  Medication alone is generally not enough to lead to long term recovery.  This may include having some type of sober network, avoiding isolating, avoiding triggers (people, places, things you associate with using opioids).   Such supports may include Alcoholics Anonymous, Narcotics anonymous or other self help organizations as well as counseling.  We can help provide resources to these services.      Narcan kit prescriptions are available if you do not have one.       The addiction medicine clinic number is [phone].    If you cannot make your appointment please call the office and reschedule immediately. If you are out of medication a bridge can be sent to your pharmacy to last until the date of your rescheduled appointment. Our clinic is open from Monday-Friday 0800-4:30pm and there is not an ON CALL after hours service.  If medical care is needed after hours or on the weekend you will need to contact your primary care physician or go to an Urgent Care or ER.     [facility]  messages and telephone calls from patients are taken care of by the nursing team within 24 business hours if received between Monday 8am - Friday 4:30pm. Therefore if a refill/bridge is needed it is important to call in advance so you do not run out of medications.     [facility] does not accept [facility] or [employer] Medical assistance insurance.

## 2018-02-13 NOTE — PROGRESS NOTES
"  SUBJECTIVE:   Domenico Wilder is a 27 year old female who presents to clinic today for the following health issues:      ADDICTION MEDICINE NOTE:    DOING WELL    NOW [facility]    DEALING WITH FEELINGS RE: MISCARRIAGE OK - \" WAS NOT THE RIGHT TIME\"    CRAVING CONTROLLED    MOTIVATED    BEST SOBRIETY SINCE USING HEROIN; PREVIOUS SOBRIETY WAS AFTER TREATMENT FOR ALCOHOLISM - 2 YEARS SOBER BUT NOT ACTIVELY INVOLVED IN RECOVERY    Problem list and histories reviewed & adjusted, as indicated.  Additional history: as documented    Patient Active Problem List   Diagnosis     Depression     Insomnia     GERD (gastroesophageal reflux disease)     Opioid dependence with withdrawal (H)     Alcohol dependence with uncomplicated withdrawal (H)     Opioid use disorder, severe, dependence (H)     Alcoholism (H)     Anxiety     Major depressive disorder, recurrent, moderate (H)     Polysubstance abuse     Smoker     Need for Tdap vaccination     Past Surgical History:   Procedure Laterality Date     NO HISTORY OF SURGERY         Social History   Substance Use Topics     Smoking status: Former Smoker     Packs/day: 1.00     Years: 10.00     Start date: 12/28/2017     Smokeless tobacco: Never Used     Alcohol use Yes     Family History   Problem Relation Age of Onset     Allergies Mother      Depression Mother      Alcohol/Drug Mother      Prostate Cancer Paternal Grandfather      DIABETES Maternal Grandfather      non insulin dependent         Current Outpatient Prescriptions   Medication Sig Dispense Refill     sertraline (ZOLOFT) 100 MG tablet Take 1 tablet (100 mg) by mouth daily 30 tablet 3     buprenorphine HCl-naloxone HCl (SUBOXONE) 8-2 MG per film Take 1.5 film daily 42 Film 0     traZODone (DESYREL) 100 MG tablet Take 1 tablet (100 mg) by mouth nightly as needed for sleep 30 tablet 1     HYDROXYZINE HCL PO Take 25-50 mg by mouth every 8 hours as needed (for anxiety)       benzocaine (ORAJEL MAXIMUM STRENGTH) 20 % " GEL gel Take by mouth 4 times daily as needed for moderate pain       oseltamivir (TAMIFLU) 75 MG capsule Take 1 capsule (75 mg) by mouth daily 7 capsule 0     Prenatal Vit-Fe Fumarate-FA (PRENATAL MULTIVITAMIN PLUS IRON) 27-0.8 MG TABS per tablet Take 1 tablet by mouth daily 100 tablet 3     pyridOXINE (VITAMIN B-6) 25 MG tablet Take 1 tablet (25 mg) by mouth 3 times daily Take with unisom for nausea/vomiting 100 tablet 1     buPROPion (WELLBUTRIN XL) 150 MG 24 hr tablet        sertraline (ZOLOFT) 25 MG tablet Take 1 tablet (25 mg) by mouth daily Increase to 50 mg after one week 30 tablet 3     acetaminophen (TYLENOL) 325 MG tablet Take 1-2 tablets (325-650 mg) by mouth every 4 hours as needed for mild pain 100 tablet 0     Allergies   Allergen Reactions     Cats      Dogs      Labs reviewed in EPIC      Reviewed and updated as needed this visit by clinical staffTobacco  Allergies  Meds  Med Hx  Surg Hx  Fam Hx  Soc Hx      Reviewed and updated as needed this visit by Provider         ROS:      OBJECTIVE:     /78  Pulse 97  Temp 99.1  F (37.3  C) (Oral)  Resp 18  Wt 154 lb (69.9 kg)  LMP 12/01/2017  SpO2 97%  BMI 25.63 kg/m2  Body mass index is 25.63 kg/(m^2).       ROS:  Constitutional, HEENT, cardiovascular, pulmonary, gi and gu systems are negative, except as otherwise noted.    /78  Pulse 97  Temp 99.1  F (37.3  C) (Oral)  Resp 18  Wt 154 lb (69.9 kg)  LMP 12/01/2017  SpO2 97%  BMI 25.63 kg/m2  EXAM:  GENERAL APPEARANCE: healthy, alert and no distress  EYES: Eyes grossly normal to inspection, PERRL and conjunctivae and sclerae normal  NEURO: Normal strength and tone, mentation intact and speech normal  PSYCH: mentation appears normal and affect normal/bright  MENTAL STATUS EXAM:  Appearance/Behavior: No apparent distress and Casually groomed  Speech: Normal  Mood/Affect: normal affect  Insight: Adequate      [place]  - NO ISSUES; CHECKED 1/11/18; SUBOXONE 8 MG, #48,  1/11/18      Diagnostic Test Results:  Results for orders placed or performed in visit on 02/12/18   Urine Drugs of Abuse Screen Panel 13   Result Value Ref Range    Cannabinoids (91-[id number]-7-[id number]-9-THC) Not Detected NDET^Not Detected ng/mL    Phencyclidine (Phencyclidine) Not Detected NDET^Not Detected ng/mL    Cocaine (Benzoylecgonine) Not Detected NDET^Not Detected ng/mL    Methamphetamine (d-Methamphetamine) Not Detected NDET^Not Detected ng/mL    Opiates (Morphine) Not Detected NDET^Not Detected ng/mL    Amphetamine (d-Amphetamine) Not Detected NDET^Not Detected ng/mL    Benzodiazepines (Nordiazepam) Not Detected NDET^Not Detected ng/mL    Tricyclic Antidepressants (Desipramine) Detected, Abnormal Result (A) NDET^Not Detected ng/mL    Methadone (Methadone) Not Detected NDET^Not Detected ng/mL    Barbiturates (Butalbital) Not Detected NDET^Not Detected ng/mL    Oxycodone (Oxycodone) Not Detected NDET^Not Detected ng/mL    Propoxyphene (Norpropoxyphene) Not Detected NDET^Not Detected ng/mL    Buprenorphine (Buprenorphine) Detected, Abnormal Result (A) NDET^Not Detected ng/mL       ASSESSMENT:   OPIOID DEPENDENCE  PREGNANCY    PLAN:       ICD-10-CM    1. Opioid use disorder, severe, dependence (H) F11.20 Urine Drugs of Abuse Screen Panel 13     buprenorphine HCl-naloxone HCl (SUBOXONE) 8-2 MG per film   2. Generalized anxiety disorder F41.1 sertraline (ZOLOFT) 100 MG tablet   3. Episode of recurrent major depressive disorder, unspecified depression episode severity (H) F33.9 sertraline (ZOLOFT) 100 MG tablet           MEDICATIONS:   Orders Placed This Encounter   Medications     sertraline (ZOLOFT) 100 MG tablet     Sig: Take 1 tablet (100 mg) by mouth daily     Dispense:  30 tablet     Refill:  3     buprenorphine HCl-naloxone HCl (SUBOXONE) 8-2 MG per film     Sig: Take 1.5 film daily     Dispense:  42 Film     Refill:  0          - Continue other medications without change  FUTURE APPOINTMENTS:       -  Follow-up visit in 4 WEEKS    Troy Fiore MD  [facility] PRIMARY CARE

## 2018-03-12 ENCOUNTER — TELEPHONE (OUTPATIENT)
Dept: FAMILY MEDICINE | Facility: CLINIC | Age: 28
End: 2018-03-12

## 2018-03-12 NOTE — TELEPHONE ENCOUNTER
Called patient to offer her availability to come in today if she can. Patient was having trouble getting here, but dr Fiore will see her, if she can be here by 5pm. If not, please schedule patient at her earliest convinces.  Alla Juarez MA

## 2018-04-06 ENCOUNTER — HOSPITAL ENCOUNTER (INPATIENT)
Facility: CLINIC | Age: 28
LOS: 5 days | Discharge: HOME | DRG: 897 | End: 2018-04-11
Attending: FAMILY MEDICINE | Admitting: PSYCHIATRY & NEUROLOGY
Payer: COMMERCIAL

## 2018-04-06 DIAGNOSIS — F10.20 UNCOMPLICATED ALCOHOL DEPENDENCE (H): ICD-10-CM

## 2018-04-06 DIAGNOSIS — F10.230 ALCOHOL DEPENDENCE WITH UNCOMPLICATED WITHDRAWAL (H): ICD-10-CM

## 2018-04-06 DIAGNOSIS — F11.23 OPIOID DEPENDENCE WITH WITHDRAWAL (H): ICD-10-CM

## 2018-04-06 DIAGNOSIS — F33.1 MAJOR DEPRESSIVE DISORDER, RECURRENT, MODERATE (H): Primary | ICD-10-CM

## 2018-04-06 DIAGNOSIS — K21.9 GASTROESOPHAGEAL REFLUX DISEASE, ESOPHAGITIS PRESENCE NOT SPECIFIED: ICD-10-CM

## 2018-04-06 PROBLEM — F19.20 CHEMICAL DEPENDENCY (H): Status: ACTIVE | Noted: 2018-04-06

## 2018-04-06 LAB
ALCOHOL BREATH TEST: 0.19 (ref 0–0.01)
AMPHETAMINES UR QL SCN: NEGATIVE
BARBITURATES UR QL: NEGATIVE
BENZODIAZ UR QL: NEGATIVE
CANNABINOIDS UR QL SCN: NEGATIVE
COCAINE UR QL: NEGATIVE
ETHANOL UR QL SCN: POSITIVE
HCG UR QL: NEGATIVE
OPIATES UR QL SCN: POSITIVE

## 2018-04-06 PROCEDURE — 99284 EMERGENCY DEPT VISIT MOD MDM: CPT | Mod: Z6 | Performed by: FAMILY MEDICINE

## 2018-04-06 PROCEDURE — 25000132 ZZH RX MED GY IP 250 OP 250 PS 637: Performed by: PSYCHIATRY & NEUROLOGY

## 2018-04-06 PROCEDURE — 25000132 ZZH RX MED GY IP 250 OP 250 PS 637: Performed by: FAMILY MEDICINE

## 2018-04-06 PROCEDURE — 12800012 ZZH R&B CD MH INTERMEDIATE ADULT

## 2018-04-06 PROCEDURE — HZ2ZZZZ DETOXIFICATION SERVICES FOR SUBSTANCE ABUSE TREATMENT: ICD-10-PCS | Performed by: PSYCHIATRY & NEUROLOGY

## 2018-04-06 PROCEDURE — 80320 DRUG SCREEN QUANTALCOHOLS: CPT | Performed by: FAMILY MEDICINE

## 2018-04-06 PROCEDURE — 80307 DRUG TEST PRSMV CHEM ANLYZR: CPT | Performed by: FAMILY MEDICINE

## 2018-04-06 PROCEDURE — 81025 URINE PREGNANCY TEST: CPT | Performed by: FAMILY MEDICINE

## 2018-04-06 PROCEDURE — 82075 ASSAY OF BREATH ETHANOL: CPT | Performed by: FAMILY MEDICINE

## 2018-04-06 PROCEDURE — 99285 EMERGENCY DEPT VISIT HI MDM: CPT | Performed by: FAMILY MEDICINE

## 2018-04-06 RX ORDER — BUPRENORPHINE 2 MG/1
4 TABLET SUBLINGUAL ONCE
Status: COMPLETED | OUTPATIENT
Start: 2018-04-06 | End: 2018-04-06

## 2018-04-06 RX ORDER — BUPRENORPHINE 2 MG/1
4 TABLET SUBLINGUAL ONCE
Status: COMPLETED | OUTPATIENT
Start: 2018-04-07 | End: 2018-04-07

## 2018-04-06 RX ORDER — ACETAMINOPHEN 325 MG/1
650 TABLET ORAL EVERY 4 HOURS PRN
Status: DISCONTINUED | OUTPATIENT
Start: 2018-04-06 | End: 2018-04-11 | Stop reason: HOSPADM

## 2018-04-06 RX ORDER — NICOTINE 21 MG/24HR
1 PATCH, TRANSDERMAL 24 HOURS TRANSDERMAL DAILY
Status: DISCONTINUED | OUTPATIENT
Start: 2018-04-07 | End: 2018-04-11 | Stop reason: HOSPADM

## 2018-04-06 RX ORDER — IBUPROFEN 600 MG/1
600 TABLET, FILM COATED ORAL EVERY 6 HOURS PRN
Status: DISCONTINUED | OUTPATIENT
Start: 2018-04-06 | End: 2018-04-11 | Stop reason: HOSPADM

## 2018-04-06 RX ORDER — MULTIPLE VITAMINS W/ MINERALS TAB 9MG-400MCG
1 TAB ORAL DAILY
Status: DISCONTINUED | OUTPATIENT
Start: 2018-04-07 | End: 2018-04-11 | Stop reason: HOSPADM

## 2018-04-06 RX ORDER — TRAZODONE HYDROCHLORIDE 100 MG/1
100 TABLET ORAL
Status: ON HOLD | COMMUNITY
End: 2018-04-10

## 2018-04-06 RX ORDER — BUPRENORPHINE AND NALOXONE 8; 2 MG/1; MG/1
1.5 FILM, SOLUBLE BUCCAL; SUBLINGUAL DAILY
Status: ON HOLD | COMMUNITY
End: 2018-04-10

## 2018-04-06 RX ORDER — ALUMINA, MAGNESIA, AND SIMETHICONE 2400; 2400; 240 MG/30ML; MG/30ML; MG/30ML
30 SUSPENSION ORAL EVERY 4 HOURS PRN
Status: DISCONTINUED | OUTPATIENT
Start: 2018-04-06 | End: 2018-04-11 | Stop reason: HOSPADM

## 2018-04-06 RX ORDER — HYDROXYZINE HYDROCHLORIDE 25 MG/1
25 TABLET, FILM COATED ORAL EVERY 6 HOURS PRN
Status: DISCONTINUED | OUTPATIENT
Start: 2018-04-06 | End: 2018-04-11 | Stop reason: HOSPADM

## 2018-04-06 RX ORDER — HYDROXYZINE HYDROCHLORIDE 25 MG/1
25-50 TABLET, FILM COATED ORAL EVERY 8 HOURS PRN
Status: ON HOLD | COMMUNITY
End: 2018-04-10

## 2018-04-06 RX ORDER — DIAZEPAM 5 MG
5 TABLET ORAL ONCE
Status: COMPLETED | OUTPATIENT
Start: 2018-04-06 | End: 2018-04-06

## 2018-04-06 RX ORDER — ATENOLOL 50 MG/1
50 TABLET ORAL DAILY PRN
Status: DISCONTINUED | OUTPATIENT
Start: 2018-04-06 | End: 2018-04-09

## 2018-04-06 RX ORDER — SERTRALINE HYDROCHLORIDE 100 MG/1
100 TABLET, FILM COATED ORAL DAILY
Status: ON HOLD | COMMUNITY
End: 2018-04-10

## 2018-04-06 RX ORDER — FOLIC ACID 1 MG/1
1 TABLET ORAL DAILY
Status: DISCONTINUED | OUTPATIENT
Start: 2018-04-07 | End: 2018-04-11 | Stop reason: HOSPADM

## 2018-04-06 RX ORDER — BUPRENORPHINE 2 MG/1
4 TABLET SUBLINGUAL 2 TIMES DAILY
Status: DISCONTINUED | OUTPATIENT
Start: 2018-04-07 | End: 2018-04-09

## 2018-04-06 RX ORDER — BISACODYL 10 MG
10 SUPPOSITORY, RECTAL RECTAL DAILY PRN
Status: DISCONTINUED | OUTPATIENT
Start: 2018-04-06 | End: 2018-04-11 | Stop reason: HOSPADM

## 2018-04-06 RX ORDER — HYDROXYZINE HYDROCHLORIDE 50 MG/1
50 TABLET, FILM COATED ORAL EVERY 6 HOURS PRN
Status: DISCONTINUED | OUTPATIENT
Start: 2018-04-06 | End: 2018-04-11 | Stop reason: HOSPADM

## 2018-04-06 RX ORDER — ONDANSETRON 4 MG/1
4 TABLET, ORALLY DISINTEGRATING ORAL EVERY 6 HOURS PRN
Status: DISCONTINUED | OUTPATIENT
Start: 2018-04-06 | End: 2018-04-10

## 2018-04-06 RX ORDER — TRAZODONE HYDROCHLORIDE 100 MG/1
100 TABLET ORAL
Status: DISCONTINUED | OUTPATIENT
Start: 2018-04-06 | End: 2018-04-11 | Stop reason: HOSPADM

## 2018-04-06 RX ORDER — LANOLIN ALCOHOL/MO/W.PET/CERES
100 CREAM (GRAM) TOPICAL DAILY
Status: COMPLETED | OUTPATIENT
Start: 2018-04-07 | End: 2018-04-09

## 2018-04-06 RX ORDER — DIAZEPAM 5 MG
5-20 TABLET ORAL EVERY 30 MIN PRN
Status: DISCONTINUED | OUTPATIENT
Start: 2018-04-06 | End: 2018-04-09

## 2018-04-06 RX ADMIN — DIAZEPAM 10 MG: 5 TABLET ORAL at 20:23

## 2018-04-06 RX ADMIN — DIAZEPAM 5 MG: 5 TABLET ORAL at 17:53

## 2018-04-06 RX ADMIN — BUPRENORPHINE HCL 4 MG: 2 TABLET SUBLINGUAL at 20:23

## 2018-04-06 ASSESSMENT — ACTIVITIES OF DAILY LIVING (ADL)
ORAL_HYGIENE: INDEPENDENT
DRESS: SCRUBS (BEHAVIORAL HEALTH);INDEPENDENT
GROOMING: INDEPENDENT
LAUNDRY: WITH SUPERVISION

## 2018-04-06 NOTE — ED NOTES
"ED to Behavioral Floor Handoff    SITUATION  Domenico Wilder is a 27 year old female who speaks English and lives is homeless unknown The patient arrived in the ED by walking from homeless with a complaint of Addiction Problem (pt here for detox from IV heroine and alcohol. Last use of herione was at midnight, pt uses 1-2 grams per day.  Last drink at 1330, pt drank 1 pint of vodka. Pt usually drinks 1 pint of vodka daily.)  .The patient's current symptoms started/worsened 2 week(s) ago and during this time the symptoms have increased.   In the ED, pt was diagnosed with   Final diagnoses:   Opioid dependence with withdrawal (H)   Uncomplicated alcohol dependence (H)        Initial vitals were: BP: 117/74  Heart Rate: 67  Temp: 97.7  F (36.5  C)  Resp: 20  Height: 162.6 cm (5' 4\")  Weight: 67.1 kg (148 lb)  SpO2: 99 %   --------  Is the patient diabetic? No   If yes, last blood glucose? --     If yes, was this treated in the ED? --  --------  Is the patient inebriated (ETOH) Yes or Impaired on other substances? Yes  MSSA done? Yes  Last MSSA score: --    Were withdrawal symptoms treated? No  Does the patient have a seizure history? No. If yes, date of most recent seizure--  --------  Is the patient patient experiencing suicidal ideation? denies current or recent suicidal ideation     Homicidal ideation? denies current or recent homicidal ideation or behaviors.    Self-injurious behavior/urges? denies current or recent self injurious behavior or ideation.  ------  Was pt aggressive in the ED No  Was a code called No  Is the pt now cooperative? Yes  -------  Meds given in ED: Medications - No data to display   Family present during ED course? No  Family currently present? No    BACKGROUND  Does the patient have a cognitive impairment or developmental disability? No  Allergies:   Allergies   Allergen Reactions     Cats      Dogs    .   Social demographics are   Social History     Social History     Marital status: Single " "    Spouse name: N/A     Number of children: N/A     Years of education: N/A     Social History Main Topics     Smoking status: Former Smoker     Packs/day: 0.50     Years: 13.00     Start date: 12/28/2017     Smokeless tobacco: Never Used     Alcohol use Yes      Comment: 1 liter per day vodka     Drug use: No      Comment: 1-2 grams of heroine daily, occasional meth and marijuana     Sexual activity: Yes     Partners: Male     Other Topics Concern     None     Social History Narrative        ASSESSMENT  Labs results   Labs Ordered and Resulted from Time of ED Arrival Up to the Time of Departure from the ED   DRUG ABUSE SCREEN 6 CHEM DEP URINE ([facility]) - Abnormal; Notable for the following:        Result Value    Ethanol Qual Urine Positive (*)     Opiates Qualitative Urine Positive (*)     All other components within normal limits   ALCOHOL BREATH TEST POCT - Abnormal; Notable for the following:     Alcohol Breath Test 0.19 (*)     All other components within normal limits   HCG QUALITATIVE URINE      Imaging Studies: No results found for this or any previous visit (from the past 24 hour(s)).   Most recent vital signs /74  Temp 97.7  F (36.5  C) (Oral)  Resp 20  Ht 1.626 m (5' 4\")  Wt 67.1 kg (148 lb)  LMP 03/02/2018  SpO2 99%  Breastfeeding? Unknown  BMI 25.4 kg/m2   Abnormal labs/tests/findings requiring intervention:---   Pain control: fair  Nausea control: pt had none    RECOMMENDATION  Are any infection precautions needed (MRSA, VRE, etc.)? No If yes, what infection? --  ---  Does the patient have mobility issues? independently. If yes, what device does the pt use? ---  ---  Is patient on 72 hour hold or commitment? No If on 72 hour hold, have hold and rights been given to patient? N/A  Are admitting orders written if after 10 p.m. ?N/A  Tasks needing to be completed:---     Leila Tong   [facility]-- [phone] West ED   [phone] East ED  "

## 2018-04-06 NOTE — PHARMACY-ADMISSION MEDICATION HISTORY
"Admission medication history for the April 6, 2018 admission is complete.     Interview sources:  Patient, [facility] (via [facility])    Reliability of source: Moderate/poor - pt list the names of a couple medications, but said she wasn't taking them and didn't know doses    Medication compliance: Poor - pt reports stopping all medications about 1-2 month ago because she \"became homeless.\" All the medications listed below were dispensed in February, except bupropion has never been dispensed.    Changes made to PTA medication list (reason)  Added: all medications listed (per [facility])  Deleted: none  Changed: none    Additional medication history information:   - Suboxone 8-2 mg SL film dispensed on 2/12/18 for quantity #42 tablets (28 day supply)     Prior to Admission medications    Medication Sig Last Dose Taking? Auth Provider   hydrOXYzine (ATARAX) 25 MG tablet Take 25-50 mg by mouth every 8 hours as needed for anxiety not taking Yes Unknown, Entered By History   buprenorphine HCl-naloxone HCl (SUBOXONE) 8-2 MG per film Place 1.5 Film under the tongue daily not taking Yes Unknown, Entered By History   sertraline (ZOLOFT) 100 MG tablet Take 100 mg by mouth daily not taking Yes Unknown, Entered By History   traZODone (DESYREL) 100 MG tablet Take 100 mg by mouth nightly as needed for sleep not taking Yes Unknown, Entered By History   buPROPion (WELLBUTRIN XL) 150 MG 24 hr tablet Take 150 mg by mouth every morning  not taking  Reported, Patient       Time spent: 20 minutes    Medication history completed by:   Tete Delcid, Molly  [facility]  Available daily from 1-9 PM: phone [phone], pager [phone]    "

## 2018-04-06 NOTE — IP AVS SNAPSHOT
MRN:[id number]                      After Visit Summary   4/6/2018    Domenico Wilder    MRN: [id number]           Thank you!     Thank you for choosing [facility] for your care. Our goal is always to provide you with excellent care.        Patient Information     Date Of Birth          1990        Designated Caregiver       Most Recent Value    Caregiver    Will someone help with your care after discharge? no      About your hospital stay     You were admitted on:  April 6, 2018 You last received care in the:  [facility]    You were discharged on:  April 11, 2018       Who to Call     For medical emergencies, please call 911.  For non-urgent questions about your medical care, please call your primary care provider or clinic, [phone]          Attending Provider     Provider Specialty    Crow Ni MD [facility], Syed Griffith MD Psychiatry       Primary Care Provider Office Phone # Fax #    [facility] [phone]      Your next 10 appointments already scheduled     Apr 23, 2018  2:30 PM CDT   New Visit with Troy Fiore MD   [facility] Integrated Primary Care ([facility])    [address] [place] [address]  [address]  [facility] [id number] [phone]              Further instructions from your care team               Behavioral Discharge Planning and Instructions  THANK YOU FOR CHOOSING THE [address]  [phone]    Summary: You were admitted to [facility] on 4/6/2018 for detoxification from opioids and alcohol.  A medical exam was performed that included lab work. You have met with a  and opted to transfer directly to [facility] - see details below.  Please take care and make your recovery a daily priority, Domenico!     Recommendation:  Transfer directly to residential treatment and follow the recommendations of your treatment counselors. Suboxone  maintenance. Support group meetings/sponsor. Consider psychotherapy/CD counseling weekly following completion of treatment.    Main Diagnoses:  Per Dr. Colón;  Opiate dependence with withdrawalAs an outp  alcohol use disorder severe, with withdrawal  generalized anxiety disorder, depression, nonspecified.    Major Treatments, Procedures and Findings:  You were treated for alcohol detoxification using valium administered based on the [facility] protocol. You were treated for opioid detoxification using subutex initially and then were changed to your suboxone dosing as prescribed by Dr. Fiore. You had a chemical dependency assessment. You had labs drawn and those results were reviewed with you. Please take a copy of your lab work with you to your next primary care physician appointment.    Symptoms to Report:  If you experience more anxiety, confusion, sleeplessness, deep sadness or thoughts of suicide, notify your treatment team or notify your primary care physician. IF ANY OF THE SYMPTOMS YOU ARE EXPERIENCING ARE A MEDICAL EMERGENCY CALL 911 IMMEDIATELY.     Lifestyle Adjustment: Adjust your lifestyle to get enough sleep, relaxation, exercise and good nutrition. Continue to develop healthy coping skills to decrease stress and promote a sober living environment. Do not use mood altering substances including alcohol, illegal drugs or addictive medications other than what is currently prescribed.     Follow-up Appointment:     Apr 23, 2018  2:30 PM CDT   New Visit with Troy Fiore MD   [facility] Integrated Primary Care ([facility])    [address] [place] [address]  [address]  [facility] [id number] [phone]            Treatment Program Provider:   by [employer]  on Wednesday, April 11, 2018 at 12:30 PM  [facility]  [address] [place] [address]  Ph:  [phone]  Fax:  [phone]    Resources:   AA/NA and  Sponsors are excellent resources for support and you can find one at any support group meeting.   SMART Recovery - self management for addiction recovery:  www.smartrecovery.org  http://www.aastpaul.org/?topic=8  http://a[place].org/meetings/  http://www.nam[place].org/index.php/meeting-list-pdf  Pathways ~ A Health Crisis Resource & Support Center:  957.138.2876.  http://www.harmreduction.org  [facility] [phone]  Support Group:  AA/NA and Sponsor/support  Crisis Intervention: 163.968.9624 or 332-605-8516 (TTY: 322.248.9337).  Call anytime for help.  National [place] on Mental Illness (www.[place].jadyn.org): 861.695.2237 or 329-173-7261.  Alcoholics Anonymous (www.alcoholics-anonymous.org): Check your phone book for your local chapter.  Suicide Awareness Voices of Education (SAVE) (www.save.org): 342-982-LEDZ (2683)  National Suicide Prevention Line (www.mentalhealthmn.org): 341-494-SGCF (5319)  Mental Health Consumer/Survivor Network of [place] (www.mhcsn.net): 671.775.9811 or 399-095-5210  Mental Health Association of [place] (www.mentalhealth.org): 964.346.3161 or 183-131-5366   Substance Abuse and Mental Health Services (www.samhsa.gov)    [place] Recovery Connection ([facility])  [facility] connects people seeking recovery to resources that help foster and sustain long-term recovery.  Whether you are seeking resources for treatment, transportation, housing, job training, education, health care or other pathways to recovery, [facility] is a great place to start.  829.354.3978.  www.[facility].org    General Medication Instructions:   See your medication sheet(s) for instructions.   Take all medicines as directed.  Make no changes unless your doctor suggests them.     Please Note:  If you have any questions at anytime after you are discharged please call the [facility] [place] detox unit 3AW unit at [phone].  [facility] , Health, Behavioral Intake  "[phone]  Please take this discharge folder with you to all your follow up appointments, it contains your lab results, diagnosis, medication list and discharge recommendations.      THANK YOU FOR CHOOSING THE [facility]       Pending Results     No orders found from 2018 to 2018.            Statement of Approval     Ordered          18 0833  I have reviewed and agree with all the recommendations and orders detailed in this document.  EFFECTIVE NOW     Approved and electronically signed by:  Syed Russo MD             Admission Information     Date & Time Provider Department Dept. Phone    2018 Syed Russo MD [facility] [phone]      Your Vitals Were     Blood Pressure Pulse Temperature Respirations Height Weight    110/66 61 97.3  F (36.3  C) (Oral) 16 1.626 m (5' 4\") 67.1 kg (148 lb)    Last Period Pulse Oximetry BMI (Body Mass Index)             2018 96% 25.4 kg/m2         My[employer] Information     [employer] lets you send messages to your doctor, view your test results, renew your prescriptions, schedule appointments and more. To sign up, go to [email or web address] . Click on \"Log in\" on the left side of the screen, which will take you to the Welcome page. Then click on \"Sign up Now\" on the right side of the page.     You will be asked to enter the access code listed below, as well as some personal information. Please follow the directions to create your username and password.     Your access code is: [email or web address]  Expires: 2018 10:51 AM     Your access code will  in 90 days. If you need help or a new code, please call your [place] clinic or [phone].        Care EveryWhere ID     This is your Care EveryWhere ID. This could be used by other organizations to access your [place] medical records  [id number]        Equal Access to Services     LAUREL SHANKS AH: justino Aj " julián marilusergei ruedanolan farrellaan ah. So [facility] [phone].    ATENCIÓN: Si parmjit lau, tiene a velez disposición servicios gratuitos de asistencia lingüística. Llame al [phone].    We comply with applicable federal civil rights laws and [place] laws. We do not discriminate on the basis of race, color, national origin, age, disability, sex, sexual orientation, or gender identity.               Review of your medicines      START taking        Dose / Directions    * buprenorphine-naloxone 8-2 MG Subl sublingual tablet   Commonly known as:  SUBOXONE   Used for:  Opioid dependence with withdrawal (H)   Replaces:  buprenorphine HCl-naloxone HCl 8-2 MG per film        Dose:  1 tablet   Place 1 tablet under the tongue daily   Quantity:  12 each   Refills:  0       * buprenorphine HCl-naloxone HCl 4-1 MG per film   Commonly known as:  SUBOXONE   Used for:  Opioid dependence with withdrawal (H)        Dose:  1 Film   Place 1 Film under the tongue daily   Quantity:  12 Film   Refills:  0       folic acid 1 MG tablet   Commonly known as:  FOLVITE   Used for:  Alcohol dependence with uncomplicated withdrawal (H)        Dose:  1 mg   Take 1 tablet (1 mg) by mouth daily   Quantity:  30 tablet   Refills:  1       ibuprofen 600 MG tablet   Commonly known as:  ADVIL/MOTRIN        Dose:  600 mg   Take 1 tablet (600 mg) by mouth every 6 hours as needed for moderate pain   Quantity:  120 tablet   Refills:  0       omeprazole 20 MG CR capsule   Commonly known as:  priLOSEC   Used for:  Gastroesophageal reflux disease, esophagitis presence not specified        Dose:  20 mg   Take 1 capsule (20 mg) by mouth every morning (before breakfast)   Quantity:  30 capsule   Refills:  1       * Notice:  This list has 2 medication(s) that are the same as other medications prescribed for you. Read the directions carefully, and ask your doctor or other care provider to review them with you.       CONTINUE these medicines which may have CHANGED, or have new prescriptions. If we are uncertain of the size of tablets/capsules you have at home, strength may be listed as something that might have changed.        Dose / Directions    hydrOXYzine 25 MG tablet   Commonly known as:  ATARAX   This may have changed:  when to take this   Used for:  Major depressive disorder, recurrent, moderate (H)        Dose:  25-50 mg   Take 1-2 tablets (25-50 mg) by mouth every 6 hours as needed for anxiety   Quantity:  120 tablet   Refills:  1         CONTINUE these medicines which have NOT CHANGED        Dose / Directions    buPROPion 150 MG 24 hr tablet   Commonly known as:  WELLBUTRIN XL   Used for:  Major depressive disorder, recurrent, moderate (H)        Dose:  150 mg   Take 1 tablet (150 mg) by mouth every morning   Quantity:  30 tablet   Refills:  1       sertraline 100 MG tablet   Commonly known as:  ZOLOFT   Used for:  Major depressive disorder, recurrent, moderate (H)        Dose:  100 mg   Take 1 tablet (100 mg) by mouth daily   Quantity:  30 tablet   Refills:  1       traZODone 100 MG tablet   Commonly known as:  DESYREL   Used for:  Major depressive disorder, recurrent, moderate (H)        Dose:  100 mg   Take 1 tablet (100 mg) by mouth nightly as needed for sleep   Quantity:  30 tablet   Refills:  1         STOP taking     buprenorphine HCl-naloxone HCl 8-2 MG per film   Commonly known as:  SUBOXONE   Replaced by:  buprenorphine-naloxone 8-2 MG Subl sublingual tablet                Where to get your medicines      These medications were sent to [facility] [place], [place] - [address]  [address] [address]     Phone:  [phone]     buPROPion 150 MG 24 hr tablet    folic acid 1 MG tablet    hydrOXYzine 25 MG tablet    omeprazole 20 MG CR capsule    sertraline 100 MG tablet    traZODone 100 MG tablet         Some of these will need a paper prescription and others can be  bought over the counter. Ask your nurse if you have questions.     Bring a paper prescription for each of these medications     buprenorphine HCl-naloxone HCl 4-1 MG per film    buprenorphine-naloxone 8-2 MG Subl sublingual tablet                Protect others around you: Learn how to safely use, store and throw away your medicines at www.disposemymeds.org.             Medication List: This is a list of all your medications and when to take them. Check marks below indicate your daily home schedule. Keep this list as a reference.      Medications           Morning Afternoon Evening Bedtime As Needed    * buprenorphine-naloxone 8-2 MG Subl sublingual tablet   Commonly known as:  SUBOXONE   Place 1 tablet under the tongue daily   Last time this was given:  4/11/2018 @ 8:00 AM   Next Dose Due:  4/12/2018 @ 8:00 AM                                   * buprenorphine HCl-naloxone HCl 4-1 MG per film   Commonly known as:  SUBOXONE   Place 1 Film under the tongue daily   Last time this was given:  1 Film on 4/10/2018  9:29 PM   Last time this was given:  4/10/2018 @ 8:00 PM   Next Dose Due:  4/11/2018 @ 8:00 PM                                   buPROPion 150 MG 24 hr tablet   Commonly known as:  WELLBUTRIN XL   Take 1 tablet (150 mg) by mouth every morning                                   folic acid 1 MG tablet   Commonly known as:  FOLVITE   Take 1 tablet (1 mg) by mouth daily   Last time this was given:  1 mg on 4/11/2018  8:21 AM                                   hydrOXYzine 25 MG tablet   Commonly known as:  ATARAX   Take 1-2 tablets (25-50 mg) by mouth every 6 hours as needed for anxiety   Last time this was given:  50 mg on 4/10/2018  9:29 PM                            For anxiety       ibuprofen 600 MG tablet   Commonly known as:  ADVIL/MOTRIN   Take 1 tablet (600 mg) by mouth every 6 hours as needed for moderate pain   Last time this was given:  600 mg on 4/8/2018 12:06 PM                            For pain        omeprazole 20 MG CR capsule   Commonly known as:  priLOSEC   Take 1 capsule (20 mg) by mouth every morning (before breakfast)   Last time this was given:  20 mg on 4/11/2018  8:20 AM                                   sertraline 100 MG tablet   Commonly known as:  ZOLOFT   Take 1 tablet (100 mg) by mouth daily   Last time this was given:  100 mg on 4/11/2018  8:20 AM                                   traZODone 100 MG tablet   Commonly known as:  DESYREL   Take 1 tablet (100 mg) by mouth nightly as needed for sleep   Last time this was given:  100 mg on 4/10/2018  9:29 PM                            For sleep       * Notice:  This list has 2 medication(s) that are the same as other medications prescribed for you. Read the directions carefully, and ask your doctor or other care provider to review them with you.

## 2018-04-06 NOTE — IP AVS SNAPSHOT
[facility]    [address] [place] [address]    Phone:  [phone]                                       After Visit Summary   4/6/2018    Domenico Wilder    MRN: [id number]           After Visit Summary Signature Page     I have received my discharge instructions, and my questions have been answered. I have discussed any challenges I see with this plan with the nurse or doctor.    ..........................................................................................................................................  Patient/Patient Representative Signature      ..........................................................................................................................................  Patient Representative Print Name and Relationship to Patient    ..................................................               ................................................  Date                                            Time    ..........................................................................................................................................  Reviewed by Signature/Title    ...................................................              ..............................................  Date                                                            Time

## 2018-04-06 NOTE — ED PROVIDER NOTES
"  History     Chief Complaint   Patient presents with     Addiction Problem     pt here for detox from IV heroine and alcohol. Last use of herione was at midnight, pt uses 1-2 grams per day.  Last drink at 1330, pt drank 1 pint of vodka. Pt usually drinks 1 pint of vodka daily.     HPI  Domenico Wilder is a 27 year old female who presents requesting detox from heroin and alcohol.  Patient has a long history of IV heroin abuse and uses up to 1 g intravenously per day.  Her last use was approximately 18 hours ago.  She does develop physical symptoms when in withdrawal including restlessness, myalgias, cramps.  She is starting to experience withdrawal symptoms.  She also drinks alcohol approximately one point per day.  Last drink 2-3 hours ago.  Does have a history of alcohol withdrawal seizures in the remote past.  She has a history of depression but does not endorse acute symptoms.  She has no medical complaints at this time.    I have reviewed the Medications, Allergies, Past Medical and Surgical History, and Social History in the Epic system.    Review of Systems  All other systems were reviewed and are negative    Physical Exam   BP: 117/74  Heart Rate: 67  Temp: 97.7  F (36.5  C)  Resp: 20  Height: 162.6 cm (5' 4\")  Weight: 67.1 kg (148 lb)  SpO2: 99 %      Physical Exam   Constitutional: She is oriented to person, place, and time. She appears well-developed and well-nourished.   HENT:   Head: Normocephalic and atraumatic.   Mouth/Throat: Oropharynx is clear and moist.   Eyes: EOM are normal. Pupils are equal, round, and reactive to light.   Neck: Normal range of motion. Neck supple. No tracheal deviation present. No thyromegaly present.   Cardiovascular: Normal rate, regular rhythm, normal heart sounds and intact distal pulses.  Exam reveals no gallop and no friction rub.    No murmur heard.  Pulmonary/Chest: Effort normal and breath sounds normal. She exhibits no tenderness.   Abdominal: Soft. Bowel sounds are " normal. She exhibits no distension and no mass. There is no tenderness.   Musculoskeletal: She exhibits no edema or tenderness.   Needle tracks without signs of abscess or cellulitis on both upper extremities.   Neurological: She is alert and oriented to person, place, and time. No cranial nerve deficit. Coordination normal.   Skin: Skin is warm and dry. No rash noted.   Psychiatric: She has a normal mood and affect. Her behavior is normal.   Nursing note and vitals reviewed.      ED Course     ED Course     Procedures             Critical Care time:  none             Labs Ordered and Resulted from Time of ED Arrival Up to the Time of Departure from the ED   ALCOHOL BREATH TEST POCT - Abnormal; Notable for the following:        Result Value    Alcohol Breath Test 0.19 (*)     All other components within normal limits   DRUG ABUSE SCREEN 6 CHEM DEP URINE ([facility])   HCG QUALITATIVE URINE            Assessments & Plan (with Medical Decision Making)   Patient with a history of IV heroin abuse and dependence, alcohol abuse and dependence, presents seeking detox from alcohol.  She also needs detox from heroin.  Her last use of heroin was yesterday and she is beginning to experience the early stages of opiate withdrawal.  She is acutely intoxicated with alcohol, no withdrawal symptoms to date.  She denies other medical complaints.  Has a history of depression but no significant acute symptoms.  Patient has been unable to stop using drugs and alcohol in the community due to both physical and psychological symptoms.  Continued use will put the patient at risk for medical and/or psychiatric complications.  Patient appears to be an appropriate candidate for voluntary admission to our detox unit.  The patient is otherwise medically and psychiatrically stable and voluntary.      I have reviewed the nursing notes.    I have reviewed the findings, diagnosis, plan and need for follow up with the patient.    New Prescriptions    No  medications on file       Final diagnoses:   Opioid dependence with withdrawal (H)   Uncomplicated alcohol dependence (H)       4/6/2018   [facility], [place], EMERGENCY DEPARTMENT     Crow Ni MD  04/06/18 6424

## 2018-04-07 LAB
ALBUMIN SERPL-MCNC: 3.9 G/DL (ref 3.4–5)
ALP SERPL-CCNC: 83 U/L (ref 40–150)
ALT SERPL W P-5'-P-CCNC: 11 U/L (ref 0–50)
ANION GAP SERPL CALCULATED.3IONS-SCNC: 9 MMOL/L (ref 3–14)
AST SERPL W P-5'-P-CCNC: 15 U/L (ref 0–45)
BILIRUB SERPL-MCNC: 0.4 MG/DL (ref 0.2–1.3)
BUN SERPL-MCNC: 13 MG/DL (ref 7–30)
CALCIUM SERPL-MCNC: 9.1 MG/DL (ref 8.5–10.1)
CHLORIDE SERPL-SCNC: 105 MMOL/L (ref 94–109)
CHOLEST SERPL-MCNC: 145 MG/DL
CO2 SERPL-SCNC: 28 MMOL/L (ref 20–32)
CREAT SERPL-MCNC: 0.58 MG/DL (ref 0.52–1.04)
ERYTHROCYTE [DISTWIDTH] IN BLOOD BY AUTOMATED COUNT: 13.5 % (ref 10–15)
GFR SERPL CREATININE-BSD FRML MDRD: >90 ML/MIN/1.7M2
GGT SERPL-CCNC: 10 U/L (ref 0–40)
GLUCOSE SERPL-MCNC: 92 MG/DL (ref 70–99)
HCT VFR BLD AUTO: 46.1 % (ref 35–47)
HDLC SERPL-MCNC: 53 MG/DL
HGB BLD-MCNC: 15 G/DL (ref 11.7–15.7)
LDLC SERPL CALC-MCNC: 80 MG/DL
MCH RBC QN AUTO: 29.4 PG (ref 26.5–33)
MCHC RBC AUTO-ENTMCNC: 32.5 G/DL (ref 31.5–36.5)
MCV RBC AUTO: 90 FL (ref 78–100)
NONHDLC SERPL-MCNC: 92 MG/DL
PLATELET # BLD AUTO: 299 10E9/L (ref 150–450)
POTASSIUM SERPL-SCNC: 3.7 MMOL/L (ref 3.4–5.3)
PROT SERPL-MCNC: 7.4 G/DL (ref 6.8–8.8)
RBC # BLD AUTO: 5.11 10E12/L (ref 3.8–5.2)
SODIUM SERPL-SCNC: 142 MMOL/L (ref 133–144)
TRIGL SERPL-MCNC: 61 MG/DL
WBC # BLD AUTO: 5.8 10E9/L (ref 4–11)

## 2018-04-07 PROCEDURE — 36415 COLL VENOUS BLD VENIPUNCTURE: CPT | Performed by: PSYCHIATRY & NEUROLOGY

## 2018-04-07 PROCEDURE — G0499 HEPB SCREEN HIGH RISK INDIV: HCPCS | Performed by: PSYCHIATRY & NEUROLOGY

## 2018-04-07 PROCEDURE — 99222 1ST HOSP IP/OBS MODERATE 55: CPT | Performed by: NURSE PRACTITIONER

## 2018-04-07 PROCEDURE — 86706 HEP B SURFACE ANTIBODY: CPT | Performed by: PSYCHIATRY & NEUROLOGY

## 2018-04-07 PROCEDURE — 80053 COMPREHEN METABOLIC PANEL: CPT | Performed by: PSYCHIATRY & NEUROLOGY

## 2018-04-07 PROCEDURE — 85027 COMPLETE CBC AUTOMATED: CPT | Performed by: PSYCHIATRY & NEUROLOGY

## 2018-04-07 PROCEDURE — 25000132 ZZH RX MED GY IP 250 OP 250 PS 637: Performed by: NURSE PRACTITIONER

## 2018-04-07 PROCEDURE — 82977 ASSAY OF GGT: CPT | Performed by: PSYCHIATRY & NEUROLOGY

## 2018-04-07 PROCEDURE — 86803 HEPATITIS C AB TEST: CPT | Performed by: PSYCHIATRY & NEUROLOGY

## 2018-04-07 PROCEDURE — 99207 ZZC CONSULT E&M CHANGED TO INITIAL LEVEL: CPT | Performed by: NURSE PRACTITIONER

## 2018-04-07 PROCEDURE — 12800012 ZZH R&B CD MH INTERMEDIATE ADULT

## 2018-04-07 PROCEDURE — 99223 1ST HOSP IP/OBS HIGH 75: CPT | Mod: AI | Performed by: PSYCHIATRY & NEUROLOGY

## 2018-04-07 PROCEDURE — 25000132 ZZH RX MED GY IP 250 OP 250 PS 637: Performed by: PSYCHIATRY & NEUROLOGY

## 2018-04-07 PROCEDURE — 80061 LIPID PANEL: CPT | Performed by: PSYCHIATRY & NEUROLOGY

## 2018-04-07 PROCEDURE — 87389 HIV-1 AG W/HIV-1&-2 AB AG IA: CPT | Performed by: PSYCHIATRY & NEUROLOGY

## 2018-04-07 RX ORDER — POLYETHYLENE GLYCOL 3350 17 G/17G
17 POWDER, FOR SOLUTION ORAL DAILY
Status: DISCONTINUED | OUTPATIENT
Start: 2018-04-07 | End: 2018-04-11 | Stop reason: HOSPADM

## 2018-04-07 RX ADMIN — NICOTINE 1 PATCH: 21 PATCH, EXTENDED RELEASE TRANSDERMAL at 08:26

## 2018-04-07 RX ADMIN — DIAZEPAM 10 MG: 5 TABLET ORAL at 00:19

## 2018-04-07 RX ADMIN — BUPRENORPHINE HCL 4 MG: 2 TABLET SUBLINGUAL at 08:26

## 2018-04-07 RX ADMIN — MULTIPLE VITAMINS W/ MINERALS TAB 1 TABLET: TAB at 08:26

## 2018-04-07 RX ADMIN — POLYETHYLENE GLYCOL 3350 17 G: 17 POWDER, FOR SOLUTION ORAL at 16:15

## 2018-04-07 RX ADMIN — HYDROXYZINE HYDROCHLORIDE 50 MG: 50 TABLET, FILM COATED ORAL at 21:32

## 2018-04-07 RX ADMIN — TRAZODONE HYDROCHLORIDE 100 MG: 100 TABLET ORAL at 21:31

## 2018-04-07 RX ADMIN — BUPRENORPHINE HCL 4 MG: 2 TABLET SUBLINGUAL at 01:01

## 2018-04-07 RX ADMIN — IBUPROFEN 600 MG: 600 TABLET ORAL at 03:06

## 2018-04-07 RX ADMIN — SERTRALINE HYDROCHLORIDE 50 MG: 50 TABLET ORAL at 16:15

## 2018-04-07 RX ADMIN — Medication 100 MG: at 08:26

## 2018-04-07 RX ADMIN — BUPRENORPHINE HCL 4 MG: 2 TABLET SUBLINGUAL at 20:18

## 2018-04-07 RX ADMIN — DIAZEPAM 5 MG: 5 TABLET ORAL at 12:11

## 2018-04-07 RX ADMIN — FOLIC ACID 1 MG: 1 TABLET ORAL at 08:26

## 2018-04-07 RX ADMIN — TRAZODONE HYDROCHLORIDE 100 MG: 100 TABLET ORAL at 00:21

## 2018-04-07 RX ADMIN — DIAZEPAM 10 MG: 5 TABLET ORAL at 08:26

## 2018-04-07 RX ADMIN — DIAZEPAM 10 MG: 5 TABLET ORAL at 03:06

## 2018-04-07 ASSESSMENT — ACTIVITIES OF DAILY LIVING (ADL)
ORAL_HYGIENE: INDEPENDENT
LAUNDRY: WITH SUPERVISION
GROOMING: INDEPENDENT
DRESS: SCRUBS (BEHAVIORAL HEALTH)

## 2018-04-07 NOTE — PROGRESS NOTES
Writer met with pt to discuss aftercare plans. Pt reports she is interested in residential/inpatient treatment. Pt stated she was recently in treatment in December/January of 17/18 in [facility]. Writer reviewed chart review and pt's original Rule 25 was completed on 12/10/17 by Regina Kahn. Pt has zaira BARRETO and will need Rule 25 update and county application for [facility] for pt's chosen treatment location. Pt stated that at this time she is feeling sick and is unsure what treatment program she is interested in.   CM to follow-up with Rule 25 update and [facility] funding.

## 2018-04-07 NOTE — PLAN OF CARE
"Problem: Substance Withdrawal  Goal: Substance Withdrawal  Signs and symptoms of listed problems will be absent or manageable.  Outcome: Declining  S:  Domenico is a 26 yo female who comes to [facility] seeking detox from alcohol and heroin.  She states that she has been drinking one pint of   Vodka daily on and off for ten years.  She reports that her last drink was today (4/6/18) at 13:00.  She states that she has been injecting heroin 1-2 gm/day on and off for the last three years.  She states that her last use was yesterday (4/5/18) at 20:00.  She has a large area of induration, not warm or red where she has injected on her right inferior forearm.  Both arms harm multiple areas with track marks.  She states that she also uses cocaine/crack \"a couple of times a month\" and she smokes a couple of bowls of marijuana a couple of times a week.  She states that she is close to her boyfriend, Nelson with whom she has a 5 yo son named Red.  Her parents are caring for the child.  When asked if there were anything that gives her satisfaction she stated \"my son\".  She said that she is tired of using.  There have been times when she wished she were dead but she doesn't dwell on the pain of her addiction.  She reports that she used to cut as a teenager.  She endorses depression but denies thoughts of suicide, self harm or thoughts of harming others.  B:  Epic listed a history of depression, GERD, SA, chicken pox and anxiety.  A:  Pt in moderate alcohol withdrawal and opiate withdrawal AEB COWS & MSSA scores of 11 & 17 respectively.  R:  Obtained admission orders.  Provided with a nutritious meal and encouraged increased fluid intake.  Re-oriented to unit, schedule and lab work to be drawn.  Counseled on smoking cessation.  Re-introduced to [facility] and accompanying paper work.  Administered diazepam 10 mg and Buprenorphine 4 mg.  Continue to monitor and medicate as ordered and indicated.       "

## 2018-04-07 NOTE — H&P
Admitted:     04/06/2018      PSYCHIATRIC EVALUATION      The patient was seen for 70 minutes on 04/07/2016.  Greater than 50% of the time was spent in counseling and coordinating care, clarifying diagnostic prognostic issues, presence of support in community.      CHIEF COMPLAINT AND REASON FOR ADMISSION:  The patient is a 27-year-old  female in a long-term relationship, who has a 6-year-old son from this relationship.  She is currently unemployed.  She presented to the Emergency Department requesting detoxification from heroin and alcohol.      HISTORY OF PRESENT ILLNESS:  The patient has a long history of IV heroin abuse, uses up to 1-2 grams of IV heroin per day and alcohol on average 1 pint per day.  She drinks and uses almost daily.  She does have a history of alcohol withdrawal seizures in the remote past.  Reports a history of depression, but no acute symptoms of depression.  However, reported difficulties with sleep and having hot and cold flashes, problems with appetite.  Denied the presence of suicidal thoughts.      PAST PSYCHIATRIC HISTORY:  The patient has had previous detoxification at this facility.  The last one was in 12/2017, was under the care of Dr. Shah.  Was discharged on Suboxone 8-2 mg at night with plan to continue Suboxone maintenance at [facility].  The patient did go to treatment in [facility] in 12/2017 and 01/2018.  She reports a history of depression and anxiety that predates substance use; does not feel that these have ever been well controlled.  The patient in the past was treated with Zoloft and Wellbutrin; currently taking no medications.      PAST MEDICAL HISTORY:  Significant for elevated liver functions in the past.  She had a miscarriage in 01/2018.      PAST MEDICAL HISTORY:  Significant for ALLERGIES TO DOGS AND CATS.      HOME MEDICATIONS:   1.  Hydroxyzine 25-50 mg q.8 hours as needed for anxiety.   2.  Sertraline 100 mg daily.   3.  Trazodone 100 mg,  which is p.r.n. for sleep.   4.  Wellbutrin 150 mg XL every morning.     The patient said that she had not been taking either of those medications.      5.  Buprenorphine 8-2 mg per film; place 1.5 film under the tongue daily.      From historical computer records, the patient had legal problems because of her alcohol use and was in [place] because of it.  The patient is currently unemployed.  Says that she is homeless.  Her significant other lives with his parents.      PHYSICAL EXAMINATION:  Please refer to Dr. Ni's note from 04/06/2018.      VITAL SIGNS:  Temperature 97.6, respirations 16, heart rate 72, blood pressure 113/76.      MENTAL STATUS EXAMINATION:  The patient is a  female, maintaining limited eye contact.  Speech is slow, monotone.  She looks with a rather restricted and mood congruent range of affect, denies the presence of suicidal or homicidal thoughts.  There was no evidence of psychosis, hypomania or veena.  Thought processes were slow but linear and goal directed.  Speech is spontaneous, but monotone.  She typically answers with only a few words.  There is no evidence of abnormal involuntary movements.  She appears to have an average fund of knowledge with proper usage of vocabulary.  Ability to focus and concentrate was slightly impaired.  Insight and judgment are partial.          DIAGNOSIS:  Opiate dependence with withdrawal, alcohol use disorder severe, with withdrawal, generalized anxiety disorder, depression, nonspecified.      TREATMENT PLAN:  The patient will continue hospitalization on a voluntary basis and detoxification from opiates and alcohol.  I discussed with her starting her on sertraline but not Wellbutrin.  She agreed with my suggestion.  She will be seen by Internal Medicine.  She states that she would be interested in a residential/inpatient treatment program.  The patient, according to the 's note, has straight Medical Assistance and would need  the Rule 25 update and County application for [facility].  This will be addressed Monday morning.        The patient's care will be taken over on Monday by Dr. Russo.         MERLINE TORRES MD             D: 2018   T: 2018   MT: MD      Name:     RAUL HERNANDES   MRN:      [id number]        Account:      [id number]   :      1990        Admitted:     2018                   Document: [id number]

## 2018-04-07 NOTE — PROGRESS NOTES
04/06/18 1853   Patient Belongings   Did you bring any home meds/supplements to the hospital?  Yes   Disposition of meds  Sent to security/pharmacy per site process   Patient Belongings other (see comments)   Disposition of Belongings Other (see comment)   Belongings Search Yes   Clothing Search Yes   Second Staff Ashley     STORAGE BIN:   coat, boots, scarf, hat,, backpack, toiletries, sunglasses, restricted cosmetics, cigarettes, , earbuds, NEEDLES WITH SYRINGES     LOCKED DRAWER 320-1:   cell phone    SECURITY:   meds, visa  A             Admission:  I am responsible for any personal items that are not sent to the safe or pharmacy.  [facility] is not responsible for loss, theft or damage of any property in my possession.    Signature:  _________________________________ Date: _______  Time: _____                                              Staff Signature:  ____________________________ Date: ________  Time: _____      2nd Staff person, if patient is unable/unwilling to sign:    Signature: ________________________________ Date: ________  Time: _____   Discharge:  [facility] has returned all of my personal belongings:    Signature: _________________________________ Date: ________  Time: _____                                          Staff Signature:  ____________________________ Date: ________  Time: _____

## 2018-04-07 NOTE — PLAN OF CARE
"Problem: Substance Withdrawal  Goal: Substance Withdrawal  Signs and symptoms of listed problems will be absent or manageable.     Pt reports anxiety and depression both rated an 8 of 10. Denies any suicidal ideation plans or intent. Does endorse feeling tense irritable and sad. Pt reports being on prozac and wellbutrin but has not been taking them for a while. States she has been drinking and using heroin daily for \"a couple months\". Is a suboxone maintenance patient with Dr. Fiore. Last admit to detox in December 2017 then to [facility] with \"a couple months clean\". Would like to do treatment and resume suboxone maintenance after detox is complete. MSSA score is a 9 (10 mg po valium administered) and COWS score a 7 (4 mg subutex administered).      "

## 2018-04-07 NOTE — CONSULTS
Internal Medicine Consult - Initial Visit       Domenico Wilder MRN# [id number]   YOB: 1990 Age: 27 year old   Date of Admission: 4/6/2018  PCP: Saurabh Brandon  Date of Service: 4/7/2018    Referring Provider: Syed Russo MD  Reason for Consult: Medical co-management of detox process          Assessment and Recommendations:   Domenico Wilder is a 27 year old female with a history of polysubstance abuse, withdrawal seizures, and GERD admitted for detoxification from alcohol and opiates.     # Detoxification from alcohol, opiates - MSSA 9 this shift.  ABT 0.19 on admission.  Hx significant for withdrawal seizures.    - Seizure precautions   - Further management per Psychiatry     # Polysubstance abuse - Utox positive for opiates and ethanol.  Hx significant for IVDU.  Reports having shared needles.    - HBV, HCV, HIV pending    # GERD - Daily PPI     # Constipation - Bowel regimen with Miralax and PRN Bisacodyl    Medicine will follow along pending labs.  Thank you for this consult.     Kiki Yates CNP  Hospitalist Service   Pager: [phone]       Reason for Visit:   Medical co-management of detox process          History of Present Illness:   History is obtained from the patient and medical record.     This patient is a 27 year old female with a history of polysubstance abuse, withdrawal seizures, and GERD admitted for detoxification from alcohol and opiates.     Internal Medicine service was asked to see patient for medical co-management of detoxification process.  Domenico is resting in bed.  She is complaining of nausea and chills, which she feels are consistent with her typical withdrawal symptoms.  She denies sick contacts.  She reports IVDU and has shared needles.  Drinks about 1 pint of alcohol daily.  Last drink on 4/6/18 about 2-3 hours prior to admission.  She denies fevers, headaches, dizziness, cough, chest pain, dyspnea, vomiting, abdominal pain, diarrhea, and  dysuria.            Review of Systems:   A 10 point ROS was performed and negative unless otherwise noted in HPI.           Past Medical History:   Reviewed and updated in Epic.  Past Medical History:   Diagnosis Date     Anxiety      Chickenpox      Depression      Depressive disorder      GERD (gastroesophageal reflux disease)      Substance abuse              Past Surgical History:   Reviewed and updated in Epic.  Past Surgical History:   Procedure Laterality Date     NO HISTORY OF SURGERY               Social History:   Reviewed and updated in Epic.  Social History     Social History     Marital status: Single     Spouse name: N/A     Number of children: N/A     Years of education: N/A     Occupational History     Not on file.     Social History Main Topics     Smoking status: Former Smoker     Packs/day: 0.50     Years: 13.00     Start date: 12/28/2017     Smokeless tobacco: Never Used     Alcohol use Yes      Comment: 1 liter per day vodka     Drug use: No      Comment: 1-2 grams of heroine daily, occasional meth and marijuana     Sexual activity: Yes     Partners: Male     Other Topics Concern     Not on file     Social History Narrative              Family History:   Reviewed and updated in Epic.  Family History   Problem Relation Age of Onset     Allergies Mother      Depression Mother      Alcohol/Drug Mother      Prostate Cancer Paternal Grandfather      DIABETES Maternal Grandfather      non insulin dependent             Allergies:     Allergies   Allergen Reactions     Cats      Dogs              Medications:     Current Facility-Administered Medications   Medication     diazepam (VALIUM) tablet 5-20 mg     atenolol (TENORMIN) tablet 50 mg     thiamine tablet 100 mg     folic acid (FOLVITE) tablet 1 mg     multivitamin, therapeutic with minerals (THERA-VIT-M) tablet 1 tablet     ondansetron (ZOFRAN-ODT) ODT tab 4 mg     ibuprofen (ADVIL/MOTRIN) tablet 600 mg     acetaminophen (TYLENOL) tablet 650 mg      "alum & mag hydroxide-simethicone (MYLANTA ES/MAALOX  ES) suspension 30 mL     magnesium hydroxide (MILK OF MAGNESIA) suspension 30 mL     bisacodyl (DULCOLAX) Suppository 10 mg     buprenorphine (SUBUTEX) sublingual tablet 4 mg     nicotine Patch in Place     nicotine patch REMOVAL     nicotine (NICODERM CQ) 21 MG/24HR 24 hr patch 1 patch     hydrOXYzine (ATARAX) tablet 25 mg    Or     hydrOXYzine (ATARAX) tablet 50 mg     traZODone (DESYREL) tablet 100 mg            Physical Exam:   Blood pressure 110/77, pulse 70, temperature 97.6  F (36.4  C), temperature source Tympanic, resp. rate 16, height 1.626 m (5' 4\"), weight 67.1 kg (148 lb), last menstrual period 03/02/2018, SpO2 96 %, unknown if currently breastfeeding.  Body mass index is 25.4 kg/(m^2).    GENERAL: Alert and oriented x 3. Well nourished, well developed.  Thin body habitus.  No acute distress.    HEENT: Normocephalic, atraumatic. Anicteric sclera. Mucous membranes moist.   CV: RRR. S1, S2. No murmurs appreciated.   RESPIRATORY: Effort normal on room air. Lungs CTAB with no wheezing, rales, or rhonchi.   GI: Abdomen soft and non distended, bowel sounds hyperactive x all 4 quadrants. No tenderness, rebound, or guarding.   NEUROLOGICAL: No focal deficits. Follows commands.  Strength 5/5 in upper and lower extremities.  Slightly tremulous.    MUSCULOSKELETAL: No joint swelling or tenderness. Moves all extremities.   EXTREMITIES: No gross deformities. No peripheral edema. Intact bilateral pedal pulses.   SKIN: Grossly warm, dry, and intact. No jaundice. No rashes.  Arms with numerous lesions and scabs over injection sites bilaterally.            Data:   I personally reviewed the following studies:    ROUTINE IP LABS (Last four results)  [facility]   Recent Labs  Lab 04/07/18  0748      POTASSIUM 3.7   CHLORIDE 105   CO2 28   ANIONGAP 9   GLC 92   BUN 13   CR 0.58   SONA 9.1   PROTTOTAL 7.4   ALBUMIN 3.9   BILITOTAL 0.4   ALKPHOS 83   AST 15   ALT 11     CBC "   Recent Labs  Lab 04/07/18  0748   WBC 5.8   RBC 5.11   HGB 15.0   HCT 46.1   MCV 90   MCH 29.4   MCHC 32.5   RDW 13.5        INR No lab results found in last 7 days.      Unresulted Labs Ordered in the Past 30 Days of this Admission     Date and Time Order Name Status Description    4/7/2018 0030 Hepatitis C antibody In process     4/7/2018 0030 Hepatitis B surface antigen In process     4/7/2018 0030 Hepatitis B Surface Antibody In process     4/7/2018 0030 HIV Antigen Antibody Combo In process

## 2018-04-07 NOTE — PROGRESS NOTES
"CLINICAL NUTRITION SERVICES - ASSESSMENT NOTE     Nutrition Prescription    RECOMMENDATIONS FOR MDs/PROVIDERS TO ORDER:  None     Malnutrition Status:    Unable to determine due to unable to complete physical assessment    Recommendations already ordered by Registered Dietitian (RD):  Yonis Del Rioe with meals     Future/Additional Recommendations:  Recommend RD follow up with patient when she is feeling better to obtain nutrition history and current intake/ supplement acceptance.     REASON FOR ASSESSMENT  Domenico Wilder is a/an 27 year old female assessed by the dietitian for Admission Nutrition Risk Screen for unintentional loss of 10# or more in the past two months and reduced oral intake over the last month    NUTRITION HISTORY  Per chart review, pt admitted requesting detox from heroin and alcohol. Pt starting to experience withdrawal symptoms. Last drink 2-3 hours PTA, typically drinks 1 pint of vodka daily. Per ED note, pt is currently homeless.     Writer attempted to visit, however pt declined visit today as she reports she does not feel good. Pt previously seen by RD during previous admission on 12/8/2017 and was receiving Ensure Clear supplements (berry) to improve intakes with withdrawal symptoms.     CURRENT NUTRITION ORDERS  Diet: Regular  Intake/Tolerance: Per chart, pt recently admitted yesterday evening, provided with meal and fluids.     LABS  Labs reviewed    MEDICATIONS  Medications reviewed  Folic Acid daily  Multivitamin with minerals daily  Thiamine daily     ANTHROPOMETRICS  Height: 162.6 cm (5' 4\")  Most Recent Weight: 67.1 kg (148 lb)    IBW: 54.5 kg (120 lb)  BMI: Overweight BMI 25-29.9  Weight History: Per review of weight hx below, appears pt has lost ~4% body weight (6 lbs) over the past two months. However based on weight hx, appears usual weight typically trends 140-148 lb.   Wt Readings from Last 10 Encounters:   04/06/18 67.1 kg (148 lb)   02/12/18 69.9 kg (154 lb)   01/11/18 68.3 kg " (150 lb 8 oz)   01/02/18 63.5 kg (140 lb)   01/02/18 63.6 kg (140 lb 4.8 oz)   12/28/17 64.4 kg (142 lb)   12/18/17 67.4 kg (148 lb 8 oz)   12/06/17 64.4 kg (142 lb 1 oz)   02/27/17 63.5 kg (140 lb)   07/12/16 61.2 kg (135 lb)     Dosing Weight: 58 kg (adjusted >120% IBW)    ASSESSED NUTRITION NEEDS  Estimated Energy Needs: 3864-1549 kcals/day (25 - 30 kcals/kg)  Justification: Maintenance  Estimated Protein Needs: 50-60 grams protein/day (0.8 - 1 grams of pro/kg)  Justification: Maintenance  Estimated Fluid Needs: 1 mL/kcal or per MD goals     PHYSICAL FINDINGS  See malnutrition section below.    MALNUTRITION  % Intake: Unable to assess  % Weight Loss: Weight loss does not meet criteria  Subcutaneous Fat Loss: Unable to assess  Muscle Loss: Unable to assess  Fluid Accumulation/Edema: Unable to assess  Malnutrition Diagnosis: Unable to determine due to unable to complete physical assessment    NUTRITION DIAGNOSIS  Predicted inadequate nutrient intake (calories/protein) related to likely reduced PO intake PTA 2/2 substance abuse or lack of adequate access to food 2/2 homelessness per chart as evidenced by (+) nutrition admission risk screen for unintentional loss of 10# or more in the past two months and reduced oral intake over the last month.     INTERVENTIONS  Implementation  Nutrition Education: Not appropriate at this time due to patient condition   Medical food supplement therapy     Goals  Patient to consume % of nutritionally adequate meal trays TID, or the equivalent with supplements/snacks.     Monitoring/Evaluation  Progress toward goals will be monitored and evaluated per protocol.    Shannon Mcmullen RD, LD  Weekend/On-call Pager: [phone]

## 2018-04-08 PROCEDURE — 25000132 ZZH RX MED GY IP 250 OP 250 PS 637: Performed by: PSYCHIATRY & NEUROLOGY

## 2018-04-08 PROCEDURE — 25000132 ZZH RX MED GY IP 250 OP 250 PS 637: Performed by: NURSE PRACTITIONER

## 2018-04-08 PROCEDURE — 12800012 ZZH R&B CD MH INTERMEDIATE ADULT

## 2018-04-08 RX ADMIN — FOLIC ACID 1 MG: 1 TABLET ORAL at 08:30

## 2018-04-08 RX ADMIN — BUPRENORPHINE HCL 4 MG: 2 TABLET SUBLINGUAL at 20:23

## 2018-04-08 RX ADMIN — HYDROXYZINE HYDROCHLORIDE 50 MG: 50 TABLET, FILM COATED ORAL at 20:23

## 2018-04-08 RX ADMIN — TRAZODONE HYDROCHLORIDE 100 MG: 100 TABLET ORAL at 21:22

## 2018-04-08 RX ADMIN — MAGNESIUM HYDROXIDE 30 ML: 400 SUSPENSION ORAL at 14:20

## 2018-04-08 RX ADMIN — SERTRALINE HYDROCHLORIDE 50 MG: 50 TABLET ORAL at 08:30

## 2018-04-08 RX ADMIN — IBUPROFEN 600 MG: 600 TABLET ORAL at 12:06

## 2018-04-08 RX ADMIN — HYDROXYZINE HYDROCHLORIDE 50 MG: 50 TABLET, FILM COATED ORAL at 08:30

## 2018-04-08 RX ADMIN — NICOTINE 1 PATCH: 21 PATCH, EXTENDED RELEASE TRANSDERMAL at 08:30

## 2018-04-08 RX ADMIN — BUPRENORPHINE HCL 4 MG: 2 TABLET SUBLINGUAL at 08:30

## 2018-04-08 RX ADMIN — HYDROXYZINE HYDROCHLORIDE 50 MG: 50 TABLET, FILM COATED ORAL at 14:17

## 2018-04-08 RX ADMIN — Medication 100 MG: at 08:30

## 2018-04-08 RX ADMIN — POLYETHYLENE GLYCOL 3350 17 G: 17 POWDER, FOR SOLUTION ORAL at 08:30

## 2018-04-08 RX ADMIN — MULTIPLE VITAMINS W/ MINERALS TAB 1 TABLET: TAB at 08:30

## 2018-04-08 RX ADMIN — OMEPRAZOLE 20 MG: 20 CAPSULE, DELAYED RELEASE ORAL at 08:30

## 2018-04-08 ASSESSMENT — ACTIVITIES OF DAILY LIVING (ADL)
GROOMING: INDEPENDENT
DRESS: SCRUBS (BEHAVIORAL HEALTH)
LAUNDRY: WITH SUPERVISION
ORAL_HYGIENE: INDEPENDENT

## 2018-04-08 NOTE — PLAN OF CARE
Problem: Substance Withdrawal  Goal: Substance Withdrawal  Signs and symptoms of listed problems will be absent or manageable.   Outcome: Improving    Patient has not required any valium for alcohol detox since Yesterday 04/07/18 at 1211. All MSSA scores since that time have been less than 8. Pt is now removed from alcohol detox monitoring status. She remains in COWS monitoring status for opioid withdrawal.

## 2018-04-09 LAB
HBV SURFACE AB SERPL IA-ACNC: 12.88 M[IU]/ML
HBV SURFACE AG SERPL QL IA: NONREACTIVE
HCV AB SERPL QL IA: REACTIVE
HIV 1+2 AB+HIV1 P24 AG SERPL QL IA: NONREACTIVE

## 2018-04-09 PROCEDURE — 25000132 ZZH RX MED GY IP 250 OP 250 PS 637: Performed by: PSYCHIATRY & NEUROLOGY

## 2018-04-09 PROCEDURE — 99232 SBSQ HOSP IP/OBS MODERATE 35: CPT | Performed by: PSYCHIATRY & NEUROLOGY

## 2018-04-09 PROCEDURE — 25000132 ZZH RX MED GY IP 250 OP 250 PS 637: Performed by: NURSE PRACTITIONER

## 2018-04-09 PROCEDURE — 12800012 ZZH R&B CD MH INTERMEDIATE ADULT

## 2018-04-09 RX ORDER — SERTRALINE HYDROCHLORIDE 100 MG/1
100 TABLET, FILM COATED ORAL DAILY
Status: DISCONTINUED | OUTPATIENT
Start: 2018-04-10 | End: 2018-04-11 | Stop reason: HOSPADM

## 2018-04-09 RX ORDER — BUPRENORPHINE AND NALOXONE 4; 1 MG/1; MG/1
1 FILM, SOLUBLE BUCCAL; SUBLINGUAL
Status: DISCONTINUED | OUTPATIENT
Start: 2018-04-09 | End: 2018-04-11 | Stop reason: HOSPADM

## 2018-04-09 RX ORDER — BUPRENORPHINE AND NALOXONE 8; 2 MG/1; MG/1
1 FILM, SOLUBLE BUCCAL; SUBLINGUAL DAILY
Status: DISCONTINUED | OUTPATIENT
Start: 2018-04-09 | End: 2018-04-11 | Stop reason: HOSPADM

## 2018-04-09 RX ADMIN — NICOTINE 1 PATCH: 21 PATCH, EXTENDED RELEASE TRANSDERMAL at 09:11

## 2018-04-09 RX ADMIN — FOLIC ACID 1 MG: 1 TABLET ORAL at 09:11

## 2018-04-09 RX ADMIN — SERTRALINE HYDROCHLORIDE 50 MG: 50 TABLET ORAL at 09:11

## 2018-04-09 RX ADMIN — HYDROXYZINE HYDROCHLORIDE 50 MG: 50 TABLET, FILM COATED ORAL at 21:17

## 2018-04-09 RX ADMIN — TRAZODONE HYDROCHLORIDE 100 MG: 100 TABLET ORAL at 21:17

## 2018-04-09 RX ADMIN — BUPRENORPHINE HYDROCHLORIDE, NALOXONE HYDROCHLORIDE 1 FILM: 8; 2 FILM, SOLUBLE BUCCAL; SUBLINGUAL at 09:11

## 2018-04-09 RX ADMIN — POLYETHYLENE GLYCOL 3350 17 G: 17 POWDER, FOR SOLUTION ORAL at 09:11

## 2018-04-09 RX ADMIN — Medication 100 MG: at 09:11

## 2018-04-09 RX ADMIN — MULTIPLE VITAMINS W/ MINERALS TAB 1 TABLET: TAB at 09:11

## 2018-04-09 RX ADMIN — BUPRENORPHINE HYDROCHLORIDE, NALOXONE HYDROCHLORIDE 1 FILM: 4; 1 FILM, SOLUBLE BUCCAL; SUBLINGUAL at 21:17

## 2018-04-09 RX ADMIN — OMEPRAZOLE 20 MG: 20 CAPSULE, DELAYED RELEASE ORAL at 09:11

## 2018-04-09 ASSESSMENT — ACTIVITIES OF DAILY LIVING (ADL)
LAUNDRY: WITH SUPERVISION
ORAL_HYGIENE: INDEPENDENT
GROOMING: INDEPENDENT
DRESS: SCRUBS (BEHAVIORAL HEALTH)

## 2018-04-09 NOTE — PROGRESS NOTES
"[facility] [place]   Psychiatric Progress Note        Interim History:   The patient's care was discussed with the treatment team during the daily team meeting and/or staff's chart notes were reviewed.  Staff report patient will likely go to [facility] for CD treatment. Was on 8mg of Suboxone in the morning and 4mg at HS.     The patient reports that she is feeling better. Mood is \"a little down.\" Agreeable to increase Zoloft starting tomorrow. Says she \"tossed and turned\" but got some sleep. Eating some. Denies SI or HI.          Medications:       buprenorphine HCl-naloxone HCl  1 Film Sublingual Daily     buprenorphine HCl-naloxone HCl  1 Film Sublingual Daily at 8 pm     [START ON 4/10/2018] sertraline  100 mg Oral Daily     omeprazole  20 mg Oral QAM AC     polyethylene glycol  17 g Oral Daily     folic acid  1 mg Oral Daily     multivitamin, therapeutic with minerals  1 tablet Oral Daily     nicotine   Transdermal Q8H     nicotine   Transdermal Daily     nicotine  1 patch Transdermal Daily          Allergies:     Allergies   Allergen Reactions     Cats      Dogs           Labs:   No results found for this or any previous visit (from the past 24 hour(s)).       Psychiatric Examination:     BP 97/64  Pulse 67  Temp 97.8  F (36.6  C)  Resp 16  Ht 1.626 m (5' 4\")  Wt 67.1 kg (148 lb)  LMP 03/02/2018  SpO2 96%  Breastfeeding? Unknown  BMI 25.4 kg/m2  Weight is 148 lbs 0 oz  Body mass index is 25.4 kg/(m^2).  Orthostatic Vitals     None            Appearance: awake, alert and dressed in hospital scrubs  Attitude:  cooperative  Eye Contact:  good  Mood:  better  Affect:  mood congruent  Speech:  clear, coherent  Psychomotor Behavior:  no evidence of tardive dyskinesia, dystonia, or tics  Thought Process:  linear  Associations:  no loose associations  Thought Content:  no evidence of suicidal ideation or homicidal ideation and no evidence of psychotic thought  Insight:  " fair  Judgement:  intact  Oriented to:  time, person, and place  Attention Span and Concentration:  intact  Recent and Remote Memory:  fair    Clinical Global Impressions  First:  Considering your total clinical experience with this particular patient population, how severe are the patient's symptoms at this time?: 7 (04/07/18 1438)  Compared to the patient's condition at the START of treatment, this patient's condition is:: 4 (04/07/18 1438)  Most recent:  Considering your total clinical experience with this particular patient population, how severe are the patient's symptoms at this time?: 7 (04/07/18 1438)  Compared to the patient's condition at the START of treatment, this patient's condition is:: 4 (04/07/18 1438)    # Pain Assessment:  Current Pain Score 4/6/2018   Patient currently in pain? yes   Pain score (0-10) -   Pain location Generalized   Pain descriptors -              Precautions:     Behavioral Orders   Procedures     Code 1 - Restrict to Unit     Routine Programming     As clinically indicated     Status 15     Every 15 minutes.          Diagnoses:     Opiate dependence with withdrawal, alcohol use disorder severe, with withdrawal, generalized anxiety disorder, depression, nonspecified.          Plan:     1) Stop MSSA.   2) Change Subutex to patient's Suboxone maintenance of 8mg Qam and 4mg Qhs.   3) Increase Zoloft to 100mg Qday. Could add back Wellbutrin if patient continues to endorse depressed mood.   4) Disposition to CD treatment. Patient to show improvement in terms of withdrawal and mood prior to discharge.

## 2018-04-09 NOTE — PROGRESS NOTES
Rule 25 Chemical Health Assessment Update:   Domenico Wilder had a Rule 25 Evaluation with Regina Kahn at [facility]- on 12/10/2017 and is located in her electronic chart. The original Rule 25 paperwork was reviewed and updated on 4/9/2018 by Lindsey Herndon MA, [facility], [facility].  Please obtain the paper chart to review the Rule 25 paperwork.    Pt reports her last use of heroin was on 4/5/18 and last use of alcohol was on 4/6/18. Patient was given a UA upon admit and UA was positive for opiates. Pt was given a breathalyzer upon ER admission and pt's AIME was 0.19.    Updated Information:    DIMENSION I - Acute Intoxication /Withdrawal Potential   1. Chemical use most recent 12 months outside a facility and other significant use history (client self-report)              X = Primary Drug Used   Age of First Use Most Recent Pattern of Use and Duration   Need enough information to show pattern (both frequency and amounts) and to show tolerance for each chemical that has a diagnosis   Date of last use and time, if needed   Withdrawal Potential? Requiring special care Method of use  (oral, smoked, snort, IV, etc)      Alcohol     13 1 pt /day off and on for 10 yrs   4/6/18  1pm Yes Oral      Marijuana/  Hashish             Cocaine/Crack                Meth/  Amphetamines             X Heroin     18 1-2 gms/day off and on for 3 yrs 4/5/2018 at 10pm  Yes IV      Other Opiates/  Synthetics              Inhalants                Benzodiazepines                Hallucinogens                Barbiturates/  Sedatives/  Hypnotics            Over-the-Counter Drugs              Other                Nicotine     13 1/2 ppd since age 14 4/6/18 at 3pm  Oral     ASAM PLACEMENT CRITERIA:   DIMENSION 1: Intoxication/Withdrawal: Risk level 1. Patient displays mild intoxication symptomology at this time. Pt endorses feelings of withdrawal. The patient's withdrawal symptomology was identified, managed and addressed by [facility] Medical Team. Pt reports that  her last use of heroin via IV was on 4/5/18 Pt was given a UA at time of ER admit and the UA was positive for opiates. Pt was given a breathalyzer at the time of detox admit and patients AIME was 0.19.     DIMENSION 2: Biomedical Conditions: Risk level 0.   Patient denies having any chronic biomedical conditions that would interfere with treatment or any recovery skills training/workshop.     Pt does endorse having the following medical conditions;   Past Medical History:   Diagnosis Date     Anxiety      Chickenpox      Depression      Depressive disorder      GERD (gastroesophageal reflux disease)      Substance abuse      Per EMR taking the following medications at this time;    Current Facility-Administered Medications   Medication     buprenorphine HCl-naloxone HCl (SUBOXONE) 8-2 MG per film 1 Film     buprenorphine HCl-naloxone HCl (SUBOXONE) 4-1 MG per film 1 Film     sertraline (ZOLOFT) tablet 50 mg     omeprazole (priLOSEC) CR capsule 20 mg     polyethylene glycol (MIRALAX/GLYCOLAX) Packet 17 g     diazepam (VALIUM) tablet 5-20 mg     atenolol (TENORMIN) tablet 50 mg     folic acid (FOLVITE) tablet 1 mg     multivitamin, therapeutic with minerals (THERA-VIT-M) tablet 1 tablet     ondansetron (ZOFRAN-ODT) ODT tab 4 mg     ibuprofen (ADVIL/MOTRIN) tablet 600 mg     acetaminophen (TYLENOL) tablet 650 mg     alum & mag hydroxide-simethicone (MYLANTA ES/MAALOX  ES) suspension 30 mL     magnesium hydroxide (MILK OF MAGNESIA) suspension 30 mL     bisacodyl (DULCOLAX) Suppository 10 mg     nicotine Patch in Place     nicotine patch REMOVAL     nicotine (NICODERM CQ) 21 MG/24HR 24 hr patch 1 patch     hydrOXYzine (ATARAX) tablet 25 mg    Or     hydrOXYzine (ATARAX) tablet 50 mg     traZODone (DESYREL) tablet 100 mg     DIMENSION 3: Emotional/Behavioral: Risk level 2. The patient reports having mental health diagnosis of anxiety and depression. Pt lacks sober coping skills and impulse control. Pt lacks emotional and  stress management skills. Pt denies SIB/SA/HI/HA at this time.     DIMENSION 4: Treatment Readiness: Risk level 1, Patient displays verbal compliance and motivation but lacks consistent behaviors and follow-through. Pt has continued to use despite negative consequences.    DIMENSION 5: Relapse & Continued Use Potential: Risk level 4. Patient reports having been involved in 12 past treatments (completed 8), 5 past detox admissions. Pt reports having minimal and has tried to quit using in the past but relapsed. Pt lacks insight into his personal relapse process along with early warning signs and triggers. Pt lacks impulse control, sober coping skills and long-term sober maintenance skills. Pt lacks insight into the effects her use has had on his physical and mental health. Pt is at a high risk for relapse/continued use.    DIMENSION 6: Recovery Environment: Risk level 4. Patient reports that she is homeless and unemployed. Patient has limited sober connections.    Counselor Notes:   1)  Complete a residential treatment such as [facility] (or similar) that is medication assistance friendly (Suboxone)  2)  Medication maintenance  3)  Abstain from all mood-altering chemicals unless prescribed by a licensed provider.   4)  Attend weekly 12-step support group meetings.     5)  Actively work with a female sponsor or  through [place] [facility] ([phone])  6)  Follow all the recommendations of your treatment/medical providers.    Lindsey Herndon MA, LPCC, LADC

## 2018-04-09 NOTE — PROGRESS NOTES
Brief Medicine Note    Ms. Wilder is a 27 year old female with a history of polysubstance abuse, withdrawal seizures, and GERD admitted to  for alcohol and opiate withdrawal. Medicine following for HIV, Hepatitis B, and Hepatitis C testing. Hepatitis C Ab reactive and has been since 2/2017. HCV RNA Quant previously undetected x 2, last in 12/2017. Follow up with PCP within 1 month for repeat HCV RNA Quant. HIV and Hep B Surface Agn non-reactive. Hep B Surface Ab reactive c/w immunity.     Medicine will sign off.    Guadalupe Harmon PA-C  Hospitalist Service  [phone]

## 2018-04-09 NOTE — PLAN OF CARE
Problem: Substance Withdrawal  Goal: Substance Withdrawal  Signs and symptoms of listed problems will be absent or manageable.   COWS score today is a 4. Pt states taking suboxone maintenance prior to her relapse with Dr. Fiore as her provider. Macarena's office called and dose double checked as 8-2 mg suboxone take 1.5 strips daily. Pt reports she takes an 8-2 mg in the morning and a 4 mg suboxone at 8pm. Dr. Russo updated and he changed her dosing to match her maintenance dose. Pt endorses anxiety, depression and feeling tense. Denies any suicidal ideation plans or intent. Awaiting case management completion and funding for her to get into treatment.

## 2018-04-10 PROCEDURE — 25000132 ZZH RX MED GY IP 250 OP 250 PS 637: Performed by: NURSE PRACTITIONER

## 2018-04-10 PROCEDURE — 25000132 ZZH RX MED GY IP 250 OP 250 PS 637: Performed by: PSYCHIATRY & NEUROLOGY

## 2018-04-10 PROCEDURE — 99232 SBSQ HOSP IP/OBS MODERATE 35: CPT | Performed by: PSYCHIATRY & NEUROLOGY

## 2018-04-10 PROCEDURE — 12800012 ZZH R&B CD MH INTERMEDIATE ADULT

## 2018-04-10 RX ORDER — FOLIC ACID 1 MG/1
1 TABLET ORAL DAILY
Qty: 30 TABLET | Refills: 1 | Status: ON HOLD | OUTPATIENT
Start: 2018-04-11 | End: 2019-12-31

## 2018-04-10 RX ORDER — IBUPROFEN 600 MG/1
600 TABLET, FILM COATED ORAL EVERY 6 HOURS PRN
Qty: 120 TABLET | COMMUNITY
Start: 2018-04-10 | End: 2019-05-21

## 2018-04-10 RX ORDER — BUPRENORPHINE AND NALOXONE 4; 1 MG/1; MG/1
1 FILM, SOLUBLE BUCCAL; SUBLINGUAL DAILY
Qty: 12 FILM | Refills: 0 | Status: SHIPPED | OUTPATIENT
Start: 2018-04-10 | End: 2018-04-23

## 2018-04-10 RX ORDER — SERTRALINE HYDROCHLORIDE 100 MG/1
100 TABLET, FILM COATED ORAL DAILY
Qty: 30 TABLET | Refills: 1 | Status: SHIPPED | OUTPATIENT
Start: 2018-04-10 | End: 2018-06-12

## 2018-04-10 RX ORDER — HYDROXYZINE HYDROCHLORIDE 25 MG/1
25-50 TABLET, FILM COATED ORAL EVERY 6 HOURS PRN
Qty: 120 TABLET | Refills: 1 | Status: SHIPPED | OUTPATIENT
Start: 2018-04-10 | End: 2019-05-08

## 2018-04-10 RX ORDER — BUPRENORPHINE HYDROCHLORIDE AND NALOXONE HYDROCHLORIDE DIHYDRATE 8; 2 MG/1; MG/1
1 TABLET SUBLINGUAL DAILY
Qty: 12 EACH | Refills: 0 | Status: SHIPPED | OUTPATIENT
Start: 2018-04-10 | End: 2018-04-23

## 2018-04-10 RX ORDER — TRAZODONE HYDROCHLORIDE 100 MG/1
100 TABLET ORAL
Qty: 30 TABLET | Refills: 1 | Status: SHIPPED | OUTPATIENT
Start: 2018-04-10 | End: 2019-05-21

## 2018-04-10 RX ADMIN — SERTRALINE HYDROCHLORIDE 100 MG: 100 TABLET ORAL at 08:52

## 2018-04-10 RX ADMIN — HYDROXYZINE HYDROCHLORIDE 50 MG: 50 TABLET, FILM COATED ORAL at 12:02

## 2018-04-10 RX ADMIN — HYDROXYZINE HYDROCHLORIDE 50 MG: 50 TABLET, FILM COATED ORAL at 21:29

## 2018-04-10 RX ADMIN — BUPRENORPHINE HYDROCHLORIDE, NALOXONE HYDROCHLORIDE 1 FILM: 4; 1 FILM, SOLUBLE BUCCAL; SUBLINGUAL at 21:29

## 2018-04-10 RX ADMIN — TRAZODONE HYDROCHLORIDE 100 MG: 100 TABLET ORAL at 21:29

## 2018-04-10 RX ADMIN — MULTIPLE VITAMINS W/ MINERALS TAB 1 TABLET: TAB at 08:52

## 2018-04-10 RX ADMIN — BUPRENORPHINE HYDROCHLORIDE, NALOXONE HYDROCHLORIDE 1 FILM: 8; 2 FILM, SOLUBLE BUCCAL; SUBLINGUAL at 08:53

## 2018-04-10 RX ADMIN — OMEPRAZOLE 20 MG: 20 CAPSULE, DELAYED RELEASE ORAL at 08:52

## 2018-04-10 RX ADMIN — FOLIC ACID 1 MG: 1 TABLET ORAL at 08:52

## 2018-04-10 RX ADMIN — POLYETHYLENE GLYCOL 3350 17 G: 17 POWDER, FOR SOLUTION ORAL at 12:02

## 2018-04-10 RX ADMIN — NICOTINE 1 PATCH: 21 PATCH, EXTENDED RELEASE TRANSDERMAL at 08:52

## 2018-04-10 ASSESSMENT — ACTIVITIES OF DAILY LIVING (ADL)
ORAL_HYGIENE: INDEPENDENT
DRESS: SCRUBS (BEHAVIORAL HEALTH)
GROOMING: INDEPENDENT
LAUNDRY: WITH SUPERVISION

## 2018-04-10 NOTE — PROGRESS NOTES
"[facility], [place]   Psychiatric Progress Note        Interim History:   The patient's care was discussed with the treatment team during the daily team meeting and/or staff's chart notes were reviewed.  Staff report patient will likely go to [facility] for CD treatment tomorrow.     The patient reports that she is feeling better. Mood is \"not as good.\" Sleep is okay. Denies SI. Tolerating increase in Zoloft.          Medications:       buprenorphine HCl-naloxone HCl  1 Film Sublingual Daily     buprenorphine HCl-naloxone HCl  1 Film Sublingual Daily at 8 pm     sertraline  100 mg Oral Daily     omeprazole  20 mg Oral QAM AC     polyethylene glycol  17 g Oral Daily     folic acid  1 mg Oral Daily     multivitamin, therapeutic with minerals  1 tablet Oral Daily     nicotine   Transdermal Q8H     nicotine   Transdermal Daily     nicotine  1 patch Transdermal Daily          Allergies:     Allergies   Allergen Reactions     Cats      Dogs           Labs:   No results found for this or any previous visit (from the past 24 hour(s)).       Psychiatric Examination:     BP 99/62  Pulse 62  Temp 97.7  F (36.5  C) (Oral)  Resp 16  Ht 1.626 m (5' 4\")  Wt 67.1 kg (148 lb)  LMP 03/02/2018  SpO2 96%  Breastfeeding? Unknown  BMI 25.4 kg/m2  Weight is 148 lbs 0 oz  Body mass index is 25.4 kg/(m^2).  Orthostatic Vitals     None            Appearance: awake, alert and dressed in hospital scrubs  Attitude:  cooperative  Eye Contact:  good  Mood:  \"not as good\"  Affect:  mood congruent  Speech:  clear, coherent  Psychomotor Behavior:  no evidence of tardive dyskinesia, dystonia, or tics  Thought Process:  linear  Associations:  no loose associations  Thought Content:  no evidence of suicidal ideation or homicidal ideation and no evidence of psychotic thought  Insight:  fair  Judgement:  intact  Oriented to:  time, person, and place  Attention Span and Concentration:  intact  Recent and Remote Memory: "  fair    Clinical Global Impressions  First:  Considering your total clinical experience with this particular patient population, how severe are the patient's symptoms at this time?: 7 (04/07/18 1438)  Compared to the patient's condition at the START of treatment, this patient's condition is:: 4 (04/07/18 1438)  Most recent:  Considering your total clinical experience with this particular patient population, how severe are the patient's symptoms at this time?: 7 (04/07/18 1438)  Compared to the patient's condition at the START of treatment, this patient's condition is:: 4 (04/07/18 1438)    # Pain Assessment:  Current Pain Score 4/6/2018   Patient currently in pain? yes   Pain score (0-10) -   Pain location Generalized   Pain descriptors -              Precautions:     Behavioral Orders   Procedures     Code 1 - Restrict to Unit     Routine Programming     As clinically indicated     Status 15     Every 15 minutes.          Diagnoses:     Opiate dependence with withdrawal, alcohol use disorder severe, with withdrawal, generalized anxiety disorder, depression, nonspecified.          Plan:     1) Continue Suboxone maintenance of 8mg Qam and 4mg Qhs.   2) Continue Zoloft 100mg Qday. Could add back Wellbutrin if patient continues to endorse depressed mood.   3) Disposition to CD treatment tomorrow. Will order medications. Patient to show improvement in terms of withdrawal and mood prior to discharge.

## 2018-04-10 NOTE — PROGRESS NOTES
CASE MANAGEMENT NOTE    Call received from Deloris lombardi [facility] [phone] approving patient's funding and admission there tomorrow, 4/11, with  by their  at 1230. [facility] Rule 25 ([phone]) approved [facility] funding. Patient will continue to see Dr. Fiore with next suboxone appointment on 4/23 and will be admitted with enough suboxone to cover her until that appointment.     Candis Del Real) ESTEVAN Arias, [facility]  3A Psychotherapist  [phone]

## 2018-04-11 VITALS
SYSTOLIC BLOOD PRESSURE: 110 MMHG | WEIGHT: 148 LBS | HEIGHT: 64 IN | HEART RATE: 61 BPM | TEMPERATURE: 97.3 F | OXYGEN SATURATION: 96 % | BODY MASS INDEX: 25.27 KG/M2 | RESPIRATION RATE: 16 BRPM | DIASTOLIC BLOOD PRESSURE: 66 MMHG

## 2018-04-11 PROCEDURE — 25000132 ZZH RX MED GY IP 250 OP 250 PS 637: Performed by: PSYCHIATRY & NEUROLOGY

## 2018-04-11 PROCEDURE — 25000132 ZZH RX MED GY IP 250 OP 250 PS 637: Performed by: NURSE PRACTITIONER

## 2018-04-11 PROCEDURE — 99238 HOSP IP/OBS DSCHRG MGMT 30/<: CPT | Performed by: PSYCHIATRY & NEUROLOGY

## 2018-04-11 RX ORDER — BUPROPION HYDROCHLORIDE 150 MG/1
150 TABLET ORAL EVERY MORNING
Qty: 30 TABLET | Refills: 1 | Status: SHIPPED | OUTPATIENT
Start: 2018-04-11 | End: 2019-05-21

## 2018-04-11 RX ADMIN — BUPRENORPHINE HYDROCHLORIDE, NALOXONE HYDROCHLORIDE 1 FILM: 8; 2 FILM, SOLUBLE BUCCAL; SUBLINGUAL at 08:21

## 2018-04-11 RX ADMIN — MULTIPLE VITAMINS W/ MINERALS TAB 1 TABLET: TAB at 08:20

## 2018-04-11 RX ADMIN — FOLIC ACID 1 MG: 1 TABLET ORAL at 08:21

## 2018-04-11 RX ADMIN — NICOTINE 1 PATCH: 21 PATCH, EXTENDED RELEASE TRANSDERMAL at 08:21

## 2018-04-11 RX ADMIN — OMEPRAZOLE 20 MG: 20 CAPSULE, DELAYED RELEASE ORAL at 08:20

## 2018-04-11 RX ADMIN — SERTRALINE HYDROCHLORIDE 100 MG: 100 TABLET ORAL at 08:20

## 2018-04-11 NOTE — DISCHARGE INSTRUCTIONS
Behavioral Discharge Planning and Instructions  THANK YOU FOR CHOOSING THE [facility]  3A  [phone]    Summary: You were admitted to Station [facility] on 4/6/2018 for detoxification from opioids and alcohol.  A medical exam was performed that included lab work. You have met with a  and opted to transfer directly to [facility] - see details below.  Please take care and make your recovery a daily priority, Domenico!     Recommendation:  Transfer directly to residential treatment and follow the recommendations of your treatment counselors. Suboxone maintenance. Support group meetings/sponsor. Consider psychotherapy/CD counseling weekly following completion of treatment.    Main Diagnoses:  Per Dr. Colón;  Opiate dependence with withdrawalAs an outp  alcohol use disorder severe, with withdrawal  generalized anxiety disorder, depression, nonspecified.    Major Treatments, Procedures and Findings:  You were treated for alcohol detoxification using valium administered based on the [facility] protocol. You were treated for opioid detoxification using subutex initially and then were changed to your suboxone dosing as prescribed by Dr. Fiore. You had a chemical dependency assessment. You had labs drawn and those results were reviewed with you. Please take a copy of your lab work with you to your next primary care physician appointment.    Symptoms to Report:  If you experience more anxiety, confusion, sleeplessness, deep sadness or thoughts of suicide, notify your treatment team or notify your primary care physician. IF ANY OF THE SYMPTOMS YOU ARE EXPERIENCING ARE A MEDICAL EMERGENCY CALL [phone] IMMEDIATELY.     Lifestyle Adjustment: Adjust your lifestyle to get enough sleep, relaxation, exercise and good nutrition. Continue to develop healthy coping skills to decrease stress and promote a sober living environment. Do not use mood altering substances including alcohol, illegal drugs or  addictive medications other than what is currently prescribed.     Follow-up Appointment:     Apr 23, 2018  2:30 PM CDT   New Visit with Troy Fiore MD   [facility] ([facility])    [address] [place] [address]  [address]  [facility] [id number] [phone]            Treatment Program Provider:   by [employer]  on Wednesday, April 11, 2018 at 12:30 PM  [facility] [employer]  [address] [place] [address]  Ph:  [phone]  Fax:  [phone]    Resources:   AA/NA and Sponsors are excellent resources for support and you can find one at any support group meeting.   SMART Recovery - self management for addiction recovery:  www.smartrecovery.org  http://www.aastpaul.org/?topic=8  http://[place].org/meetings/  http://www.[place].org/index.php/meeting-list-pdf  Pathways ~ A Health Crisis Resource & Support Center:  548.483.3813.  http://www.harmreduction.org  [facility] [phone]  Support Group:  AA/NA and Sponsor/support  Crisis Intervention: 598.691.9895 or 126-220-9183 (TTY: 948.866.8866).  Call anytime for help.  National [place] on Mental Illness (www.[place].jadyn.org): 830.214.1228 or 606-786-6055.  Alcoholics Anonymous (www.alcoholics-anonymous.org): Check your phone book for your local chapter.  Suicide Awareness Voices of Education (SAVE) (www.save.org): 038-740-CRVQ (2483)  National Suicide Prevention Line (www.mentalhealthmn.org): 830-252-QNMR (1384)  Mental Health Consumer/Survivor Network of [place] (www.mhcsn.net): 240.139.3547 or 095-866-9155  Mental Health Association of [place] (www.mentalhealth.org): 512.528.6718 or 548-786-8484   Substance Abuse and Mental Health Services (www.samhsa.gov)    [place] [facility] (MRC)  [facility] connects people seeking recovery to resources that help foster and sustain long-term recovery.  Whether you are seeking resources for  treatment, transportation, housing, job training, education, health care or other pathways to recovery, [facility] is a great place to start.  517.285.4620.  www.[facility] Hospital[facility].org    General Medication Instructions:   See your medication sheet(s) for instructions.   Take all medicines as directed.  Make no changes unless your doctor suggests them.     Please Note:  If you have any questions at anytime after you are discharged please call the [facility] detox unit 3AW unit at [phone].  [facility], Behavioral Intake [phone]  Please take this discharge folder with you to all your follow up appointments, it contains your lab results, diagnosis, medication list and discharge recommendations.      THANK YOU FOR CHOOSING THE [facility]

## 2018-04-11 NOTE — DISCHARGE SUMMARY
Psychiatric Discharge Summary    Domenico Wilder MRN# [id number]   Age: 27 year old YOB: 1990     Date of Admission:  4/6/2018  Date of Discharge:  4/11/2018 12:36 PM  Admitting Physician:  Trey Colón MD  Discharge Physician:  Syed Russo MD          Event Leading to Hospitalization:   CHIEF COMPLAINT AND REASON FOR ADMISSION:  The patient is a 27-year-old  female in a long-term relationship, who has a 6-year-old son from this relationship.  She is currently unemployed.  She presented to the Emergency Department requesting detoxification from heroin and alcohol.       HISTORY OF PRESENT ILLNESS:  The patient has a long history of IV heroin abuse, uses up to 1-2 grams of IV heroin per day and alcohol on average 1 pint per day.  She drinks and uses almost daily.  She does have a history of alcohol withdrawal seizures in the remote past.  Reports a history of depression, but no acute symptoms of depression.  However, reported difficulties with sleep and having hot and cold flashes, problems with appetite.  Denied the presence of suicidal thoughts.        See Admission note for additional details.          DIagnoses:     Opiate dependence with withdrawal, alcohol use disorder severe, with withdrawal, generalized anxiety disorder, depression, nonspecified.          Labs:          Lab Results   Component Value Date     04/07/2018    Lab Results   Component Value Date    CHLORIDE 105 04/07/2018    Lab Results   Component Value Date    BUN 13 04/07/2018      Lab Results   Component Value Date    POTASSIUM 3.7 04/07/2018    Lab Results   Component Value Date    CO2 28 04/07/2018    Lab Results   Component Value Date    CR 0.58 04/07/2018        Lab Results   Component Value Date    WBC 5.8 04/07/2018    HGB 15.0 04/07/2018    HCT 46.1 04/07/2018    MCV 90 04/07/2018     04/07/2018     Lab Results   Component Value Date    AST 15 04/07/2018    ALT 11 04/07/2018    GGT 10  04/07/2018    ALKPHOS 83 04/07/2018    BILITOTAL 0.4 04/07/2018     Lab Results   Component Value Date    TSH 0.33 (L) 02/28/2017            Consults:   Consultation during this admission received from internal medicine:    Domenico Wilder is a 27 year old female with a history of polysubstance abuse, withdrawal seizures, and GERD admitted for detoxification from alcohol and opiates.      # Detoxification from alcohol, opiates - MSSA 9 this shift.  ABT 0.19 on admission.  Hx significant for withdrawal seizures.    - Seizure precautions   - Further management per Psychiatry      # Polysubstance abuse - Utox positive for opiates and ethanol.  Hx significant for IVDU.  Reports having shared needles.    - HBV, HCV, HIV pending     # GERD - Daily PPI      # Constipation - Bowel regimen with Miralax and PRN Bisacodyl         Hospital Course:   Domenico Wilder was admitted to [facility] with attending Syed Russo MD as a voluntary patient. The patient was placed under status 15 (15 minute checks) to ensure patient safety.   CBC, BMP and utox obtained.    The patient had not been taking any medications as an outpatient. Zoloft was restarted and titrated to 100mg Qday to some effect. Wellbutrin was restarted at discharge. The patient was detoxified from opiates using Subutex which was changed to Suboxone maintenance.     Domenico Wilder did participate in groups and was visible in the milieu.     The patient's symptoms of withdrawal and depression improved.     # Discharge Pain Plan:   - Patient currently has NO PAIN and is not being prescribed pain medications on discharge.      Domenico Wilder was released to  treatment. At the time of discharge Domenico Wilder was determined to not be a danger to herself or others. At the current time of discharge, the patient does not meet criteria for involuntary hospitalization. On the day of discharge, the patient reports that they do not have suicidal or homicidal ideation and would  never hurt themselves or others. Steps taken to minimize risk include: assessing patient s behavior and thought process daily during hospital stay, discharging patient with adequate plan for follow up for mental and physical health and discussing safety plan of returning to the hospital should the patient ever have thoughts of harming themselves or others. Therefore, based on all available evidence including the factors cited above, the patient does not appear to be at imminent risk for self-harm, and is appropriate for outpatient level of care.            Discharge Medications:     Current Discharge Medication List      START taking these medications    Details   buprenorphine-naloxone (SUBOXONE) 8-2 MG SUBL sublingual tablet Place 1 tablet under the tongue daily  Qty: 12 each, Refills: 0    Associated Diagnoses: Opioid dependence with withdrawal (H)      buprenorphine HCl-naloxone HCl (SUBOXONE) 4-1 MG per film Place 1 Film under the tongue daily  Qty: 12 Film, Refills: 0    Associated Diagnoses: Opioid dependence with withdrawal (H)      ibuprofen (ADVIL/MOTRIN) 600 MG tablet Take 1 tablet (600 mg) by mouth every 6 hours as needed for moderate pain  Qty: 120 tablet      folic acid (FOLVITE) 1 MG tablet Take 1 tablet (1 mg) by mouth daily  Qty: 30 tablet, Refills: 1    Associated Diagnoses: Alcohol dependence with uncomplicated withdrawal (H)      omeprazole (PRILOSEC) 20 MG CR capsule Take 1 capsule (20 mg) by mouth every morning (before breakfast)  Qty: 30 capsule, Refills: 1    Associated Diagnoses: Gastroesophageal reflux disease, esophagitis presence not specified         CONTINUE these medications which have CHANGED    Details   buPROPion (WELLBUTRIN XL) 150 MG 24 hr tablet Take 1 tablet (150 mg) by mouth every morning  Qty: 30 tablet, Refills: 1    Associated Diagnoses: Major depressive disorder, recurrent, moderate (H)      hydrOXYzine (ATARAX) 25 MG tablet Take 1-2 tablets (25-50 mg) by mouth every 6  hours as needed for anxiety  Qty: 120 tablet, Refills: 1    Associated Diagnoses: Major depressive disorder, recurrent, moderate (H)      sertraline (ZOLOFT) 100 MG tablet Take 1 tablet (100 mg) by mouth daily  Qty: 30 tablet, Refills: 1    Associated Diagnoses: Major depressive disorder, recurrent, moderate (H)      traZODone (DESYREL) 100 MG tablet Take 1 tablet (100 mg) by mouth nightly as needed for sleep  Qty: 30 tablet, Refills: 1    Associated Diagnoses: Major depressive disorder, recurrent, moderate (H)         STOP taking these medications       buprenorphine HCl-naloxone HCl (SUBOXONE) 8-2 MG per film Comments:   Reason for Stopping:                    Psychiatric Examination:   Appearance:  awake, alert and adequately groomed  Attitude:  cooperative  Eye Contact:  good  Mood:  better  Affect:  mood congruent  Speech:  clear, coherent  Psychomotor Behavior:  no evidence of tardive dyskinesia, dystonia, or tics  Thought Process:  linear  Associations:  no loose associations  Thought Content:  no evidence of suicidal ideation or homicidal ideation and no evidence of psychotic thought  Insight:  fair  Judgment:  intact  Oriented to:  time, person, and place  Attention Span and Concentration:  intact  Recent and Remote Memory:  fair  Language: Able to read and write  Fund of Knowledge: appropriate  Muscle Strength and Tone: normal  Gait and Station: Normal         Discharge Plan:   Continue Zoloft and Wellbutrin as above.   Continue Suboxone.     Major Treatments, Procedures and Findings:  You were treated for alcohol detoxification using valium administered based on the [facility] protocol. You were treated for opioid detoxification using subutex initially and then were changed to your suboxone dosing as prescribed by Dr. Fiore. You had a chemical dependency assessment. You had labs drawn and those results were reviewed with you. Please take a copy of your lab work with you to your next primary care physician  appointment.     Symptoms to Report:  If you experience more anxiety, confusion, sleeplessness, deep sadness or thoughts of suicide, notify your treatment team or notify your primary care physician. IF ANY OF THE SYMPTOMS YOU ARE EXPERIENCING ARE A MEDICAL EMERGENCY CALL 911 IMMEDIATELY.      Lifestyle Adjustment: Adjust your lifestyle to get enough sleep, relaxation, exercise and good nutrition. Continue to develop healthy coping skills to decrease stress and promote a sober living environment. Do not use mood altering substances including alcohol, illegal drugs or addictive medications other than what is currently prescribed.      Follow-up Appointment:      Apr 23, 2018  2:30 PM CDT   New Visit with Troy Fiore MD   [facility] ([facility]    [address] [place] [address]  [address]  [facility] [id number] [phone]                Treatment Program Provider:   by [email or web address]  on Wednesday, April 11, 2018 at 12:30 PM  [facility] [email or web address]  [address] [place] [address]  Ph:  [phone]  Fax:  [phone]     Resources:   AA/NA and Sponsors are excellent resources for support and you can find one at any support group meeting.   SMART Recovery - self management for addiction recovery:  www.smartrecovery.org  http://www.aastpaul.org/?topic=8  http://a[place].org/meetings/  http://www.nam[place].org/index.php/meeting-list-pdf  Pathways ~ A Health Crisis Resource & Support Center:  441.860.6907.  http://www.harmreduction.org  [facility] 023-281-2598  Support Group:  AA/NA and Sponsor/support  Crisis Intervention: 985.184.1410 or 978-866-4961 (TTY: 918.527.6142).  Call anytime for help.  National [place] on Mental Illness (www.[place].jadyn.org): 508.678.9999 or 490-143-5716.  Alcoholics Anonymous (www.alcoholics-anonymous.org): Check your phone book for your local  chapter.  Suicide Awareness Voices of Education (SAVE) (www.save.org): 285-394-PDIY (7283)  National Suicide Prevention Line (www.mentalhealthmn.org): 309-339-YDFN (6783)  Mental Health Consumer/Survivor Network of [place] (www.mhcsn.net): 337.413.7481 or 630-725-8361  Mental Health Association of [place] (www.mentalhealth.org): 107.640.4760 or 125-914-8329   Substance Abuse and Mental Health Services (www.samhsa.gov)      Attestation:  The patient was seen and evaluated by me. I spent less than 30 minutes on discharge day activities. Syed Russo MD

## 2018-04-11 NOTE — PROGRESS NOTES
Pt verbalized complete understanding of all discharge instructions. Pt discharged to [facility] transported by their staff. Pt's home supply of suboxone (31 strips) were given to her father per pt's request.

## 2018-04-23 ENCOUNTER — OFFICE VISIT (OUTPATIENT)
Dept: ADDICTION MEDICINE | Facility: CLINIC | Age: 28
End: 2018-04-23
Payer: COMMERCIAL

## 2018-04-23 VITALS
TEMPERATURE: 99 F | DIASTOLIC BLOOD PRESSURE: 64 MMHG | HEART RATE: 75 BPM | SYSTOLIC BLOOD PRESSURE: 118 MMHG | BODY MASS INDEX: 25.92 KG/M2 | WEIGHT: 151 LBS | OXYGEN SATURATION: 96 % | RESPIRATION RATE: 16 BRPM

## 2018-04-23 DIAGNOSIS — F11.23 OPIOID DEPENDENCE WITH WITHDRAWAL (H): ICD-10-CM

## 2018-04-23 DIAGNOSIS — F11.20 OPIOID USE DISORDER, SEVERE, DEPENDENCE (H): ICD-10-CM

## 2018-04-23 LAB
AMPHETAMINES UR QL: NOT DETECTED NG/ML
BARBITURATES UR QL SCN: NOT DETECTED NG/ML
BENZODIAZ UR QL SCN: ABNORMAL NG/ML
BUPRENORPHINE UR QL: ABNORMAL NG/ML
CANNABINOIDS UR QL: ABNORMAL NG/ML
COCAINE UR QL SCN: NOT DETECTED NG/ML
D-METHAMPHET UR QL: NOT DETECTED NG/ML
METHADONE UR QL SCN: NOT DETECTED NG/ML
OPIATES UR QL SCN: NEGATIVE NG/ML
OXYCODONE UR QL SCN: NOT DETECTED NG/ML
PCP UR QL SCN: NOT DETECTED NG/ML
PROPOXYPH UR QL: NOT DETECTED NG/ML
TRICYCLICS UR QL SCN: NEGATIVE NG/ML

## 2018-04-23 PROCEDURE — 80306 DRUG TEST PRSMV INSTRMNT: CPT | Performed by: FAMILY MEDICINE

## 2018-04-23 PROCEDURE — 99214 OFFICE O/P EST MOD 30 MIN: CPT | Performed by: FAMILY MEDICINE

## 2018-04-23 RX ORDER — BUPRENORPHINE AND NALOXONE 8; 2 MG/1; MG/1
FILM, SOLUBLE BUCCAL; SUBLINGUAL
Qty: 42 FILM | Refills: 0 | Status: SHIPPED | OUTPATIENT
Start: 2018-04-23 | End: 2018-05-15

## 2018-04-23 NOTE — PROGRESS NOTES
SUBJECTIVE:   Domenico Wilder is a 27 year old female who presents to clinic today for the following health issues:      ADDICTION MEDICINE NOTE:    RELAPSED AFTER LAST VISIT AND HAD TO GO BACK TO DETOX    NOW IN TREATMENT AT [facility]    DOING WELL    GOOD ATTITUDE    HELP SEEKING    FEELS SUBOXONE DOSE IS SUFFICIENT    WILL CONTINUE AS IS    DRUG SCREEN POSITIVE FOR THC, BENZODIAZEPINES; MAY BE FROM PRIOR USE AND/OR MEDICATIONS GIVEN IN DETOX    WILL FOLLOW    Problem list and histories reviewed & adjusted, as indicated.  Additional history: as documented    Patient Active Problem List   Diagnosis     Depression     Insomnia     GERD (gastroesophageal reflux disease)     Opioid dependence with withdrawal (H)     Alcohol dependence with uncomplicated withdrawal (H)     Opioid use disorder, severe, dependence (H)     Alcoholism (H)     Anxiety     Major depressive disorder, recurrent, moderate (H)     Polysubstance abuse     Smoker     Need for Tdap vaccination     Chemical dependency (H)     Past Surgical History:   Procedure Laterality Date     NO HISTORY OF SURGERY         Social History   Substance Use Topics     Smoking status: Former Smoker     Packs/day: 0.50     Years: 13.00     Types: Cigarettes     Start date: 12/28/2017     Smokeless tobacco: Never Used     Alcohol use Yes      Comment: 1 liter per day vodka     Family History   Problem Relation Age of Onset     Allergies Mother      Depression Mother      Alcohol/Drug Mother      Prostate Cancer Paternal Grandfather      DIABETES Maternal Grandfather      non insulin dependent         Current Outpatient Prescriptions   Medication Sig Dispense Refill     buprenorphine HCl-naloxone HCl (SUBOXONE) 8-2 MG per film Take 1 film each morning and one-half  film each evening 42 Film 0     buPROPion (WELLBUTRIN XL) 150 MG 24 hr tablet Take 1 tablet (150 mg) by mouth every morning 30 tablet 1     folic acid (FOLVITE) 1 MG tablet Take 1 tablet (1 mg) by mouth  daily 30 tablet 1     hydrOXYzine (ATARAX) 25 MG tablet Take 1-2 tablets (25-50 mg) by mouth every 6 hours as needed for anxiety 120 tablet 1     ibuprofen (ADVIL/MOTRIN) 600 MG tablet Take 1 tablet (600 mg) by mouth every 6 hours as needed for moderate pain 120 tablet      omeprazole (PRILOSEC) 20 MG CR capsule Take 1 capsule (20 mg) by mouth every morning (before breakfast) 30 capsule 1     sertraline (ZOLOFT) 100 MG tablet Take 1 tablet (100 mg) by mouth daily 30 tablet 1     traZODone (DESYREL) 100 MG tablet Take 1 tablet (100 mg) by mouth nightly as needed for sleep 30 tablet 1     Allergies   Allergen Reactions     Cats      Dogs      Labs reviewed in EPIC      Reviewed and updated as needed this visit by clinical staffTobacco  Soc Hx      Reviewed and updated as needed this visit by Provider  Tobacco  Soc Hx        ROS:      OBJECTIVE:     /64  Pulse 75  Temp 99  F (37.2  C) (Oral)  Resp 16  Wt 151 lb (68.5 kg)  SpO2 96%  BMI 25.92 kg/m2  Body mass index is 25.92 kg/(m^2).       ROS:  Constitutional, HEENT, cardiovascular, pulmonary, gi and gu systems are negative, except as otherwise noted.    /64  Pulse 75  Temp 99  F (37.2  C) (Oral)  Resp 16  Wt 151 lb (68.5 kg)  SpO2 96%  BMI 25.92 kg/m2  EXAM:  GENERAL APPEARANCE: healthy, alert and no distress  EYES: Eyes grossly normal to inspection, PERRL and conjunctivae and sclerae normal  NEURO: Normal strength and tone, mentation intact and speech normal  PSYCH: mentation appears normal and affect normal/bright  MENTAL STATUS EXAM:  Appearance/Behavior: No apparent distress and Casually groomed  Speech: Normal  Mood/Affect: normal affect  Insight: Adequate      [place]  - NO ISSUES; CHECKED 1/11/18; SUBOXONE 8 MG, #48, 1/11/18      Diagnostic Test Results:  Results for orders placed or performed in visit on 04/23/18   Urine Drugs of Abuse Screen Panel 13   Result Value Ref Range    Cannabinoids (18-kpd-8-carboxy-9-THC) Detected, Abnormal  Result (A) NDET^Not Detected ng/mL    Phencyclidine (Phencyclidine) Not Detected NDET^Not Detected ng/mL    Cocaine (Benzoylecgonine) Not Detected NDET^Not Detected ng/mL    Methamphetamine (d-Methamphetamine) Not Detected NDET^Not Detected ng/mL    Opiates (Morphine) Negative (A) NDET^Not Detected ng/mL    Amphetamine (d-Amphetamine) Not Detected NDET^Not Detected ng/mL    Benzodiazepines (Nordiazepam) Detected, Abnormal Result (A) NDET^Not Detected ng/mL    Tricyclic Antidepressants (Desipramine) Negative (A) NDET^Not Detected ng/mL    Methadone (Methadone) Not Detected NDET^Not Detected ng/mL    Barbiturates (Butalbital) Not Detected NDET^Not Detected ng/mL    Oxycodone (Oxycodone) Not Detected NDET^Not Detected ng/mL    Propoxyphene (Norpropoxyphene) Not Detected NDET^Not Detected ng/mL    Buprenorphine (Buprenorphine) Detected, Abnormal Result (A) NDET^Not Detected ng/mL       ASSESSMENT:   OPIOID DEPENDENCE  PREGNANCY    PLAN:       ICD-10-CM    1. Opioid use disorder, severe, dependence (H) F11.20 Urine Drugs of Abuse Screen Panel 13     buprenorphine HCl-naloxone HCl (SUBOXONE) 8-2 MG per film   2. Opioid dependence with withdrawal (H) F11.23            MEDICATIONS:   Orders Placed This Encounter   Medications     buprenorphine HCl-naloxone HCl (SUBOXONE) 8-2 MG per film     Sig: Take 1 film each morning and one-half  film each evening     Dispense:  42 Film     Refill:  0          - Continue other medications without change  FUTURE APPOINTMENTS:       - Follow-up visit in 4 WEEKS    Troy Fiore MD  [facility] ADDICTION MEDICINE

## 2018-04-23 NOTE — MR AVS SNAPSHOT
After Visit Summary   4/23/2018    Domenico Wilder    MRN: [id number]           Patient Information     Date Of Birth          1990        Visit Information        Provider Department      4/23/2018 2:30 PM Troy Fiore MD [facility]        Today's Diagnoses     Opioid use disorder, severe, dependence (H)        Opioid dependence with withdrawal (H)          Care Instructions    Continue your Buprenorphine 8 mg  films/tabs  1.5 film daily     Follow up 4 weeks    A prescription has been sent to your pharmacy of choice.  If a prior authorization is required it may take several days to get your medication.  Please make sure your pharmacy had your contact information so they can contact you when it is ready to     You are at risk for overdose  ( including risk of death)  with return to use of opioids after a period of abstinence because your tolerance will have decreased dramatically.      It is strongly recommended that you abstain from alcohol, benzodiazepines (Xanax Valium, Klonipin) , THC, opioids and other drugs of abuse.  Use of these substances increases your risk of relapse for opioids.  Using these substances with Buprenorphine also incresaes your risk of overdose/death (especially alcohol/benzodiazepines).     You are encouraged to have some type of recovery program in addition to medication treatment.  Medication alone is generally not enough to lead to long term recovery.  This may include having some type of sober network, avoiding isolating, avoiding triggers (people, places, things you associate with using opioids).   Such supports may include Alcoholics Anonymous, Narcotics anonymous or other self help organizations as well as counseling.  We can help provide resources to these services.      Narcan kit prescriptions are available if you do not have one.       The addiction medicine clinic number is [phone].    If you cannot make your  "appointment please call the office and reschedule immediately. If you are out of medication a bridge can be sent to your pharmacy to last until the date of your rescheduled appointment. Our clinic is open from Monday-Friday 0800-4:30pm and there is not an ON CALL after hours service.  If medical care is needed after hours or on the weekend you will need to contact your primary care physician or go to an Urgent Care or ER.     Alektrona messages and telephone calls from patients are taken care of by the nursing team within 24 business hours if received between Monday 8am - Friday 4:30pm. Therefore if a refill/bridge is needed it is important to call in advance so you do not run out of medications.     [facility] does not accept [employer] or [facility] Medical assistance insurance.                      Follow-ups after your visit        Who to contact     If you have questions or need follow up information about today's clinic visit or your schedule please contact [facility] directly at [phone].  Normal or non-critical lab and imaging results will be communicated to you by ParkAroundhart, letter or phone within 4 business days after the clinic has received the results. If you do not hear from us within 7 days, please contact the clinic through DanceTrippint or phone. If you have a critical or abnormal lab result, we will notify you by phone as soon as possible.  Submit refill requests through Alektrona or call your pharmacy and they will forward the refill request to us. Please allow 3 business days for your refill to be completed.          Additional Information About Your Visit        Alektrona Information     Alektrona lets you send messages to your doctor, view your test results, renew your prescriptions, schedule appointments and more. To sign up, go to [email or web address] . Click on \"Log in\" on the left side of the screen, which will take you to the Welcome page. Then click on " "\"Sign up Now\" on the right side of the page.     You will be asked to enter the access code listed below, as well as some personal information. Please follow the directions to create your username and password.     Your access code is: [email or web address]  Expires: 2018 10:51 AM     Your access code will  in 90 days. If you need help or a new code, please call your [place] clinic or [phone].        Care EveryWhere ID     This is your Care EveryWhere ID. This could be used by other organizations to access your [place] medical records  [id number]        Your Vitals Were     Pulse Temperature Respirations Pulse Oximetry BMI (Body Mass Index)       75 99  F (37.2  C) (Oral) 16 96% 25.92 kg/m2        Blood Pressure from Last 3 Encounters:   18 118/64   18 126/78   18 102/60    Weight from Last 3 Encounters:   18 151 lb (68.5 kg)   18 154 lb (69.9 kg)   18 150 lb 8 oz (68.3 kg)              We Performed the Following     Urine Drugs of Abuse Screen Panel 13          Today's Medication Changes          These changes are accurate as of 18  3:00 PM.  If you have any questions, ask your nurse or doctor.               Start taking these medicines.        Dose/Directions    buprenorphine HCl-naloxone HCl 8-2 MG per film   Commonly known as:  SUBOXONE   Used for:  Opioid use disorder, severe, dependence (H)   Replaces:  buprenorphine-naloxone 8-2 MG Subl sublingual tablet   Started by:  Troy Fiore MD        Take 1 film each morning and one-half  film each evening   Quantity:  42 Film   Refills:  0         Stop taking these medicines if you haven't already. Please contact your care team if you have questions.     buprenorphine HCl-naloxone HCl 4-1 MG per film   Commonly known as:  SUBOXONE   Stopped by:  Troy Fiore MD           buprenorphine-naloxone 8-2 MG Subl sublingual tablet   Commonly known as:  SUBOXONE   Replaced by:  buprenorphine HCl-naloxone HCl 8-2 " MG per film   Stopped by:  Troy Fiore MD                Where to get your medicines      Some of these will need a paper prescription and others can be bought over the counter.  Ask your nurse if you have questions.     Bring a paper prescription for each of these medications     buprenorphine HCl-naloxone HCl 8-2 MG per film                Primary Care Provider Office Phone # Fax #    Saurabh [facility] [phone] [address]  [facility] [address]        Equal Access to Services     LAUREL SHANKS : Hadii aad ku hadasho Soomaali, waaxda luqadaha, qaybta kaalmada adeegyada, waxay idiin hayaan adeeg kharash lajourdan . So [facility] [phone].    ATENCIÓN: Si habla español, tiene a velez disposición servicios gratuitos de asistencia lingüística. [facility] [phone].    We comply with applicable federal civil rights laws and [place] laws. We do not discriminate on the basis of race, color, national origin, age, disability, sex, sexual orientation, or gender identity.            Thank you!     Thank you for choosing [facility]  for your care. Our goal is always to provide you with excellent care. Hearing back from our patients is one way we can continue to improve our services. Please take a few minutes to complete the written survey that you may receive in the mail after your visit with us. Thank you!             Your Updated Medication List - Protect others around you: Learn how to safely use, store and throw away your medicines at www.disposemymeds.org.          This list is accurate as of 4/23/18  3:00 PM.  Always use your most recent med list.                   Brand Name Dispense Instructions for use Diagnosis    buprenorphine HCl-naloxone HCl 8-2 MG per film    SUBOXONE    42 Film    Take 1 film each morning and one-half  film each evening    Opioid use disorder, severe, dependence (H)       buPROPion 150 MG 24 hr tablet    WELLBUTRIN XL    30  tablet    Take 1 tablet (150 mg) by mouth every morning    Major depressive disorder, recurrent, moderate (H)       folic acid 1 MG tablet    FOLVITE    30 tablet    Take 1 tablet (1 mg) by mouth daily    Alcohol dependence with uncomplicated withdrawal (H)       hydrOXYzine 25 MG tablet    ATARAX    120 tablet    Take 1-2 tablets (25-50 mg) by mouth every 6 hours as needed for anxiety    Major depressive disorder, recurrent, moderate (H)       ibuprofen 600 MG tablet    ADVIL/MOTRIN    120 tablet    Take 1 tablet (600 mg) by mouth every 6 hours as needed for moderate pain        omeprazole 20 MG CR capsule    priLOSEC    30 capsule    Take 1 capsule (20 mg) by mouth every morning (before breakfast)    Gastroesophageal reflux disease, esophagitis presence not specified       sertraline 100 MG tablet    ZOLOFT    30 tablet    Take 1 tablet (100 mg) by mouth daily    Major depressive disorder, recurrent, moderate (H)       traZODone 100 MG tablet    DESYREL    30 tablet    Take 1 tablet (100 mg) by mouth nightly as needed for sleep    Major depressive disorder, recurrent, moderate (H)

## 2018-04-23 NOTE — PATIENT INSTRUCTIONS
Continue your Buprenorphine 8 mg  films/tabs  1.5 film daily     Follow up 4 weeks    A prescription has been sent to your pharmacy of choice.  If a prior authorization is required it may take several days to get your medication.  Please make sure your pharmacy had your contact information so they can contact you when it is ready to     You are at risk for overdose  ( including risk of death)  with return to use of opioids after a period of abstinence because your tolerance will have decreased dramatically.      It is strongly recommended that you abstain from alcohol, benzodiazepines (Xanax Valium, Klonipin) , THC, opioids and other drugs of abuse.  Use of these substances increases your risk of relapse for opioids.  Using these substances with Buprenorphine also incresaes your risk of overdose/death (especially alcohol/benzodiazepines).     You are encouraged to have some type of recovery program in addition to medication treatment.  Medication alone is generally not enough to lead to long term recovery.  This may include having some type of sober network, avoiding isolating, avoiding triggers (people, places, things you associate with using opioids).   Such supports may include Alcoholics Anonymous, Narcotics anonymous or other self help organizations as well as counseling.  We can help provide resources to these services.      Narcan kit prescriptions are available if you do not have one.       The addiction medicine clinic number is [phone].    If you cannot make your appointment please call the office and reschedule immediately. If you are out of medication a bridge can be sent to your pharmacy to last until the date of your rescheduled appointment. Our clinic is open from Monday-Friday 0800-4:30pm and there is not an ON CALL after hours service.  If medical care is needed after hours or on the weekend you will need to contact your primary care physician or go to an Urgent Care or ER.     [facility]  messages and telephone calls from patients are taken care of by the nursing team within 24 business hours if received between Monday 8am - Friday 4:30pm. Therefore if a refill/bridge is needed it is important to call in advance so you do not run out of medications.     [facility] does not accept [facility] or [facility] Medical assistance insurance.

## 2018-05-15 ENCOUNTER — TELEPHONE (OUTPATIENT)
Dept: ADDICTION MEDICINE | Facility: CLINIC | Age: 28
End: 2018-05-15

## 2018-05-15 ENCOUNTER — OFFICE VISIT (OUTPATIENT)
Dept: ADDICTION MEDICINE | Facility: CLINIC | Age: 28
End: 2018-05-15
Payer: COMMERCIAL

## 2018-05-15 VITALS
BODY MASS INDEX: 26.95 KG/M2 | SYSTOLIC BLOOD PRESSURE: 96 MMHG | HEART RATE: 75 BPM | WEIGHT: 157 LBS | RESPIRATION RATE: 14 BRPM | OXYGEN SATURATION: 98 % | TEMPERATURE: 98.2 F | DIASTOLIC BLOOD PRESSURE: 58 MMHG

## 2018-05-15 DIAGNOSIS — F11.20 OPIOID USE DISORDER, SEVERE, DEPENDENCE (H): ICD-10-CM

## 2018-05-15 PROCEDURE — 99214 OFFICE O/P EST MOD 30 MIN: CPT | Performed by: FAMILY MEDICINE

## 2018-05-15 PROCEDURE — 80306 DRUG TEST PRSMV INSTRMNT: CPT | Performed by: FAMILY MEDICINE

## 2018-05-15 RX ORDER — BUPRENORPHINE AND NALOXONE 8; 2 MG/1; MG/1
FILM, SOLUBLE BUCCAL; SUBLINGUAL
Qty: 42 FILM | Refills: 0 | Status: SHIPPED | OUTPATIENT
Start: 2018-05-15 | End: 2018-06-12

## 2018-05-15 NOTE — TELEPHONE ENCOUNTER
"Reason for Call:  Other prescription    Detailed comments: Call from RN at facility where patient resides.  Patient has missed some doses of suboxone (on May 7th and 14th) in the mornings because she does not come down during morning med pass (from 6-8 am) to get her medication.    The facility is requesting that when Dr. Fiore see Domenico today that he write within what time parameters the patient should take suboxone.      At the facility, the morning med pass is from 6-8.  If patient misses it, how long after that time can she take an \"am\" dose?  The evening med pass is from 5:30-7.  Is it ok to get second dose during this time if morning one is taken later?    Right now patient takes doses at noon and 9 pm.    Requesting that Dr. Fiore specify on orders today.      Phone Number Patient can be reached at: Other phone number:  [phone] (for RN at facility)    Best Time:     Can we leave a detailed message on this number? NO    Call taken on 5/15/2018 at 8:47 AM by Charlene Byers        "

## 2018-05-15 NOTE — PROGRESS NOTES
SUBJECTIVE:   Domenico Wilder is a 27 year old female who presents to clinic today for the following health issues:      ADDICTION MEDICINE NOTE:    DOING WELL    [email or web address]    SOME TROUBLE WITH STAFF TURNOVER    SUBOXONE HELPING    THINKING OF GOING TO [facility] AFTER CURRENT 90 DAY TREATMENT     MOTIVATED    WILL CONTINUE AS IS  Problem list and histories reviewed & adjusted, as indicated.  Additional history: as documented    Patient Active Problem List   Diagnosis     Depression     Insomnia     GERD (gastroesophageal reflux disease)     Opioid dependence with withdrawal (H)     Alcohol dependence with uncomplicated withdrawal (H)     Opioid use disorder, severe, dependence (H)     Alcoholism (H)     Anxiety     Major depressive disorder, recurrent, moderate (H)     Polysubstance abuse     Smoker     Need for Tdap vaccination     Chemical dependency (H)     Past Surgical History:   Procedure Laterality Date     NO HISTORY OF SURGERY         Social History   Substance Use Topics     Smoking status: Former Smoker     Packs/day: 0.50     Years: 13.00     Types: Cigarettes     Start date: 12/28/2017     Smokeless tobacco: Never Used     Alcohol use Yes      Comment: 1 liter per day vodka     Family History   Problem Relation Age of Onset     Allergies Mother      Depression Mother      Alcohol/Drug Mother      Prostate Cancer Paternal Grandfather      DIABETES Maternal Grandfather      non insulin dependent         Current Outpatient Prescriptions   Medication Sig Dispense Refill     buprenorphine HCl-naloxone HCl (SUBOXONE) 8-2 MG per film Take 1 film each morning and one-half  film each evening 42 Film 0     buPROPion (WELLBUTRIN XL) 150 MG 24 hr tablet Take 1 tablet (150 mg) by mouth every morning 30 tablet 1     folic acid (FOLVITE) 1 MG tablet Take 1 tablet (1 mg) by mouth daily 30 tablet 1     hydrOXYzine (ATARAX) 25 MG tablet Take 1-2 tablets (25-50 mg) by mouth every 6 hours as needed for  anxiety 120 tablet 1     ibuprofen (ADVIL/MOTRIN) 600 MG tablet Take 1 tablet (600 mg) by mouth every 6 hours as needed for moderate pain 120 tablet      omeprazole (PRILOSEC) 20 MG CR capsule Take 1 capsule (20 mg) by mouth every morning (before breakfast) 30 capsule 1     sertraline (ZOLOFT) 100 MG tablet Take 1 tablet (100 mg) by mouth daily 30 tablet 1     traZODone (DESYREL) 100 MG tablet Take 1 tablet (100 mg) by mouth nightly as needed for sleep 30 tablet 1     Allergies   Allergen Reactions     Cats      Dogs      Labs reviewed in EPIC      Reviewed and updated as needed this visit by clinical staffTobacco       Reviewed and updated as needed this visit by Provider  Tobacco         ROS:      OBJECTIVE:     BP 96/58  Pulse 75  Temp 98.2  F (36.8  C) (Oral)  Resp 14  Wt 157 lb (71.2 kg)  SpO2 98%  BMI 26.95 kg/m2  Body mass index is 26.95 kg/(m^2).       ROS:  Constitutional, HEENT, cardiovascular, pulmonary, gi and gu systems are negative, except as otherwise noted.    BP 96/58  Pulse 75  Temp 98.2  F (36.8  C) (Oral)  Resp 14  Wt 157 lb (71.2 kg)  SpO2 98%  BMI 26.95 kg/m2  EXAM:  GENERAL APPEARANCE: healthy, alert and no distress  EYES: Eyes grossly normal to inspection, PERRL and conjunctivae and sclerae normal  NEURO: Normal strength and tone, mentation intact and speech normal  PSYCH: mentation appears normal and affect normal/bright  MENTAL STATUS EXAM:  Appearance/Behavior: No apparent distress and Casually groomed  Speech: Normal  Mood/Affect: normal affect  Insight: Adequate      [place]  - NO ISSUES; CHECKED 5/15/18      Diagnostic Test Results:  Results for orders placed or performed in visit on 05/15/18   Urine Drugs of Abuse Screen Panel 13   Result Value Ref Range    Cannabinoids (95-wiq-1-carboxy-9-THC) Not Detected NDET^Not Detected ng/mL    Phencyclidine (Phencyclidine) Not Detected NDET^Not Detected ng/mL    Cocaine (Benzoylecgonine) Not Detected NDET^Not Detected ng/mL     Methamphetamine (d-Methamphetamine) Not Detected NDET^Not Detected ng/mL    Opiates (Morphine) Not Detected NDET^Not Detected ng/mL    Amphetamine (d-Amphetamine) Not Detected NDET^Not Detected ng/mL    Benzodiazepines (Nordiazepam) Not Detected NDET^Not Detected ng/mL    Tricyclic Antidepressants (Desipramine) Not Detected NDET^Not Detected ng/mL    Methadone (Methadone) Not Detected NDET^Not Detected ng/mL    Barbiturates (Butalbital) Not Detected NDET^Not Detected ng/mL    Oxycodone (Oxycodone) Not Detected NDET^Not Detected ng/mL    Propoxyphene (Norpropoxyphene) Not Detected NDET^Not Detected ng/mL    Buprenorphine (Buprenorphine) Detected, Abnormal Result (A) NDET^Not Detected ng/mL       ASSESSMENT:   OPIOID DEPENDENCE  PREGNANCY    PLAN:       ICD-10-CM    1. Opioid use disorder, severe, dependence (H) F11.20 Urine Drugs of Abuse Screen Panel 13     buprenorphine HCl-naloxone HCl (SUBOXONE) 8-2 MG per film           MEDICATIONS:   Orders Placed This Encounter   Medications     buprenorphine HCl-naloxone HCl (SUBOXONE) 8-2 MG per film     Sig: Take 1 film each morning and one-half  film each evening     Dispense:  42 Film     Refill:  0          - Continue other medications without change  FUTURE APPOINTMENTS:       - Follow-up visit in 4 WEEKS    Troy Fiore MD  [facility]

## 2018-05-15 NOTE — MR AVS SNAPSHOT
After Visit Summary   5/15/2018    Domenico Wilder    MRN: [id number]           Patient Information     Date Of Birth          1990        Visit Information        Provider Department      5/15/2018 9:45 AM Troy Fiore MD [facility]        Today's Diagnoses     Opioid use disorder, severe, dependence (H)          Care Instructions    Continue your Buprenorphine 8 mg  films/tabs  one each morning and 1/2 each evening     Follow up 1 month    A prescription has been sent to your pharmacy of choice.  If a prior authorization is required it may take several days to get your medication.  Please make sure your pharmacy had your contact information so they can contact you when it is ready to     You are at risk for overdose  ( including risk of death)  with return to use of opioids after a period of abstinence because your tolerance will have decreased dramatically.      It is strongly recommended that you abstain from alcohol, benzodiazepines (Xanax Valium, Klonipin) , THC, opioids and other drugs of abuse.  Use of these substances increases your risk of relapse for opioids.  Using these substances with Buprenorphine also incresaes your risk of overdose/death (especially alcohol/benzodiazepines).     You are encouraged to have some type of recovery program in addition to medication treatment.  Medication alone is generally not enough to lead to long term recovery.  This may include having some type of sober network, avoiding isolating, avoiding triggers (people, places, things you associate with using opioids).   Such supports may include Alcoholics Anonymous, Narcotics anonymous or other self help organizations as well as counseling.  We can help provide resources to these services.      Narcan kit prescriptions are available if you do not have one.       The addiction medicine clinic number is [phone].    If you cannot make your appointment please call the  "office and reschedule immediately. If you are out of medication a bridge can be sent to your pharmacy to last until the date of your rescheduled appointment. Our clinic is open from Monday-Friday 0800-4:30pm and there is not an ON CALL after hours service.  If medical care is needed after hours or on the weekend you will need to contact your primary care physician or go to an Urgent Care or ER.     Acer messages and telephone calls from patients are taken care of by the nursing team within 24 business hours if received between Monday 8am - Friday 4:30pm. Therefore if a refill/bridge is needed it is important to call in advance so you do not run out of medications.     [facility] does not accept [employer] or [employer] assistance insurance.                      Follow-ups after your visit        Who to contact     If you have questions or need follow up information about today's clinic visit or your schedule please contact [facility] directly at [phone].  Normal or non-critical lab and imaging results will be communicated to you by Activiomicshart, letter or phone within 4 business days after the clinic has received the results. If you do not hear from us within 7 days, please contact the clinic through Precipiot or phone. If you have a critical or abnormal lab result, we will notify you by phone as soon as possible.  Submit refill requests through Acer or call your pharmacy and they will forward the refill request to us. Please allow 3 business days for your refill to be completed.          Additional Information About Your Visit        Acer Information     Acer lets you send messages to your doctor, view your test results, renew your prescriptions, schedule appointments and more. To sign up, go to [email or web address] . Click on \"Log in\" on the left side of the screen, which will take you to the Welcome page. Then click on \"Sign up Now\" on the right " side of the page.     You will be asked to enter the access code listed below, as well as some personal information. Please follow the directions to create your username and password.     Your access code is: [email or web address]  Expires: 2018 10:51 AM     Your access code will  in 90 days. If you need help or a new code, please call your [facility] or [phone].        Care EveryWhere ID     This is your Care EveryWhere ID. This could be used by other organizations to access your [place] medical records  [id number]        Your Vitals Were     Pulse Temperature Respirations Pulse Oximetry BMI (Body Mass Index)       75 98.2  F (36.8  C) (Oral) 14 98% 26.95 kg/m2        Blood Pressure from Last 3 Encounters:   05/15/18 96/58   18 118/64   18 126/78    Weight from Last 3 Encounters:   05/15/18 157 lb (71.2 kg)   18 151 lb (68.5 kg)   18 154 lb (69.9 kg)              We Performed the Following     Urine Drugs of Abuse Screen Panel 13          Where to get your medicines      Some of these will need a paper prescription and others can be bought over the counter.  Ask your nurse if you have questions.     Bring a paper prescription for each of these medications     buprenorphine HCl-naloxone HCl 8-2 MG per film          Primary Care Provider Office Phone # Fax #    Saurabh [facility] [phone] [address]  [facility] [address]        Equal Access to Services     LAUREL SHANKS : Hadii aad ku hadasho Soomaali, waaxda luqadaha, qaybta kaalmada adeegyada, nolan gurrola. So [facility] [phone].    ATENCIÓN: Si habla español, tiene a velez disposición servicios gratuitos de asistencia lingüística. Llame al [phone].    We comply with applicable federal civil rights laws and [place] laws. We do not discriminate on the basis of race, color, national origin, age, disability, sex, sexual orientation, or gender identity.             Thank you!     Thank you for choosing [facility]  for your care. Our goal is always to provide you with excellent care. Hearing back from our patients is one way we can continue to improve our services. Please take a few minutes to complete the written survey that you may receive in the mail after your visit with us. Thank you!             Your Updated Medication List - Protect others around you: Learn how to safely use, store and throw away your medicines at www.disposemymeds.org.          This list is accurate as of 5/15/18  9:54 AM.  Always use your most recent med list.                   Brand Name Dispense Instructions for use Diagnosis    buprenorphine HCl-naloxone HCl 8-2 MG per film    SUBOXONE    42 Film    Take 1 film each morning and one-half  film each evening    Opioid use disorder, severe, dependence (H)       buPROPion 150 MG 24 hr tablet    WELLBUTRIN XL    30 tablet    Take 1 tablet (150 mg) by mouth every morning    Major depressive disorder, recurrent, moderate (H)       folic acid 1 MG tablet    FOLVITE    30 tablet    Take 1 tablet (1 mg) by mouth daily    Alcohol dependence with uncomplicated withdrawal (H)       hydrOXYzine 25 MG tablet    ATARAX    120 tablet    Take 1-2 tablets (25-50 mg) by mouth every 6 hours as needed for anxiety    Major depressive disorder, recurrent, moderate (H)       ibuprofen 600 MG tablet    ADVIL/MOTRIN    120 tablet    Take 1 tablet (600 mg) by mouth every 6 hours as needed for moderate pain        omeprazole 20 MG CR capsule    priLOSEC    30 capsule    Take 1 capsule (20 mg) by mouth every morning (before breakfast)    Gastroesophageal reflux disease, esophagitis presence not specified       sertraline 100 MG tablet    ZOLOFT    30 tablet    Take 1 tablet (100 mg) by mouth daily    Major depressive disorder, recurrent, moderate (H)       traZODone 100 MG tablet    DESYREL    30 tablet    Take 1 tablet (100 mg) by mouth nightly as  needed for sleep    Major depressive disorder, recurrent, moderate (H)

## 2018-05-25 ENCOUNTER — OFFICE VISIT (OUTPATIENT)
Dept: FAMILY MEDICINE | Facility: CLINIC | Age: 28
End: 2018-05-25
Payer: COMMERCIAL

## 2018-05-25 VITALS
WEIGHT: 160 LBS | TEMPERATURE: 98.1 F | DIASTOLIC BLOOD PRESSURE: 70 MMHG | SYSTOLIC BLOOD PRESSURE: 108 MMHG | HEIGHT: 67 IN | HEART RATE: 80 BPM | RESPIRATION RATE: 16 BRPM | BODY MASS INDEX: 25.11 KG/M2

## 2018-05-25 DIAGNOSIS — N76.0 BV (BACTERIAL VAGINOSIS): Primary | ICD-10-CM

## 2018-05-25 DIAGNOSIS — B96.89 BV (BACTERIAL VAGINOSIS): Primary | ICD-10-CM

## 2018-05-25 DIAGNOSIS — R39.9 UTI SYMPTOMS: ICD-10-CM

## 2018-05-25 LAB
ALBUMIN UR-MCNC: NEGATIVE MG/DL
APPEARANCE UR: CLEAR
BILIRUB UR QL STRIP: NEGATIVE
COLOR UR AUTO: YELLOW
GLUCOSE UR STRIP-MCNC: NEGATIVE MG/DL
HGB UR QL STRIP: NEGATIVE
KETONES UR STRIP-MCNC: NEGATIVE MG/DL
LEUKOCYTE ESTERASE UR QL STRIP: NEGATIVE
NITRATE UR QL: NEGATIVE
PH UR STRIP: 6.5 PH (ref 5–7)
SOURCE: NORMAL
SP GR UR STRIP: 1.01 (ref 1–1.03)
SPECIMEN SOURCE: ABNORMAL
UROBILINOGEN UR STRIP-ACNC: 0.2 EU/DL (ref 0.2–1)
WET PREP SPEC: ABNORMAL

## 2018-05-25 PROCEDURE — 81003 URINALYSIS AUTO W/O SCOPE: CPT | Performed by: FAMILY MEDICINE

## 2018-05-25 PROCEDURE — 99213 OFFICE O/P EST LOW 20 MIN: CPT | Performed by: FAMILY MEDICINE

## 2018-05-25 PROCEDURE — 87210 SMEAR WET MOUNT SALINE/INK: CPT | Performed by: FAMILY MEDICINE

## 2018-05-25 RX ORDER — HYDROXYZINE PAMOATE 25 MG/1
CAPSULE ORAL
Qty: 30 CAPSULE | Refills: 0 | OUTPATIENT
Start: 2018-05-25

## 2018-05-25 RX ORDER — METRONIDAZOLE 500 MG/1
500 TABLET ORAL 2 TIMES DAILY
Qty: 14 TABLET | Refills: 0 | Status: SHIPPED | OUTPATIENT
Start: 2018-05-25 | End: 2018-06-12

## 2018-05-25 NOTE — PATIENT INSTRUCTIONS
You do have bacterial vaginosis.    Please take the metronidazole 500 mg taking one tab twice a day for 7 days.  Do not use anything with alcohol in it since there is a drug reaction.    If you are not better please return to clinic and the plan would be to do a pelvic exam.          Thank you for choosing [facility].  You may be receiving a survey in the mail from Cristino Kolb regarding your visit today.  Please take a few minutes to complete and return the survey to let us know how we are doing.      If you have questions or concerns, please contact us via inMotionNow or you can contact your care team at [phone].    Our Clinic hours are:  Monday 6:40 am  to 7:00 pm  Tuesday -Friday 6:40 am to 5:00 pm    The [place] outpatient lab hours are:  Monday - Friday 6:10 am to 4:45 pm  Saturdays 7:00 am to 11:00 am  Appointments are required, call [phone]    If you have clinical questions after hours or would like to schedule an appointment,  call the clinic at [phone].

## 2018-05-25 NOTE — TELEPHONE ENCOUNTER
Spoke with the pharmacist at  [facility] pharm    I let him know our provider has never seen the pt    Refill request cancelled    They will send to correct provider    Lorena Suh RN   [facility]

## 2018-05-25 NOTE — PROGRESS NOTES
"  SUBJECTIVE:   Domenico Wilder is a 27 year old female who presents to clinic today for the following health issues:    Vaginal Symptoms  Onset: x 2 weeks    Description:  Vaginal Discharge: white creamy   Itching (Pruritis): no   Burning sensation:  no   Odor: YES    Accompanying Signs & Symptoms:  Pain with Urination: no   Abdominal Pain: YES- pain and pressure  Fever: no- felt hot    History:   Sexually active: YES  New Partner: no   Possibility of Pregnancy:  No    Precipitating factors:   Recent Antibiotic Use: no     Alleviating factors:  no    She has vaginal discharge.    She was last sexually activity was a couple of months ago.  Has some urinary symptoms.  No fever, has some hot flashes at times.  LMP was 5/3/2018, has been having regular cycles  Discharge is creamy and white, has odor like fish.    Therapies Tried and outcome: nothing        Problem list and histories reviewed & adjusted, as indicated.  Additional history: as documented        Reviewed and updated as needed this visit by clinical staff       Reviewed and updated as needed this visit by Provider         ROS:  CONSTITUTIONAL:as above  RESP:NEGATIVE for significant cough or SOB  CV: NEGATIVE for chest pain, palpitations or peripheral edema  GI: no vomiting  : as above    OBJECTIVE:                                                    /70  Pulse 80  Temp 98.1  F (36.7  C) (Tympanic)  Resp 16  Ht 5' 6.75\" (1.695 m)  Wt 160 lb (72.6 kg)  LMP 05/03/2018 (Approximate)  BMI 25.25 kg/m2  Body mass index is 25.25 kg/(m^2).  Patient is pleasant, cooperative and in no acute distress.  ABDOMEN: soft, nontender, without hepatosplenomegaly or masses and bowel sounds normal  MS: no cva tenderness  SKIN: no suspicious lesions or rashes  NEURO: Normal strength and tone, mentation intact and speech normal  PSYCH: mentation appears normal and affect normal/bright    Results for orders placed or performed in visit on 05/25/18   UA reflex to " Microscopic and Culture   Result Value Ref Range    Color Urine Yellow     Appearance Urine Clear     Glucose Urine Negative NEG^Negative mg/dL    Bilirubin Urine Negative NEG^Negative    Ketones Urine Negative NEG^Negative mg/dL    Specific Gravity Urine 1.015 1.003 - 1.035    Blood Urine Negative NEG^Negative    pH Urine 6.5 5.0 - 7.0 pH    Protein Albumin Urine Negative NEG^Negative mg/dL    Urobilinogen Urine 0.2 0.2 - 1.0 EU/dL    Nitrite Urine Negative NEG^Negative    Leukocyte Esterase Urine Negative NEG^Negative    Source Midstream Urine    Wet prep   Result Value Ref Range    Specimen Description Vagina     Wet Prep No yeast seen     Wet Prep Clue cells seen (A)     Wet Prep No Trichomonas seen           ASSESSMENT/PLAN:                                                    1. BV (bacterial vaginosis)  Will treat for BV.  I did not do UPT since LMP was 5/3/2018 and I did not check for stds since last sexual intercourse a couple months ago.  However if not better rtc and will need to have pelvic exam.  - metroNIDAZOLE (FLAGYL) 500 MG tablet; Take 1 tablet (500 mg) by mouth 2 times daily  Dispense: 14 tablet; Refill: 0    2. UTI symptoms  No uti  - Wet prep  - UA reflex to Microscopic and Culture      See Patient Instructions    Herman Patrick MD  [facility]

## 2018-05-25 NOTE — MR AVS SNAPSHOT
After Visit Summary   5/25/2018    Domenico Wilder    MRN: [id number]           Patient Information     Date Of Birth          1990        Visit Information        Provider Department      5/25/2018 9:00 AM Herman Patrick MD [facility]        Today's Diagnoses     BV (bacterial vaginosis)    -  1    UTI symptoms          Care Instructions    You do have bacterial vaginosis.    Please take the metronidazole 500 mg taking one tab twice a day for 7 days.  Do not use anything with alcohol in it since there is a drug reaction.    If you are not better please return to clinic and the plan would be to do a pelvic exam.          Thank you for choosing [facility].  You may be receiving a survey in the mail from [employer] regarding your visit today.  Please take a few minutes to complete and return the survey to let us know how we are doing.      If you have questions or concerns, please contact us via Learn with Homer or you can contact your care team at [phone].    Our Clinic hours are:  Monday 6:40 am  to 7:00 pm  Tuesday -Friday 6:40 am to 5:00 pm    The [place] outpatient lab hours are:  Monday - Friday 6:10 am to 4:45 pm  Saturdays 7:00 am to 11:00 am  Appointments are required, call [phone]    If you have clinical questions after hours or would like to schedule an appointment,  call the clinic at [phone].            Follow-ups after your visit        Your next 10 appointments already scheduled     Jun 12, 2018 10:00 AM CDT   Return Visit with Troy Fiore MD   [facility] ([facility])    [address] [place] [address]  [address]  [facility] [address] [phone]              Who to contact     If you have questions or need follow up information about today's clinic visit or your schedule please contact [facility] directly at [phone].  Normal or non-critical lab and imaging results will be  "communicated to you by Hububhart, letter or phone within 4 business days after the clinic has received the results. If you do not hear from us within 7 days, please contact the clinic through expressor software or phone. If you have a critical or abnormal lab result, we will notify you by phone as soon as possible.  Submit refill requests through expressor software or call your pharmacy and they will forward the refill request to us. Please allow 3 business days for your refill to be completed.          Additional Information About Your Visit        expressor software Information     expressor software lets you send messages to your doctor, view your test results, renew your prescriptions, schedule appointments and more. To sign up, go to [email or web address] . Click on \"Log in\" on the left side of the screen, which will take you to the Welcome page. Then click on \"Sign up Now\" on the right side of the page.     You will be asked to enter the access code listed below, as well as some personal information. Please follow the directions to create your username and password.     Your access code is: [email or web address]  Expires: 2018 10:51 AM     Your access code will  in 90 days. If you need help or a new code, please call your [place] clinic or [phone].        Care EveryWhere ID     This is your Care EveryWhere ID. This could be used by other organizations to access your [place] medical records  [id number]        Your Vitals Were     Pulse Temperature Respirations Height Last Period BMI (Body Mass Index)    80 98.1  F (36.7  C) (Tympanic) 16 5' 6.75\" (1.695 m) 2018 (Approximate) 25.25 kg/m2       Blood Pressure from Last 3 Encounters:   18 108/70   05/15/18 96/58   18 118/64    Weight from Last 3 Encounters:   18 160 lb (72.6 kg)   05/15/18 157 lb (71.2 kg)   18 151 lb (68.5 kg)              We Performed the Following     UA reflex to Microscopic and Culture     Wet prep          Today's Medication Changes        "   These changes are accurate as of 5/25/18  9:54 AM.  If you have any questions, ask your nurse or doctor.               Start taking these medicines.        Dose/Directions    metroNIDAZOLE 500 MG tablet   Commonly known as:  FLAGYL   Used for:  BV (bacterial vaginosis)   Started by:  Herman Patrick MD        Dose:  500 mg   Take 1 tablet (500 mg) by mouth 2 times daily   Quantity:  14 tablet   Refills:  0            Where to get your medicines      These medications were sent to [facility] [place], [place] - [address]  [address] [address]     Phone:  [phone]     metroNIDAZOLE 500 MG tablet                Primary Care Provider Office Phone # Fax #    Saurabh [facility] [phone] [address]  [facility] [address]        Equal Access to Services     LAUREL SHANKS AH: Hadii jennifer sheppard hadasho Soomaali, waaxda luqadaha, qaybta kaalmada adeegyada, waxfamilia stack . So [facility] [phone].    ATENCIÓN: Si habla español, tiene a velez disposición servicios gratuitos de asistencia lingüística. Dino al [phone].    We comply with applicable federal civil rights laws and [place] laws. We do not discriminate on the basis of race, color, national origin, age, disability, sex, sexual orientation, or gender identity.            Thank you!     Thank you for choosing [facility]  for your care. Our goal is always to provide you with excellent care. Hearing back from our patients is one way we can continue to improve our services. Please take a few minutes to complete the written survey that you may receive in the mail after your visit with us. Thank you!             Your Updated Medication List - Protect others around you: Learn how to safely use, store and throw away your medicines at www.disposemymeds.org.          This list is accurate as of 5/25/18  9:54 AM.  Always use your most recent med list.                    Brand Name Dispense Instructions for use Diagnosis    buprenorphine HCl-naloxone HCl 8-2 MG per film    SUBOXONE    42 Film    Take 1 film each morning and one-half  film each evening    Opioid use disorder, severe, dependence (H)       buPROPion 150 MG 24 hr tablet    WELLBUTRIN XL    30 tablet    Take 1 tablet (150 mg) by mouth every morning    Major depressive disorder, recurrent, moderate (H)       folic acid 1 MG tablet    FOLVITE    30 tablet    Take 1 tablet (1 mg) by mouth daily    Alcohol dependence with uncomplicated withdrawal (H)       hydrOXYzine 25 MG tablet    ATARAX    120 tablet    Take 1-2 tablets (25-50 mg) by mouth every 6 hours as needed for anxiety    Major depressive disorder, recurrent, moderate (H)       ibuprofen 600 MG tablet    ADVIL/MOTRIN    120 tablet    Take 1 tablet (600 mg) by mouth every 6 hours as needed for moderate pain        metroNIDAZOLE 500 MG tablet    FLAGYL    14 tablet    Take 1 tablet (500 mg) by mouth 2 times daily    BV (bacterial vaginosis)       omeprazole 20 MG CR capsule    priLOSEC    30 capsule    Take 1 capsule (20 mg) by mouth every morning (before breakfast)    Gastroesophageal reflux disease, esophagitis presence not specified       sertraline 100 MG tablet    ZOLOFT    30 tablet    Take 1 tablet (100 mg) by mouth daily    Major depressive disorder, recurrent, moderate (H)       traZODone 100 MG tablet    DESYREL    30 tablet    Take 1 tablet (100 mg) by mouth nightly as needed for sleep    Major depressive disorder, recurrent, moderate (H)

## 2018-06-12 ENCOUNTER — OFFICE VISIT (OUTPATIENT)
Dept: ADDICTION MEDICINE | Facility: CLINIC | Age: 28
End: 2018-06-12
Payer: COMMERCIAL

## 2018-06-12 VITALS
HEART RATE: 95 BPM | BODY MASS INDEX: 25.72 KG/M2 | TEMPERATURE: 98.5 F | DIASTOLIC BLOOD PRESSURE: 64 MMHG | OXYGEN SATURATION: 98 % | RESPIRATION RATE: 16 BRPM | SYSTOLIC BLOOD PRESSURE: 98 MMHG | WEIGHT: 163 LBS

## 2018-06-12 DIAGNOSIS — F11.20 OPIOID USE DISORDER, SEVERE, DEPENDENCE (H): ICD-10-CM

## 2018-06-12 PROCEDURE — 80306 DRUG TEST PRSMV INSTRMNT: CPT | Performed by: FAMILY MEDICINE

## 2018-06-12 PROCEDURE — 99214 OFFICE O/P EST MOD 30 MIN: CPT | Performed by: FAMILY MEDICINE

## 2018-06-12 RX ORDER — ARIPIPRAZOLE 5 MG/1
5 TABLET ORAL DAILY
COMMUNITY
End: 2019-05-21

## 2018-06-12 RX ORDER — BUPRENORPHINE AND NALOXONE 8; 2 MG/1; MG/1
FILM, SOLUBLE BUCCAL; SUBLINGUAL
Qty: 42 FILM | Refills: 0 | Status: SHIPPED | OUTPATIENT
Start: 2018-06-12 | End: 2018-07-10

## 2018-06-12 NOTE — PATIENT INSTRUCTIONS
Continue your Buprenorphine 8mg  films/tabs  1.5 per day     Follow up 4 weeks    A prescription has been sent to your pharmacy of choice.  If a prior authorization is required it may take several days to get your medication.  Please make sure your pharmacy had your contact information so they can contact you when it is ready to     You are at risk for overdose  ( including risk of death)  with return to use of opioids after a period of abstinence because your tolerance will have decreased dramatically.      It is strongly recommended that you abstain from alcohol, benzodiazepines (Xanax Valium, Klonipin) , THC, opioids and other drugs of abuse.  Use of these substances increases your risk of relapse for opioids.  Using these substances with Buprenorphine also incresaes your risk of overdose/death (especially alcohol/benzodiazepines).     You are encouraged to have some type of recovery program in addition to medication treatment.  Medication alone is generally not enough to lead to long term recovery.  This may include having some type of sober network, avoiding isolating, avoiding triggers (people, places, things you associate with using opioids).   Such supports may include Alcoholics Anonymous, Narcotics anonymous or other self help organizations as well as counseling.  We can help provide resources to these services.      Narcan kit prescriptions are available if you do not have one.       The addiction medicine clinic number is [phone].    If you cannot make your appointment please call the office and reschedule immediately. If you are out of medication a bridge can be sent to your pharmacy to last until the date of your rescheduled appointment. Our clinic is open from Monday-Friday 0800-4:30pm and there is not an ON CALL after hours service.  If medical care is needed after hours or on the weekend you will need to contact your primary care physician or go to an Urgent Care or ER.     [place]  messages and telephone calls from patients are taken care of by the nursing team within 24 business hours if received between Monday 8am - Friday 4:30pm. Therefore if a refill/bridge is needed it is important to call in advance so you do not run out of medications.     [facility] does not accept [facility] or [employer] assistance insurance.

## 2018-06-12 NOTE — MR AVS SNAPSHOT
After Visit Summary   6/12/2018    Domenico Wilder    MRN: [id number]           Patient Information     Date Of Birth          1990        Visit Information        Provider Department      6/12/2018 10:00 AM Troy Fiore MD [facility]        Today's Diagnoses     Opioid use disorder, severe, dependence (H)          Care Instructions    Continue your Buprenorphine 8mg  films/tabs  1.5 per day     Follow up 4 weeks    A prescription has been sent to your pharmacy of choice.  If a prior authorization is required it may take several days to get your medication.  Please make sure your pharmacy had your contact information so they can contact you when it is ready to     You are at risk for overdose  ( including risk of death)  with return to use of opioids after a period of abstinence because your tolerance will have decreased dramatically.      It is strongly recommended that you abstain from alcohol, benzodiazepines (Xanax Valium, Klonipin) , THC, opioids and other drugs of abuse.  Use of these substances increases your risk of relapse for opioids.  Using these substances with Buprenorphine also incresaes your risk of overdose/death (especially alcohol/benzodiazepines).     You are encouraged to have some type of recovery program in addition to medication treatment.  Medication alone is generally not enough to lead to long term recovery.  This may include having some type of sober network, avoiding isolating, avoiding triggers (people, places, things you associate with using opioids).   Such supports may include Alcoholics Anonymous, Narcotics anonymous or other self help organizations as well as counseling.  We can help provide resources to these services.      Narcan kit prescriptions are available if you do not have one.       The addiction medicine clinic number is [phone].    If you cannot make your appointment please call the office and reschedule  "immediately. If you are out of medication a bridge can be sent to your pharmacy to last until the date of your rescheduled appointment. Our clinic is open from Monday-Friday 0800-4:30pm and there is not an ON CALL after hours service.  If medical care is needed after hours or on the weekend you will need to contact your primary care physician or go to an Urgent Care or ER.     Smart[employer] messages and telephone calls from patients are taken care of by the nursing team within 24 business hours if received between Monday 8am - Friday 4:30pm. Therefore if a refill/bridge is needed it is important to call in advance so you do not run out of medications.     [facility] does not accept [employer] or [employer] assistance insurance.                      Follow-ups after your visit        Who to contact     If you have questions or need follow up information about today's clinic visit or your schedule please contact [facility] directly at [phone].  Normal or non-critical lab and imaging results will be communicated to you by [employer], letter or phone within 4 business days after the clinic has received the results. If you do not hear from us within 7 days, please contact the clinic through [employer] or phone. If you have a critical or abnormal lab result, we will notify you by phone as soon as possible.  Submit refill requests through Smartpay or call your pharmacy and they will forward the refill request to us. Please allow 3 business days for your refill to be completed.          Additional Information About Your Visit        Smartpay Information     Smartpay lets you send messages to your doctor, view your test results, renew your prescriptions, schedule appointments and more. To sign up, go to [email or web address] . Click on \"Log in\" on the left side of the screen, which will take you to the Welcome page. Then click on \"Sign up Now\" on the right side of the page. "     You will be asked to enter the access code listed below, as well as some personal information. Please follow the directions to create your username and password.     Your access code is: [email or web address]  Expires: 2018 10:51 AM     Your access code will  in 90 days. If you need help or a new code, please call your [facility] or [phone].        Care EveryWhere ID     This is your Care EveryWhere ID. This could be used by other organizations to access your [place] medical records  [id number]        Your Vitals Were     Pulse Temperature Respirations Pulse Oximetry BMI (Body Mass Index)       95 98.5  F (36.9  C) (Oral) 16 98% 25.72 kg/m2        Blood Pressure from Last 3 Encounters:   18 98/64   18 108/70   05/15/18 96/58    Weight from Last 3 Encounters:   18 163 lb (73.9 kg)   18 160 lb (72.6 kg)   05/15/18 157 lb (71.2 kg)              We Performed the Following     Urine Drugs of Abuse Screen Panel 13          Today's Medication Changes          These changes are accurate as of 18 10:16 AM.  If you have any questions, ask your nurse or doctor.               Stop taking these medicines if you haven't already. Please contact your care team if you have questions.     metroNIDAZOLE 500 MG tablet   Commonly known as:  FLAGYL   Stopped by:  Troy Fiore MD           sertraline 100 MG tablet   Commonly known as:  ZOLOFT   Stopped by:  Troy Fiore MD                Where to get your medicines      Information about where to get these medications is not yet available     ! Ask your nurse or doctor about these medications     buprenorphine HCl-naloxone HCl 8-2 MG per film                Primary Care Provider Office Phone # Fax #    [facility] [phone] [address]  [facility] [address]        Equal Access to Services     LAUREL SHANKS AH: Herve Trevino, justino gallego, nolan pro  rosie littletyler metz'aan ah. So [facility] [phone].    ATENCIÓN: Si parmijt lau, tiene a velez disposición servicios gratuitos de asistencia lingüística. Dino al [phone].    We comply with applicable federal civil rights laws and [place] laws. We do not discriminate on the basis of race, color, national origin, age, disability, sex, sexual orientation, or gender identity.            Thank you!     Thank you for choosing [facility]  for your care. Our goal is always to provide you with excellent care. Hearing back from our patients is one way we can continue to improve our services. Please take a few minutes to complete the written survey that you may receive in the mail after your visit with us. Thank you!             Your Updated Medication List - Protect others around you: Learn how to safely use, store and throw away your medicines at www.disposemymeds.org.          This list is accurate as of 6/12/18 10:16 AM.  Always use your most recent med list.                   Brand Name Dispense Instructions for use Diagnosis    ABILIFY 5 MG tablet   Generic drug:  ARIPiprazole      Take 5 mg by mouth daily        buprenorphine HCl-naloxone HCl 8-2 MG per film    SUBOXONE    42 Film    Take 1 film each morning and one-half  film each evening    Opioid use disorder, severe, dependence (H)       buPROPion 150 MG 24 hr tablet    WELLBUTRIN XL    30 tablet    Take 1 tablet (150 mg) by mouth every morning    Major depressive disorder, recurrent, moderate (H)       folic acid 1 MG tablet    FOLVITE    30 tablet    Take 1 tablet (1 mg) by mouth daily    Alcohol dependence with uncomplicated withdrawal (H)       hydrOXYzine 25 MG tablet    ATARAX    120 tablet    Take 1-2 tablets (25-50 mg) by mouth every 6 hours as needed for anxiety    Major depressive disorder, recurrent, moderate (H)       ibuprofen 600 MG tablet    ADVIL/MOTRIN    120 tablet    Take 1 tablet (600 mg) by mouth every  6 hours as needed for moderate pain        omeprazole 20 MG CR capsule    priLOSEC    30 capsule    Take 1 capsule (20 mg) by mouth every morning (before breakfast)    Gastroesophageal reflux disease, esophagitis presence not specified       traZODone 100 MG tablet    DESYREL    30 tablet    Take 1 tablet (100 mg) by mouth nightly as needed for sleep    Major depressive disorder, recurrent, moderate (H)

## 2018-06-26 ENCOUNTER — TELEPHONE (OUTPATIENT)
Dept: ADDICTION MEDICINE | Facility: CLINIC | Age: 28
End: 2018-06-26

## 2018-06-26 NOTE — TELEPHONE ENCOUNTER
Call from Lise with [facility] on behalf of pt. Pt's requesting Miralax.   Preferred pharmacy - [facility] in [place] tel: [phone]  Lise contact info: [phone]      Jaime Riggs

## 2018-07-10 ENCOUNTER — OFFICE VISIT (OUTPATIENT)
Dept: ADDICTION MEDICINE | Facility: CLINIC | Age: 28
End: 2018-07-10
Payer: COMMERCIAL

## 2018-07-10 VITALS
OXYGEN SATURATION: 97 % | DIASTOLIC BLOOD PRESSURE: 70 MMHG | SYSTOLIC BLOOD PRESSURE: 116 MMHG | RESPIRATION RATE: 14 BRPM | BODY MASS INDEX: 26.59 KG/M2 | WEIGHT: 168.5 LBS | HEART RATE: 69 BPM

## 2018-07-10 DIAGNOSIS — F11.20 OPIOID USE DISORDER, SEVERE, DEPENDENCE (H): ICD-10-CM

## 2018-07-10 PROCEDURE — 99214 OFFICE O/P EST MOD 30 MIN: CPT | Performed by: FAMILY MEDICINE

## 2018-07-10 PROCEDURE — 80306 DRUG TEST PRSMV INSTRMNT: CPT | Performed by: FAMILY MEDICINE

## 2018-07-10 RX ORDER — BUPRENORPHINE AND NALOXONE 8; 2 MG/1; MG/1
FILM, SOLUBLE BUCCAL; SUBLINGUAL
Qty: 33 FILM | Refills: 0 | Status: SHIPPED | OUTPATIENT
Start: 2018-07-10 | End: 2018-08-16
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
## 2018-07-10 NOTE — MR AVS SNAPSHOT
After Visit Summary   7/10/2018    Domenico Wilder    MRN: [id number]           Patient Information     Date Of Birth          1990        Visit Information        Provider Department      7/10/2018 11:15 AM Troy Fiore MD [facility]        Today's Diagnoses     Opioid use disorder, severe, dependence (H)           Follow-ups after your visit        Who to contact     If you have questions or need follow up information about today's clinic visit or your schedule please contact [facility] directly at [phone].  Normal or non-critical lab and imaging results will be communicated to you by MyChart, letter or phone within 4 business days after the clinic has received the results. If you do not hear from us within 7 days, please contact the clinic through MyChart or phone. If you have a critical or abnormal lab result, we will notify you by phone as soon as possible.  Submit refill requests through [employer] or call your pharmacy and they will forward the refill request to us. Please allow 3 business days for your refill to be completed.          Additional Information About Your Visit        Care EveryWhere ID     This is your Care EveryWhere ID. This could be used by other organizations to access your [place] medical records  [id number]        Your Vitals Were     Pulse Respirations Pulse Oximetry BMI (Body Mass Index)          69 14 97% 26.59 kg/m2         Blood Pressure from Last 3 Encounters:   07/10/18 116/70   06/12/18 98/64   05/25/18 108/70    Weight from Last 3 Encounters:   07/10/18 168 lb 8 oz (76.4 kg)   06/12/18 163 lb (73.9 kg)   05/25/18 160 lb (72.6 kg)              We Performed the Following     Urine Drugs of Abuse Screen Panel 13          Where to get your medicines      Some of these will need a paper prescription and others can be bought over the counter.  Ask your nurse if you have questions.     Bring a paper  prescription for each of these medications     buprenorphine HCl-naloxone HCl 8-2 MG per film          Primary Care Provider Office Phone # Fax #    [facility] [phone] [address]  [facility] [address]        Equal Access to Services     LAUREL SHANKS : Hadii jennifer ku haddaleo Soomaali, waaxda luqadaha, qaybta kaalmada adeegyada, waxay idiin haymassimon adetyler khzully seda gurrola. So [facility] [phone].    ATENCIÓN: Si habla español, tiene a velez disposición servicios gratuitos de asistencia lingüística. Chapisame al [phone].    We comply with applicable federal civil rights laws and [place] laws. We do not discriminate on the basis of race, color, national origin, age, disability, sex, sexual orientation, or gender identity.            Thank you!     Thank you for choosing [facility]  for your care. Our goal is always to provide you with excellent care. Hearing back from our patients is one way we can continue to improve our services. Please take a few minutes to complete the written survey that you may receive in the mail after your visit with us. Thank you!             Your Updated Medication List - Protect others around you: Learn how to safely use, store and throw away your medicines at www.disposemymeds.org.          This list is accurate as of 7/10/18 11:39 AM.  Always use your most recent med list.                   Brand Name Dispense Instructions for use Diagnosis    ABILIFY 5 MG tablet   Generic drug:  ARIPiprazole      Take 5 mg by mouth daily        buprenorphine HCl-naloxone HCl 8-2 MG per film    SUBOXONE    33 Film    Take 1 film each morning and one-half  film each evening    Opioid use disorder, severe, dependence (H)       buPROPion 150 MG 24 hr tablet    WELLBUTRIN XL    30 tablet    Take 1 tablet (150 mg) by mouth every morning    Major depressive disorder, recurrent, moderate (H)       folic acid 1 MG tablet    FOLVITE    30 tablet     Take 1 tablet (1 mg) by mouth daily    Alcohol dependence with uncomplicated withdrawal (H)       hydrOXYzine 25 MG tablet    ATARAX    120 tablet    Take 1-2 tablets (25-50 mg) by mouth every 6 hours as needed for anxiety    Major depressive disorder, recurrent, moderate (H)       ibuprofen 600 MG tablet    ADVIL/MOTRIN    120 tablet    Take 1 tablet (600 mg) by mouth every 6 hours as needed for moderate pain        omeprazole 20 MG CR capsule    priLOSEC    30 capsule    Take 1 capsule (20 mg) by mouth every morning (before breakfast)    Gastroesophageal reflux disease, esophagitis presence not specified       traZODone 100 MG tablet    DESYREL    30 tablet    Take 1 tablet (100 mg) by mouth nightly as needed for sleep    Major depressive disorder, recurrent, moderate (H)

## 2018-08-16 ENCOUNTER — OFFICE VISIT (OUTPATIENT)
Dept: ADDICTION MEDICINE | Facility: CLINIC | Age: 28
End: 2018-08-16
Payer: COMMERCIAL

## 2018-08-16 VITALS
BODY MASS INDEX: 26.59 KG/M2 | OXYGEN SATURATION: 100 % | HEART RATE: 66 BPM | DIASTOLIC BLOOD PRESSURE: 60 MMHG | WEIGHT: 168.5 LBS | TEMPERATURE: 98.6 F | SYSTOLIC BLOOD PRESSURE: 98 MMHG

## 2018-08-16 DIAGNOSIS — F11.20 OPIOID USE DISORDER, SEVERE, DEPENDENCE (H): ICD-10-CM

## 2018-08-16 PROCEDURE — 99214 OFFICE O/P EST MOD 30 MIN: CPT | Performed by: FAMILY MEDICINE

## 2018-08-16 PROCEDURE — 80306 DRUG TEST PRSMV INSTRMNT: CPT | Performed by: FAMILY MEDICINE

## 2018-08-16 RX ORDER — BUPRENORPHINE AND NALOXONE 8; 2 MG/1; MG/1
FILM, SOLUBLE BUCCAL; SUBLINGUAL
Qty: 42 FILM | Refills: 0 | Status: SHIPPED | OUTPATIENT
Start: 2018-08-16 | End: 2019-05-21

## 2018-08-16 NOTE — MR AVS SNAPSHOT
After Visit Summary   8/16/2018    Domenico Wilder    MRN: [id number]           Patient Information     Date Of Birth          1990        Visit Information        Provider Department      8/16/2018 3:00 PM Troy Fiore MD [facility]        Today's Diagnoses     Opioid use disorder, severe, dependence (H)           Follow-ups after your visit        Who to contact     If you have questions or need follow up information about today's clinic visit or your schedule please contact [facility] directly at [phone].  Normal or non-critical lab and imaging results will be communicated to you by MyChart, letter or phone within 4 business days after the clinic has received the results. If you do not hear from us within 7 days, please contact the clinic through MyChart or phone. If you have a critical or abnormal lab result, we will notify you by phone as soon as possible.  Submit refill requests through [employer] or call your pharmacy and they will forward the refill request to us. Please allow 3 business days for your refill to be completed.          Additional Information About Your Visit        Care EveryWhere ID     This is your Care EveryWhere ID. This could be used by other organizations to access your [place] medical records  [id number]        Your Vitals Were     Pulse Temperature Pulse Oximetry BMI (Body Mass Index)          66 98.6  F (37  C) (Oral) 100% 26.59 kg/m2         Blood Pressure from Last 3 Encounters:   08/16/18 98/60   07/10/18 116/70   06/12/18 98/64    Weight from Last 3 Encounters:   08/16/18 168 lb 8 oz (76.4 kg)   07/10/18 168 lb 8 oz (76.4 kg)   06/12/18 163 lb (73.9 kg)              We Performed the Following     Urine Drugs of Abuse Screen Panel 13          Where to get your medicines      Some of these will need a paper prescription and others can be bought over the counter.  Ask your nurse if you have  questions.     Bring a paper prescription for each of these medications     buprenorphine HCl-naloxone HCl 8-2 MG per film          Primary Care Provider Office Phone # Fax #    [facility] [phone] [address]  [facility] [address]        Equal Access to Services     LAUREL SHANKS : Herve sheppard hadasho Soomaali, waaxda luqadaha, qaybta kaalmada adeegyada, waxay rosie haymassimon marco a harperjorjedavid gurrola. So [facility] [phone].    ATENCIÓN: Si habla español, tiene a velez disposición servicios gratuitos de asistencia lingüística. Dino al [phone].    We comply with applicable federal civil rights laws and [place] laws. We do not discriminate on the basis of race, color, national origin, age, disability, sex, sexual orientation, or gender identity.            Thank you!     Thank you for choosing [facility] PRIMARY CARE  for your care. Our goal is always to provide you with excellent care. Hearing back from our patients is one way we can continue to improve our services. Please take a few minutes to complete the written survey that you may receive in the mail after your visit with us. Thank you!             Your Updated Medication List - Protect others around you: Learn how to safely use, store and throw away your medicines at www.disposemymeds.org.          This list is accurate as of 8/16/18  3:28 PM.  Always use your most recent med list.                   Brand Name Dispense Instructions for use Diagnosis    ABILIFY 5 MG tablet   Generic drug:  ARIPiprazole      Take 5 mg by mouth daily        buprenorphine HCl-naloxone HCl 8-2 MG per film    SUBOXONE    42 Film    Take 1 film each morning and one-half  film each evening    Opioid use disorder, severe, dependence (H)       buPROPion 150 MG 24 hr tablet    WELLBUTRIN XL    30 tablet    Take 1 tablet (150 mg) by mouth every morning    Major depressive disorder, recurrent, moderate (H)       folic acid 1 MG  tablet    FOLVITE    30 tablet    Take 1 tablet (1 mg) by mouth daily    Alcohol dependence with uncomplicated withdrawal (H)       hydrOXYzine 25 MG tablet    ATARAX    120 tablet    Take 1-2 tablets (25-50 mg) by mouth every 6 hours as needed for anxiety    Major depressive disorder, recurrent, moderate (H)       ibuprofen 600 MG tablet    ADVIL/MOTRIN    120 tablet    Take 1 tablet (600 mg) by mouth every 6 hours as needed for moderate pain        omeprazole 20 MG CR capsule    priLOSEC    30 capsule    Take 1 capsule (20 mg) by mouth every morning (before breakfast)    Gastroesophageal reflux disease, esophagitis presence not specified       traZODone 100 MG tablet    DESYREL    30 tablet    Take 1 tablet (100 mg) by mouth nightly as needed for sleep    Major depressive disorder, recurrent, moderate (H)

## 2018-08-18 NOTE — PROGRESS NOTES
"  SUBJECTIVE:   Domenico Wilder is a 27 year old female who presents to clinic today for the following health issues:      ADDICTION MEDICINE NOTE:    DOING WELL    MISSED APPOINTMENT BUT HAD LEFT OVER SUBOXONE TO GET HER TO TODAY    GOING TO OUT-PATIENT TREATMENT AT [facility] - \"OK\"    WORKING IN A STORE; LIKES IT    DISCUSSED RECOVERY PROGRAM AND DISEASE    RE-CHECK 1 MONTH    Problem list and histories reviewed & adjusted, as indicated.  Additional history: as documented    Patient Active Problem List   Diagnosis     Depression     Insomnia     GERD (gastroesophageal reflux disease)     Opioid dependence with withdrawal (H)     Alcohol dependence with uncomplicated withdrawal (H)     Opioid use disorder, severe, dependence (H)     Alcoholism (H)     Anxiety     Major depressive disorder, recurrent, moderate (H)     Polysubstance abuse     Smoker     Need for Tdap vaccination     Chemical dependency (H)     Past Surgical History:   Procedure Laterality Date     NO HISTORY OF SURGERY         Social History   Substance Use Topics     Smoking status: Former Smoker     Packs/day: 0.50     Years: 13.00     Types: Cigarettes     Start date: 12/28/2017     Smokeless tobacco: Never Used     Alcohol use Yes      Comment: 1 liter per day vodka     Family History   Problem Relation Age of Onset     Allergies Mother      Depression Mother      Alcohol/Drug Mother      Prostate Cancer Paternal Grandfather      Diabetes Maternal Grandfather      non insulin dependent         Current Outpatient Prescriptions   Medication Sig Dispense Refill     buprenorphine HCl-naloxone HCl (SUBOXONE) 8-2 MG per film Take 1 film each morning and one-half  film each evening 42 Film 0     ARIPiprazole (ABILIFY) 5 MG tablet Take 5 mg by mouth daily       buPROPion (WELLBUTRIN XL) 150 MG 24 hr tablet Take 1 tablet (150 mg) by mouth every morning 30 tablet 1     folic acid (FOLVITE) 1 MG tablet Take 1 tablet (1 mg) by mouth daily 30 tablet 1     " hydrOXYzine (ATARAX) 25 MG tablet Take 1-2 tablets (25-50 mg) by mouth every 6 hours as needed for anxiety 120 tablet 1     ibuprofen (ADVIL/MOTRIN) 600 MG tablet Take 1 tablet (600 mg) by mouth every 6 hours as needed for moderate pain 120 tablet      omeprazole (PRILOSEC) 20 MG CR capsule Take 1 capsule (20 mg) by mouth every morning (before breakfast) 30 capsule 1     traZODone (DESYREL) 100 MG tablet Take 1 tablet (100 mg) by mouth nightly as needed for sleep 30 tablet 1     Allergies   Allergen Reactions     Cats      Dogs      Labs reviewed in EPIC      Reviewed and updated as needed this visit by clinical staffTobacco       Reviewed and updated as needed this visit by Provider  Tobacco         ROS:      OBJECTIVE:     BP 98/60  Pulse 66  Temp 98.6  F (37  C) (Oral)  Wt 168 lb 8 oz (76.4 kg)  SpO2 100%  BMI 26.59 kg/m2  Body mass index is 26.59 kg/(m^2).       ROS:  Constitutional, HEENT, cardiovascular, pulmonary, gi and gu systems are negative, except as otherwise noted.    BP 98/60  Pulse 66  Temp 98.6  F (37  C) (Oral)  Wt 168 lb 8 oz (76.4 kg)  SpO2 100%  BMI 26.59 kg/m2  EXAM:  GENERAL APPEARANCE: healthy, alert and no distress  EYES: Eyes grossly normal to inspection, PERRL and conjunctivae and sclerae normal  NEURO: Normal strength and tone, mentation intact and speech normal  PSYCH: mentation appears normal and affect normal/bright  MENTAL STATUS EXAM:  Appearance/Behavior: No apparent distress and Casually groomed  Speech: Normal  Mood/Affect: normal affect  Insight: Adequate      [place]  - NO ISSUES; CHECKED 8/16/18      Diagnostic Test Results:  Results for orders placed or performed in visit on 08/16/18   Urine Drugs of Abuse Screen Panel 13   Result Value Ref Range    Cannabinoids (67-iov-3-carboxy-9-THC) Not Detected NDET^Not Detected ng/mL    Phencyclidine (Phencyclidine) Not Detected NDET^Not Detected ng/mL    Cocaine (Benzoylecgonine) Not Detected NDET^Not Detected ng/mL     Methamphetamine (d-Methamphetamine) Not Detected NDET^Not Detected ng/mL    Opiates (Morphine) Not Detected NDET^Not Detected ng/mL    Amphetamine (d-Amphetamine) Not Detected NDET^Not Detected ng/mL    Benzodiazepines (Nordiazepam) Not Detected NDET^Not Detected ng/mL    Tricyclic Antidepressants (Desipramine) Not Detected NDET^Not Detected ng/mL    Methadone (Methadone) Not Detected NDET^Not Detected ng/mL    Barbiturates (Butalbital) Not Detected NDET^Not Detected ng/mL    Oxycodone (Oxycodone) Not Detected NDET^Not Detected ng/mL    Propoxyphene (Norpropoxyphene) Not Detected NDET^Not Detected ng/mL    Buprenorphine (Buprenorphine) Detected, Abnormal Result (A) NDET^Not Detected ng/mL       ASSESSMENT:   OPIOID DEPENDENCE  PREGNANCY    PLAN:       ICD-10-CM    1. Opioid use disorder, severe, dependence (H) F11.20 Urine Drugs of Abuse Screen Panel 13     buprenorphine HCl-naloxone HCl (SUBOXONE) 8-2 MG per film           MEDICATIONS:   Orders Placed This Encounter   Medications     buprenorphine HCl-naloxone HCl (SUBOXONE) 8-2 MG per film     Sig: Take 1 film each morning and one-half  film each evening     Dispense:  42 Film     Refill:  0          - Continue other medications without change  FUTURE APPOINTMENTS:       - Follow-up visit in 4 WEEKS    Troy Fiore MD  [facility] ADDICTION MEDICINE

## 2018-09-17 ENCOUNTER — OFFICE VISIT (OUTPATIENT)
Dept: FAMILY MEDICINE | Facility: CLINIC | Age: 28
End: 2018-09-17
Payer: COMMERCIAL

## 2018-09-17 VITALS
BODY MASS INDEX: 25.56 KG/M2 | DIASTOLIC BLOOD PRESSURE: 70 MMHG | WEIGHT: 162 LBS | TEMPERATURE: 99.1 F | SYSTOLIC BLOOD PRESSURE: 106 MMHG | RESPIRATION RATE: 14 BRPM | HEART RATE: 84 BPM

## 2018-09-17 DIAGNOSIS — F33.1 MAJOR DEPRESSIVE DISORDER, RECURRENT, MODERATE (H): ICD-10-CM

## 2018-09-17 DIAGNOSIS — Z32.00 ENCOUNTER FOR PREGNANCY TEST, RESULT UNKNOWN: Primary | ICD-10-CM

## 2018-09-17 LAB — BETA HCG QUAL IFA URINE: POSITIVE

## 2018-09-17 PROCEDURE — 84703 CHORIONIC GONADOTROPIN ASSAY: CPT | Performed by: NURSE PRACTITIONER

## 2018-09-17 PROCEDURE — 99213 OFFICE O/P EST LOW 20 MIN: CPT | Performed by: NURSE PRACTITIONER

## 2018-09-17 RX ORDER — PRENATAL VIT/IRON FUM/FOLIC AC 27MG-0.8MG
1 TABLET ORAL DAILY
Qty: 100 TABLET | Refills: 3 | Status: SHIPPED | OUTPATIENT
Start: 2018-09-17 | End: 2023-09-28

## 2018-09-17 ASSESSMENT — PAIN SCALES - GENERAL: PAINLEVEL: NO PAIN (0)

## 2018-09-17 ASSESSMENT — ANXIETY QUESTIONNAIRES
1. FEELING NERVOUS, ANXIOUS, OR ON EDGE: MORE THAN HALF THE DAYS
7. FEELING AFRAID AS IF SOMETHING AWFUL MIGHT HAPPEN: MORE THAN HALF THE DAYS
3. WORRYING TOO MUCH ABOUT DIFFERENT THINGS: MORE THAN HALF THE DAYS
5. BEING SO RESTLESS THAT IT IS HARD TO SIT STILL: SEVERAL DAYS
GAD7 TOTAL SCORE: 12
2. NOT BEING ABLE TO STOP OR CONTROL WORRYING: MORE THAN HALF THE DAYS
6. BECOMING EASILY ANNOYED OR IRRITABLE: SEVERAL DAYS

## 2018-09-17 ASSESSMENT — PATIENT HEALTH QUESTIONNAIRE - PHQ9: 5. POOR APPETITE OR OVEREATING: MORE THAN HALF THE DAYS

## 2018-09-17 NOTE — PATIENT INSTRUCTIONS
Congratulations      Estimated due date   5/18/19     Stay well hydrated      contact your  Pain management doctor.   About the Suboxone     Get started on  Prenatal vitamins     Try to eat small meals if nauseated     Call for GYN appointment for  8-10 weeks out. This way you will know when your appointment is,       Enjoy .

## 2018-09-17 NOTE — LETTER
My Depression Action Plan  Name: Domenico Wilder   Date of Birth 1990  Date: 9/17/2018    My doctor: Saurabh Brandon   My clinic: [address] [id number] [phone]          GREEN    ZONE   Good Control    What it looks like:     Things are going generally well. You have normal up s and down s. You may even feel depressed from time to time, but bad moods usually last less than a day.   What you need to do:  1. Continue to care for yourself (see self care plan)  2. Check your depression survival kit and update it as needed  3. Follow your physician s recommendations including any medication.  4. Do not stop taking medication unless you consult with your physician first.           YELLOW         ZONE Getting Worse    What it looks like:     Depression is starting to interfere with your life.     It may be hard to get out of bed; you may be starting to isolate yourself from others.    Symptoms of depression are starting to last most all day and this has happened for several days.     You may have suicidal thoughts but they are not constant.   What you need to do:     1. Call your care team, your response to treatment will improve if you keep your care team informed of your progress. Yellow periods are signs an adjustment may need to be made.     2. Continue your self-care, even if you have to fake it!    3. Talk to someone in your support network    4. Open up your depression survival kit           RED    ZONE Medical Alert - Get Help    What it looks like:     Depression is seriously interfering with your life.     You may experience these or other symptoms: You can t get out of bed most days, can t work or engage in other necessary activities, you have trouble taking care of basic hygiene, or basic responsibilities, thoughts of suicide or death that will not go away, self-injurious behavior.     What you need to do:  1. Call your care team  and request a same-day appointment. If they are not available (weekends or after hours) call your local crisis line, emergency room or 911.            Depression Self Care Plan / Survival Kit    Self-Care for Depression  Here s the deal. Your body and mind are really not as separate as most people think.  What you do and think affects how you feel and how you feel influences what you do and think. This means if you do things that people who feel good do, it will help you feel better.  Sometimes this is all it takes.  There is also a place for medication and therapy depending on how severe your depression is, so be sure to consult with your medical provider and/ or Behavioral Health Consultant if your symptoms are worsening or not improving.     In order to better manage my stress, I will:    Exercise  Get some form of exercise, every day. This will help reduce pain and release endorphins, the  feel good  chemicals in your brain. This is almost as good as taking antidepressants!  This is not the same as joining a gym and then never going! (they count on that by the way ) It can be as simple as just going for a walk or doing some gardening, anything that will get you moving.      Hygiene   Maintain good hygiene (Get out of bed in the morning, Make your bed, Brush your teeth, Take a shower, and Get dressed like you were going to work, even if you are unemployed).  If your clothes don't fit try to get ones that do.    Diet  I will strive to eat foods that are good for me, drink plenty of water, and avoid excessive sugar, caffeine, alcohol, and other mood-altering substances.  Some foods that are helpful in depression are: complex carbohydrates, B vitamins, flaxseed, fish or fish oil, fresh fruits and vegetables.    Psychotherapy  I agree to participate in Individual Therapy (if recommended).    Medication  If prescribed medications, I agree to take them.  Missing doses can result in serious side effects.  I understand  that drinking alcohol, or other illicit drug use, may cause potential side effects.  I will not stop my medication abruptly without first discussing it with my provider.    Staying Connected With Others  I will stay in touch with my friends, family members, and my primary care provider/team.    Use your imagination  Be creative.  We all have a creative side; it doesn t matter if it s oil painting, sand castles, or mud pies! This will also kick up the endorphins.    Witness Beauty  (AKA stop and smell the roses) Take a look outside, even in mid-winter. Notice colors, textures. Watch the squirrels and birds.     Service to others  Be of service to others.  There is always someone else in need.  By helping others we can  get out of ourselves  and remember the really important things.  This also provides opportunities for practicing all the other parts of the program.    Humor  Laugh and be silly!  Adjust your TV habits for less news and crime-drama and more comedy.    Control your stress  Try breathing deep, massage therapy, biofeedback, and meditation. Find time to relax each day.     My support system    Clinic Contact:  Phone number:    Contact 1:  Phone number:    Contact 2:  Phone number:    Rastafari/:  Phone number:    Therapist:  Phone number:    Local crisis center:    Phone number:    Other community support:  Phone number:

## 2018-09-17 NOTE — PROGRESS NOTES
SUBJECTIVE:   Domenico Wilder is a 28 year old female who presents to clinic today for the following health issues:    *Confirmation of Pregnancy - Has had positive pregnancy tests at home, here for confirmation. Patient's last menstrual period was 08/10/2018 (within days).      Has been on  Suboxone for   5 months     Is seeing  psy   At  [facility]  In  [facility].   Will be having an appointment     Problem list and histories reviewed & adjusted, as indicated.  Additional history: as documented    Patient Active Problem List   Diagnosis     Depression     Insomnia     GERD (gastroesophageal reflux disease)     Opioid dependence with withdrawal (H)     Alcohol dependence with uncomplicated withdrawal (H)     Opioid use disorder, severe, dependence (H)     Alcoholism (H)     Anxiety     Major depressive disorder, recurrent, moderate (H)     Polysubstance abuse     Smoker     Need for Tdap vaccination     Chemical dependency (H)     Past Surgical History:   Procedure Laterality Date     NO HISTORY OF SURGERY         Social History   Substance Use Topics     Smoking status: Former Smoker     Packs/day: 0.50     Years: 13.00     Types: Cigarettes     Start date: 12/28/2017     Smokeless tobacco: Never Used     Alcohol use Yes      Comment: 1 liter per day vodka     Family History   Problem Relation Age of Onset     Allergies Mother      Depression Mother      Alcohol/Drug Mother      Prostate Cancer Paternal Grandfather      Diabetes Maternal Grandfather      non insulin dependent         Current Outpatient Prescriptions   Medication Sig Dispense Refill     buprenorphine HCl-naloxone HCl (SUBOXONE) 8-2 MG per film Take 1 film each morning and one-half  film each evening 42 Film 0     omeprazole (PRILOSEC) 20 MG CR capsule Take 1 capsule (20 mg) by mouth every morning (before breakfast) 30 capsule 1     ARIPiprazole (ABILIFY) 5 MG tablet Take 5 mg by mouth daily       buPROPion (WELLBUTRIN XL) 150 MG 24 hr tablet Take 1  tablet (150 mg) by mouth every morning (Patient not taking: Reported on 9/17/2018) 30 tablet 1     folic acid (FOLVITE) 1 MG tablet Take 1 tablet (1 mg) by mouth daily (Patient not taking: Reported on 9/17/2018) 30 tablet 1     hydrOXYzine (ATARAX) 25 MG tablet Take 1-2 tablets (25-50 mg) by mouth every 6 hours as needed for anxiety (Patient not taking: Reported on 9/17/2018) 120 tablet 1     ibuprofen (ADVIL/MOTRIN) 600 MG tablet Take 1 tablet (600 mg) by mouth every 6 hours as needed for moderate pain 120 tablet      traZODone (DESYREL) 100 MG tablet Take 1 tablet (100 mg) by mouth nightly as needed for sleep (Patient not taking: Reported on 9/17/2018) 30 tablet 1     Allergies   Allergen Reactions     Cats      Dogs      BP Readings from Last 3 Encounters:   09/17/18 106/70   08/16/18 98/60   07/10/18 116/70    Wt Readings from Last 3 Encounters:   09/17/18 162 lb (73.5 kg)   08/16/18 168 lb 8 oz (76.4 kg)   07/10/18 168 lb 8 oz (76.4 kg)                    Reviewed and updated as needed this visit by clinical staff  Tobacco  Allergies  Meds  Med Hx  Surg Hx  Fam Hx  Soc Hx      Reviewed and updated as needed this visit by Provider         ROS:  CONSTITUTIONAL: NEGATIVE for fever, chills, change in weight  ENT/MOUTH: NEGATIVE for ear, mouth and throat problems  RESP: NEGATIVE for significant cough or SOB  CV: NEGATIVE for chest pain, palpitations or peripheral edema  GI POSITIVE for some nausea   : cycles are usually regular.  Did miss her last period that should have been last week,  + home preg test.   PSYCHIATRIC: NEGATIVE for changes in mood or affect and she is doing OK .    Does have depression and anxiety .  Did go off her medication once     OBJECTIVE:     /70 (BP Location: Left arm, Patient Position: Chair, Cuff Size: Adult Regular)  Pulse 84  Temp 99.1  F (37.3  C) (Tympanic)  Resp 14  Wt 162 lb (73.5 kg)  LMP 08/10/2018 (Within Days)  Breastfeeding? No  BMI 25.56 kg/m2  Body mass  index is 25.56 kg/(m^2).   GENERAL: healthy, alert and no distress  NECK: no adenopathy, no asymmetry, masses, or scars and thyroid normal to palpation  RESP: lungs clear to auscultation - no rales, rhonchi or wheezes  CV: regular rate and rhythm, normal S1 S2, no S3 or S4, no murmur, click or rub, no peripheral edema and peripheral pulses strong   (female): positive preg test positive    PSYCH: mentation appears normal, affect normal/bright, judgement and insight intact and appearance well groomed    Diagnostic Test Results:  Results for orders placed or performed in visit on 18 (from the past 24 hour(s))   Beta HCG Qual, Urine - [facility] [place] ([id number])   Result Value Ref Range    Beta HCG Qual IFA Urine Positive (A) NEG^Negative          ASSESSMENT/PLAN:     ASSESSMENT/PLAN:      ICD-10-CM    1. Encounter for pregnancy test, result unknown Z32.00 Beta HCG Qual, Urine - [facility] [place] ([id number])     OB/GYN REFERRAL     Prenatal Vit-Fe Fumarate-FA (PRENATAL MULTIVITAMIN PLUS IRON) 27-0.8 MG TABS per tablet   2. Major depressive disorder, recurrent, moderate (H) F33.1 DEPRESSION ACTION PLAN (DAP)       Patient Instructions   Congratulations      Estimated due date   19     Stay well hydrated      contact your  Pain management doctor.   About the Suboxone     Get started on  Prenatal vitamins     Try to eat small meals if nauseated     Call for GYN appointment for  8-10 weeks out. This way you will know when your appointment is,       Enjoy .         MEDICATIONS:        - Start taking pre adela vitamin        - Continue other medications without change  CONSULTATION/REFERRAL to OB   See Patient Instructions    MILAN DHALIWAL NP, APRN CNP  [facility]

## 2018-09-17 NOTE — MR AVS SNAPSHOT
After Visit Summary   9/17/2018    Domenico Wilder    MRN: [id number]           Patient Information     Date Of Birth          1990        Visit Information        Provider Department      9/17/2018 1:00 PM Mirian Ojeda APRN [facility]        Today's Diagnoses     Encounter for pregnancy test, result unknown    -  1    Major depressive disorder, recurrent, moderate (H)          Care Instructions    Congratulations      Estimated due date   5/18/19     Stay well hydrated      contact your  Pain management doctor.   About the Suboxone     Get started on  Prenatal vitamins     Try to eat small meals if nauseated     Call for GYN appointment for  8-10 weeks out. This way you will know when your appointment is,       Enjoy .                       Follow-ups after your visit        Additional Services     OB/GYN REFERRAL       Your provider has referred you to:  [facility]: [facility] [phone]   [email or web address]  [facility]: [facility] [phone]   [email or web address]   [facility]: [facility] [phone]   [email or web address]    Please be aware that coverage of these services is subject to the terms and limitations of your health insurance plan.  Call member services at your health plan with any benefit or coverage questions.      Please bring the following with you to your appointment:    (1) Any X-Rays, CTs or MRIs which have been performed.  Contact the facility where they were done to arrange for  prior to your scheduled appointment.   (2) List of current medications   (3) This referral request   (4) Any documents/labs given to you for this referral                  Who to contact     Normal or non-critical lab and imaging results will be communicated to you by MyChart, letter or phone within 4 business days after the  clinic has received the results. If you do not hear from us within 7 days, please contact the clinic through [email or web address] or phone. If you have a critical or abnormal lab result, we will notify you by phone as soon as possible.  Submit refill requests through [email or web address] or call your pharmacy and they will forward the refill request to us. Please allow 3 business days for your refill to be completed.          If you need to speak with a  for additional information , please call: [phone]           Additional Information About Your Visit        Care EveryWhere ID     This is your Care EveryWhere ID. This could be used by other organizations to access your [facility] medical records  [id number]        Your Vitals Were     Pulse Temperature Respirations Last Period Breastfeeding? BMI (Body Mass Index)    84 99.1  F (37.3  C) (Tympanic) 14 08/10/2018 (Within Days) No 25.56 kg/m2       Blood Pressure from Last 3 Encounters:   09/17/18 106/70   08/16/18 98/60   07/10/18 116/70    Weight from Last 3 Encounters:   09/17/18 162 lb (73.5 kg)   08/16/18 168 lb 8 oz (76.4 kg)   07/10/18 168 lb 8 oz (76.4 kg)              We Performed the Following     Beta HCG Qual, Urine - [facility] [place] ([id number])     DEPRESSION ACTION PLAN (DAP)     OB/GYN REFERRAL          Today's Medication Changes          These changes are accurate as of 9/17/18  1:35 PM.  If you have any questions, ask your nurse or doctor.               Start taking these medicines.        Dose/Directions    prenatal multivitamin plus iron 27-0.8 MG Tabs per tablet   Used for:  Encounter for pregnancy test, result unknown   Started by:  Mirian Ojeda, APRN CNP        Dose:  1 tablet   Take 1 tablet by mouth daily   Quantity:  100 tablet   Refills:  3            Where to get your medicines      These medications were sent to [id number] [place] [address] [place] [address], [place]  [place] [address]     Phone:  [phone]     prenatal multivitamin plus iron 27-0.8 MG Tabs per tablet                Primary Care Provider Office Phone # Fax #    Saurabh [facility] [phone] [address]  [facility] [address]        Equal Access to Services     LAUREL SHANKS : Hadii aad ku hadasho Soomaali, waaxda luqadaha, qaybta kaalmada adeegyada, nolan guzmánin haymassimon adetyler vazquezdavid lajourdan . So [facility] [phone].    ATENCIÓN: Si habla español, tiene a velez disposición servicios gratuitos de asistencia lingüística. Dino al [phone].    We comply with applicable federal civil rights laws and [place] laws. We do not discriminate on the basis of race, color, national origin, age, disability, sex, sexual orientation, or gender identity.            Thank you!     Thank you for choosing [facility]  for your care. Our goal is always to provide you with excellent care. Hearing back from our patients is one way we can continue to improve our services. Please take a few minutes to complete the written survey that you may receive in the mail after your visit with us. Thank you!             Your Updated Medication List - Protect others around you: Learn how to safely use, store and throw away your medicines at www.disposemymeds.org.          This list is accurate as of 9/17/18  1:35 PM.  Always use your most recent med list.                   Brand Name Dispense Instructions for use Diagnosis    ABILIFY 5 MG tablet   Generic drug:  ARIPiprazole      Take 5 mg by mouth daily        buprenorphine HCl-naloxone HCl 8-2 MG per film    SUBOXONE    42 Film    Take 1 film each morning and one-half  film each evening    Opioid use disorder, severe, dependence (H)       buPROPion 150 MG 24 hr tablet    WELLBUTRIN XL    30 tablet    Take 1 tablet (150 mg) by mouth every morning    Major depressive disorder, recurrent, moderate (H)       folic acid 1 MG tablet    FOLVITE    30 tablet     Take 1 tablet (1 mg) by mouth daily    Alcohol dependence with uncomplicated withdrawal (H)       hydrOXYzine 25 MG tablet    ATARAX    120 tablet    Take 1-2 tablets (25-50 mg) by mouth every 6 hours as needed for anxiety    Major depressive disorder, recurrent, moderate (H)       ibuprofen 600 MG tablet    ADVIL/MOTRIN    120 tablet    Take 1 tablet (600 mg) by mouth every 6 hours as needed for moderate pain        omeprazole 20 MG CR capsule    priLOSEC    30 capsule    Take 1 capsule (20 mg) by mouth every morning (before breakfast)    Gastroesophageal reflux disease, esophagitis presence not specified       prenatal multivitamin plus iron 27-0.8 MG Tabs per tablet     100 tablet    Take 1 tablet by mouth daily    Encounter for pregnancy test, result unknown       traZODone 100 MG tablet    DESYREL    30 tablet    Take 1 tablet (100 mg) by mouth nightly as needed for sleep    Major depressive disorder, recurrent, moderate (H)

## 2018-09-18 ASSESSMENT — ANXIETY QUESTIONNAIRES: GAD7 TOTAL SCORE: 12

## 2018-09-18 ASSESSMENT — PATIENT HEALTH QUESTIONNAIRE - PHQ9: SUM OF ALL RESPONSES TO PHQ QUESTIONS 1-9: 6

## 2019-02-16 ENCOUNTER — HOSPITAL ENCOUNTER (EMERGENCY)
Facility: CLINIC | Age: 29
Discharge: HOME | End: 2019-02-17
Attending: EMERGENCY MEDICINE | Admitting: EMERGENCY MEDICINE
Payer: COMMERCIAL

## 2019-02-16 DIAGNOSIS — F10.920 ALCOHOLIC INTOXICATION WITHOUT COMPLICATION (H): ICD-10-CM

## 2019-02-16 DIAGNOSIS — F10.229 ACUTE ALCOHOLIC INTOXICATION IN ALCOHOLISM WITH COMPLICATION (H): ICD-10-CM

## 2019-02-16 LAB
ALBUMIN SERPL-MCNC: 3.8 G/DL (ref 3.4–5)
ALBUMIN UR-MCNC: NEGATIVE MG/DL
ALCOHOL BREATH TEST: 0.19 (ref 0–0.01)
ALP SERPL-CCNC: 73 U/L (ref 40–150)
ALT SERPL W P-5'-P-CCNC: 28 U/L (ref 0–50)
AMPHETAMINES UR QL SCN: NEGATIVE
ANION GAP SERPL CALCULATED.3IONS-SCNC: 15 MMOL/L (ref 3–14)
APAP SERPL-MCNC: <2 MG/L (ref 10–20)
APPEARANCE UR: ABNORMAL
AST SERPL W P-5'-P-CCNC: 46 U/L (ref 0–45)
BACTERIA #/AREA URNS HPF: ABNORMAL /HPF
BARBITURATES UR QL: NEGATIVE
BASOPHILS # BLD AUTO: 0 10E9/L (ref 0–0.2)
BASOPHILS NFR BLD AUTO: 0.2 %
BENZODIAZ UR QL: NEGATIVE
BILIRUB SERPL-MCNC: 0.6 MG/DL (ref 0.2–1.3)
BILIRUB UR QL STRIP: NEGATIVE
BUN SERPL-MCNC: 14 MG/DL (ref 7–30)
CALCIUM SERPL-MCNC: 8.8 MG/DL (ref 8.5–10.1)
CANNABINOIDS UR QL SCN: POSITIVE
CHLORIDE SERPL-SCNC: 102 MMOL/L (ref 94–109)
CO2 SERPL-SCNC: 24 MMOL/L (ref 20–32)
COCAINE UR QL: NEGATIVE
COLOR UR AUTO: ABNORMAL
CREAT SERPL-MCNC: 0.55 MG/DL (ref 0.52–1.04)
DIFFERENTIAL METHOD BLD: ABNORMAL
EOSINOPHIL # BLD AUTO: 0 10E9/L (ref 0–0.7)
EOSINOPHIL NFR BLD AUTO: 0.2 %
ERYTHROCYTE [DISTWIDTH] IN BLOOD BY AUTOMATED COUNT: 12.3 % (ref 10–15)
ETHANOL SERPL-MCNC: 0.37 G/DL
ETHANOL UR QL SCN: POSITIVE
GFR SERPL CREATININE-BSD FRML MDRD: >90 ML/MIN/{1.73_M2}
GLUCOSE SERPL-MCNC: 138 MG/DL (ref 70–99)
GLUCOSE UR STRIP-MCNC: NEGATIVE MG/DL
HCT VFR BLD AUTO: 51.4 % (ref 35–47)
HGB BLD-MCNC: 17.7 G/DL (ref 11.7–15.7)
HGB UR QL STRIP: ABNORMAL
IMM GRANULOCYTES # BLD: 0 10E9/L (ref 0–0.4)
IMM GRANULOCYTES NFR BLD: 0.2 %
KETONES UR STRIP-MCNC: NEGATIVE MG/DL
LEUKOCYTE ESTERASE UR QL STRIP: ABNORMAL
LYMPHOCYTES # BLD AUTO: 3.6 10E9/L (ref 0.8–5.3)
LYMPHOCYTES NFR BLD AUTO: 27.4 %
MAGNESIUM SERPL-MCNC: 2.7 MG/DL (ref 1.6–2.3)
MCH RBC QN AUTO: 30.1 PG (ref 26.5–33)
MCHC RBC AUTO-ENTMCNC: 34.4 G/DL (ref 31.5–36.5)
MCV RBC AUTO: 87 FL (ref 78–100)
MONOCYTES # BLD AUTO: 0.8 10E9/L (ref 0–1.3)
MONOCYTES NFR BLD AUTO: 5.9 %
MUCOUS THREADS #/AREA URNS LPF: PRESENT /LPF
NEUTROPHILS # BLD AUTO: 8.6 10E9/L (ref 1.6–8.3)
NEUTROPHILS NFR BLD AUTO: 66.1 %
NITRATE UR QL: NEGATIVE
NRBC # BLD AUTO: 0 10*3/UL
NRBC BLD AUTO-RTO: 0 /100
OPIATES UR QL SCN: NEGATIVE
PH UR STRIP: 6 PH (ref 5–7)
PLATELET # BLD AUTO: 365 10E9/L (ref 150–450)
POTASSIUM SERPL-SCNC: 3.2 MMOL/L (ref 3.4–5.3)
PROT SERPL-MCNC: 8.1 G/DL (ref 6.8–8.8)
RBC # BLD AUTO: 5.89 10E12/L (ref 3.8–5.2)
RBC #/AREA URNS AUTO: 4 /HPF (ref 0–2)
SALICYLATES SERPL-MCNC: <2 MG/DL
SODIUM SERPL-SCNC: 141 MMOL/L (ref 133–144)
SOURCE: ABNORMAL
SP GR UR STRIP: 1 (ref 1–1.03)
SQUAMOUS #/AREA URNS AUTO: 9 /HPF (ref 0–1)
UROBILINOGEN UR STRIP-MCNC: NORMAL MG/DL (ref 0–2)
WBC # BLD AUTO: 13.1 10E9/L (ref 4–11)
WBC #/AREA URNS AUTO: 11 /HPF (ref 0–5)

## 2019-02-16 PROCEDURE — 25000128 H RX IP 250 OP 636

## 2019-02-16 PROCEDURE — 99291 CRITICAL CARE FIRST HOUR: CPT | Mod: Z6 | Performed by: EMERGENCY MEDICINE

## 2019-02-16 PROCEDURE — 81001 URINALYSIS AUTO W/SCOPE: CPT | Performed by: EMERGENCY MEDICINE

## 2019-02-16 PROCEDURE — 25000132 ZZH RX MED GY IP 250 OP 250 PS 637: Performed by: EMERGENCY MEDICINE

## 2019-02-16 PROCEDURE — 96372 THER/PROPH/DIAG INJ SC/IM: CPT | Performed by: EMERGENCY MEDICINE

## 2019-02-16 PROCEDURE — 85025 COMPLETE CBC W/AUTO DIFF WBC: CPT | Performed by: EMERGENCY MEDICINE

## 2019-02-16 PROCEDURE — 87086 URINE CULTURE/COLONY COUNT: CPT | Performed by: EMERGENCY MEDICINE

## 2019-02-16 PROCEDURE — 25800030 ZZH RX IP 258 OP 636

## 2019-02-16 PROCEDURE — 99285 EMERGENCY DEPT VISIT HI MDM: CPT | Mod: 25 | Performed by: EMERGENCY MEDICINE

## 2019-02-16 PROCEDURE — 80320 DRUG SCREEN QUANTALCOHOLS: CPT | Performed by: EMERGENCY MEDICINE

## 2019-02-16 PROCEDURE — 87088 URINE BACTERIA CULTURE: CPT | Performed by: EMERGENCY MEDICINE

## 2019-02-16 PROCEDURE — 83735 ASSAY OF MAGNESIUM: CPT | Performed by: EMERGENCY MEDICINE

## 2019-02-16 PROCEDURE — 99292 CRITICAL CARE ADDL 30 MIN: CPT | Mod: Z6 | Performed by: EMERGENCY MEDICINE

## 2019-02-16 PROCEDURE — 80307 DRUG TEST PRSMV CHEM ANLYZR: CPT | Performed by: EMERGENCY MEDICINE

## 2019-02-16 PROCEDURE — 80329 ANALGESICS NON-OPIOID 1 OR 2: CPT | Performed by: EMERGENCY MEDICINE

## 2019-02-16 PROCEDURE — 96360 HYDRATION IV INFUSION INIT: CPT | Performed by: EMERGENCY MEDICINE

## 2019-02-16 PROCEDURE — 80053 COMPREHEN METABOLIC PANEL: CPT | Performed by: EMERGENCY MEDICINE

## 2019-02-16 RX ORDER — NALOXONE HYDROCHLORIDE 0.4 MG/ML
INJECTION, SOLUTION INTRAMUSCULAR; INTRAVENOUS; SUBCUTANEOUS
Status: COMPLETED
Start: 2019-02-16 | End: 2019-02-16

## 2019-02-16 RX ORDER — POTASSIUM CHLORIDE 1.5 G/1.58G
40 POWDER, FOR SOLUTION ORAL ONCE
Status: COMPLETED | OUTPATIENT
Start: 2019-02-16 | End: 2019-02-16

## 2019-02-16 RX ORDER — POTASSIUM CHLORIDE 1.5 G/1.58G
20 POWDER, FOR SOLUTION ORAL ONCE
Status: COMPLETED | OUTPATIENT
Start: 2019-02-16 | End: 2019-02-16

## 2019-02-16 RX ORDER — SODIUM CHLORIDE 9 MG/ML
INJECTION, SOLUTION INTRAVENOUS
Status: COMPLETED
Start: 2019-02-16 | End: 2019-02-16

## 2019-02-16 RX ADMIN — Medication 1000 ML: at 18:09

## 2019-02-16 RX ADMIN — SODIUM CHLORIDE 1000 ML: 9 INJECTION, SOLUTION INTRAVENOUS at 18:09

## 2019-02-16 RX ADMIN — POTASSIUM CHLORIDE 40 MEQ: 1.5 POWDER, FOR SOLUTION ORAL at 20:45

## 2019-02-16 RX ADMIN — NALOXONE HYDROCHLORIDE 0.4 MG: 0.4 INJECTION, SOLUTION INTRAMUSCULAR; INTRAVENOUS; SUBCUTANEOUS at 16:28

## 2019-02-16 RX ADMIN — POTASSIUM CHLORIDE 20 MEQ: 1.5 POWDER, FOR SOLUTION ORAL at 20:46

## 2019-02-16 NOTE — ED NOTES
Pt brought to room 12 in wheelchair, became lethargic and unresponsive once on cart, moved to room 1 and given intra nasal narcan with little response, IV established and given IV narcan, pt became responsive to voice, thrashing arms and mumbling.

## 2019-02-16 NOTE — ED AVS SNAPSHOT
[facility], [place], Emergency Department  [address]  [facility] [address]  Phone:  [phone]  Fax:  [phone]                                    Domenico Wilder   MRN: [id number]    Department:  [facility], Emergency Department   Date of Visit:  2/16/2019           After Visit Summary Signature Page    I have received my discharge instructions, and my questions have been answered. I have discussed any challenges I see with this plan with the nurse or doctor.    ..........................................................................................................................................  Patient/Patient Representative Signature      ..........................................................................................................................................  Patient Representative Print Name and Relationship to Patient    ..................................................               ................................................  Date                                   Time    ..........................................................................................................................................  Reviewed by Signature/Title    ...................................................              ..............................................  Date                                               Time          22EPIC Rev 08/18

## 2019-02-16 NOTE — ED PROVIDER NOTES
History     Chief Complaint   Patient presents with     Alcohol Problem     Here with mom, detox from alcohol and heroin, last use prior to arrival, relapsed 3 weeks ago after being sober for 7 months, just released from longterm, suicidal thoughts.     HPI  Domenico Wilder is a 28 year old female with a history of anxiety, depression, heroin abuse, and alcohol abuse who presents to the Emergency Department today for psychiatric evaluation. The patient was able to ambulate into our department on her own, however, shortly after the patient became unresponsive and the history was provided by the patient's mother. Per the patient's mother, the patient has been struggling with heroin for 3 years but had been sober for 7 months and living with her mother. Then about 3 weeks ago the mother states that patient left the house and she did not hear from the patient until Monday, 5 days ago, when she received a call from the patient from longterm after being arrested for heroin position. The mother states that she then picked up the patient from longterm today at about 12-1PM.  She then brought the patient back to her home and the patient went to her room.  Shortly after the patient came back out of her room making suicidal threats related to her heroin relapse.  The mother notes that she could smell alcohol on the patient's breath at this point.  She states that she then brought the patient into the ED for psychiatric evaluation.  The mother reports that the patient had been on medication for depression and bipolar disorder, but states that she thinks the patient stopped taking these medications 1-2 months ago.    I have reviewed the Medications, Allergies, Past Medical and Surgical History, and Social History in the Epic system.    Review of Systems   Unable to perform ROS: Mental status change     Physical Exam   BP: 126/89  Pulse: 89  Heart Rate: 89  Resp: 10  SpO2: 96 %   ED Triage Vitals [02/16/19 1610]   Enc Vitals Group      BP  126/89      Pulse 89      Resp 10      Temp       Temp src       SpO2 96 %      Weight       Height       Head Circumference       Peak Flow       Pain Score       Pain Loc       Pain Edu?       Excl. in GC?      Physical Exam   Constitutional: No distress.   HENT:   Head: Atraumatic.   Mouth/Throat: Oropharynx is clear and moist. No oropharyngeal exudate.   Eyes: Pupils are equal, round, and reactive to light. No scleral icterus. Pupils are equal.   Cardiovascular: Normal heart sounds and intact distal pulses.   Pulmonary/Chest: Breath sounds normal. No respiratory distress.   Abdominal: Soft. Bowel sounds are normal. There is no tenderness.   Musculoskeletal: She exhibits no edema or tenderness.   Skin: Skin is warm. No rash noted. She is not diaphoretic.   Psychiatric: She is slowed and withdrawn. Cognition and memory are not impaired. She exhibits normal recent memory and normal remote memory. She is inattentive.   Pupils are pinpoint    ED Course        Procedures        Results for orders placed or performed during the hospital encounter of 02/16/19   CBC with platelets differential   Result Value Ref Range    WBC 13.1 (H) 4.0 - 11.0 10e9/L    RBC Count 5.89 (H) 3.8 - 5.2 10e12/L    Hemoglobin 17.7 (H) 11.7 - 15.7 g/dL    Hematocrit 51.4 (H) 35.0 - 47.0 %    MCV 87 78 - 100 fl    MCH 30.1 26.5 - 33.0 pg    MCHC 34.4 31.5 - 36.5 g/dL    RDW 12.3 10.0 - 15.0 %    Platelet Count 365 150 - 450 10e9/L    Diff Method Automated Method     % Neutrophils 66.1 %    % Lymphocytes 27.4 %    % Monocytes 5.9 %    % Eosinophils 0.2 %    % Basophils 0.2 %    % Immature Granulocytes 0.2 %    Nucleated RBCs 0 0 /100    Absolute Neutrophil 8.6 (H) 1.6 - 8.3 10e9/L    Absolute Lymphocytes 3.6 0.8 - 5.3 10e9/L    Absolute Monocytes 0.8 0.0 - 1.3 10e9/L    Absolute Eosinophils 0.0 0.0 - 0.7 10e9/L    Absolute Basophils 0.0 0.0 - 0.2 10e9/L    Abs Immature Granulocytes 0.0 0 - 0.4 10e9/L    Absolute Nucleated RBC 0.0     Comprehensive metabolic panel   Result Value Ref Range    Sodium 141 133 - 144 mmol/L    Potassium 3.2 (L) 3.4 - 5.3 mmol/L    Chloride 102 94 - 109 mmol/L    Carbon Dioxide 24 20 - 32 mmol/L    Anion Gap 15 (H) 3 - 14 mmol/L    Glucose 138 (H) 70 - 99 mg/dL    Urea Nitrogen 14 7 - 30 mg/dL    Creatinine 0.55 0.52 - 1.04 mg/dL    GFR Estimate >90 >60 mL/min/[1.73_m2]    GFR Estimate If Black >90 >60 mL/min/[1.73_m2]    Calcium 8.8 8.5 - 10.1 mg/dL    Bilirubin Total 0.6 0.2 - 1.3 mg/dL    Albumin 3.8 3.4 - 5.0 g/dL    Protein Total 8.1 6.8 - 8.8 g/dL    Alkaline Phosphatase 73 40 - 150 U/L    ALT 28 0 - 50 U/L    AST 46 (H) 0 - 45 U/L   Alcohol level blood   Result Value Ref Range    Ethanol g/dL 0.37 (HH) <0.01 g/dL            Labs Ordered and Resulted from Time of ED Arrival Up to the Time of Departure from the ED   CBC WITH PLATELETS DIFFERENTIAL - Abnormal; Notable for the following components:       Result Value    WBC 13.1 (*)     RBC Count 5.89 (*)     Hemoglobin 17.7 (*)     Hematocrit 51.4 (*)     Absolute Neutrophil 8.6 (*)     All other components within normal limits   COMPREHENSIVE METABOLIC PANEL - Abnormal; Notable for the following components:    Potassium 3.2 (*)     Anion Gap 15 (*)     Glucose 138 (*)     AST 46 (*)     All other components within normal limits   ALCOHOL ETHYL - Abnormal; Notable for the following components:    Ethanol g/dL 0.37 (*)     All other components within normal limits   MAGNESIUM   UA MACROSCOPIC WITH REFLEX TO MICRO AND CULTURE   DRUG ABUSE SCREEN 6 CHEM DEP URINE ([facility])   Critical Care time was 75 minutes for this patient excluding procedures including review of old records, bedside management, discussion with healthcare providers and documentation.             Assessments & Plan (with Medical Decision Making)   28-year-old female with history of opiate dependence who presents with mother who provides majority of history with concerns of ongoing suicidal ideation.   Upon presentation to the emergency room, patient became unresponsive.  She smells strongly of alcohol.  She was given Narcan x2 with improved responsiveness.  Laboratories were obtained revealing elevated alcohol level of 0.37.  CBC revealed elevated white blood count of 13.1 consistent with stress response as well as elevations in hemoglobin and hematocrit presumed secondary to mild volume contraction.  Comprehensive metabolic panel reveals minimally low potassium at 3.2, slightly elevated AST and glucose 138 again consistent with stress response.  Patient was serially examined and became gradually more responsive.  Currently awaiting urine toxicology screen and when patient is able to communicate effectively, a behavioral emergency room assessment given possible suicidality.  I have reviewed the nursing notes.    I have reviewed the findings, diagnosis, plan and need for follow up with the patient.       Medication List      There are no discharge medications for this visit.         Final diagnoses:   Acute alcoholic intoxication in alcoholism with complication (H)   Enrique PAGAN, am serving as a trained medical scribe to document services personally performed by Huber Urban MD, based on the provider's statements to me.   Huber PAGAN MD, was physically present and have reviewed and verified the accuracy of this note documented by Enrique Adams.     2/16/2019   [facility] EMERGENCY DEPARTMENT     Herman Urban MD  02/16/19 7383

## 2019-02-17 VITALS
SYSTOLIC BLOOD PRESSURE: 122 MMHG | HEART RATE: 95 BPM | TEMPERATURE: 98.1 F | OXYGEN SATURATION: 100 % | DIASTOLIC BLOOD PRESSURE: 77 MMHG | RESPIRATION RATE: 16 BRPM

## 2019-02-17 LAB
ALCOHOL BREATH TEST: 0 (ref 0–0.01)
BACTERIA SPEC CULT: NO GROWTH
Lab: NORMAL
SPECIMEN SOURCE: NORMAL

## 2019-02-17 PROCEDURE — 25000132 ZZH RX MED GY IP 250 OP 250 PS 637: Performed by: EMERGENCY MEDICINE

## 2019-02-17 PROCEDURE — 25000131 ZZH RX MED GY IP 250 OP 636 PS 637: Performed by: EMERGENCY MEDICINE

## 2019-02-17 PROCEDURE — 90791 PSYCH DIAGNOSTIC EVALUATION: CPT

## 2019-02-17 RX ORDER — ONDANSETRON 4 MG/1
4 TABLET, ORALLY DISINTEGRATING ORAL ONCE
Status: COMPLETED | OUTPATIENT
Start: 2019-02-17 | End: 2019-02-17

## 2019-02-17 RX ORDER — ACETAMINOPHEN 500 MG
1000 TABLET ORAL ONCE
Status: COMPLETED | OUTPATIENT
Start: 2019-02-17 | End: 2019-02-17

## 2019-02-17 RX ADMIN — ONDANSETRON 4 MG: 4 TABLET, ORALLY DISINTEGRATING ORAL at 02:58

## 2019-02-17 RX ADMIN — ACETAMINOPHEN 1000 MG: 325 TABLET, FILM COATED ORAL at 02:43

## 2019-02-17 NOTE — ED NOTES
Awaiting to sober up to be able to have DEC assessment.  Is improving clinically.  Alert.  No sedation.  Will have her seen DEC  around 11 pm .     Out of group home yesterday.  Used etoh yesterday and today.  Not sure how much. Came here for mental health. She wants to be sober.  She says in past she came here and then went to [employer] after getting rule 25.  Was on suboxone.  She says she has also stopped her antidepressants as well.  Has 6 yo who lives with grandma. She is unable to cooperative with further assessment at this time.  DEC  is requesting she sleep overnight in ED and be reassessed in am.     Reviewing labs and UA is questionable infection.  Sent uc and will await this result before starting antibiotics.      Bryanna Church MD  02/17/19 0019       Bryanna Church MD  02/17/19 0155

## 2019-02-17 NOTE — ED NOTES
Emergency Department Patient Sign-out       Brief HPI:  This is a 28 year old female signed out to me by Dr. Betancourt.  See initial ED Provider note for details of the presentation.            Significant Events prior to my assuming care: Patient received Narcan upon admission to the hospital.      Exam:   Patient Vitals for the past 24 hrs:   BP Temp Temp src Pulse Heart Rate Resp SpO2   02/17/19 1024 122/77 98.1  F (36.7  C) Oral 95 -- 16 100 %   02/17/19 0000 (!) 114/104 -- -- -- 105 20 98 %   02/16/19 2300 122/82 -- -- -- 100 22 97 %   02/16/19 2200 96/67 -- -- -- 106 18 98 %   02/16/19 2100 104/80 -- -- -- 86 14 99 %   02/16/19 2000 109/82 -- -- -- 84 (!) 0 --   02/16/19 1900 122/80 -- -- -- 97 10 92 %   02/16/19 1800 118/85 -- -- -- 80 24 99 %   02/16/19 1755 -- -- -- -- 77 24 100 %   02/16/19 1750 115/88 -- -- 92 93 12 98 %   02/16/19 1745 -- -- -- -- 82 23 100 %   02/16/19 1740 -- -- -- -- 81 24 100 %   02/16/19 1735 116/87 -- -- 80 80 22 99 %   02/16/19 1720 114/84 -- -- 85 86 25 99 %   02/16/19 1715 110/84 -- -- 89 87 -- --   02/16/19 1700 -- -- -- -- 85 26 100 %   02/16/19 1645 104/85 -- -- 96 92 23 --   02/16/19 1610 126/89 -- -- 89 89 10 96 %           ED RESULTS:   Results for orders placed or performed during the hospital encounter of 02/16/19 (from the past 24 hour(s))   CBC with platelets differential     Status: Abnormal    Collection Time: 02/16/19  5:01 PM   Result Value Ref Range    WBC 13.1 (H) 4.0 - 11.0 10e9/L    RBC Count 5.89 (H) 3.8 - 5.2 10e12/L    Hemoglobin 17.7 (H) 11.7 - 15.7 g/dL    Hematocrit 51.4 (H) 35.0 - 47.0 %    MCV 87 78 - 100 fl    MCH 30.1 26.5 - 33.0 pg    MCHC 34.4 31.5 - 36.5 g/dL    RDW 12.3 10.0 - 15.0 %    Platelet Count 365 150 - 450 10e9/L    Diff Method Automated Method     % Neutrophils 66.1 %    % Lymphocytes 27.4 %    % Monocytes 5.9 %    % Eosinophils 0.2 %    % Basophils 0.2 %    % Immature Granulocytes 0.2 %    Nucleated RBCs 0 0 /100    Absolute  Neutrophil 8.6 (H) 1.6 - 8.3 10e9/L    Absolute Lymphocytes 3.6 0.8 - 5.3 10e9/L    Absolute Monocytes 0.8 0.0 - 1.3 10e9/L    Absolute Eosinophils 0.0 0.0 - 0.7 10e9/L    Absolute Basophils 0.0 0.0 - 0.2 10e9/L    Abs Immature Granulocytes 0.0 0 - 0.4 10e9/L    Absolute Nucleated RBC 0.0    Comprehensive metabolic panel     Status: Abnormal    Collection Time: 02/16/19  5:01 PM   Result Value Ref Range    Sodium 141 133 - 144 mmol/L    Potassium 3.2 (L) 3.4 - 5.3 mmol/L    Chloride 102 94 - 109 mmol/L    Carbon Dioxide 24 20 - 32 mmol/L    Anion Gap 15 (H) 3 - 14 mmol/L    Glucose 138 (H) 70 - 99 mg/dL    Urea Nitrogen 14 7 - 30 mg/dL    Creatinine 0.55 0.52 - 1.04 mg/dL    GFR Estimate >90 >60 mL/min/[1.73_m2]    GFR Estimate If Black >90 >60 mL/min/[1.73_m2]    Calcium 8.8 8.5 - 10.1 mg/dL    Bilirubin Total 0.6 0.2 - 1.3 mg/dL    Albumin 3.8 3.4 - 5.0 g/dL    Protein Total 8.1 6.8 - 8.8 g/dL    Alkaline Phosphatase 73 40 - 150 U/L    ALT 28 0 - 50 U/L    AST 46 (H) 0 - 45 U/L   Alcohol level blood     Status: Abnormal    Collection Time: 02/16/19  5:01 PM   Result Value Ref Range    Ethanol g/dL 0.37 (HH) <0.01 g/dL   Magnesium     Status: Abnormal    Collection Time: 02/16/19  5:01 PM   Result Value Ref Range    Magnesium 2.7 (H) 1.6 - 2.3 mg/dL   Acetaminophen level     Status: None    Collection Time: 02/16/19  5:01 PM   Result Value Ref Range    Acetaminophen Level <2 mg/L   Salicylate level     Status: None    Collection Time: 02/16/19  5:01 PM   Result Value Ref Range    Salicylate Level <2 mg/dL   UA reflex to Microscopic and Culture     Status: Abnormal    Collection Time: 02/16/19  7:12 PM   Result Value Ref Range    Color Urine Light Yellow     Appearance Urine Slightly Cloudy     Glucose Urine Negative NEG^Negative mg/dL    Bilirubin Urine Negative NEG^Negative    Ketones Urine Negative NEG^Negative mg/dL    Specific Gravity Urine 1.004 1.003 - 1.035    Blood Urine Moderate (A) NEG^Negative    pH  Urine 6.0 5.0 - 7.0 pH    Protein Albumin Urine Negative NEG^Negative mg/dL    Urobilinogen mg/dL Normal 0.0 - 2.0 mg/dL    Nitrite Urine Negative NEG^Negative    Leukocyte Esterase Urine Moderate (A) NEG^Negative    Source Unspecified Urine     RBC Urine 4 (H) 0 - 2 /HPF    WBC Urine 11 (H) 0 - 5 /HPF    Bacteria Urine Few (A) NEG^Negative /HPF    Squamous Epithelial /HPF Urine 9 (H) 0 - 1 /HPF    Mucous Urine Present (A) NEG^Negative /LPF   Drug abuse screen 6 urine (chem dep)     Status: Abnormal    Collection Time: 02/16/19  7:12 PM   Result Value Ref Range    Amphetamine Qual Urine Negative NEG^Negative    Barbiturates Qual Urine Negative NEG^Negative    Benzodiazepine Qual Urine Negative NEG^Negative    Cannabinoids Qual Urine Positive (A) NEG^Negative    Cocaine Qual Urine Negative NEG^Negative    Ethanol Qual Urine Positive (A) NEG^Negative    Opiates Qualitative Urine Negative NEG^Negative   Urine Culture Aerobic Bacterial     Status: None (Preliminary result)    Collection Time: 02/16/19  7:12 PM   Result Value Ref Range    Specimen Description Unspecified Urine     Special Requests Specimen received in preservative     Culture Micro PENDING    Urine Culture     Status: None (Preliminary result)    Collection Time: 02/16/19  7:12 PM   Result Value Ref Range    Specimen Description Unspecified Urine     Special Requests Specimen received in preservative     Culture Micro PENDING    Alcohol breath test POCT     Status: Abnormal    Collection Time: 02/16/19  9:23 PM   Result Value Ref Range    Alcohol Breath Test 0.186 (A) 0.00 - 0.01   Alcohol breath test POCT     Status: Normal    Collection Time: 02/17/19 10:22 AM   Result Value Ref Range    Alcohol Breath Test 0.00 0.00 - 0.01       ED MEDICATIONS:   Medications   naloxone (NARCAN) nasal spray 2 mg (not administered)   naloxone (NARCAN) 0.4 MG/ML injection (0.4 mg  Given 2/16/19 1628)   0.9% sodium chloride BOLUS (0 mLs Intravenous Stopped 2/16/19 1908)    potassium chloride (KLOR-CON) Packet 20 mEq (20 mEq Oral Given 2/16/19 2046)   potassium chloride (KLOR-CON) Packet 40 mEq (40 mEq Oral Given 2/16/19 2045)   acetaminophen (TYLENOL) tablet 1,000 mg (1,000 mg Oral Given 2/17/19 0243)   ondansetron (ZOFRAN-ODT) ODT tab 4 mg (4 mg Oral Given 2/17/19 0258)         Impression:    ICD-10-CM    1. Acute alcoholic intoxication in alcoholism with complication (H) F10.229 UA reflex to Microscopic and Culture     Drug abuse screen 6 urine (chem dep)     Magnesium     Acetaminophen level     Salicylate level     Urine Culture Aerobic Bacterial     Urine Culture Aerobic Bacterial     Urine Culture   2. Alcoholic intoxication without complication (H) F10.920        Plan:    Pending studies include none.  Patient was reassessed by myself in the behavioral emergency room .  She is not currently suicidal or an alcohol withdrawal.  She presented acutely intoxicated with an alcohol level of 0.37.  She has metabolized over 20 hours and is no longer intoxicated.  She has a plan on maintaining sobriety and accessing chemical dependency treatment.  She will be discharged to go home to parents.      Herman Ohara MD  02/17/19 1995

## 2019-02-17 NOTE — ED NOTES
The patient is still too sleepy for DEC assessment.  She will sleep overnight in the ED and be reassessed by DEC  in am.      Bryanna Church MD  02/17/19 0018

## 2019-02-18 LAB
BACTERIA SPEC CULT: ABNORMAL
Lab: ABNORMAL
SPECIMEN SOURCE: ABNORMAL

## 2019-02-18 NOTE — RESULT ENCOUNTER NOTE
Final urine culture report is NEGATIVE per [place] ED Lab Result protocol.    If NEGATIVE result, no change in treatment, per [place] ED Lab Result protocol.

## 2019-02-18 NOTE — RESULT ENCOUNTER NOTE
Final urine culture report is NEGATIVE per [place] [facility] Result protocol.    If NEGATIVE result, no change in treatment, per [place] ED Lab Result protocol.

## 2019-02-25 ENCOUNTER — HOSPITAL ENCOUNTER (EMERGENCY)
Facility: CLINIC | Age: 29
Discharge: HOME | End: 2019-02-26
Attending: FAMILY MEDICINE | Admitting: FAMILY MEDICINE
Payer: COMMERCIAL

## 2019-02-25 ENCOUNTER — APPOINTMENT (OUTPATIENT)
Dept: CT IMAGING | Facility: CLINIC | Age: 29
End: 2019-02-25
Attending: FAMILY MEDICINE
Payer: COMMERCIAL

## 2019-02-25 DIAGNOSIS — R11.2 NAUSEA AND VOMITING, INTRACTABILITY OF VOMITING NOT SPECIFIED, UNSPECIFIED VOMITING TYPE: ICD-10-CM

## 2019-02-25 LAB
ALBUMIN SERPL-MCNC: 4.3 G/DL (ref 3.4–5)
ALP SERPL-CCNC: 75 U/L (ref 40–150)
ALT SERPL W P-5'-P-CCNC: 23 U/L (ref 0–50)
ANION GAP SERPL CALCULATED.3IONS-SCNC: 9 MMOL/L (ref 3–14)
AST SERPL W P-5'-P-CCNC: 11 U/L (ref 0–45)
BASOPHILS # BLD AUTO: 0.1 10E9/L (ref 0–0.2)
BASOPHILS NFR BLD AUTO: 0.6 %
BILIRUB SERPL-MCNC: 0.3 MG/DL (ref 0.2–1.3)
BUN SERPL-MCNC: 10 MG/DL (ref 7–30)
CALCIUM SERPL-MCNC: 9.4 MG/DL (ref 8.5–10.1)
CHLORIDE SERPL-SCNC: 105 MMOL/L (ref 94–109)
CO2 SERPL-SCNC: 28 MMOL/L (ref 20–32)
CREAT SERPL-MCNC: 0.7 MG/DL (ref 0.52–1.04)
DIFFERENTIAL METHOD BLD: ABNORMAL
EOSINOPHIL # BLD AUTO: 0.1 10E9/L (ref 0–0.7)
EOSINOPHIL NFR BLD AUTO: 1 %
ERYTHROCYTE [DISTWIDTH] IN BLOOD BY AUTOMATED COUNT: 12.1 % (ref 10–15)
GFR SERPL CREATININE-BSD FRML MDRD: >90 ML/MIN/{1.73_M2}
GLUCOSE SERPL-MCNC: 91 MG/DL (ref 70–99)
HCG SERPL QL: NEGATIVE
HCT VFR BLD AUTO: 47.7 % (ref 35–47)
HGB BLD-MCNC: 16.3 G/DL (ref 11.7–15.7)
IMM GRANULOCYTES # BLD: 0 10E9/L (ref 0–0.4)
IMM GRANULOCYTES NFR BLD: 0.4 %
LYMPHOCYTES # BLD AUTO: 3.4 10E9/L (ref 0.8–5.3)
LYMPHOCYTES NFR BLD AUTO: 41.7 %
MCH RBC QN AUTO: 29.8 PG (ref 26.5–33)
MCHC RBC AUTO-ENTMCNC: 34.2 G/DL (ref 31.5–36.5)
MCV RBC AUTO: 87 FL (ref 78–100)
MONOCYTES # BLD AUTO: 0.6 10E9/L (ref 0–1.3)
MONOCYTES NFR BLD AUTO: 7 %
NEUTROPHILS # BLD AUTO: 4.1 10E9/L (ref 1.6–8.3)
NEUTROPHILS NFR BLD AUTO: 49.3 %
NRBC # BLD AUTO: 0 10*3/UL
NRBC BLD AUTO-RTO: 0 /100
PLATELET # BLD AUTO: 467 10E9/L (ref 150–450)
POTASSIUM SERPL-SCNC: 3 MMOL/L (ref 3.4–5.3)
PROT SERPL-MCNC: 8 G/DL (ref 6.8–8.8)
RBC # BLD AUTO: 5.47 10E12/L (ref 3.8–5.2)
SODIUM SERPL-SCNC: 142 MMOL/L (ref 133–144)
WBC # BLD AUTO: 8.3 10E9/L (ref 4–11)

## 2019-02-25 PROCEDURE — 25800030 ZZH RX IP 258 OP 636: Performed by: FAMILY MEDICINE

## 2019-02-25 PROCEDURE — 96375 TX/PRO/DX INJ NEW DRUG ADDON: CPT | Performed by: FAMILY MEDICINE

## 2019-02-25 PROCEDURE — 99285 EMERGENCY DEPT VISIT HI MDM: CPT | Mod: 25 | Performed by: FAMILY MEDICINE

## 2019-02-25 PROCEDURE — 25000128 H RX IP 250 OP 636: Performed by: FAMILY MEDICINE

## 2019-02-25 PROCEDURE — 84703 CHORIONIC GONADOTROPIN ASSAY: CPT | Performed by: FAMILY MEDICINE

## 2019-02-25 PROCEDURE — 72132 CT LUMBAR SPINE W/DYE: CPT

## 2019-02-25 PROCEDURE — 72129 CT CHEST SPINE W/DYE: CPT

## 2019-02-25 PROCEDURE — 96365 THER/PROPH/DIAG IV INF INIT: CPT | Mod: 59 | Performed by: FAMILY MEDICINE

## 2019-02-25 PROCEDURE — 96361 HYDRATE IV INFUSION ADD-ON: CPT | Performed by: FAMILY MEDICINE

## 2019-02-25 PROCEDURE — 76775 US EXAM ABDO BACK WALL LIM: CPT | Mod: 26 | Performed by: FAMILY MEDICINE

## 2019-02-25 PROCEDURE — 96366 THER/PROPH/DIAG IV INF ADDON: CPT | Performed by: FAMILY MEDICINE

## 2019-02-25 PROCEDURE — 76775 US EXAM ABDO BACK WALL LIM: CPT | Performed by: FAMILY MEDICINE

## 2019-02-25 PROCEDURE — 85025 COMPLETE CBC W/AUTO DIFF WBC: CPT | Performed by: FAMILY MEDICINE

## 2019-02-25 PROCEDURE — 80053 COMPREHEN METABOLIC PANEL: CPT | Performed by: FAMILY MEDICINE

## 2019-02-25 PROCEDURE — 25000125 ZZHC RX 250: Performed by: FAMILY MEDICINE

## 2019-02-25 PROCEDURE — 96372 THER/PROPH/DIAG INJ SC/IM: CPT | Mod: 59 | Performed by: FAMILY MEDICINE

## 2019-02-25 RX ORDER — KETOROLAC TROMETHAMINE 15 MG/ML
15 INJECTION, SOLUTION INTRAMUSCULAR; INTRAVENOUS ONCE
Status: COMPLETED | OUTPATIENT
Start: 2019-02-25 | End: 2019-02-25

## 2019-02-25 RX ORDER — DIPHENHYDRAMINE HYDROCHLORIDE 50 MG/ML
12.5 INJECTION INTRAMUSCULAR; INTRAVENOUS ONCE
Status: COMPLETED | OUTPATIENT
Start: 2019-02-25 | End: 2019-02-25

## 2019-02-25 RX ORDER — ONDANSETRON 2 MG/ML
4 INJECTION INTRAMUSCULAR; INTRAVENOUS
Status: DISCONTINUED | OUTPATIENT
Start: 2019-02-25 | End: 2019-02-26

## 2019-02-25 RX ORDER — IOPAMIDOL 755 MG/ML
79 INJECTION, SOLUTION INTRAVASCULAR ONCE
Status: COMPLETED | OUTPATIENT
Start: 2019-02-25 | End: 2019-02-25

## 2019-02-25 RX ORDER — PROMETHAZINE HYDROCHLORIDE 25 MG/ML
12.5 INJECTION, SOLUTION INTRAMUSCULAR; INTRAVENOUS ONCE
Status: COMPLETED | OUTPATIENT
Start: 2019-02-25 | End: 2019-02-25

## 2019-02-25 RX ADMIN — KETOROLAC TROMETHAMINE 15 MG: 15 INJECTION, SOLUTION INTRAMUSCULAR; INTRAVENOUS at 23:14

## 2019-02-25 RX ADMIN — IOPAMIDOL 79 ML: 755 INJECTION, SOLUTION INTRAVENOUS at 23:32

## 2019-02-25 RX ADMIN — ONDANSETRON 4 MG: 2 INJECTION INTRAMUSCULAR; INTRAVENOUS at 22:36

## 2019-02-25 RX ADMIN — PROMETHAZINE HYDROCHLORIDE 12.5 MG: 25 INJECTION INTRAMUSCULAR; INTRAVENOUS at 22:39

## 2019-02-25 RX ADMIN — DIPHENHYDRAMINE HYDROCHLORIDE 12.5 MG: 50 INJECTION, SOLUTION INTRAMUSCULAR; INTRAVENOUS at 22:34

## 2019-02-25 RX ADMIN — SODIUM CHLORIDE 1000 ML: 9 INJECTION, SOLUTION INTRAVENOUS at 22:35

## 2019-02-25 RX ADMIN — FOLIC ACID: 5 INJECTION, SOLUTION INTRAMUSCULAR; INTRAVENOUS; SUBCUTANEOUS at 23:08

## 2019-02-25 NOTE — ED AVS SNAPSHOT
[facility] Emergency Department  [address] [facility] [address]  Phone:  [phone]  Fax:  [phone]                                    Domenico Wilder   MRN: [id number]    Department:  [facility] Emergency Department   Date of Visit:  2/25/2019           After Visit Summary Signature Page    I have received my discharge instructions, and my questions have been answered. I have discussed any challenges I see with this plan with the nurse or doctor.    ..........................................................................................................................................  Patient/Patient Representative Signature      ..........................................................................................................................................  Patient Representative Print Name and Relationship to Patient    ..................................................               ................................................  Date                                   Time    ..........................................................................................................................................  Reviewed by Signature/Title    ...................................................              ..............................................  Date                                               Time          22EPIC Rev 08/18

## 2019-02-26 VITALS
DIASTOLIC BLOOD PRESSURE: 78 MMHG | SYSTOLIC BLOOD PRESSURE: 140 MMHG | HEART RATE: 78 BPM | RESPIRATION RATE: 16 BRPM | TEMPERATURE: 97.6 F | HEIGHT: 65 IN | BODY MASS INDEX: 26.96 KG/M2 | OXYGEN SATURATION: 98 %

## 2019-02-26 LAB
ALBUMIN UR-MCNC: NEGATIVE MG/DL
APPEARANCE UR: CLEAR
BILIRUB UR QL STRIP: NEGATIVE
COLOR UR AUTO: YELLOW
GLUCOSE UR STRIP-MCNC: NEGATIVE MG/DL
HGB UR QL STRIP: NEGATIVE
KETONES UR STRIP-MCNC: NEGATIVE MG/DL
LEUKOCYTE ESTERASE UR QL STRIP: NEGATIVE
MUCOUS THREADS #/AREA URNS LPF: PRESENT /LPF
NITRATE UR QL: NEGATIVE
PH UR STRIP: 7 PH (ref 5–7)
RBC #/AREA URNS AUTO: <1 /HPF (ref 0–2)
SOURCE: ABNORMAL
SP GR UR STRIP: 1.03 (ref 1–1.03)
SQUAMOUS #/AREA URNS AUTO: <1 /HPF (ref 0–1)
UROBILINOGEN UR STRIP-MCNC: 0 MG/DL (ref 0–2)
WBC #/AREA URNS AUTO: 2 /HPF (ref 0–5)

## 2019-02-26 PROCEDURE — 81001 URINALYSIS AUTO W/SCOPE: CPT | Performed by: FAMILY MEDICINE

## 2019-02-26 PROCEDURE — 87086 URINE CULTURE/COLONY COUNT: CPT | Performed by: FAMILY MEDICINE

## 2019-02-26 PROCEDURE — 96376 TX/PRO/DX INJ SAME DRUG ADON: CPT | Performed by: FAMILY MEDICINE

## 2019-02-26 PROCEDURE — 25000128 H RX IP 250 OP 636: Performed by: FAMILY MEDICINE

## 2019-02-26 RX ORDER — KETOROLAC TROMETHAMINE 15 MG/ML
15 INJECTION, SOLUTION INTRAMUSCULAR; INTRAVENOUS ONCE
Status: COMPLETED | OUTPATIENT
Start: 2019-02-26 | End: 2019-02-26

## 2019-02-26 RX ORDER — PROMETHAZINE HYDROCHLORIDE 25 MG/ML
12.5 INJECTION, SOLUTION INTRAMUSCULAR; INTRAVENOUS ONCE
Status: COMPLETED | OUTPATIENT
Start: 2019-02-26 | End: 2019-02-26

## 2019-02-26 RX ORDER — ONDANSETRON 2 MG/ML
4 INJECTION INTRAMUSCULAR; INTRAVENOUS ONCE
Status: COMPLETED | OUTPATIENT
Start: 2019-02-26 | End: 2019-02-26

## 2019-02-26 RX ORDER — ONDANSETRON 4 MG/1
4-8 TABLET, ORALLY DISINTEGRATING ORAL EVERY 8 HOURS PRN
Qty: 12 TABLET | Refills: 0 | Status: SHIPPED | OUTPATIENT
Start: 2019-02-26 | End: 2019-03-01

## 2019-02-26 RX ORDER — DIPHENHYDRAMINE HYDROCHLORIDE 50 MG/ML
12.5 INJECTION INTRAMUSCULAR; INTRAVENOUS ONCE
Status: COMPLETED | OUTPATIENT
Start: 2019-02-26 | End: 2019-02-26

## 2019-02-26 RX ORDER — OLANZAPINE 10 MG/2ML
5 INJECTION, POWDER, FOR SOLUTION INTRAMUSCULAR ONCE
Status: COMPLETED | OUTPATIENT
Start: 2019-02-26 | End: 2019-02-26

## 2019-02-26 RX ADMIN — PROMETHAZINE HYDROCHLORIDE 12.5 MG: 25 INJECTION INTRAMUSCULAR; INTRAVENOUS at 00:27

## 2019-02-26 RX ADMIN — ONDANSETRON 4 MG: 2 INJECTION INTRAMUSCULAR; INTRAVENOUS at 00:25

## 2019-02-26 RX ADMIN — KETOROLAC TROMETHAMINE 15 MG: 15 INJECTION, SOLUTION INTRAMUSCULAR; INTRAVENOUS at 00:22

## 2019-02-26 RX ADMIN — DIPHENHYDRAMINE HYDROCHLORIDE 12.5 MG: 50 INJECTION, SOLUTION INTRAMUSCULAR; INTRAVENOUS at 00:20

## 2019-02-26 RX ADMIN — OLANZAPINE 5 MG: 10 INJECTION, POWDER, FOR SOLUTION INTRAMUSCULAR at 01:22

## 2019-02-26 NOTE — DISCHARGE INSTRUCTIONS
Return to the Emergency Room if the following occurs:     Dehydration, fever >101, or for any concern at anytime.    Or, follow-up with the following provider as we discussed:     Return to your primary doctor this week for reevaluation.    Medications discussed:    Zofran for nausea, as needed.  Ibuprofen 600 mg every six hours for pain (7 days duration).  Tylenol 1000 mg every six hours for pain (7 days duration).  Therefore, you can alternate these every three hours and do it safely.    If you received pain-relieving or sedating medication during your time in the ER, avoid alcohol, driving automobiles, or working with machinery.  Also, a responsible adult must stay with you.        Call the Nurse Advice Line at [phone] or [phone] for any concern at anytime.

## 2019-02-26 NOTE — ED PROVIDER NOTES
"HPI  Current medications, past medical history, and social history are reviewed.    The patient is a 28-year-old female presenting with back pain and nausea with vomiting.  Her past medical history is significant for polysubstance abuse.  No prior surgery.  No recent medication changes.    The patient has not been feeling well over the past few weeks.  She has had some dull low back pain during this time.  She describes severe low back pain now.  She denies that the pain favors one side or the other.  The pain is not necessarily worsened with movement or position.  She has difficulty telling me if she has abdominal pain but instead says that she is nauseous.  She has been vomiting multiple times over the past 2 days.  Her severe low back pain has been present during this time as well.  No reported fever.  No reported dysuria, urgency, or frequency though she says, \"I cannot remember paying last.\"  She denies diarrhea but she cannot tell me if she is constipated or if she has had regular bowel movements.  No recent falls or injuries.  She denies any sexual activity over the past 2 months.  No vaginal discharge or irritation.  No vaginal bleeding.    ROS: All other review of systems are negative other than that noted above.     Past Medical History:   Diagnosis Date     Anxiety      Chickenpox      Depression      Depressive disorder      GERD (gastroesophageal reflux disease)      Substance abuse (H)      Past Surgical History:   Procedure Laterality Date     NO HISTORY OF SURGERY       Medicines    ondansetron (ZOFRAN ODT) 4 MG ODT tab   ARIPiprazole (ABILIFY) 5 MG tablet   buprenorphine HCl-naloxone HCl (SUBOXONE) 8-2 MG per film   buPROPion (WELLBUTRIN XL) 150 MG 24 hr tablet   folic acid (FOLVITE) 1 MG tablet   hydrOXYzine (ATARAX) 25 MG tablet   ibuprofen (ADVIL/MOTRIN) 600 MG tablet   omeprazole (PRILOSEC) 20 MG CR capsule   Prenatal Vit-Fe Fumarate-FA (PRENATAL MULTIVITAMIN PLUS IRON) 27-0.8 MG TABS per tablet " "  traZODone (DESYREL) 100 MG tablet     Family History   Problem Relation Age of Onset     Allergies Mother      Depression Mother      Alcohol/Drug Mother      Prostate Cancer Paternal Grandfather      Diabetes Maternal Grandfather         non insulin dependent     Social History     Tobacco Use     Smoking status: Former Smoker     Packs/day: 0.50     Years: 13.00     Pack years: 6.50     Types: Cigarettes     Start date: 12/28/2017     Smokeless tobacco: Never Used   Substance Use Topics     Alcohol use: Yes     Comment: 1 liter per day vodka     Drug use: No     Types: Marijuana, IV, Methamphetamines, Opiates     Comment: 1-2 grams of heroine daily, occasional meth and marijuana         PHYSICAL  BP (!) 131/96   Pulse 85   Temp 97.6  F (36.4  C) (Oral)   Resp 18   Ht 1.651 m (5' 5\")   SpO2 100%   BMI 26.96 kg/m    General: Patient is alert and in severe distress.  Cachectic.  Skin sores diffusely present, consistent with picking-type sores.  Neurological: Alert.  Moving upper and lower extremities equally, bilaterally.  Head / Neck: Atraumatic.  Ears: Not done.  Eyes: Pupils are equal, round, and reactive.  Normal conjunctiva.  Nose: Midline.  No epistaxis.  Mouth / Throat: No ulcerations or lesions.  Upper pharynx is not erythematous.  Moist.  Respiratory: No respiratory distress. CTA B.  Cardiovascular: Regular rhythm.  Peripheral extremities are warm.  No edema.  No calf tenderness.  Abdomen / Pelvis: Soft throughout, not tender.  Genitalia: Not done.  Musculoskeletal: No tenderness over major muscles and joints.  Skin: No evidence of rash or trauma.        ED COURSE  2222.  The patient has a difficult social history given her polysubstance abuse.  She is cachectic.  She has bilateral lower back pain that been severe over the past 2 days.  She has been nauseous with vomiting over the past 2 days.  The rest of her history is difficult to obtain.  She seems very uncomfortable at this point.  She is asking " for water and feels like she is dry.  She looks dehydrated.  Lab values pending.  Banana bag.  Phenergan, Benadryl, Zofran.    Labs Ordered and Resulted from Time of ED Arrival Up to the Time of Departure from the ED   CBC WITH PLATELETS DIFFERENTIAL - Abnormal; Notable for the following components:       Result Value    RBC Count 5.47 (*)     Hemoglobin 16.3 (*)     Hematocrit 47.7 (*)     Platelet Count 467 (*)     All other components within normal limits   COMPREHENSIVE METABOLIC PANEL - Abnormal; Notable for the following components:    Potassium 3.0 (*)     All other components within normal limits   ROUTINE UA WITH MICROSCOPIC - Abnormal; Notable for the following components:    Mucous Urine Present (*)     All other components within normal limits   HCG QUALITATIVE   URINE CULTURE AEROBIC BACTERIAL     2310.  The patient continues to complain of severe mid and lower back pain that is bilateral.  She has subjective fever symptoms.  She has history of IV drug use.  CT scan of the thoracic and lumbar spine with IV contrast is ordered to look for an infectious etiology.  She has been unable to provide a urine sample as of yet.  She is without typical urinary symptoms though.  She is without PID symptoms and is not sexually active.    IMAGING  Images reviewed by me.  Radiology report also reviewed.  CT Lumbar Spine w Contrast   Preliminary Result   IMPRESSION: Mild L5-S1 degenerative disc disease. No acute   abnormality.      CT Thoracic Spine w Contrast   Preliminary Result   IMPRESSION: No acute abnormality.      POC US RETROPERITONEAL LIMITED   Final Result   [facility] Procedure Note     Limited Bedside ED Renal Ultrasound:      PERFORMED BY: Dr. Rolf Del Cid   INDICATIONS:  Flank Pain   PROBE: Low frequency convex probe   BODY LOCATION:  Abdomen   FINDINGS:  The ultrasound was performed with longitudinal and transverse views.    Right Kidney:    Hydronephrosis:  None    Renal cyst:  None   Left  "Kidney:    Hydronephrosis:  None    Renal cyst:  None   INTERPRETATION:  The evaluation of the kidneys was normal without evidence of hydronephrosis or cysts.   IMAGE DOCUMENTATION: Images were archived to hard drive.           0203.  The patient continued to have some nausea off and on here in the ED.  She was given another round of Zofran, Phenergan, and Benadryl.  She had a little bit of akathisia with \"crawling\" of her skin described.  Zyprexa 5 mg IM was given.  This helped with sedation and she was sleeping on and off prior to discharge.  She continued to show a bit of agitation and complain of some nausea though she was not vomiting.  Her work appears been unremarkable.  Low concern for severe cause of symptoms requiring hospitalization or consultation.  She has a friend with her tonight who will take responsibility for her.  Recommending she get some follow-up this week for reevaluation.  Of note, the patient denies any recent drugs of abuse and denies that this is withdrawal related.  There is certainly some concern for cyclical vomiting versus methamphetamine withdrawal versus opioid withdrawal.  This could be viral.  Return for worsening as discussed.  A prescription for Zofran is given.    Medications   sodium chloride 0.9 % 1,000 mL with INFUVITE ADULT 10 mL, thiamine 100 mg, folic acid 1 mg infusion ( Intravenous Stopped 2/26/19 0116)   Saline Flush (not administered)   promethazine (PHENERGAN) IV injection 12.5 mg (12.5 mg Intravenous Given 2/25/19 2239)   diphenhydrAMINE (BENADRYL) injection 12.5 mg (12.5 mg Intravenous Given 2/25/19 2234)   ketorolac (TORADOL) injection 15 mg (15 mg Intravenous Given 2/25/19 2314)   0.9% sodium chloride BOLUS (0 mLs Intravenous Stopped 2/25/19 2308)   iopamidol (ISOVUE-370) solution 79 mL (79 mLs Intravenous Given 2/25/19 2332)   diphenhydrAMINE (BENADRYL) injection 12.5 mg (12.5 mg Intravenous Given 2/26/19 0020)   ketorolac (TORADOL) injection 15 mg (15 mg " Intravenous Given 2/26/19 0022)   promethazine (PHENERGAN) IV injection 12.5 mg (12.5 mg Intravenous Given 2/26/19 0027)   ondansetron (ZOFRAN) injection 4 mg (4 mg Intravenous Given 2/26/19 0025)   OLANZapine (zyPREXA) injection 5 mg (5 mg Intramuscular Given 2/26/19 0122)       IMPRESSION    ICD-10-CM    1. Nausea and vomiting, intractability of vomiting not specified, unspecified vomiting type R11.2          Critical Care time:  none                    Rolf Del Cid MD  02/26/19 1739

## 2019-02-26 NOTE — ED NOTES
Pt thrashing around on stretcher. Pt stating she wants to take a bath. Pt inquiring if there is a bath available in the ER.

## 2019-02-27 LAB
BACTERIA SPEC CULT: NO GROWTH
Lab: NORMAL
SPECIMEN SOURCE: NORMAL

## 2019-02-27 NOTE — RESULT ENCOUNTER NOTE
Final urine culture report is NEGATIVE per [facility] ED Lab Result protocol.    If NEGATIVE result, no change in treatment, per [facility] ED Lab Result protocol.

## 2019-05-08 ENCOUNTER — HOSPITAL ENCOUNTER (EMERGENCY)
Facility: CLINIC | Age: 29
Discharge: HOME | End: 2019-05-08
Attending: NURSE PRACTITIONER | Admitting: NURSE PRACTITIONER
Payer: COMMERCIAL

## 2019-05-08 VITALS
SYSTOLIC BLOOD PRESSURE: 118 MMHG | DIASTOLIC BLOOD PRESSURE: 84 MMHG | RESPIRATION RATE: 18 BRPM | TEMPERATURE: 98.1 F | OXYGEN SATURATION: 100 %

## 2019-05-08 DIAGNOSIS — F33.1 MAJOR DEPRESSIVE DISORDER, RECURRENT, MODERATE (H): ICD-10-CM

## 2019-05-08 DIAGNOSIS — L98.9 SKIN LESION: ICD-10-CM

## 2019-05-08 PROCEDURE — 87070 CULTURE OTHR SPECIMN AEROBIC: CPT | Performed by: NURSE PRACTITIONER

## 2019-05-08 PROCEDURE — 87186 SC STD MICRODIL/AGAR DIL: CPT | Performed by: NURSE PRACTITIONER

## 2019-05-08 PROCEDURE — G0463 HOSPITAL OUTPT CLINIC VISIT: HCPCS

## 2019-05-08 PROCEDURE — 99213 OFFICE O/P EST LOW 20 MIN: CPT | Mod: Z6 | Performed by: NURSE PRACTITIONER

## 2019-05-08 PROCEDURE — 87077 CULTURE AEROBIC IDENTIFY: CPT | Performed by: NURSE PRACTITIONER

## 2019-05-08 RX ORDER — CEPHALEXIN 500 MG/1
500 CAPSULE ORAL 3 TIMES DAILY
Qty: 21 CAPSULE | Refills: 0 | Status: SHIPPED | OUTPATIENT
Start: 2019-05-08 | End: 2019-05-15

## 2019-05-08 RX ORDER — HYDROXYZINE HYDROCHLORIDE 25 MG/1
25-50 TABLET, FILM COATED ORAL EVERY 6 HOURS PRN
Qty: 120 TABLET | Refills: 0 | Status: SHIPPED | OUTPATIENT
Start: 2019-05-08 | End: 2019-05-21

## 2019-05-08 ASSESSMENT — ENCOUNTER SYMPTOMS
HEADACHES: 0
ABDOMINAL PAIN: 0
DIAPHORESIS: 0
FEVER: 0
DYSURIA: 0
VOMITING: 0
DIFFICULTY URINATING: 0
SHORTNESS OF BREATH: 0
COUGH: 0
CHILLS: 0
CONSTIPATION: 0
DIARRHEA: 0
CONFUSION: 0
WHEEZING: 0
FATIGUE: 1
NAUSEA: 0
EYE REDNESS: 0

## 2019-05-08 NOTE — ED AVS SNAPSHOT
[facility] Emergency Department  [address] [address]  Phone:  [phone]  Fax:  [phone]                                    Domenico Wilder   MRN: [id number]    Department:  [facility] Emergency Department   Date of Visit:  5/8/2019           After Visit Summary Signature Page    I have received my discharge instructions, and my questions have been answered. I have discussed any challenges I see with this plan with the nurse or doctor.    ..........................................................................................................................................  Patient/Patient Representative Signature      ..........................................................................................................................................  Patient Representative Print Name and Relationship to Patient    ..................................................               ................................................  Date                                   Time    ..........................................................................................................................................  Reviewed by Signature/Title    ...................................................              ..............................................  Date                                               Time          22EPIC Rev 08/18

## 2019-05-08 NOTE — ED PROVIDER NOTES
History     Chief Complaint   Patient presents with     Rash     fatigue     HPI  Domenico Wilder is a 28 year old female who presents with concerns of a rash.  Pt reports onset of symptoms was one to two months ago.  Pt has been trying bleach baths for a couple of weeks and there has been mild improvement in symptoms. Pt has also tried hibiclens and there may have been improvement in symptoms.    Patient denies history of MRSA but mom is present and reports mom has a history of chronic staph and similar symptoms.  Patient denies fever, aches, chills, sweats.  Patient does admit to underlying chronic fatigue symptoms.  Patient admits to history of polysubstance abuse including opioids, alcohol, methamphetamine but reports being clean since last July.  Patient also admits to a history of anxiety and depression for which she is not on her previous medications of Wellbutrin, hydroxyzine, Abilify presently due to insurance issues.    Allergies:  Allergies   Allergen Reactions     Cats      Dogs        Problem List:    Patient Active Problem List    Diagnosis Date Noted     Chemical dependency (H) 04/06/2018     Priority: Medium     Need for Tdap vaccination 01/02/2018     Priority: Medium     Opioid use disorder, severe, dependence (H) 12/07/2017     Priority: Medium     Alcohol dependence with uncomplicated withdrawal (H) 12/06/2017     Priority: Medium     Opioid dependence with withdrawal (H) 02/27/2017     Priority: Medium     Polysubstance abuse (H) 07/14/2016     Priority: Medium     Alcoholism (H) 12/27/2015     Priority: Medium     Major depressive disorder, recurrent, moderate (H) 11/05/2015     Priority: Medium     Smoker 02/02/2015     Priority: Medium     Insomnia 10/06/2008     Priority: Medium     GERD (gastroesophageal reflux disease) 10/06/2008     Priority: Medium        Past Medical History:    Past Medical History:   Diagnosis Date     Anxiety      Chickenpox      Depression      Depressive disorder       GERD (gastroesophageal reflux disease)      Substance abuse (H)        Past Surgical History:    Past Surgical History:   Procedure Laterality Date     NO HISTORY OF SURGERY         Family History:    Family History   Problem Relation Age of Onset     Allergies Mother      Depression Mother      Alcohol/Drug Mother      Prostate Cancer Paternal Grandfather      Diabetes Maternal Grandfather         non insulin dependent       Social History:  Marital Status:  Single [1]  Social History     Tobacco Use     Smoking status: Former Smoker     Packs/day: 0.50     Years: 13.00     Pack years: 6.50     Types: Cigarettes     Start date: 12/28/2017     Smokeless tobacco: Never Used   Substance Use Topics     Alcohol use: Yes     Comment: 1 liter per day vodka     Drug use: No     Types: Marijuana, IV, Methamphetamines, Opiates     Comment: 1-2 grams of heroine daily, occasional meth and marijuana        Medications:      cephALEXin (KEFLEX) 500 MG capsule   hydrOXYzine (ATARAX) 25 MG tablet   ARIPiprazole (ABILIFY) 5 MG tablet   buprenorphine HCl-naloxone HCl (SUBOXONE) 8-2 MG per film   buPROPion (WELLBUTRIN XL) 150 MG 24 hr tablet   folic acid (FOLVITE) 1 MG tablet   ibuprofen (ADVIL/MOTRIN) 600 MG tablet   omeprazole (PRILOSEC) 20 MG CR capsule   Prenatal Vit-Fe Fumarate-FA (PRENATAL MULTIVITAMIN PLUS IRON) 27-0.8 MG TABS per tablet   traZODone (DESYREL) 100 MG tablet     Review of Systems   Constitutional: Positive for fatigue. Negative for chills, diaphoresis and fever.   HENT: Negative for congestion and ear pain.    Eyes: Negative for redness and visual disturbance.   Respiratory: Negative for cough, shortness of breath and wheezing.    Cardiovascular: Negative for chest pain.   Gastrointestinal: Negative for abdominal pain, constipation, diarrhea, nausea and vomiting.   Genitourinary: Negative for difficulty urinating and dysuria.   Skin: Positive for rash (scattered over arms, legs, torso, face, sparing hands  and feet).   Neurological: Negative for headaches.   Psychiatric/Behavioral: Negative for confusion.   All other systems reviewed and are negative.      Physical Exam   BP: 118/84  Heart Rate: 77  Temp: 98.1  F (36.7  C)  Resp: 18  SpO2: 100 %      Physical Exam   Constitutional: She is oriented to person, place, and time. She appears well-developed and well-nourished. No distress.   HENT:   Head: Normocephalic and atraumatic.   Right Ear: External ear normal.   Left Ear: External ear normal.   Eyes: Conjunctivae are normal. Right eye exhibits no discharge. Left eye exhibits no discharge. No scleral icterus.   Neck: Normal range of motion. Neck supple.   Cardiovascular: Regular rhythm and normal heart sounds. Exam reveals no friction rub.   No murmur heard.  Pulmonary/Chest: Effort normal and breath sounds normal. No stridor. No respiratory distress. She has no wheezes. She has no rales. She exhibits no tenderness.   Neurological: She is alert and oriented to person, place, and time. Coordination normal.   Skin: Skin is warm and dry. Lesion (scattered excoriated lesions with dry scabs - varied sizes 5-10 mm - noted on nape of neck, back, bilateral forearms, lower legs without weeping/oozing, purlence, erythema) noted. No rash noted. She is not diaphoretic. No erythema. No pallor.   Psychiatric: She has a normal mood and affect.   Nursing note and vitals reviewed.      ED Course        Procedures      Results for orders placed or performed during the hospital encounter of 05/08/19 (from the past 24 hour(s))   Wound Culture Aerobic Bacterial   Result Value Ref Range    Specimen Description Back Wound     Special Requests Specimen collected in eSwab transport (white cap)     Culture Micro PENDING        Medications - No data to display    Assessments & Plan (with Medical Decision Making)     I have reviewed the nursing notes.    I have reviewed the findings, diagnosis, plan and need for follow up with the  patient.  Domenico Wilder is a 28 year old female who presents with a 1 to 2-month history of a scattered pruritic rash without fever, aches, chills, sweats, purulent drainage.  Patient has been self treating with Hibiclens and bleach baths and started this approximately 1 to 2 weeks ago and since then symptoms have been improving.  Patient also has a history of anxiety and is not currently on her medications.  Patient has additional history of polysubstance abuse including methamphetamine, alcohol, opioids and reports that she is 7 months sober.  On exam the lesions are scabbed over and no open weeping lesions noted however a wound culture was obtained with the jose a scab and discussed with patient various possibilities including staphylococcal aureus versus MRSA versus anxiety related pruritus and subsequent lesions.  Will initiate patient on cephalexin 3 times daily for 7 days and encouraged continued bleach baths once a week and recommend moisturizing and given a handout on moisturizing possibilities.  Also prescribed Atarax for anxiety and pruritus and recommend taking this 3 times a day and recommend establishing care with primary care and restarting medications for anxiety and to discuss follow-up of wound culture obtained today.  Encourage follow-up here if sudden worsening of symptoms.  Patient verbalizes understanding regarding all of the above and was discharged in stable condition.     Medication List      Started    cephALEXin 500 MG capsule  Commonly known as:  KEFLEX  500 mg, Oral, 3 TIMES DAILY            Final diagnoses:   Skin lesion       5/8/2019   [facility] EMERGENCY DEPARTMENT     Liz Jacome APRN CNP  05/09/19 1200

## 2019-05-08 NOTE — DISCHARGE INSTRUCTIONS
Take cephalexin and take three times daily for 7 days  Continue with once weekly bleach baths  Moisturize daily  Use hydroxyzine three times daily as needed.

## 2019-05-09 NOTE — RESULT ENCOUNTER NOTE
[place] ED discharge antibiotic (if prescribed):  Cephalexin (Keflex) 500 mg capsule, 1 capsule (500 mg) by mouth 3 times daily for 7 days.  Incision and Drainage performed in [place] ED [Yes / No] : No  No changes in treatment per ED lab result protocol.

## 2019-05-11 ENCOUNTER — TELEPHONE (OUTPATIENT)
Dept: EMERGENCY MEDICINE | Facility: CLINIC | Age: 29
End: 2019-05-11

## 2019-05-11 DIAGNOSIS — R11.2 NAUSEA WITH VOMITING: ICD-10-CM

## 2019-05-11 LAB
BACTERIA SPEC CULT: ABNORMAL
BACTERIA SPEC CULT: ABNORMAL
Lab: ABNORMAL
SPECIMEN SOURCE: ABNORMAL

## 2019-05-11 NOTE — TELEPHONE ENCOUNTER
[facility] Emergency Department Lab result notification:    [place] ED lab result protocol used  General Culture    Reason for call  Notify of lab results, assess symptoms,  review ED providers recommendations/discharge instructions (if necessary) and advise per ED lab result f/u protocol    Lab Result   Final Wound culture (back) report on 5/11/19  Emergency Dept discharge antibiotic prescribed: Cephalexin (Keflex) 500 mg capsule, 1 capsule (500 mg) by mouth 3 times daily for 7 days.  #1. Bacteria, Moderate growth Staphylococcus aureus, which is [SUSCEPTIBLE] to antibiotic   Incision and Drainage performed in [place] ED [Yes / No]: No  Patient to be notified of result, symptoms assessed and advised per [place] ED lab result protocol.    Information table from ED Provider visit on 5/8/19  Symptoms reported at ED visit (Chief complaint, HPI) Domenico Wilder is a 28 year old female who presents with concerns of a rash.  Pt reports onset of symptoms was one to two months ago.  Pt has been trying bleach baths for a couple of weeks and there has been mild improvement in symptoms. Pt has also tried hibiclens and there may have been improvement in symptoms.    Patient denies history of MRSA but mom is present and reports mom has a history of chronic staph and similar symptoms.  Patient denies fever, aches, chills, sweats.  Patient does admit to underlying chronic fatigue symptoms.  Patient admits to history of polysubstance abuse including opioids, alcohol, methamphetamine but reports being clean since last July.  Patient also admits to a history of anxiety and depression for which she is not on her previous medications of Wellbutrin, hydroxyzine, Abilify presently due to insurance issues.        ED providers Impression and Plan (applicable information) Domenico Wilder is a 28 year old female who presents with a 1 to 2-month history of a scattered pruritic rash without fever, aches, chills, sweats, purulent  drainage.  Patient has been self treating with Hibiclens and bleach baths and started this approximately 1 to 2 weeks ago and since then symptoms have been improving.  Patient also has a history of anxiety and is not currently on her medications.  Patient has additional history of polysubstance abuse including methamphetamine, alcohol, opioids and reports that she is 7 months sober.  On exam the lesions are scabbed over and no open weeping lesions noted however a wound culture was obtained with the jose a scab and discussed with patient various possibilities including staphylococcal aureus versus MRSA versus anxiety related pruritus and subsequent lesions.  Will initiate patient on cephalexin 3 times daily for 7 days and encouraged continued bleach baths once a week and recommend moisturizing and given a handout on moisturizing possibilities.  Also prescribed Atarax for anxiety and pruritus and recommend taking this 3 times a day and recommend establishing care with primary care and restarting medications for anxiety and to discuss follow-up of wound culture obtained today.  Encourage follow-up here if sudden worsening of symptoms.  Patient verbalizes understanding regarding all of the above and was discharged in stable condition.     Miscellaneous information N/A     RN Assessment (Patient s current Symptoms), include time called.  [Insert Left message here if message left]  Msg left at 11:25 am.    PCP follow-up Questions asked: NO    Charo Madrigal RN    [facility] RN  Lung Nodule and ED Lab Results F/U RN  Epic pool (ED late result f/u RN) : P [id number]   # [phone]    Copy of Lab result   Order   Wound Culture Aerobic Bacterial [[id number]] (Order [id number])   Exam Information     Exam Date Exam Time Accession # Results    5/8/19  3:10 PM [id number]    Component Results     Specimen Information: Back        Component Collected Lab   Specimen Description 05/08/2019  3:10    Back Wound     Special Requests 05/08/2019  3:10    Specimen collected in eSwab transport (white cap)    Culture Micro Abnormal  05/08/2019  3:10    Moderate growth   Staphylococcus aureus   This isolate DOES NOT demonstrate inducible clindamycin resistance in vitro. Clindamycin   is susceptible and could be used when indicated, however, erythromycin is resistant and   should not be used.    Culture Micro 05/08/2019  3:10    Light growth   Normal skin valeria    Susceptibility     Staphylococcus aureus     Antibiotic Interpretation Sensitivity Method Status   CLINDAMYCIN Sensitive <=0.25 ug/mL KYE Final   ERYTHROMYCIN Resistant >=8 ug/mL KYE Final   GENTAMICIN Sensitive <=0.5 ug/mL KYE Final   OXACILLIN Sensitive 0.5 ug/mL KYE Final   TETRACYCLINE Intermediate 8 ug/mL KYE Final   Trimethoprim/Sulfa Sensitive <=0.5/9.5 ug/mL KYE Final   VANCOMYCIN Sensitive <=0.5 ug/mL KYE Final

## 2019-05-12 RX ORDER — ONDANSETRON 4 MG/1
4 TABLET, ORALLY DISINTEGRATING ORAL EVERY 12 HOURS PRN
Qty: 10 TABLET | Refills: 0 | Status: SHIPPED | OUTPATIENT
Start: 2019-05-12 | End: 2019-05-15

## 2019-05-12 NOTE — TELEPHONE ENCOUNTER
[facility] Emergency Department Lab result notification     Patient/parent Name  Domenico Wilder    RN Assessment (Patient s current Symptoms), include time called.  [Insert Left message here if message left]  Back improving, Keflex causing nausea/vomiting  Lab result (if applicable):  Final Wound culture (back) report on 5/11/19  Emergency Dept discharge antibiotic prescribed: Cephalexin (Keflex) 500 mg capsule, 1 capsule (500 mg) by mouth 3 times daily for 7 days.  #1. Bacteria, Moderate growth Staphylococcus aureus, which is [SUSCEPTIBLE] to antibiotic   Incision and Drainage performed in [facility] [Yes / No]: No  Patient to be notified of result, symptoms assessed and advised per [facility] ED lab result protocol.     RN Recommendations/Instructions per [facility] lab result protocol  To continue with plan of care.  Did order Zofran per protocol to [facility].     Please Contact your PCP clinic or return to the Emergency department if your:    Symptoms return.    Symptoms do not resolve after completing antibiotic.    Symptoms worsen or other concerning symptom's.    PCP follow-up Questions asked: YES       Charo Madrigal RN    [facility] RN  Lung Nodule and ED Lab Results F/U RN  Epic pool (ED late result f/u RN) : [id number]   # [phone]

## 2019-05-21 ENCOUNTER — HOSPITAL ENCOUNTER (EMERGENCY)
Facility: CLINIC | Age: 29
Discharge: HOME | End: 2019-05-21
Attending: EMERGENCY MEDICINE | Admitting: EMERGENCY MEDICINE
Payer: COMMERCIAL

## 2019-05-21 VITALS
BODY MASS INDEX: 20.8 KG/M2 | TEMPERATURE: 99.1 F | OXYGEN SATURATION: 99 % | WEIGHT: 125 LBS | SYSTOLIC BLOOD PRESSURE: 91 MMHG | RESPIRATION RATE: 18 BRPM | DIASTOLIC BLOOD PRESSURE: 63 MMHG | HEART RATE: 98 BPM

## 2019-05-21 DIAGNOSIS — J02.9 VIRAL PHARYNGITIS: ICD-10-CM

## 2019-05-21 DIAGNOSIS — F10.930 ALCOHOL WITHDRAWAL SYNDROME WITHOUT COMPLICATION (H): ICD-10-CM

## 2019-05-21 DIAGNOSIS — Z32.01 PREGNANCY TEST POSITIVE: ICD-10-CM

## 2019-05-21 LAB
ALBUMIN SERPL-MCNC: 3.6 G/DL (ref 3.4–5)
ALBUMIN UR-MCNC: 10 MG/DL
ALCOHOL BREATH TEST: 0 (ref 0–0.01)
ALP SERPL-CCNC: 77 U/L (ref 40–150)
ALT SERPL W P-5'-P-CCNC: 14 U/L (ref 0–50)
AMPHETAMINES UR QL SCN: POSITIVE
ANION GAP SERPL CALCULATED.3IONS-SCNC: 9 MMOL/L (ref 3–14)
APPEARANCE UR: ABNORMAL
AST SERPL W P-5'-P-CCNC: 13 U/L (ref 0–45)
B-HCG SERPL-ACNC: ABNORMAL IU/L (ref 0–5)
BARBITURATES UR QL: NEGATIVE
BASOPHILS # BLD AUTO: 0 10E9/L (ref 0–0.2)
BASOPHILS NFR BLD AUTO: 0.2 %
BENZODIAZ UR QL: NEGATIVE
BILIRUB SERPL-MCNC: 0.2 MG/DL (ref 0.2–1.3)
BILIRUB UR QL STRIP: NEGATIVE
BUN SERPL-MCNC: 6 MG/DL (ref 7–30)
CALCIUM SERPL-MCNC: 9.3 MG/DL (ref 8.5–10.1)
CANNABINOIDS UR QL SCN: POSITIVE
CHLORIDE SERPL-SCNC: 100 MMOL/L (ref 94–109)
CO2 SERPL-SCNC: 30 MMOL/L (ref 20–32)
COCAINE UR QL: NEGATIVE
COLOR UR AUTO: YELLOW
CREAT SERPL-MCNC: 0.44 MG/DL (ref 0.52–1.04)
DEPRECATED S PYO AG THROAT QL EIA: NORMAL
DIFFERENTIAL METHOD BLD: ABNORMAL
EOSINOPHIL # BLD AUTO: 0.1 10E9/L (ref 0–0.7)
EOSINOPHIL NFR BLD AUTO: 0.4 %
ERYTHROCYTE [DISTWIDTH] IN BLOOD BY AUTOMATED COUNT: 13.3 % (ref 10–15)
ETHANOL UR QL SCN: NEGATIVE
GFR SERPL CREATININE-BSD FRML MDRD: >90 ML/MIN/{1.73_M2}
GLUCOSE SERPL-MCNC: 78 MG/DL (ref 70–99)
GLUCOSE UR STRIP-MCNC: NEGATIVE MG/DL
HCG UR QL: POSITIVE
HCT VFR BLD AUTO: 42.5 % (ref 35–47)
HGB BLD-MCNC: 14.2 G/DL (ref 11.7–15.7)
HGB UR QL STRIP: NEGATIVE
HYALINE CASTS #/AREA URNS LPF: 10 /LPF (ref 0–2)
IMM GRANULOCYTES # BLD: 0 10E9/L (ref 0–0.4)
IMM GRANULOCYTES NFR BLD: 0.3 %
KETONES UR STRIP-MCNC: NEGATIVE MG/DL
LEUKOCYTE ESTERASE UR QL STRIP: NEGATIVE
LYMPHOCYTES # BLD AUTO: 2.1 10E9/L (ref 0.8–5.3)
LYMPHOCYTES NFR BLD AUTO: 15.9 %
MCH RBC QN AUTO: 29.9 PG (ref 26.5–33)
MCHC RBC AUTO-ENTMCNC: 33.4 G/DL (ref 31.5–36.5)
MCV RBC AUTO: 90 FL (ref 78–100)
MONOCYTES # BLD AUTO: 1.3 10E9/L (ref 0–1.3)
MONOCYTES NFR BLD AUTO: 9.6 %
MUCOUS THREADS #/AREA URNS LPF: PRESENT /LPF
NEUTROPHILS # BLD AUTO: 9.5 10E9/L (ref 1.6–8.3)
NEUTROPHILS NFR BLD AUTO: 73.6 %
NITRATE UR QL: NEGATIVE
NRBC # BLD AUTO: 0 10*3/UL
NRBC BLD AUTO-RTO: 0 /100
OPIATES UR QL SCN: NEGATIVE
PH UR STRIP: 6.5 PH (ref 5–7)
PLATELET # BLD AUTO: 263 10E9/L (ref 150–450)
POTASSIUM SERPL-SCNC: 3.6 MMOL/L (ref 3.4–5.3)
PROT SERPL-MCNC: 8.3 G/DL (ref 6.8–8.8)
RBC # BLD AUTO: 4.75 10E12/L (ref 3.8–5.2)
RBC #/AREA URNS AUTO: 0 /HPF (ref 0–2)
SODIUM SERPL-SCNC: 139 MMOL/L (ref 133–144)
SOURCE: ABNORMAL
SP GR UR STRIP: 1.02 (ref 1–1.03)
SPECIMEN SOURCE: NORMAL
SQUAMOUS #/AREA URNS AUTO: 1 /HPF (ref 0–1)
UROBILINOGEN UR STRIP-MCNC: NORMAL MG/DL (ref 0–2)
WBC # BLD AUTO: 13 10E9/L (ref 4–11)
WBC #/AREA URNS AUTO: 4 /HPF (ref 0–5)

## 2019-05-21 PROCEDURE — 80320 DRUG SCREEN QUANTALCOHOLS: CPT | Performed by: EMERGENCY MEDICINE

## 2019-05-21 PROCEDURE — 87077 CULTURE AEROBIC IDENTIFY: CPT | Performed by: EMERGENCY MEDICINE

## 2019-05-21 PROCEDURE — 82075 ASSAY OF BREATH ETHANOL: CPT | Performed by: EMERGENCY MEDICINE

## 2019-05-21 PROCEDURE — 99284 EMERGENCY DEPT VISIT MOD MDM: CPT | Mod: 25 | Performed by: EMERGENCY MEDICINE

## 2019-05-21 PROCEDURE — 85025 COMPLETE CBC W/AUTO DIFF WBC: CPT | Performed by: EMERGENCY MEDICINE

## 2019-05-21 PROCEDURE — 99284 EMERGENCY DEPT VISIT MOD MDM: CPT | Mod: Z6 | Performed by: EMERGENCY MEDICINE

## 2019-05-21 PROCEDURE — 81001 URINALYSIS AUTO W/SCOPE: CPT | Performed by: EMERGENCY MEDICINE

## 2019-05-21 PROCEDURE — 87880 STREP A ASSAY W/OPTIC: CPT | Performed by: EMERGENCY MEDICINE

## 2019-05-21 PROCEDURE — 25000132 ZZH RX MED GY IP 250 OP 250 PS 637: Performed by: EMERGENCY MEDICINE

## 2019-05-21 PROCEDURE — 81025 URINE PREGNANCY TEST: CPT | Performed by: EMERGENCY MEDICINE

## 2019-05-21 PROCEDURE — 96374 THER/PROPH/DIAG INJ IV PUSH: CPT | Performed by: EMERGENCY MEDICINE

## 2019-05-21 PROCEDURE — 96361 HYDRATE IV INFUSION ADD-ON: CPT | Performed by: EMERGENCY MEDICINE

## 2019-05-21 PROCEDURE — 84702 CHORIONIC GONADOTROPIN TEST: CPT | Performed by: EMERGENCY MEDICINE

## 2019-05-21 PROCEDURE — 87081 CULTURE SCREEN ONLY: CPT | Performed by: EMERGENCY MEDICINE

## 2019-05-21 PROCEDURE — 80307 DRUG TEST PRSMV CHEM ANLYZR: CPT | Performed by: EMERGENCY MEDICINE

## 2019-05-21 PROCEDURE — 25000128 H RX IP 250 OP 636: Performed by: EMERGENCY MEDICINE

## 2019-05-21 PROCEDURE — 25800030 ZZH RX IP 258 OP 636: Performed by: EMERGENCY MEDICINE

## 2019-05-21 PROCEDURE — 80053 COMPREHEN METABOLIC PANEL: CPT | Performed by: EMERGENCY MEDICINE

## 2019-05-21 PROCEDURE — 96360 HYDRATION IV INFUSION INIT: CPT | Performed by: EMERGENCY MEDICINE

## 2019-05-21 RX ORDER — ACETAMINOPHEN 325 MG/1
975 TABLET ORAL ONCE
Status: COMPLETED | OUTPATIENT
Start: 2019-05-21 | End: 2019-05-21

## 2019-05-21 RX ORDER — METOCLOPRAMIDE 10 MG/1
10 TABLET ORAL 4 TIMES DAILY PRN
Qty: 40 TABLET | Refills: 1 | Status: SHIPPED | OUTPATIENT
Start: 2019-05-21 | End: 2019-06-20

## 2019-05-21 RX ORDER — HYDROXYZINE HYDROCHLORIDE 25 MG/1
25-50 TABLET, FILM COATED ORAL 3 TIMES DAILY PRN
Qty: 30 TABLET | Refills: 0 | Status: SHIPPED | OUTPATIENT
Start: 2019-05-21 | End: 2019-05-31

## 2019-05-21 RX ORDER — METOCLOPRAMIDE HYDROCHLORIDE 5 MG/ML
10 INJECTION INTRAMUSCULAR; INTRAVENOUS ONCE
Status: COMPLETED | OUTPATIENT
Start: 2019-05-21 | End: 2019-05-21

## 2019-05-21 RX ORDER — SODIUM CHLORIDE 9 MG/ML
1000 INJECTION, SOLUTION INTRAVENOUS CONTINUOUS
Status: DISCONTINUED | OUTPATIENT
Start: 2019-05-21 | End: 2019-05-21 | Stop reason: HOSPADM

## 2019-05-21 RX ORDER — HYDROXYZINE HYDROCHLORIDE 25 MG/1
25 TABLET, FILM COATED ORAL ONCE
Status: COMPLETED | OUTPATIENT
Start: 2019-05-21 | End: 2019-05-21

## 2019-05-21 RX ADMIN — HYDROXYZINE HYDROCHLORIDE 25 MG: 25 TABLET ORAL at 16:39

## 2019-05-21 RX ADMIN — ACETAMINOPHEN 975 MG: 325 TABLET, FILM COATED ORAL at 19:42

## 2019-05-21 RX ADMIN — METOCLOPRAMIDE 10 MG: 5 INJECTION, SOLUTION INTRAMUSCULAR; INTRAVENOUS at 16:40

## 2019-05-21 RX ADMIN — SODIUM CHLORIDE 1000 ML: 9 INJECTION, SOLUTION INTRAVENOUS at 16:40

## 2019-05-21 RX ADMIN — SODIUM CHLORIDE 1000 ML: 9 INJECTION, SOLUTION INTRAVENOUS at 18:00

## 2019-05-21 ASSESSMENT — ENCOUNTER SYMPTOMS
FEVER: 1
VOMITING: 1
NAUSEA: 1
CHILLS: 1
APPETITE CHANGE: 1
SORE THROAT: 1
WOUND: 1

## 2019-05-21 NOTE — ED PROVIDER NOTES
[facility] EMERGENCY DEPARTMENT ([facility])    5/21/19       History     Chief Complaint   Patient presents with     Pregnancy Test     unknown LMP. got + PT 2 weeks ago. has been using meth and ETOH.      Addiction Problem     looking for detox. using meth and ETOH. last use about 2 days ago. seizure hx with ETOH withdrawls.      Abrasion     skin abrasions was told 2 weeks ago has staph infection. could not complete abx due to vomiting.      The history is provided by the patient and a parent.     Domencio Wilder is a 28 year old female with a medical history significant for polysubstance abuse, chemical dependency, depression and anxiety who presents to the Emergency Department seeking detox from alcohol and methamphetamine.  The patient reports that she has been drinking approximately a pint a day of vodka for the last few months.  She reports that her last drink was 2 days ago.  The patient reports using 1 g of methamphetamine daily with her last use 2 days ago as well.  The patient here is complaining of nausea and vomiting, she states that she has been unable to keep a lot of food or fluids down.  Patient has only been able to drink a small amount of fluids today.  The patient is also complaining of hot and cold flashes and she feels that her throat is swollen.  Patient denies any suicidal ideation.  The patient here is also requesting a pregnancy test.  She reports that her last menstrual period was a couple of months ago, but she states that her menstrual periods are irregular.  The patient has done 3-4 home pregnancy test and all returned positive.    The patient's mother does note that the patient was seen in urgent care last week and was put on a course of Keflex for a possible cellulitis.  Patient has since finished this antibiotic course.    I have reviewed the Medications, Allergies, Past Medical and Surgical History, and Social History in the [employer] system.    Past Medical History:   Diagnosis  Date     Anxiety      Chickenpox      Depression      Depressive disorder      GERD (gastroesophageal reflux disease)      Substance abuse (H)        Past Surgical History:   Procedure Laterality Date     NO HISTORY OF SURGERY         Family History   Problem Relation Age of Onset     Allergies Mother      Depression Mother      Alcohol/Drug Mother      Prostate Cancer Paternal Grandfather      Diabetes Maternal Grandfather         non insulin dependent       Social History     Tobacco Use     Smoking status: Former Smoker     Packs/day: 0.50     Years: 13.00     Pack years: 6.50     Types: Cigarettes     Start date: 12/28/2017     Smokeless tobacco: Never Used   Substance Use Topics     Alcohol use: Yes     Comment: 1 liter per day vodka       Current Facility-Administered Medications   Medication     sodium chloride 0.9% infusion     Current Outpatient Medications   Medication     folic acid (FOLVITE) 1 MG tablet     hydrOXYzine (ATARAX) 25 MG tablet     metoclopramide (REGLAN) 10 MG tablet     Prenatal Vit-Fe Fumarate-FA (PRENATAL MULTIVITAMIN PLUS IRON) 27-0.8 MG TABS per tablet        Allergies   Allergen Reactions     Cats      Dogs          Review of Systems   Constitutional: Positive for appetite change (decrease), chills and fever (subjective).   HENT: Positive for sore throat.    Gastrointestinal: Positive for nausea and vomiting.   Skin: Positive for wound (picking wounds).   Psychiatric/Behavioral: Negative for suicidal ideas.   All other systems reviewed and are negative.      Physical Exam   BP: 118/82  Pulse: 108  Temp: 96.4  F (35.8  C)  Resp: 18  Weight: 56.7 kg (125 lb)  SpO2: 100 %      Physical Exam   Constitutional: No distress.   HENT:   Head: Atraumatic.   Mouth/Throat: No oropharyngeal exudate.   Posterior oropharynx erythematous but no apparent exudate present   Eyes: EOM are normal. No scleral icterus.   Neck: Neck supple.   Cardiovascular: Normal rate, regular rhythm and normal heart  sounds.   Pulmonary/Chest: Breath sounds normal. No respiratory distress.   Abdominal: Soft. She exhibits no distension. There is no tenderness.   Musculoskeletal: She exhibits no edema or deformity.   Neurological: She is alert.   Skin: Skin is warm and dry. She is not diaphoretic.   Numerous abrasions and scabs on her arms and body consistent with picking.  No apparent cellulitis at the site   Psychiatric: She has a normal mood and affect. Her behavior is normal.       ED Course   3:39 PM  The patient was seen and examined by Guille Corona DO in Room ED16B.     ED Course as of May 21 2015   Tue May 21, 2019   1839 HCG Quantitative Serum(!): 108,352   1840 HCG Quantitative Serum(!): 108,352   1840 HCG quantitative pregnancy(!)     Procedures               Labs Ordered and Resulted from Time of ED Arrival Up to the Time of Departure from the ED   DRUG ABUSE SCREEN 6 CHEM DEP URINE ([facility]) - Abnormal; Notable for the following components:       Result Value    Amphetamine Qual Urine Positive (*)     Cannabinoids Qual Urine Positive (*)     All other components within normal limits   HCG QUALITATIVE URINE - Abnormal; Notable for the following components:    HCG Qual Urine Positive (*)     All other components within normal limits   COMPREHENSIVE METABOLIC PANEL - Abnormal; Notable for the following components:    Urea Nitrogen 6 (*)     Creatinine 0.44 (*)     All other components within normal limits   CBC WITH PLATELETS DIFFERENTIAL - Abnormal; Notable for the following components:    WBC 13.0 (*)     Absolute Neutrophil 9.5 (*)     All other components within normal limits   UA MACROSCOPIC WITH REFLEX TO MICRO AND CULTURE - Abnormal; Notable for the following components:    Protein Albumin Urine 10 (*)     Mucous Urine Present (*)     Hyaline Casts 10 (*)     All other components within normal limits   HCG QUANTITATIVE PREGNANCY - Abnormal; Notable for the following components:    HCG Quantitative Serum 108,352  (*)     All other components within normal limits   ALCOHOL BREATH TEST POCT - Normal   RAPID STREP SCREEN   BETA STREP GROUP A CULTURE       Results for orders placed or performed during the hospital encounter of 05/21/19 (from the past 24 hour(s))   Alcohol breath test POCT   Result Value Ref Range    Alcohol Breath Test 0.00 0.00 - 0.01   UA reflex to Microscopic and Culture   Result Value Ref Range    Color Urine Yellow     Appearance Urine Cloudy     Glucose Urine Negative NEG^Negative mg/dL    Bilirubin Urine Negative NEG^Negative    Ketones Urine Negative NEG^Negative mg/dL    Specific Gravity Urine 1.018 1.003 - 1.035    Blood Urine Negative NEG^Negative    pH Urine 6.5 5.0 - 7.0 pH    Protein Albumin Urine 10 (A) NEG^Negative mg/dL    Urobilinogen mg/dL Normal 0.0 - 2.0 mg/dL    Nitrite Urine Negative NEG^Negative    Leukocyte Esterase Urine Negative NEG^Negative    Source Midstream Urine     RBC Urine 0 0 - 2 /HPF    WBC Urine 4 0 - 5 /HPF    Squamous Epithelial /HPF Urine 1 0 - 1 /HPF    Mucous Urine Present (A) NEG^Negative /LPF    Hyaline Casts 10 (H) 0 - 2 /LPF   Comprehensive metabolic panel   Result Value Ref Range    Sodium 139 133 - 144 mmol/L    Potassium 3.6 3.4 - 5.3 mmol/L    Chloride 100 94 - 109 mmol/L    Carbon Dioxide 30 20 - 32 mmol/L    Anion Gap 9 3 - 14 mmol/L    Glucose 78 70 - 99 mg/dL    Urea Nitrogen 6 (L) 7 - 30 mg/dL    Creatinine 0.44 (L) 0.52 - 1.04 mg/dL    GFR Estimate >90 >60 mL/min/[1.73_m2]    GFR Estimate If Black >90 >60 mL/min/[1.73_m2]    Calcium 9.3 8.5 - 10.1 mg/dL    Bilirubin Total 0.2 0.2 - 1.3 mg/dL    Albumin 3.6 3.4 - 5.0 g/dL    Protein Total 8.3 6.8 - 8.8 g/dL    Alkaline Phosphatase 77 40 - 150 U/L    ALT 14 0 - 50 U/L    AST 13 0 - 45 U/L   CBC with platelets differential   Result Value Ref Range    WBC 13.0 (H) 4.0 - 11.0 10e9/L    RBC Count 4.75 3.8 - 5.2 10e12/L    Hemoglobin 14.2 11.7 - 15.7 g/dL    Hematocrit 42.5 35.0 - 47.0 %    MCV 90 78 - 100 fl     MCH 29.9 26.5 - 33.0 pg    MCHC 33.4 31.5 - 36.5 g/dL    RDW 13.3 10.0 - 15.0 %    Platelet Count 263 150 - 450 10e9/L    Diff Method Automated Method     % Neutrophils 73.6 %    % Lymphocytes 15.9 %    % Monocytes 9.6 %    % Eosinophils 0.4 %    % Basophils 0.2 %    % Immature Granulocytes 0.3 %    Nucleated RBCs 0 0 /100    Absolute Neutrophil 9.5 (H) 1.6 - 8.3 10e9/L    Absolute Lymphocytes 2.1 0.8 - 5.3 10e9/L    Absolute Monocytes 1.3 0.0 - 1.3 10e9/L    Absolute Eosinophils 0.1 0.0 - 0.7 10e9/L    Absolute Basophils 0.0 0.0 - 0.2 10e9/L    Abs Immature Granulocytes 0.0 0 - 0.4 10e9/L    Absolute Nucleated RBC 0.0    HCG quantitative pregnancy   Result Value Ref Range    HCG Quantitative Serum 108,352 (H) 0 - 5 IU/L   Rapid strep screen   Result Value Ref Range    Specimen Description Throat     Rapid Strep A Screen       NEGATIVE: No Group A streptococcal antigen detected by immunoassay, await culture report.   Beta strep group A culture   Result Value Ref Range    Specimen Description Throat     Special Requests Specimen collected in eSwab transport (white cap)     Culture Micro PENDING    Drug abuse screen 6 urine (tox)   Result Value Ref Range    Amphetamine Qual Urine Positive (A) NEG^Negative    Barbiturates Qual Urine Negative NEG^Negative    Benzodiazepine Qual Urine Negative NEG^Negative    Cannabinoids Qual Urine Positive (A) NEG^Negative    Cocaine Qual Urine Negative NEG^Negative    Ethanol Qual Urine Negative NEG^Negative    Opiates Qualitative Urine Negative NEG^Negative   HCG qualitative urine   Result Value Ref Range    HCG Qual Urine Positive (A) NEG^Negative       Results for orders placed or performed during the hospital encounter of 05/21/19   Drug abuse screen 6 urine (tox)   Result Value Ref Range    Amphetamine Qual Urine Positive (A) NEG^Negative    Barbiturates Qual Urine Negative NEG^Negative    Benzodiazepine Qual Urine Negative NEG^Negative    Cannabinoids Qual Urine Positive (A)  NEG^Negative    Cocaine Qual Urine Negative NEG^Negative    Ethanol Qual Urine Negative NEG^Negative    Opiates Qualitative Urine Negative NEG^Negative   HCG qualitative urine   Result Value Ref Range    HCG Qual Urine Positive (A) NEG^Negative   Comprehensive metabolic panel   Result Value Ref Range    Sodium 139 133 - 144 mmol/L    Potassium 3.6 3.4 - 5.3 mmol/L    Chloride 100 94 - 109 mmol/L    Carbon Dioxide 30 20 - 32 mmol/L    Anion Gap 9 3 - 14 mmol/L    Glucose 78 70 - 99 mg/dL    Urea Nitrogen 6 (L) 7 - 30 mg/dL    Creatinine 0.44 (L) 0.52 - 1.04 mg/dL    GFR Estimate >90 >60 mL/min/[1.73_m2]    GFR Estimate If Black >90 >60 mL/min/[1.73_m2]    Calcium 9.3 8.5 - 10.1 mg/dL    Bilirubin Total 0.2 0.2 - 1.3 mg/dL    Albumin 3.6 3.4 - 5.0 g/dL    Protein Total 8.3 6.8 - 8.8 g/dL    Alkaline Phosphatase 77 40 - 150 U/L    ALT 14 0 - 50 U/L    AST 13 0 - 45 U/L   CBC with platelets differential   Result Value Ref Range    WBC 13.0 (H) 4.0 - 11.0 10e9/L    RBC Count 4.75 3.8 - 5.2 10e12/L    Hemoglobin 14.2 11.7 - 15.7 g/dL    Hematocrit 42.5 35.0 - 47.0 %    MCV 90 78 - 100 fl    MCH 29.9 26.5 - 33.0 pg    MCHC 33.4 31.5 - 36.5 g/dL    RDW 13.3 10.0 - 15.0 %    Platelet Count 263 150 - 450 10e9/L    Diff Method Automated Method     % Neutrophils 73.6 %    % Lymphocytes 15.9 %    % Monocytes 9.6 %    % Eosinophils 0.4 %    % Basophils 0.2 %    % Immature Granulocytes 0.3 %    Nucleated RBCs 0 0 /100    Absolute Neutrophil 9.5 (H) 1.6 - 8.3 10e9/L    Absolute Lymphocytes 2.1 0.8 - 5.3 10e9/L    Absolute Monocytes 1.3 0.0 - 1.3 10e9/L    Absolute Eosinophils 0.1 0.0 - 0.7 10e9/L    Absolute Basophils 0.0 0.0 - 0.2 10e9/L    Abs Immature Granulocytes 0.0 0 - 0.4 10e9/L    Absolute Nucleated RBC 0.0    UA reflex to Microscopic and Culture   Result Value Ref Range    Color Urine Yellow     Appearance Urine Cloudy     Glucose Urine Negative NEG^Negative mg/dL    Bilirubin Urine Negative NEG^Negative    Ketones Urine  Negative NEG^Negative mg/dL    Specific Gravity Urine 1.018 1.003 - 1.035    Blood Urine Negative NEG^Negative    pH Urine 6.5 5.0 - 7.0 pH    Protein Albumin Urine 10 (A) NEG^Negative mg/dL    Urobilinogen mg/dL Normal 0.0 - 2.0 mg/dL    Nitrite Urine Negative NEG^Negative    Leukocyte Esterase Urine Negative NEG^Negative    Source Midstream Urine     RBC Urine 0 0 - 2 /HPF    WBC Urine 4 0 - 5 /HPF    Squamous Epithelial /HPF Urine 1 0 - 1 /HPF    Mucous Urine Present (A) NEG^Negative /LPF    Hyaline Casts 10 (H) 0 - 2 /LPF   HCG quantitative pregnancy   Result Value Ref Range    HCG Quantitative Serum 108,352 (H) 0 - 5 IU/L   Alcohol breath test POCT   Result Value Ref Range    Alcohol Breath Test 0.00 0.00 - 0.01   Rapid strep screen   Result Value Ref Range    Specimen Description Throat     Rapid Strep A Screen       NEGATIVE: No Group A streptococcal antigen detected by immunoassay, await culture report.   Beta strep group A culture   Result Value Ref Range    Specimen Description Throat     Special Requests Specimen collected in eSwab transport (white cap)     Culture Micro PENDING             Assessments & Plan (with Medical Decision Making)   38-year-old female presents to us with a chief complaint of alcohol methamphetamine abuse requesting detox, current pregnancy, sore throat, body aches, nausea and vomiting.  UA shows no evidence of infection.  Strep is negative.  Lung exam is clear.  Patient is nontender on her abdomen.  We do not have any detox beds available.  Patient was given IV fluids in addition to Reglan which helped somewhat with her nausea and her symptoms.  She did develop a fever after initially arriving at 100.8 degrees.  Given her predominance other symptom is her sore throat suspect she has a URI and a likely viral pharyngitis causing this.  She has not noted any vaginal bleeding discharge or lower abdominal pain so I do not expect any complications of her current pregnancy.  She has not  had any alcohol or methamphetamine for several days and does not appear to be in severe alcohol withdrawal.  She is likely feeling ill from a combination of early pregnancy, polysubstance withdrawal, and a likely viral illness.  She does feel somewhat better than when she came in and she is comfortable discharge home.  Gave her the women's clinic  number for follow-up regards to OB care.  She was also given a prescription for Reglan.  She will follow-up with OB and return to us as needed.  I have reviewed the nursing notes.    I have reviewed the findings, diagnosis, plan and need for follow up with the patient.       Medication List      Started    hydrOXYzine 25 MG tablet  Commonly known as:  ATARAX  25-50 mg, Oral, 3 TIMES DAILY PRN     metoclopramide 10 MG tablet  Commonly known as:  REGLAN  10 mg, Oral, 4 TIMES DAILY PRN            Final diagnoses:   Alcohol withdrawal syndrome without complication (H)   Pregnancy test positive   Viral pharyngitis     IOnofre, am serving as a trained medical scribe to document services personally performed by Guille Corona DO, based on the provider's statements to me.   IGuille DO, was physically present and have reviewed and verified the accuracy of this note documented by Onofre Samano.    5/21/2019   [facility], [place], EMERGENCY DEPARTMENT     Guille Corona DO  05/21/19 2015

## 2019-05-21 NOTE — ED AVS SNAPSHOT
[facility], [place], Emergency Department  [address]  [facility] [address]  Phone:  [phone]  Fax:  [phone]                                    Domenico Wilder   MRN: [id number]    Department:  [facility], Emergency Department   Date of Visit:  5/21/2019           After Visit Summary Signature Page    I have received my discharge instructions, and my questions have been answered. I have discussed any challenges I see with this plan with the nurse or doctor.    ..........................................................................................................................................  Patient/Patient Representative Signature      ..........................................................................................................................................  Patient Representative Print Name and Relationship to Patient    ..................................................               ................................................  Date                                   Time    ..........................................................................................................................................  Reviewed by Signature/Title    ...................................................              ..............................................  Date                                               Time          22EPIC Rev 08/18

## 2019-05-21 NOTE — DISCHARGE INSTRUCTIONS
Follow-up with your primary care provider.  Reschedule appointment with an OB provider for prenatal care. Please make an appointment to follow up with OB/Gyn--[facility] (phone: [phone]) as soon as possible.

## 2019-05-22 NOTE — RESULT ENCOUNTER NOTE
Preliminary Beta strep group A r/o culture is PENDING and/or NEGATIVE at this time.   No changes in treatment per [place] Strep protocol.

## 2019-05-23 LAB
BACTERIA SPEC CULT: ABNORMAL
Lab: ABNORMAL
SPECIMEN SOURCE: ABNORMAL

## 2019-12-31 ENCOUNTER — HOSPITAL ENCOUNTER (OUTPATIENT)
Facility: CLINIC | Age: 29
End: 2019-12-31
Admitting: OBSTETRICS & GYNECOLOGY

## 2019-12-31 ENCOUNTER — HOSPITAL ENCOUNTER (OUTPATIENT)
Facility: CLINIC | Age: 29
Discharge: HOME | End: 2019-12-31
Attending: EMERGENCY MEDICINE | Admitting: OBSTETRICS & GYNECOLOGY
Payer: MEDICAID

## 2019-12-31 VITALS
RESPIRATION RATE: 20 BRPM | BODY MASS INDEX: 24.83 KG/M2 | DIASTOLIC BLOOD PRESSURE: 80 MMHG | SYSTOLIC BLOOD PRESSURE: 118 MMHG | TEMPERATURE: 98.2 F | WEIGHT: 149 LBS | HEART RATE: 112 BPM | HEIGHT: 65 IN

## 2019-12-31 DIAGNOSIS — K64.4 EXTERNAL HEMORRHOIDS: Primary | ICD-10-CM

## 2019-12-31 PROBLEM — Z36.89 ENCOUNTER FOR TRIAGE IN PREGNANT PATIENT: Status: ACTIVE | Noted: 2019-12-31

## 2019-12-31 LAB
ALBUMIN UR-MCNC: 10 MG/DL
AMPHETAMINES UR QL SCN: NEGATIVE
APPEARANCE UR: CLEAR
BILIRUB UR QL STRIP: NEGATIVE
CANNABINOIDS UR QL: NEGATIVE
COCAINE UR QL: NEGATIVE
COLOR UR AUTO: YELLOW
GLUCOSE UR STRIP-MCNC: NEGATIVE MG/DL
HGB UR QL STRIP: NEGATIVE
KETONES UR STRIP-MCNC: NEGATIVE MG/DL
LEUKOCYTE ESTERASE UR QL STRIP: NEGATIVE
MUCOUS THREADS #/AREA URNS LPF: PRESENT /LPF
NITRATE UR QL: NEGATIVE
OPIATES UR QL SCN: NEGATIVE
PCP UR QL SCN: NEGATIVE
PH UR STRIP: 6.5 PH (ref 5–7)
RBC #/AREA URNS AUTO: 1 /HPF (ref 0–2)
SOURCE: ABNORMAL
SP GR UR STRIP: 1.02 (ref 1–1.03)
SQUAMOUS #/AREA URNS AUTO: 5 /HPF (ref 0–1)
UROBILINOGEN UR STRIP-MCNC: NORMAL MG/DL (ref 0–2)
WBC #/AREA URNS AUTO: 1 /HPF (ref 0–5)

## 2019-12-31 PROCEDURE — G0463 HOSPITAL OUTPT CLINIC VISIT: HCPCS | Mod: 25

## 2019-12-31 PROCEDURE — 81001 URINALYSIS AUTO W/SCOPE: CPT | Mod: XU | Performed by: OBSTETRICS & GYNECOLOGY

## 2019-12-31 PROCEDURE — 40000268 ZZH STATISTIC NO CHARGES

## 2019-12-31 PROCEDURE — 80307 DRUG TEST PRSMV CHEM ANLYZR: CPT | Performed by: OBSTETRICS & GYNECOLOGY

## 2019-12-31 PROCEDURE — 59025 FETAL NON-STRESS TEST: CPT

## 2019-12-31 ASSESSMENT — MIFFLIN-ST. JEOR: SCORE: 1406.74

## 2019-12-31 NOTE — DISCHARGE INSTRUCTIONS
Discharge Instruction for Undelivered Patients      You were seen for: Labor Assessment  We Consulted: Lizzie Jerez and William  You had (Test or Medicine):NST     Diet:   You may eat meals and snacks.     Activity:  Count fetal kicks everyday (see handout)     Call your provider if you notice:  Swelling in your face or increased swelling in your hands or legs.  Headaches that are not relieved by Tylenol (acetaminophen).  Changes in your vision (blurring: seeing spots or stars.)  Nausea (sick to your stomach) and vomiting (throwing up).   Weight gain of 5 pounds or more per week.  Heartburn that doesn't go away.  Signs of bladder infection: pain when you urinate (use the toilet), need to go more often and more urgently.  The bag of cedeño (rupture of membranes) breaks, or you notice leaking in your underwear.  Bright red blood in your underwear.  Abdominal (lower belly) or stomach pain.  Second (plus) baby: Contractions (tightening) less than 10 minutes apart and getting stronger.  Increase or change in vaginal discharge (note the color and amount)      Follow-up:  at hospital at 0900 on Tuesday for IOL

## 2019-12-31 NOTE — PROGRESS NOTES
L&D Triage Assessment Note  2019  Domenico Wilder  [id number]      HPI: Domenico Wilder is a 29 year old  at 40w0d by 8w3d US who presented with numerous complaints    1. Lower abdominal pain: started last night. Very mild, but persistent. Has had occasional cramping earlier in pregnancy, but nothing that has been persistent. Has not tried anything for relief. She has had a UTI prior, states this does not feel like a UTI. Denies burning with urination or hesitancy. Felt warm at home, but has been afebrile.    2. Hemorrhoids: worsening during third trimester. Is typically constipated but has had regular bowel movements over the past few days. She has tried witch hazel and warm compresses with little relief.     3. Limited prenatal care: has had scant prenatal care. It is her due date today and she is concerned about the status of baby.     She denies regular contractions, vaginal bleeding and leakage of fluid. Believes she lost her mucus plug.  She denies headache, vision changes, chest pain, shortness of breath, fever, chills, nausea, vomiting or other systemic complaints.     Her pregnancy has been complicated by:  - history of polysubstance abuse. Denies current use  - mild tricuspid regurge on level II. No fetal echo  - scant prenatal care  - ASCUS, negative HPV  - Depression  - chlamydia infection during pregnancy. Negative MEGHNA  - GBS + urine culture    ROS: No headaches, vision changes, nausea, vomiting, fevers, chills, chest pain, SOB, diarrhea, dysuria, changes in vaginal discharge or edema in extremities noted.     OBHX:   OB History    Para Term  AB Living   4 1 1 0 2 1   SAB TAB Ectopic Multiple Live Births   1 0 0 0 1      # Outcome Date GA Lbr Sherman/2nd Weight Sex Delivery Anes PTL Lv   4 Current            3 SAB 18     SAB      2 Term 11 40w1d  3.615 kg (7 lb 15.5 oz) M  None N MARV   1 AB 08             Past Medical History:   Diagnosis Date     Anxiety   "    Chickenpox      Depression      Depressive disorder      GERD (gastroesophageal reflux disease)      Substance abuse (H)      Past Surgical History:   Procedure Laterality Date     NO HISTORY OF SURGERY       Medications:   No current facility-administered medications on file prior to encounter.   Prenatal Vit-Fe Fumarate-FA (PRENATAL MULTIVITAMIN PLUS IRON) 27-0.8 MG TABS per tablet, Take 1 tablet by mouth daily      Allergies   Allergen Reactions     Cats      Dogs      Family History   Problem Relation Age of Onset     Allergies Mother      Depression Mother      Alcohol/Drug Mother      Prostate Cancer Paternal Grandfather      Diabetes Maternal Grandfather         non insulin dependent     SocialHX:   Social History     Tobacco Use     Smoking status: Current Every Day Smoker     Packs/day: 0.50     Years: 13.00     Pack years: 6.50     Types: Cigarettes     Start date: 12/28/2017     Smokeless tobacco: Never Used   Substance Use Topics     Alcohol use: Yes     Comment: 1 liter per day vodka     Drug use: No     Types: Marijuana, IV, Methamphetamines, Opiates     Comment: 1-2 grams of heroine daily, occasional meth and marijuana     ROS: 10-point ROS negative except as indicated in HPI.    Physical Exam:  Vitals:    12/31/19 1250   BP: 118/80   Pulse: 112   Resp: 20   Temp: 98.2  F (36.8  C)   TempSrc: Oral   Weight: 67.6 kg (149 lb)   Height: 1.651 m (5' 5\")     General: alert, oriented female, resting in bed in NAD  CV: regular rate and rhythm,   Lungs: clear bilaterally, no crackles or wheezes.   Abdomen: soft, gravid, non-tender, cephalic by BSUS with 2x2 pocket of amniotic fluid identified.  Extremities: bilateral lower extremities non-tender and without edema    SVE: 1/70/-3    Bedside US: cephalic by BSUS. 2x2 pocket of fluid visualized    FHT: baseline 120, moderate variability, accelerations present, no decelerations, cat 1  [place]: irritability       Labs:   Results for orders placed or performed " during the hospital encounter of 19 (from the past 24 hour(s))   Drug abuse scrn 7 UR (/) (RH, SH, UR)   Result Value Ref Range    Amphetamine Qual Urine Negative NEG^Negative    Cannabinoids Qual Urine Negative NEG^Negative    Cocaine Qual Urine Negative NEG^Negative    Opiates Qualitative Urine Negative NEG^Negative    Pcp Qual Urine Negative NEG^Negative   UA with Microscopic reflex to Culture   Result Value Ref Range    Color Urine Yellow     Appearance Urine Clear     Glucose Urine Negative NEG^Negative mg/dL    Bilirubin Urine Negative NEG^Negative    Ketones Urine Negative NEG^Negative mg/dL    Specific Gravity Urine 1.019 1.003 - 1.035    Blood Urine Negative NEG^Negative    pH Urine 6.5 5.0 - 7.0 pH    Protein Albumin Urine 10 (A) NEG^Negative mg/dL    Urobilinogen mg/dL Normal 0.0 - 2.0 mg/dL    Nitrite Urine Negative NEG^Negative    Leukocyte Esterase Urine Negative NEG^Negative    Source Midstream Urine     WBC Urine 1 0 - 5 /HPF    RBC Urine 1 0 - 2 /HPF    Squamous Epithelial /HPF Urine 5 (H) 0 - 1 /HPF    Mucous Urine Present (A) NEG^Negative /LPF     Assessment: 29 year old  at 40w0d here with several complaints. Not in labor. FHT reactive.    Plan:  1. Hemorrhoids  - Anusol  - Lidocaine gel  - Bowel regimen prn    2. Lower abdominal pain  - UA without evidence of infection  - Abdominal binder, heat pack for comfort    3. Delivery planning  - induction scheduled for 41 weeks    4. History of polysubstance use  - UDS negative    5. Fetal wellbeing  - FHT cat I, reactive and reassuring  - Cephalic by BSUS. Non concerning for oligohydramnios with 2x2 pocket identified    Return precautions including regular and painful contractions, vaginal bleeding, leakage of fluid and decreased fetal movement discussed. Patient reports understanding and will call with concerns    Patient seen and care plan discussed under supervision of Dr. Jerez.    Elisabeth Thomas   Obstetrics and  Gyncology, PGY-1  December 31, 2019 , 1:14 PM      Appreciate note by Dr. Thomas. Patient has been seen and examined by me with the resident, agree with above note.     Leela Jerez MD

## 2019-12-31 NOTE — PLAN OF CARE
Data: [facility] at [facility].   Reason for maternal/fetal assessment per patient is Rule Out Labor  . Patient is a . Prenatal record reviewed.      OB History    Para Term  AB Living   4 1 1 0 2 1   SAB TAB Ectopic Multiple Live Births   1 0 0 0 1      # Outcome Date GA Lbr Sherman/2nd Weight Sex Delivery Anes PTL Lv   4 Current            3 SAB 18     SAB      2 Term 11 40w1d  3.615 kg (7 lb 15.5 oz) M  None N MARV   1 AB 08              Medical History:   Past Medical History:   Diagnosis Date    Anxiety     Chickenpox     Depression     Depressive disorder     GERD (gastroesophageal reflux disease)     Substance abuse (H)    . Gestational Age 40w0d. VSS. Cervix: dilated to 1.  Fetal movement present. Patient denies cramping, vaginal discharge, pelvic pressure, UTI symptoms, GI problems, bloody show, vaginal bleeding, edema, headache, visual disturbances, epigastric or URQ pain, abdominal pain, rupture of membranes. She does c/o hemorrhoid pain. Support persons are present, her grandmother Kathy.  Action: Verbal consent for EFM. Triage assessment completed. EFM applied for NST. Uterine assessment reveal some irritability. Fetal assessment: Presumed adequate fetal oxygenation documented (see flow record). Patient education pamphlets given on fetal movement counts and signs of labor. Patient instructed to report change in fetal movement, vaginal leaking of fluid or bleeding, abdominal pain, or any concerns related to the pregnancy to her nurse/physician.   Response: Dr. Jerez informed of arrival. Plan per provider is medications for hemmroids will be sent to pharmacy, IOL will be scheduled here for next week. Patient verbalized understanding of education and verbalized agreement with plan. Discharged ambulatory at 1430.

## 2020-01-07 ENCOUNTER — ANESTHESIA EVENT (OUTPATIENT)
Dept: OBGYN | Facility: CLINIC | Age: 30
End: 2020-01-07
Payer: MEDICAID

## 2020-01-07 ENCOUNTER — HOSPITAL ENCOUNTER (INPATIENT)
Facility: CLINIC | Age: 30
LOS: 3 days | Discharge: HOME HEALTH | End: 2020-01-10
Attending: OBSTETRICS & GYNECOLOGY | Admitting: OBSTETRICS & GYNECOLOGY
Payer: MEDICAID

## 2020-01-07 ENCOUNTER — ANESTHESIA (OUTPATIENT)
Dept: OBGYN | Facility: CLINIC | Age: 30
End: 2020-01-07
Payer: MEDICAID

## 2020-01-07 DIAGNOSIS — D62 ANEMIA DUE TO BLOOD LOSS, ACUTE: Primary | ICD-10-CM

## 2020-01-07 PROBLEM — Z34.90 ENCOUNTER FOR INDUCTION OF LABOR: Status: ACTIVE | Noted: 2020-01-07

## 2020-01-07 LAB
ABO + RH BLD: NORMAL
ABO + RH BLD: NORMAL
AMPHETAMINES UR QL SCN: NEGATIVE
BASOPHILS # BLD AUTO: 0 10E9/L (ref 0–0.2)
BASOPHILS NFR BLD AUTO: 0.2 %
BLD GP AB SCN SERPL QL: NORMAL
BLOOD BANK CMNT PATIENT-IMP: NORMAL
CANNABINOIDS UR QL: NEGATIVE
COCAINE UR QL: NEGATIVE
DIFFERENTIAL METHOD BLD: ABNORMAL
EOSINOPHIL # BLD AUTO: 0.1 10E9/L (ref 0–0.7)
EOSINOPHIL NFR BLD AUTO: 0.7 %
ERYTHROCYTE [DISTWIDTH] IN BLOOD BY AUTOMATED COUNT: 12.8 % (ref 10–15)
GLUCOSE BLDC GLUCOMTR-MCNC: 110 MG/DL (ref 70–99)
HCT VFR BLD AUTO: 35.2 % (ref 35–47)
HGB BLD-MCNC: 11.3 G/DL (ref 11.7–15.7)
IMM GRANULOCYTES # BLD: 0 10E9/L (ref 0–0.4)
IMM GRANULOCYTES NFR BLD: 0.3 %
LYMPHOCYTES # BLD AUTO: 2 10E9/L (ref 0.8–5.3)
LYMPHOCYTES NFR BLD AUTO: 18.4 %
MCH RBC QN AUTO: 28.8 PG (ref 26.5–33)
MCHC RBC AUTO-ENTMCNC: 32.1 G/DL (ref 31.5–36.5)
MCV RBC AUTO: 90 FL (ref 78–100)
MONOCYTES # BLD AUTO: 0.9 10E9/L (ref 0–1.3)
MONOCYTES NFR BLD AUTO: 7.9 %
NEUTROPHILS # BLD AUTO: 7.9 10E9/L (ref 1.6–8.3)
NEUTROPHILS NFR BLD AUTO: 72.5 %
NRBC # BLD AUTO: 0 10*3/UL
NRBC BLD AUTO-RTO: 0 /100
OPIATES UR QL SCN: NEGATIVE
PCP UR QL SCN: NEGATIVE
PLATELET # BLD AUTO: 171 10E9/L (ref 150–450)
RBC # BLD AUTO: 3.92 10E12/L (ref 3.8–5.2)
SPECIMEN EXP DATE BLD: NORMAL
T PALLIDUM AB SER QL: NONREACTIVE
WBC # BLD AUTO: 11 10E9/L (ref 4–11)

## 2020-01-07 PROCEDURE — 3E0R3BZ INTRODUCTION OF ANESTHETIC AGENT INTO SPINAL CANAL, PERCUTANEOUS APPROACH: ICD-10-PCS | Performed by: ANESTHESIOLOGY

## 2020-01-07 PROCEDURE — 86900 BLOOD TYPING SEROLOGIC ABO: CPT | Performed by: STUDENT IN AN ORGANIZED HEALTH CARE EDUCATION/TRAINING PROGRAM

## 2020-01-07 PROCEDURE — 72200001 ZZH LABOR CARE VAGINAL DELIVERY SINGLE

## 2020-01-07 PROCEDURE — 25000128 H RX IP 250 OP 636: Performed by: STUDENT IN AN ORGANIZED HEALTH CARE EDUCATION/TRAINING PROGRAM

## 2020-01-07 PROCEDURE — 25000132 ZZH RX MED GY IP 250 OP 250 PS 637: Performed by: STUDENT IN AN ORGANIZED HEALTH CARE EDUCATION/TRAINING PROGRAM

## 2020-01-07 PROCEDURE — 12000001 ZZH R&B MED SURG/OB UMMC

## 2020-01-07 PROCEDURE — 25000125 ZZHC RX 250: Performed by: ANESTHESIOLOGY

## 2020-01-07 PROCEDURE — 00HU33Z INSERTION OF INFUSION DEVICE INTO SPINAL CANAL, PERCUTANEOUS APPROACH: ICD-10-PCS | Performed by: ANESTHESIOLOGY

## 2020-01-07 PROCEDURE — 25800030 ZZH RX IP 258 OP 636

## 2020-01-07 PROCEDURE — 25800030 ZZH RX IP 258 OP 636: Performed by: STUDENT IN AN ORGANIZED HEALTH CARE EDUCATION/TRAINING PROGRAM

## 2020-01-07 PROCEDURE — 36415 COLL VENOUS BLD VENIPUNCTURE: CPT | Performed by: STUDENT IN AN ORGANIZED HEALTH CARE EDUCATION/TRAINING PROGRAM

## 2020-01-07 PROCEDURE — 00000146 ZZHCL STATISTIC GLUCOSE BY METER IP

## 2020-01-07 PROCEDURE — 80307 DRUG TEST PRSMV CHEM ANLYZR: CPT | Performed by: STUDENT IN AN ORGANIZED HEALTH CARE EDUCATION/TRAINING PROGRAM

## 2020-01-07 PROCEDURE — 25000125 ZZHC RX 250: Performed by: STUDENT IN AN ORGANIZED HEALTH CARE EDUCATION/TRAINING PROGRAM

## 2020-01-07 PROCEDURE — 86850 RBC ANTIBODY SCREEN: CPT | Performed by: STUDENT IN AN ORGANIZED HEALTH CARE EDUCATION/TRAINING PROGRAM

## 2020-01-07 PROCEDURE — 86901 BLOOD TYPING SEROLOGIC RH(D): CPT | Performed by: STUDENT IN AN ORGANIZED HEALTH CARE EDUCATION/TRAINING PROGRAM

## 2020-01-07 PROCEDURE — 86780 TREPONEMA PALLIDUM: CPT | Performed by: STUDENT IN AN ORGANIZED HEALTH CARE EDUCATION/TRAINING PROGRAM

## 2020-01-07 PROCEDURE — 25000128 H RX IP 250 OP 636

## 2020-01-07 PROCEDURE — 25000125 ZZHC RX 250

## 2020-01-07 PROCEDURE — 85025 COMPLETE CBC W/AUTO DIFF WBC: CPT | Performed by: STUDENT IN AN ORGANIZED HEALTH CARE EDUCATION/TRAINING PROGRAM

## 2020-01-07 RX ORDER — FENTANYL/BUPIVACAINE/NS/PF 2-1250MCG
PLASTIC BAG, INJECTION (ML) INJECTION
Status: COMPLETED
Start: 2020-01-07 | End: 2020-01-07

## 2020-01-07 RX ORDER — OXYTOCIN 10 [USP'U]/ML
INJECTION, SOLUTION INTRAMUSCULAR; INTRAVENOUS
Status: DISCONTINUED
Start: 2020-01-07 | End: 2020-01-08 | Stop reason: HOSPADM

## 2020-01-07 RX ORDER — IBUPROFEN 800 MG/1
800 TABLET, FILM COATED ORAL EVERY 6 HOURS PRN
Status: DISCONTINUED | OUTPATIENT
Start: 2020-01-07 | End: 2020-01-10 | Stop reason: HOSPADM

## 2020-01-07 RX ORDER — DEXTROSE MONOHYDRATE 25 G/50ML
25-50 INJECTION, SOLUTION INTRAVENOUS
Status: DISCONTINUED | OUTPATIENT
Start: 2020-01-07 | End: 2020-01-07

## 2020-01-07 RX ORDER — LANOLIN 100 %
OINTMENT (GRAM) TOPICAL
Status: DISCONTINUED | OUTPATIENT
Start: 2020-01-07 | End: 2020-01-10 | Stop reason: HOSPADM

## 2020-01-07 RX ORDER — ACETAMINOPHEN 325 MG/1
650 TABLET ORAL EVERY 4 HOURS PRN
Status: DISCONTINUED | OUTPATIENT
Start: 2020-01-07 | End: 2020-01-10 | Stop reason: HOSPADM

## 2020-01-07 RX ORDER — NALOXONE HYDROCHLORIDE 0.4 MG/ML
.1-.4 INJECTION, SOLUTION INTRAMUSCULAR; INTRAVENOUS; SUBCUTANEOUS
Status: DISCONTINUED | OUTPATIENT
Start: 2020-01-07 | End: 2020-01-10 | Stop reason: HOSPADM

## 2020-01-07 RX ORDER — FENTANYL CITRATE 50 UG/ML
50-100 INJECTION, SOLUTION INTRAMUSCULAR; INTRAVENOUS
Status: DISCONTINUED | OUTPATIENT
Start: 2020-01-07 | End: 2020-01-07

## 2020-01-07 RX ORDER — CARBOPROST TROMETHAMINE 250 UG/ML
250 INJECTION, SOLUTION INTRAMUSCULAR
Status: DISCONTINUED | OUTPATIENT
Start: 2020-01-07 | End: 2020-01-10 | Stop reason: HOSPADM

## 2020-01-07 RX ORDER — CITRIC ACID/SODIUM CITRATE 334-500MG
30 SOLUTION, ORAL ORAL ONCE
Status: DISCONTINUED | OUTPATIENT
Start: 2020-01-07 | End: 2020-01-07

## 2020-01-07 RX ORDER — TERBUTALINE SULFATE 1 MG/ML
0.25 INJECTION, SOLUTION SUBCUTANEOUS
Status: DISCONTINUED | OUTPATIENT
Start: 2020-01-07 | End: 2020-01-07

## 2020-01-07 RX ORDER — LIDOCAINE 40 MG/G
CREAM TOPICAL
Status: DISCONTINUED | OUTPATIENT
Start: 2020-01-07 | End: 2020-01-07

## 2020-01-07 RX ORDER — OXYTOCIN/0.9 % SODIUM CHLORIDE 30/500 ML
100-340 PLASTIC BAG, INJECTION (ML) INTRAVENOUS CONTINUOUS PRN
Status: DISCONTINUED | OUTPATIENT
Start: 2020-01-07 | End: 2020-01-07

## 2020-01-07 RX ORDER — LIDOCAINE HYDROCHLORIDE AND EPINEPHRINE 15; 5 MG/ML; UG/ML
INJECTION, SOLUTION EPIDURAL PRN
Status: DISCONTINUED | OUTPATIENT
Start: 2020-01-07 | End: 2020-01-08 | Stop reason: HOSPADM

## 2020-01-07 RX ORDER — BISACODYL 10 MG
10 SUPPOSITORY, RECTAL RECTAL DAILY PRN
Status: DISCONTINUED | OUTPATIENT
Start: 2020-01-09 | End: 2020-01-10 | Stop reason: HOSPADM

## 2020-01-07 RX ORDER — OXYTOCIN/0.9 % SODIUM CHLORIDE 30/500 ML
PLASTIC BAG, INJECTION (ML) INTRAVENOUS
Status: DISCONTINUED
Start: 2020-01-07 | End: 2020-01-08 | Stop reason: HOSPADM

## 2020-01-07 RX ORDER — MISOPROSTOL 200 UG/1
TABLET ORAL
Status: DISCONTINUED
Start: 2020-01-07 | End: 2020-01-08 | Stop reason: HOSPADM

## 2020-01-07 RX ORDER — OXYCODONE AND ACETAMINOPHEN 5; 325 MG/1; MG/1
1 TABLET ORAL
Status: DISCONTINUED | OUTPATIENT
Start: 2020-01-07 | End: 2020-01-07

## 2020-01-07 RX ORDER — OXYTOCIN 10 [USP'U]/ML
10 INJECTION, SOLUTION INTRAMUSCULAR; INTRAVENOUS
Status: DISCONTINUED | OUTPATIENT
Start: 2020-01-07 | End: 2020-01-10 | Stop reason: HOSPADM

## 2020-01-07 RX ORDER — PENICILLIN G POTASSIUM 5000000 [IU]/1
5 INJECTION, POWDER, FOR SOLUTION INTRAMUSCULAR; INTRAVENOUS ONCE
Status: COMPLETED | OUTPATIENT
Start: 2020-01-07 | End: 2020-01-07

## 2020-01-07 RX ORDER — MISOPROSTOL 200 UG/1
800 TABLET ORAL
Status: DISCONTINUED | OUTPATIENT
Start: 2020-01-07 | End: 2020-01-10 | Stop reason: HOSPADM

## 2020-01-07 RX ORDER — ACETAMINOPHEN 325 MG/1
650 TABLET ORAL EVERY 4 HOURS PRN
Status: DISCONTINUED | OUTPATIENT
Start: 2020-01-07 | End: 2020-01-07

## 2020-01-07 RX ORDER — LIDOCAINE HYDROCHLORIDE 10 MG/ML
INJECTION, SOLUTION EPIDURAL; INFILTRATION; INTRACAUDAL; PERINEURAL
Status: DISCONTINUED
Start: 2020-01-07 | End: 2020-01-08 | Stop reason: HOSPADM

## 2020-01-07 RX ORDER — METHYLERGONOVINE MALEATE 0.2 MG/ML
200 INJECTION INTRAVENOUS
Status: DISCONTINUED | OUTPATIENT
Start: 2020-01-07 | End: 2020-01-10 | Stop reason: HOSPADM

## 2020-01-07 RX ORDER — OXYTOCIN/0.9 % SODIUM CHLORIDE 30/500 ML
1-24 PLASTIC BAG, INJECTION (ML) INTRAVENOUS CONTINUOUS
Status: DISCONTINUED | OUTPATIENT
Start: 2020-01-07 | End: 2020-01-07

## 2020-01-07 RX ORDER — OXYTOCIN/0.9 % SODIUM CHLORIDE 30/500 ML
100 PLASTIC BAG, INJECTION (ML) INTRAVENOUS CONTINUOUS
Status: DISCONTINUED | OUTPATIENT
Start: 2020-01-07 | End: 2020-01-10 | Stop reason: HOSPADM

## 2020-01-07 RX ORDER — OXYTOCIN/0.9 % SODIUM CHLORIDE 30/500 ML
340 PLASTIC BAG, INJECTION (ML) INTRAVENOUS CONTINUOUS PRN
Status: DISCONTINUED | OUTPATIENT
Start: 2020-01-07 | End: 2020-01-10 | Stop reason: HOSPADM

## 2020-01-07 RX ORDER — AMOXICILLIN 250 MG
2 CAPSULE ORAL 2 TIMES DAILY
Status: DISCONTINUED | OUTPATIENT
Start: 2020-01-07 | End: 2020-01-10 | Stop reason: HOSPADM

## 2020-01-07 RX ORDER — NALOXONE HYDROCHLORIDE 0.4 MG/ML
.1-.4 INJECTION, SOLUTION INTRAMUSCULAR; INTRAVENOUS; SUBCUTANEOUS
Status: DISCONTINUED | OUTPATIENT
Start: 2020-01-07 | End: 2020-01-07

## 2020-01-07 RX ORDER — SODIUM CHLORIDE, SODIUM LACTATE, POTASSIUM CHLORIDE, CALCIUM CHLORIDE 600; 310; 30; 20 MG/100ML; MG/100ML; MG/100ML; MG/100ML
INJECTION, SOLUTION INTRAVENOUS CONTINUOUS
Status: DISCONTINUED | OUTPATIENT
Start: 2020-01-07 | End: 2020-01-07

## 2020-01-07 RX ORDER — MISOPROSTOL 100 UG/1
25 TABLET ORAL
Status: DISCONTINUED | OUTPATIENT
Start: 2020-01-07 | End: 2020-01-07

## 2020-01-07 RX ORDER — EPHEDRINE SULFATE 50 MG/ML
INJECTION, SOLUTION INTRAMUSCULAR; INTRAVENOUS; SUBCUTANEOUS
Status: COMPLETED
Start: 2020-01-07 | End: 2020-01-07

## 2020-01-07 RX ORDER — METHYLERGONOVINE MALEATE 0.2 MG/ML
200 INJECTION INTRAVENOUS
Status: DISCONTINUED | OUTPATIENT
Start: 2020-01-07 | End: 2020-01-07

## 2020-01-07 RX ORDER — IBUPROFEN 800 MG/1
800 TABLET, FILM COATED ORAL
Status: COMPLETED | OUTPATIENT
Start: 2020-01-07 | End: 2020-01-07

## 2020-01-07 RX ORDER — EPHEDRINE SULFATE 50 MG/ML
5 INJECTION, SOLUTION INTRAMUSCULAR; INTRAVENOUS; SUBCUTANEOUS
Status: DISCONTINUED | OUTPATIENT
Start: 2020-01-07 | End: 2020-01-07

## 2020-01-07 RX ORDER — NICOTINE POLACRILEX 4 MG
15-30 LOZENGE BUCCAL
Status: DISCONTINUED | OUTPATIENT
Start: 2020-01-07 | End: 2020-01-07

## 2020-01-07 RX ORDER — PROCHLORPERAZINE 25 MG
25 SUPPOSITORY, RECTAL RECTAL EVERY 12 HOURS PRN
Status: DISCONTINUED | OUTPATIENT
Start: 2020-01-07 | End: 2020-01-07

## 2020-01-07 RX ORDER — AMOXICILLIN 250 MG
1 CAPSULE ORAL 2 TIMES DAILY
Status: DISCONTINUED | OUTPATIENT
Start: 2020-01-07 | End: 2020-01-10 | Stop reason: HOSPADM

## 2020-01-07 RX ORDER — NALBUPHINE HYDROCHLORIDE 10 MG/ML
2.5-5 INJECTION, SOLUTION INTRAMUSCULAR; INTRAVENOUS; SUBCUTANEOUS EVERY 6 HOURS PRN
Status: DISCONTINUED | OUTPATIENT
Start: 2020-01-07 | End: 2020-01-07

## 2020-01-07 RX ORDER — OXYTOCIN 10 [USP'U]/ML
10 INJECTION, SOLUTION INTRAMUSCULAR; INTRAVENOUS
Status: DISCONTINUED | OUTPATIENT
Start: 2020-01-07 | End: 2020-01-07

## 2020-01-07 RX ORDER — CARBOPROST TROMETHAMINE 250 UG/ML
250 INJECTION, SOLUTION INTRAMUSCULAR
Status: DISCONTINUED | OUTPATIENT
Start: 2020-01-07 | End: 2020-01-07

## 2020-01-07 RX ORDER — METOCLOPRAMIDE HYDROCHLORIDE 5 MG/ML
10 INJECTION INTRAMUSCULAR; INTRAVENOUS EVERY 6 HOURS PRN
Status: DISCONTINUED | OUTPATIENT
Start: 2020-01-07 | End: 2020-01-07

## 2020-01-07 RX ORDER — ONDANSETRON 2 MG/ML
4 INJECTION INTRAMUSCULAR; INTRAVENOUS EVERY 6 HOURS PRN
Status: DISCONTINUED | OUTPATIENT
Start: 2020-01-07 | End: 2020-01-07

## 2020-01-07 RX ORDER — HYDROCORTISONE 2.5 %
CREAM (GRAM) TOPICAL 3 TIMES DAILY PRN
Status: DISCONTINUED | OUTPATIENT
Start: 2020-01-07 | End: 2020-01-10 | Stop reason: HOSPADM

## 2020-01-07 RX ADMIN — Medication 2 MILLI-UNITS/MIN: at 15:50

## 2020-01-07 RX ADMIN — PENICILLIN G POTASSIUM 5 MILLION UNITS: 5000000 POWDER, FOR SOLUTION INTRAMUSCULAR; INTRAPLEURAL; INTRATHECAL; INTRAVENOUS at 15:53

## 2020-01-07 RX ADMIN — Medication 25 MCG: at 10:39

## 2020-01-07 RX ADMIN — Medication 5 MG: at 18:55

## 2020-01-07 RX ADMIN — SODIUM CHLORIDE, POTASSIUM CHLORIDE, SODIUM LACTATE AND CALCIUM CHLORIDE: 600; 310; 30; 20 INJECTION, SOLUTION INTRAVENOUS at 15:48

## 2020-01-07 RX ADMIN — Medication 10 ML/HR: at 18:43

## 2020-01-07 RX ADMIN — SODIUM CHLORIDE 2.5 MILLION UNITS: 9 INJECTION, SOLUTION INTRAVENOUS at 20:06

## 2020-01-07 RX ADMIN — FENTANYL CITRATE: 0.05 INJECTION, SOLUTION INTRAMUSCULAR; INTRAVENOUS at 18:45

## 2020-01-07 RX ADMIN — SODIUM CHLORIDE, POTASSIUM CHLORIDE, SODIUM LACTATE AND CALCIUM CHLORIDE: 600; 310; 30; 20 INJECTION, SOLUTION INTRAVENOUS at 18:56

## 2020-01-07 RX ADMIN — IBUPROFEN 800 MG: 800 TABLET, FILM COATED ORAL at 21:03

## 2020-01-07 RX ADMIN — Medication 25 MCG: at 13:37

## 2020-01-07 RX ADMIN — LIDOCAINE HYDROCHLORIDE,EPINEPHRINE BITARTRATE 3 ML: 15; .005 INJECTION, SOLUTION EPIDURAL; INFILTRATION; INTRACAUDAL; PERINEURAL at 18:43

## 2020-01-07 RX ADMIN — Medication: at 18:45

## 2020-01-07 RX ADMIN — EPHEDRINE SULFATE 5 MG: 50 INJECTION, SOLUTION INTRAMUSCULAR; INTRAVENOUS; SUBCUTANEOUS at 18:55

## 2020-01-07 NOTE — PLAN OF CARE
Data: Patient admitted to room 0471 at 0930. Patient is a . Prenatal record reviewed.   OB History    Para Term  AB Living   4 1 1 0 2 1   SAB TAB Ectopic Multiple Live Births   1 0 0 0 1      # Outcome Date GA Lbr Sherman/2nd Weight Sex Delivery Anes PTL Lv   4 Current            3 SAB 18     SAB      2 Term 11 40w1d  3.615 kg (7 lb 15.5 oz) M  None N MARV   1 AB 08           .  Medical History:   Past Medical History:   Diagnosis Date     Anxiety      Chickenpox      Depression      Depressive disorder      GERD (gastroesophageal reflux disease)      Substance abuse (H)    .  Gestational age 41w0d. Vital signs per doc flowsheet. Fetal movement present. Patient reports Induction Of Labor  as reason for admission. Support persons D[place]rell present.  Action: Verbal consent for EFM, external fetal monitors applied. Admission assessment completed. Patient and support persons educated on labor process. Patient instructed to report change in fetal movement, contractions, vaginal leaking of fluid or bleeding, abdominal pain, or any concerns related to the pregnancy to her nurse/physician. Patient oriented to room, call light in reach. IV inserted, and urine sent for toxicology screen with patient consent.  Response: Dr. Jordan informed of patient arrival. Plan per provider is begin induction with cervical ripening and possible insertion of webb. Patient verbalized understanding of education and verbalized agreement with plan.

## 2020-01-07 NOTE — PROGRESS NOTES
Labor progress note    S: Patient feeling well. Contractions present but not intense.     O:  Patient Vitals for the past 24 hrs:   BP Temp Temp src Resp   20 1040 131/88 -- -- 16   20 0935 118/79 98  F (36.7  C) Oral 16   Gen: resting comfortably    FHT: baseline 125, mod nirmal, accels present, no decels  [place]: 3-4 in 10    Assessment  Ms. Domenico Wilder is a 29 year old , at 41w0d by 8w6d US who presents for induction of labor.     Plan  Admit to L&D  Labor:   Anticipate . Cervix unfavorable on last check, will plan for cervical ripening, Esposito balloon.   FWB:    Category I FHT.  Continue EFM and toco  Pain:     Desires possibly epidural for analgesia  PNC:     Rh pos, Rubella imm, GBS pos, GCT passed, Placenta anterior   Fen/GI: Clear liquid diet, IVF     Has not had GCT done, will monitor BG q4h in labor.     Josselyn Jordan MD  OB/GYN PGY-2  20 2:34 PM

## 2020-01-07 NOTE — H&P
[facility]  OB History and Physical      Domenico Wilder MRN# [id number]   Age: 29 year old YOB: 1990     CC:  IOL    HPI:  Ms. Domenico Wilder is a 29 year old  at 41w0d by 8w6d US who presents for induction of labor.  She denies contractions, vaginal bleeding, and loss of fluid.   + normal fetal movement.    Pregnancy Complications:  - h/o PSA  - Scant PNC  - ASC-US pap, HPV neg  - GBS pos  - Depression, no meds  - Chlamydia, neg MEGHNA    Prenatal Labs:   Lab Results   Component Value Date    ABO O 2018    RH Pos 2018    AS Neg 2018    HEPBANG Nonreactive 2018    CHPCRT  2010     Negative for C. trachomatis rRNA by transcription mediated amplification.   A negative result by transcription mediated amplification does not preclude the   presence of C. trachomatis infection because results are dependent on proper   and adequate collection, absence of inhibitors, and sufficient rRNA to be   detected.    GCPCRT  2010     Negative for N. gonorrhoeae rRNA by transcription mediated amplification.   A negative result by transcription mediated amplification does not preclude the   presence of N. gonorrhoeae infection because results are dependent on proper   and adequate collection, absence of inhibitors, and sufficient rRNA to be   detected.    TREPAB Negative 2018    HGB 14.2 2019       GBS Status:   Positive based on urine culture    HCV Ag positive, RNA negative    Ultrasounds  19 dating US  19 anatomy US    OB History  OB History    Para Term  AB Living   4 1 1 0 2 1   SAB TAB Ectopic Multiple Live Births   1 0 0 0 1      # Outcome Date GA Lbr Sherman/2nd Weight Sex Delivery Anes PTL Lv   4 Current            3 SAB 18     SAB      2 Term 11 40w1d  3.615 kg (7 lb 15.5 oz) M  None N MARV   1 AB 08               PMHx:   Past Medical History:   Diagnosis Date     Anxiety      Chickenpox       Depression      Depressive disorder      GERD (gastroesophageal reflux disease)      Substance abuse (H)      PSHx:   Past Surgical History:   Procedure Laterality Date     NO HISTORY OF SURGERY       Meds:   Medications Prior to Admission   Medication Sig Dispense Refill Last Dose     hydrocortisone (ANUSOL-HC) 2.5 % cream Place rectally 2 times daily as needed for hemorrhoids 30 g 1      Prenatal Vit-Fe Fumarate-FA (PRENATAL MULTIVITAMIN PLUS IRON) 27-0.8 MG TABS per tablet Take 1 tablet by mouth daily 100 tablet 3 2019 at Unknown time     Allergies:    Allergies   Allergen Reactions     Cats      Dogs       FmHx:   Family History   Problem Relation Age of Onset     Allergies Mother      Depression Mother      Alcohol/Drug Mother      Prostate Cancer Paternal Grandfather      Diabetes Maternal Grandfather         non insulin dependent     SocHx: She denies any tobacco, alcohol, or other drug use during this pregnancy.    ROS:   Complete 10-point ROS negative except as noted in HPI.She denies headache, blurry vision, chest pain, shortness of breath, RUQ pain, nausea, vomiting, dysuria, hematuria or extremity edema.    PE:  Vit:   Patient Vitals for the past 4 hrs:   BP Temp Temp src Resp   20 0935 118/79 98  F (36.7  C) Oral 16      Gen: Well-appearing, NAD, comfortable   CV: rrr, no mrg   Pulm: Ctab, no wheezes or crackles   Abd: Soft, gravid, non-tender  Ext: no LE edema b/l  Cx: Deferred, previously /-3 on     Pres:  ceph by BSUS  EFW:  7.5# by Leopold's  Memb: intact              FHT: Baseline 125, mod variability, + accelerations, no decelerations   [place]: 1-2 contractions in 10 minutes      Assessment  Ms. Domenico Wilder is a 29 year old , at 41w0d by 8w6d US who presents for induction of labor.    Plan  Admit to L&D  Labor: Anticipate . Cervix unfavorable on last check, will plan for cervical ripening, Esposito balloon.   FWB: Category I FHT.  Continue EFM and toco  Pain: Desires  possibly epidural for analgesia  PNC: Rh pos, Rubella imm, GBS pos, GCT passed, Placenta anterior   Fen/GI: Clear liquid diet, IVF    Has not had GCT done, will monitor BG q4h in labor.     The patient was discussed with Dr. Stubbs who is in agreement with the treatment plan.    Josselyn Jordan MD PGY2  Department of OB/GYN  1/7/2020 10:26 AM    I saw and evaluated the patient. I agree with the   findings and the plan of care as documented in the resident's note.  MD Palmer

## 2020-01-08 LAB — HGB BLD-MCNC: 9.8 G/DL (ref 11.7–15.7)

## 2020-01-08 PROCEDURE — 36415 COLL VENOUS BLD VENIPUNCTURE: CPT | Performed by: STUDENT IN AN ORGANIZED HEALTH CARE EDUCATION/TRAINING PROGRAM

## 2020-01-08 PROCEDURE — 25000132 ZZH RX MED GY IP 250 OP 250 PS 637: Performed by: STUDENT IN AN ORGANIZED HEALTH CARE EDUCATION/TRAINING PROGRAM

## 2020-01-08 PROCEDURE — 12000001 ZZH R&B MED SURG/OB UMMC

## 2020-01-08 PROCEDURE — 72200001 ZZH LABOR CARE VAGINAL DELIVERY SINGLE

## 2020-01-08 PROCEDURE — 85018 HEMOGLOBIN: CPT | Performed by: STUDENT IN AN ORGANIZED HEALTH CARE EDUCATION/TRAINING PROGRAM

## 2020-01-08 RX ORDER — IBUPROFEN 600 MG/1
600 TABLET, FILM COATED ORAL EVERY 6 HOURS PRN
Qty: 60 TABLET | Refills: 0 | Status: ON HOLD | OUTPATIENT
Start: 2020-01-08 | End: 2023-10-02

## 2020-01-08 RX ORDER — ACETAMINOPHEN 325 MG/1
650 TABLET ORAL EVERY 6 HOURS PRN
Qty: 100 TABLET | Refills: 0 | Status: SHIPPED | OUTPATIENT
Start: 2020-01-08 | End: 2024-09-24

## 2020-01-08 RX ORDER — FERROUS GLUCONATE 324(38)MG
324 TABLET ORAL
Qty: 30 TABLET | Refills: 3 | Status: SHIPPED | OUTPATIENT
Start: 2020-01-08 | End: 2023-09-28

## 2020-01-08 RX ORDER — AMOXICILLIN 250 MG
1 CAPSULE ORAL DAILY
Qty: 100 TABLET | Refills: 0 | Status: SHIPPED | OUTPATIENT
Start: 2020-01-08 | End: 2023-09-28

## 2020-01-08 RX ADMIN — ACETAMINOPHEN 650 MG: 325 TABLET, FILM COATED ORAL at 09:55

## 2020-01-08 RX ADMIN — ACETAMINOPHEN 650 MG: 325 TABLET, FILM COATED ORAL at 05:10

## 2020-01-08 RX ADMIN — SENNOSIDES AND DOCUSATE SODIUM 2 TABLET: 8.6; 5 TABLET ORAL at 09:55

## 2020-01-08 RX ADMIN — SENNOSIDES AND DOCUSATE SODIUM 2 TABLET: 8.6; 5 TABLET ORAL at 00:00

## 2020-01-08 RX ADMIN — IBUPROFEN 800 MG: 800 TABLET, FILM COATED ORAL at 11:44

## 2020-01-08 RX ADMIN — IBUPROFEN 800 MG: 800 TABLET, FILM COATED ORAL at 05:10

## 2020-01-08 NOTE — PROGRESS NOTES
Post Partum Progress Note  PPD#1    Subjective:  She is resting comfortably in bed this morning. She denies pain. She is tolerating PO intake. Lochia present and is moderate.  She is voiding without difficulty. She is ambulating without dizziness or difficulty.  She denies headache, changes in vision, nausea/vomiting, chest pain, shortness of breath, RUQ pain, or worsening edema.  Plans to breast feed.    Objective:  Vitals:    20 2130 20 2345 20 0100 20 0500   BP: 131/86 135/77 128/88 104/80   Resp:  16 16 16   Temp:   98.8  F (37.1  C) 98.1  F (36.7  C)   TempSrc:   Oral Oral   SpO2: 98% 99% 98%        General: NAD, resting comfortably  CV: Regular rate, well perfused.   Pulm: Normal respiratory effort.  Abd: Soft, non-tender, non-distended. Fundus is firm and below the umbilicus.    Ext: No lower extremity edema bilaterally. No calf tenderness.    Assessment/Plan:  Domenico Wilder is a 29 year old  female who is PPD#1 s/p uncomplicated .    - Encourage routine post-operative goals including ambulation and incentive spirometry  - PNC: Rh pos. Rubella immune. No intervention indicated.  - Pain: controlled on oral medications  - Heme: Hgb 11.3> mL>AM Hgb pending.  If <10 will discharge home with iron.  - CV: Elevated BP's <4 hours apart. Continue to monitor BP's  - GI: continue anti-emetics and stool softeners as needed.  - : Voiding spontaneously.  - Infant: Stable in room  - Feeding: Plans on breastfeeding.  - BC: Did not discuss  - SW consult for h/o polysubstance abuse and scant prenatal care    Discharge to home on PPD#2      Aviva Etienne MD, MD  Obstetrics and Gyncology, PGY-3  2020 , 12:36 AM      Appreciate note by Dr. Etienne. Patient has been seen and examined by me separate from the resident, agree with above note. Normalization of BP, plan for discharge home tomorrow.   Hemoglobin   Date Value Ref Range Status   2020 9.8 (L) 11.7 - 15.7 g/dL Final        Leela Jerez MD  1:54 PM

## 2020-01-08 NOTE — LACTATION NOTE
This note was copied from a baby's chart.  Consult for: RN request for tachypnea    Delivery Information: Baby Enrique was born at 41.0 weeks via vaginal delivery on 20 at .    Maternal Health History: Domenico has a history of depression and  polysubstance use. She went to treatment and denies any use this pregnancy. Her utox on admission was negative. ?    Maternal Breast Exam: Domenico noted breast growth in early pregnancy. She  her 8 year old son without milk supply concerns. She has pumped once and was able to express some colostrum which was spoon fed to Enrique. ?    Infant information: Infant evaluated by NICU NNP for tachypnea with concerns for opioid withdrawal despite mother's denial of use. ANDREW scoring initiated. Infant has age appropriate output and has been waking to breastfeed. He is < 24 hours old. ?    Oral exam of baby:  Enrique has a visible, palpable lingual frenulum. When sucking on my finger he is able to keep his tongue over the lower gum ridge and produce a coordinated suck.    Feeding History: Domenico denies any feeding concerns or questions at this time. She shares she is able to latch infant and denies any discomfort when infant latched. Infant was recently supplemented with pasteurized donor milk due to tachypnea and Domenico pumped at this time.     Domenico was encouraged to call for latch assessment and support when Enrique is cueing again to feed.     Education: early feeding cues, benefits of feeding on cue, breastfeeding positions, signs breastfeeding is going well (comfortable latch, age appropriate output and weight loss, swallowing heard at the breast), satiety cues, expected  output,  weight loss, nutritive vs non-nutritive sucking, benefits of skin to skin, the Second Night, benefits of breast massage and hand expression of colostrum, benefits of pumping when supplementing, inpatient lactation support and outpatient lactation resources.         Plan: Continue breastfeeding on cue with RN support as needed with a goal of 8-12 feedings per day. Encourage frequent skin to skin, breast massage and hand expression. If infant continues with supplementation, continue to pump at least 4-6 times per day.    Domenico plans to check with her insurance about breast pump coverage. She was encouraged to do that this afternoon.    Domenico plans to follow up at [facility]. She was encouraged to follow up with outpatient LC through [facility] or provider from the  [facility] as needed after discharge.

## 2020-01-08 NOTE — ANESTHESIA PROCEDURE NOTES
Epidural Procedure Note    Staff:     Anesthesiologist:  Harish Anguiano MD  Location: OB     Procedure start time:  1/7/2020 6:31 PM   Pre-procedure checklist:   patient identified, IV checked, site marked, risks and benefits discussed, informed consent, monitors and equipment checked, pre-op evaluation, at physician/surgeon's request and post-op pain management      Correct Patient: Yes      Correct Position: Yes      Correct Site: Yes      Correct Procedure: Yes      Correct Laterality:  Yes    Site Marked:  Yes  Procedure:     Procedure:  Epidural catheter    ASA:  2    Diagnosis:  Labor    Position:  Sitting    Sterile Prep: chloraprep      Insertion site:  L2-3    Local skin infiltration:  1% lidocaine    amount (mL):  3    Approach:  Midline    Needle gauge (G):  17    Needle Length (in):  3.5    Block Needle Type:  Band[place]josé    Injection Technique:  LORT saline    DANICA at (cm):  3    Attempts:  1    Redirects:  0    Catheter gauge (G):  19    Catheter threaded easily: Yes      Threaded to cm at skin:  6.5    Threaded in epidural space (cm):  3.5    Paresthesias:  No    Aspiration negative for Heme or CSF: Yes      Test dose (mL):  3     Local anesthetic:  Lidocaine 1.5% w/ 1:200,000 epinephrine    Test dose time:  18:43    Test dose negative for signs of intravascular, subdural or intrathecal injection: Yes

## 2020-01-08 NOTE — ANESTHESIA PREPROCEDURE EVALUATION
Anesthesia Pre-Procedure Evaluation    Patient: Domenico Wilder   MRN:     [id number] Gender:   female   Age:    29 year old :      1990        Preoperative Diagnosis: * No surgery found *        Past Medical History:   Diagnosis Date     Anxiety      Chickenpox      Depression      Depressive disorder      GERD (gastroesophageal reflux disease)      Substance abuse (H)       Past Surgical History:   Procedure Laterality Date     NO HISTORY OF SURGERY            Anesthesia Evaluation     .             ROS/MED HX    ENT/Pulmonary:       Neurologic:       Cardiovascular:         METS/Exercise Tolerance:     Hematologic:         Musculoskeletal:         GI/Hepatic:     (+) GERD       Renal/Genitourinary:         Endo:         Psychiatric: Comment: H/O polysubstance abuse    (+) psychiatric history anxiety and depression      Infectious Disease:         Malignancy:         Other:                         PHYSICAL EXAM:   Mental Status/Neuro: A/A/O   Airway: Facies: Feasible  Mallampati: II  Mouth/Opening: Full  TM distance: > 6 cm  Neck ROM: Full   Respiratory: Auscultation: CTAB     Resp. Rate: Normal     Resp. Effort: Normal      CV: Rhythm: Regular  Rate: Age appropriate  Heart: Normal Sounds  Edema: None   Comments:      Dental: Normal Dentition                LABS:  CBC:   Lab Results   Component Value Date    WBC 11.0 2020    WBC 13.0 (H) 2019    HGB 11.3 (L) 2020    HGB 14.2 2019    HCT 35.2 2020    HCT 42.5 2019     2020     2019     BMP:   Lab Results   Component Value Date     2019     2019    POTASSIUM 3.6 2019    POTASSIUM 3.0 (L) 2019    CHLORIDE 100 2019    CHLORIDE 105 2019    CO2 30 2019    CO2 28 2019    BUN 6 (L) 2019    BUN 10 2019    CR 0.44 (L) 2019    CR 0.70 2019    GLC 78 2019    GLC 91 2019     COAGS: No results found for: PTT, INR,  "FIBR  POC:   Lab Results   Component Value Date    HCG Positive (A) 05/21/2019    HCGS Negative 02/25/2019     OTHER:   Lab Results   Component Value Date    SONA 9.3 05/21/2019    MAG 2.7 (H) 02/16/2019    ALBUMIN 3.6 05/21/2019    PROTTOTAL 8.3 05/21/2019    ALT 14 05/21/2019    AST 13 05/21/2019    GGT 10 04/07/2018    ALKPHOS 77 05/21/2019    BILITOTAL 0.2 05/21/2019    TSH 0.33 (L) 02/28/2017    T4 1.41 02/28/2017        Preop Vitals    BP Readings from Last 3 Encounters:   01/07/20 123/79   12/31/19 118/80   05/21/19 91/63    Pulse Readings from Last 3 Encounters:   12/31/19 112   05/21/19 98   02/26/19 78      Resp Readings from Last 3 Encounters:   01/07/20 16   12/31/19 20   05/21/19 18    SpO2 Readings from Last 3 Encounters:   05/21/19 99%   05/08/19 100%   02/26/19 98%      Temp Readings from Last 1 Encounters:   01/07/20 (P) 36.7  C (98  F) ((P) Oral)    Ht Readings from Last 1 Encounters:   12/31/19 1.651 m (5' 5\")      Wt Readings from Last 1 Encounters:   12/31/19 67.6 kg (149 lb)    Estimated body mass index is 24.79 kg/m  as calculated from the following:    Height as of 12/31/19: 1.651 m (5' 5\").    Weight as of 12/31/19: 67.6 kg (149 lb).     LDA:  Peripheral IV 01/07/20 Right Lower forearm (Active)   Number of days: 0        Assessment:   ASA SCORE: 2    H&P: History and physical reviewed and following examination; no interval change.         Plan:   Anes. Type:  Epidural     Epidural Details:  Catheter; Lumbar   Pre-Medication: None   Induction:  N/a   Airway: Native Airway   Access/Monitoring: PIV   Maintenance: N/a     Postop Plan:   Postop Pain: Regional  Postop Sedation/Airway: Not planned  Disposition: Inpatient/Admit     PONV Management: Adult Risk Factors: Female     CONSENT: Direct conversation   Plan and risks discussed with: Patient   Blood Products: Consent Deferred (Minimal Blood Loss)                   Michael Perez MD  "

## 2020-01-08 NOTE — L&D DELIVERY NOTE
OB Vaginal Delivery Note    Domenico Wilder MRN# [id number]   Age: 29 year old YOB: 1990       GA: 41w0d  GP:   Labor Complications: None   EBL: 250 mL  Delivery QBL:    Delivery Type: Vaginal, Spontaneous   ROM to Delivery Time: rupture date or rupture time have not been documented  [place] Weight:      1 Minute 5 Minute 10 Minute   Apgar Totals: 9    9          CARLIN ETIENNE     Delivery Details:  Delivery Note:  Domenico Wilder is a 29 year old  at 41w0d who presented to L&D for late term IOL. Pregnancy was complicated by: scant prenatal care and h/o polysubstance abuse, fetal tricuspid regurgitation. GBS positive, received 4 hours of PCN.    Misoprostol was used for cervical ripening followed by Pitocin for labor augmentation. Patient had SROM for clear fluid 5 minutes prior to delivery.   The patient progressed to complete and pushed for approximately 2 minutes. FHT was Cat I for the labor course. She subsequently had an uncomplicated  of a male infant at  on 2020 . The infant head was delivered in REANNA position. Nuchal cord x1 was reduced with delivery of the head. Apgars were 9 and 9 and 1 and 5 minutes. Weight pending. Routine cord blood was obtained. IV pitocin was started for active management of the third stage. The placenta was delivered with gentle downward traction. It was found to be intact with a three vessel cord. Uterine tone was noted to be firm on fundal massage. She sustained a no perineal lacerations. Upon final inspection, there was good hemostasis. Instrument and sharp count was correct. EBL: 250mL    Dr. Colon was not present for delivery due to a simultaneous delivery.    Carlin Etienne MD  Ob/Gyn PGY-3  20 9:02 PM       I was scrubbed into a  section at the time of this precipitous delivery.  Due to the uncomplicated nature of the delivery I was not called to come assist but was available if needed.    Liz Colon MD, FACOG         Labor  Events     labor?:  No   steroids:  None  Labor Type:  Induction  Predominate monitoring during 1st stage:  continuous electronic fetal monitoring     Antibiotics received during labor?:  Yes  Reason for Antibiotics:  GBS  Antibiotics received for GBS:  Penicillin     Rupture identifier:  Sac 1  Rupture date/time:     Rupture type:  Spontaneous rupture of membranes occuring during spontaneous labor or augmentation  Fluid color:  Clear     Induction:  Oxytocin  Induction date/time:     Cervical ripening date/time:        Delivery/Placenta Date and Time    Delivery Date:  20 Delivery Time:   8:26 PM   Placenta Date/Time:  2020  8:28 PM     Apgars    Living status:  Living   1 Minute 5 Minute 10 Minute 15 Minute 20 Minute   Skin color: 1  1       Heart rate: 2  2       Reflex irritability: 2  2       Muscle tone: 2  2       Respiratory effort: 2  2       Total: 9  9       Apgars assigned by:  HERLINDA DIAZ     Cord    Vessels:  3 Vessels Complications:  None   Cord Blood Disposition:  Lab Gases Sent?:  No      Labor Events and Shoulder Dystocia    Fetal Tracing Prior to Delivery:  Category 1  Shoulder dystocia present?:  Neg             Delivery (Maternal) (Provider to Complete) (131721)    Episiotomy:  None  Perineal lacerations:  None       Blood Loss  Mother: Domenico Wilder #[id number]   Start of Mother's Information    IO Blood Loss  20 0826 - 20 2102    None           End of Mother's Information  Mother: Domenico Wilder #[id number]         Delivery - Provider to Complete ([id number])    Delivering clinician:  Liz Colon MD  Attempted Delivery Types (Choose all that apply):  Spontaneous Vaginal Delivery  Delivery Type (Choose the 1 that will go to the Birth History):  Vaginal, Spontaneous   Other personnel:   Provider Role   Aviva Etienne MD          Placenta    Delayed Cord Clamping:  Done  Date/Time:  2020  8:28 PM  Removal:  Spontaneous  Disposition:  Hospital  disposal     Anesthesia    Method:  Epidural          Presentation and Position    Presentation:  Vertex  Position:  Left Occiput Anterior           Aviva Etienne MD

## 2020-01-08 NOTE — PLAN OF CARE
Data: Domenico Wilder transferred to 7138 via wheelchair at 0008. Baby transferred via parent's arms.  Action: Receiving unit notified of transfer: Yes. Patient and family notified of room change. Report given to Nedra RAMOS RN at 0000. Belongings sent to receiving unit. Accompanied by Registered Nurse. Oriented patient to surroundings. Call light within reach. ID bands double-checked with receiving RN.  Response: Patient tolerated transfer and is stable.

## 2020-01-08 NOTE — PLAN OF CARE
Patient arrived to [facility] unit via wheelchair at 0015,with belongings, accompanied by spouse/ significant other, with infant in arms. Received report from EMILY Salgado and checked bands. Unit and room orientation completd. Call light given; no concerns present at this time. Continue with plan of care.

## 2020-01-08 NOTE — PLAN OF CARE
[id number]  Patient denies any issues thus far. Pain well managed with current pain regimen. Up ad candida in room. Independent with self and infant cares. Will continue to monitor, intervene as needed, notify provider with any change.    unknown

## 2020-01-08 NOTE — DISCHARGE SUMMARY
[facility] Discharge Summary    Domenico Wilder MRN# [id number]   Age: 29 year old YOB: 1990     Date of Admission:  2020  Date of Discharge:  20   Admitting Physician:  Liz Colon MD  Discharge Physician:  Saima Sánchez MD    Admit Dx:   - Intrauterine pregnancy at 41w0d   - h/o PSA  - Scant PNC  - ASC-US pap, HPV neg  - GBS pos  - Depression, no meds  - Chlamydia, neg MEGHNA  - Induction of labor    Discharge Dx:  - Same as above, s/p   - Gestational hypertension    Procedures:  - Spontaneous vaginal delivery  - Epidural analgesia    Admit HPI/Labor Course:  Domenico Wilder is a 29 year old  at 41w0d who presented to L&D for late term IOL. Pregnancy was complicated by: scant prenatal care and h/o polysubstance abuse, fetal tricuspid regurgitation. GBS positive, received 4 hours of PCN.     Misoprostol was used for cervical ripening followed by Pitocin for labor augmentation. Patient had SROM for clear fluid 5 minutes prior to delivery.   The patient progressed to complete and pushed for approximately 2 minutes. FHT was Cat I for the labor course. She subsequently had an uncomplicated  of a male infant at  on 2020 . The infant head was delivered in REANNA position. Nuchal cord x1 was reduced with delivery of the head. Apgars were 9 and 9 and 1 and 5 minutes. Weight pending. Routine cord blood was obtained. IV pitocin was started for active management of the third stage. The placenta was delivered with gentle downward traction. It was found to be intact with a three vessel cord. Uterine tone was noted to be firm on fundal massage. She sustained a no perineal lacerations. Upon final inspection, there was good hemostasis. Instrument and sharp count was correct. EBL: 250mL     Please see her Admission H&P and Delivery Summary for further details.    Postpartum Course:  Her postpartum course was complicated by rare mild range blood pressures  and patient met criteria for gestational hypertension. HELLP labs were wnl. Patient did not experience any preeclampsia symptoms.     On PPD#3, she was meeting all of her postpartum goals and deemed stable for discharge. She was voiding without difficulty, tolerating a regular diet without nausea and vomiting, her pain was well controlled on oral pain medicines and her lochia was appropriate. Her hemoglobin prior to delivery was 11.3 and after delivery was 9.8. Her Rh status was positive, and Rhogam was not indicated.     Discharge Medications:     Review of your medicines      START taking      Dose / Directions   acetaminophen 325 MG tablet  Commonly known as:  TYLENOL      Dose:  650 mg  Take 2 tablets (650 mg) by mouth every 6 hours as needed for mild pain Start after Delivery.  Quantity:  100 tablet  Refills:  0     ferrous gluconate 324 (38 Fe) MG tablet  Commonly known as:  FERGON  Used for:  Anemia due to blood loss, acute      Dose:  324 mg  Take 1 tablet (324 mg) by mouth daily (with breakfast)  Quantity:  30 tablet  Refills:  3     ibuprofen 600 MG tablet  Commonly known as:  ADVIL/MOTRIN      Dose:  600 mg  Take 1 tablet (600 mg) by mouth every 6 hours as needed for moderate pain Start after delivery  Quantity:  60 tablet  Refills:  0     senna-docusate 8.6-50 MG tablet  Commonly known as:  SENOKOT-S/PERICOLACE      Dose:  1 tablet  Take 1 tablet by mouth daily Start after delivery.  Quantity:  100 tablet  Refills:  0        CONTINUE these medicines which have NOT CHANGED      Dose / Directions   hydrocortisone 2.5 % cream  Commonly known as:  ANUSOL-HC  Used for:  External hemorrhoids      Place rectally 2 times daily as needed for hemorrhoids  Quantity:  30 g  Refills:  1     prenatal multivitamin w/iron 27-0.8 MG tablet  Used for:  Encounter for pregnancy test, result unknown      Dose:  1 tablet  Take 1 tablet by mouth daily  Quantity:  100 tablet  Refills:  3           Where to get your medicines       These medications were sent to [place], [place] - [address]  [address] [facility] [address], [facility] [address]    Phone:  [phone]     acetaminophen 325 MG tablet    ferrous gluconate 324 (38 Fe) MG tablet    ibuprofen 600 MG tablet    senna-docusate 8.6-50 MG tablet          Discharge/Disposition:  Domenico Wilder was discharged to home in stable condition with the following instructions/medications:  1) Call for temperature > 100.4, bright red vaginal bleeding >1 pad an hour x 2 hours, foul smelling vaginal discharge, pain not controlled by usual oral pain meds, persistent nausea and vomiting not controlled on medications  2) She was undecided contraception.  3) For feeding she decided to breastfeed  4) She was instructed to follow-up with her primary OB in 6 weeks for a routine postpartum visit and in 1 week for a blood pressure check.    Aviva Etienne MD  Ob/Gyn PGY-3  01/10/20 6:09 AM

## 2020-01-08 NOTE — PLAN OF CARE
Patient vital signs stable. Fundus U/1, firm and midline. Pain well controlled. Up ad candida. Voiding independently. Regular diet. Patient performing self cares and is caring for infant. Breastfeeding independently. Continue with plan of care

## 2020-01-08 NOTE — PROGRESS NOTES
"Epidural bolus    D: S/p epidural placement at 1845 and primary RN at bedside during the 30 minutes after placement. Bps and FHTs remain within defined parameters. Pt c/o persistant right groin pain and stating \"I haven't gotten any relief from my epidural.\" Has been utilizing use of bolus button x2 every 10 minutes.  A: Dr. Perez phoned of above findings.  1930  D: Dr. Perez at bedside  A: Bolus given.   R: Orders to repeat Bps every 5 minutes and to keep him posted about pain status.   "

## 2020-01-08 NOTE — CONSULTS
[facility]  MATERNAL CHILD HEALTH    PSYCHOSOCIAL ASSESSMENT     DATA:     Reason for Social Work Consult: chemical health.    Presenting Information: Pt, Domenico, delivered son, Enrique, at 41 weeks gestation. Parents are Domenico and Dontrell. Mom is a .    SW met with Domenico alone for the majority of the assessment; Dontrell entered the room toward the end of the conversation.     Living Situation: Domenico lives with her mom and older 8 yr old son, Red, in [place]. Dontrell lives with his mom in [place].     Family Constellation: Parents have been in a relationship over the past 10-11 years.     Social Support: Domenico identifies having a strong support system of extended family including her mom.     Employment: Both parents are not working at this time. Domenico denies concerns with this and is confident she will be able to get a job once she's ready to work. Dontrell inquired and was receptive [employer]/ temp agency information so he can advance skills for employment.     Insurance: [facility]    Source of Financial Support: Domenico's parents are helping with finances. Domenico has recently applied for county benefits including SNAP. She plans to begin WIC post baby's discharge.     Mental Health History: Domenico reports a history of depression.     History of Postpartum Mood Disorders: She denies previous experience with post partum mood disorders.     Chemical Health History: Domenico acknowledges a history but does not provide specific details of her history. She states that she attended inpatient treatment at [facility] a few years ago that she appreciated. Upon discharging from that program she attended sober housing. She denies any current concern with use and states she does not have any cravings. Domenico reports that her partner, Dontrell, also has a substance use history but has also been sober for years.     Current Coping: Domenico reports that she renee through talking, journaling,  "listening to music, spending time in nature.     Community Resources//Baby Supplies: Domenico reports having the essential baby items and has awareness of a local resource, [employer] where she can participate in \"learn and earn\" programs for baby items.     INTERVENTION:       LINDSEY completed chart review and collaborated with the multidisciplinary team.     Psychosocial Assessment     Introduction to Maternal Child Health  role and scope of practice     Provided SW business card     Reviewed Hospital and Community Resources     Assessed Chemical Health History and Current Symptoms    Assessed Mental Health History and Current Symptoms     Identified stressors, barriers and family concerns     Provided supportive counseling. Active empathetic listening and validation.     Provided psychoeducation on  mood and anxiety disorders, assessed for any current symptoms or history    Provided community resource postcard for Postpartum Support [place] ([facility])     ASSESSMENT:     Coping: adequate: Domenico discusses her coping strategies and acknowledges the increased risk for relapse or PMADS during this vulnerable post partum stage.     Affect: appropriate    Mood:  appropriate, calm    Motivation/Ability to Access Services: Independent in accessing services    Assessment of Support System: stable, involved, limited, appropriate, adequate    Level of engagement with SW: Domenico appeared receptive and easily engages with conversation while maintaining eye contact. They appeared open to and appreciative of ongoing therapeutic support, advocacy, and connection with resources.   Engaged and appropriate. Able to seek out SW when needs arise.     Family s understanding of baby s medical situation: appropriate understanding    Family and parent/infant interactions: (attentiveness to baby, interactions between parents)  Parents seem supportive of each other and are bonding with pt as they are able. "     Assessment of parental risk for PMAD:   Higher than average risk given history of depression, history of chemical use.      Strengths: Family appears to be attentive and caring family, willingness to accept help.    Vulnerabilities: History of substance abuse.     PLAN:     SW will continue to follow throughout pt's Maternal-Child Health Journey as needs arise. SW will continue to collaborate with the multidisciplinary team.    Kjerstin Rydeen, [facility]   Social Worker  Maternal Child Health   Direct: [phone]  Pager: [phone]

## 2020-01-09 LAB
ALT SERPL W P-5'-P-CCNC: 18 U/L (ref 0–50)
AST SERPL W P-5'-P-CCNC: 32 U/L (ref 0–45)
CREAT SERPL-MCNC: 0.52 MG/DL (ref 0.52–1.04)
GFR SERPL CREATININE-BSD FRML MDRD: >90 ML/MIN/{1.73_M2}
HGB BLD-MCNC: 10.1 G/DL (ref 11.7–15.7)
PLATELET # BLD AUTO: 201 10E9/L (ref 150–450)

## 2020-01-09 PROCEDURE — 85018 HEMOGLOBIN: CPT | Performed by: STUDENT IN AN ORGANIZED HEALTH CARE EDUCATION/TRAINING PROGRAM

## 2020-01-09 PROCEDURE — 12000001 ZZH R&B MED SURG/OB UMMC

## 2020-01-09 PROCEDURE — 84460 ALANINE AMINO (ALT) (SGPT): CPT | Performed by: STUDENT IN AN ORGANIZED HEALTH CARE EDUCATION/TRAINING PROGRAM

## 2020-01-09 PROCEDURE — 36415 COLL VENOUS BLD VENIPUNCTURE: CPT | Performed by: STUDENT IN AN ORGANIZED HEALTH CARE EDUCATION/TRAINING PROGRAM

## 2020-01-09 PROCEDURE — 84450 TRANSFERASE (AST) (SGOT): CPT | Performed by: STUDENT IN AN ORGANIZED HEALTH CARE EDUCATION/TRAINING PROGRAM

## 2020-01-09 PROCEDURE — 90686 IIV4 VACC NO PRSV 0.5 ML IM: CPT | Performed by: OBSTETRICS & GYNECOLOGY

## 2020-01-09 PROCEDURE — 85049 AUTOMATED PLATELET COUNT: CPT | Performed by: STUDENT IN AN ORGANIZED HEALTH CARE EDUCATION/TRAINING PROGRAM

## 2020-01-09 PROCEDURE — 25000132 ZZH RX MED GY IP 250 OP 250 PS 637: Performed by: STUDENT IN AN ORGANIZED HEALTH CARE EDUCATION/TRAINING PROGRAM

## 2020-01-09 PROCEDURE — 82565 ASSAY OF CREATININE: CPT | Performed by: STUDENT IN AN ORGANIZED HEALTH CARE EDUCATION/TRAINING PROGRAM

## 2020-01-09 PROCEDURE — 25000128 H RX IP 250 OP 636: Performed by: OBSTETRICS & GYNECOLOGY

## 2020-01-09 RX ADMIN — IBUPROFEN 800 MG: 800 TABLET, FILM COATED ORAL at 19:20

## 2020-01-09 RX ADMIN — SENNOSIDES AND DOCUSATE SODIUM 2 TABLET: 8.6; 5 TABLET ORAL at 00:00

## 2020-01-09 RX ADMIN — IBUPROFEN 800 MG: 800 TABLET, FILM COATED ORAL at 00:00

## 2020-01-09 RX ADMIN — SENNOSIDES AND DOCUSATE SODIUM 2 TABLET: 8.6; 5 TABLET ORAL at 09:08

## 2020-01-09 RX ADMIN — SENNOSIDES AND DOCUSATE SODIUM 2 TABLET: 8.6; 5 TABLET ORAL at 19:20

## 2020-01-09 RX ADMIN — INFLUENZA A VIRUS A/BRISBANE/02/2018 IVR-190 (H1N1) ANTIGEN (FORMALDEHYDE INACTIVATED), INFLUENZA A VIRUS A/KANSAS/14/2017 X-327 (H3N2) ANTIGEN (FORMALDEHYDE INACTIVATED), INFLUENZA B VIRUS B/PHUKET/3073/2013 ANTIGEN (FORMALDEHYDE INACTIVATED), AND INFLUENZA B VIRUS B/MARYLAND/15/2016 BX-69A ANTIGEN (FORMALDEHYDE INACTIVATED) 0.5 ML: 15; 15; 15; 15 INJECTION, SUSPENSION INTRAMUSCULAR at 05:29

## 2020-01-09 NOTE — PLAN OF CARE
Data: VSS, postpartum assessments WNL. She is voiding without difficulty, up ad candida, passing gas, eating and drinking normally. Perineum appears to be healing well, lochia WNL, no s/s infection. She is independent with self and infant cares. Breastfeeding infant with no assistance. Taking ibuprofen for cramping discomfort and discussed the following non-pharmacologic methods for the perineal area: bath salts and ice packs. Reports good pain management.   Action: Education provided on self-care, safe sleep.  Response: Pt is agreeable with her plan of care. Positive attachment behaviors observed with infant. Support person, kalyn Jon. Anticipate discharge per care plan.

## 2020-01-09 NOTE — PLAN OF CARE
VSS and postpartum assessments WDL, ex some higher blood pressures.  Up ad candida with steady gait.  Independent with cares.  Bonding well with infant, macer.  Breastfeeding on cue, independently; but occasionally supplementing with 10-15mL of donor milk.  Pain managed with ibuprofen.  Boyfriend Dontrell present and very supportive.  Will continue with postpartum cares and education per plan of care.

## 2020-01-09 NOTE — PROGRESS NOTES
Post Partum Progress Note  PPD#2    Subjective:  She is resting comfortably in bed this morning. She denies pain. She is tolerating PO intake. Lochia present and is moderate.  She is voiding without difficulty. She is ambulating without dizziness or difficulty.  She denies headache, changes in vision, nausea/vomiting, chest pain, shortness of breath, RUQ pain, or worsening edema.  Plans to breast feed.    Objective:  Vitals:    20 1800 20 2114 20 2130 20 0050   BP: (!) 126/94 (!) 136/109 126/88 (!) 125/95   Pulse: 76 96     Resp: 12   14   Temp:  98.2  F (36.8  C)  98.2  F (36.8  C)   TempSrc:  Oral  Oral   SpO2:           General: NAD, resting comfortably  CV: Regular rate, well perfused.   Pulm: Normal respiratory effort.  Abd: Soft, non-tender, non-distended. Fundus is firm and below the umbilicus.    Ext: No lower extremity edema bilaterally. No calf tenderness.    Labs:  AM HELLP labs pending    Assessment/Plan:  Domenico Wilder is a 29 year old  female who is PPD#2 s/p uncomplicated .    - Encourage routine post-operative goals including ambulation and incentive spirometry  - PNC: Rh pos. Rubella immune. No intervention indicated.  - Pain: controlled on oral medications  - Heme: Hgb 11.3> mL>9.8. Pt asymptomatic. Will discharge home with iron.  - GI: continue anti-emetics and stool softeners as needed.  - : Voiding spontaneously.  - Infant: Stable in room  - Feeding: Plans on breastfeeding.  - BC: Undecided, plans to discuss in clinic  - SW consult for h/o polysubstance abuse and scant prenatal care    Gestational hypertension:  - Met criteria last evening based on rare mild range BP. Overall has been normotensive.  - HELLP labs pending for this AM    Discharge to home on PPD#2 if BP's remain stable      Aviva Etienne MD, MD  Obstetrics and Gyncology, PGY-3    I have seen and examined the patient without the resident. I have reviewed, edited, and agree with the note.    My findings are: My findings are: Appears well.   Abdomen: soft, NT, ND.   Incision CDI   LE without significant edema or erythema bilaterally  Hemoglobin   Date Value Ref Range Status   01/09/2020 10.1 (L) 11.7 - 15.7 g/dL Final   01/08/2020 9.8 (L) 11.7 - 15.7 g/dL Final     HELLP Labs: Cr 0.5  ALT 18  AST 32  plt 201    Continue to monitor BP, if normalizes may discharge home  Saima Sánchez MD

## 2020-01-09 NOTE — PLAN OF CARE
Data: VSS, postpartum assessments WNL. She is voiding without difficulty, up ad candida, passing gas, eating and drinking normally. Perineum appears to be healing well, lochia WNL, no s/s infection. She is independent with self and infant cares. Breastfeeding infant with no assistance, she is also pumping intermittently and baby is being supplemented with donor milk. Taking tylenol and ibuprofen prn. Reports good pain management.   Action: Education provided on plan of care goals, discharge goals.   Response: Pt is agreeable with her plan of care. Positive attachment behaviors observed with infant. Support person present. Will continue to monitor.

## 2020-01-10 VITALS
RESPIRATION RATE: 16 BRPM | DIASTOLIC BLOOD PRESSURE: 87 MMHG | OXYGEN SATURATION: 98 % | HEART RATE: 68 BPM | SYSTOLIC BLOOD PRESSURE: 123 MMHG | TEMPERATURE: 98.2 F

## 2020-01-10 PROCEDURE — 25000132 ZZH RX MED GY IP 250 OP 250 PS 637: Performed by: STUDENT IN AN ORGANIZED HEALTH CARE EDUCATION/TRAINING PROGRAM

## 2020-01-10 RX ADMIN — SENNOSIDES AND DOCUSATE SODIUM 1 TABLET: 8.6; 5 TABLET ORAL at 09:07

## 2020-01-10 RX ADMIN — IBUPROFEN 800 MG: 800 TABLET, FILM COATED ORAL at 09:07

## 2020-01-10 NOTE — PLAN OF CARE
VSS. Postpartum check WDL. Pain managed with Ibuprofen. Up ad candida. Tolerating regular diet. Voiding without difficulty. Breastfeeding independently, but reports breastfeeding is not going well. Plans to breastfeed and supplement with formula at home.  Patient taking care of self and infant independently. Seen by CPS worker today. Pt reports being seen by SW a few days ago and has no concerns or needs where pt has to see SW before discharging home. Pt educated on discharge instructions and given written handout, verbalized understanding of instructions. Discharged with medications, patient educated on medications and given written copies as well.  Will have home care nurse visit on Sunday for BP check. Pt aware to return to clinic in 3-5 days for BP check as well. Patient discharged to home with infant and significant other.

## 2020-01-10 NOTE — PLAN OF CARE
Home care referral placed through [facility] for early discharge and blood pressure check.

## 2020-01-10 NOTE — PROGRESS NOTES
Post Partum Progress Note  PPD#3    Subjective:  She is resting comfortably in bed this morning. She denies pain. She is tolerating PO intake. Lochia present and is moderate.  She is voiding without difficulty. She is ambulating without dizziness or difficulty.  She denies headache, changes in vision, nausea/vomiting, chest pain, shortness of breath, RUQ pain, or worsening edema.  Plans to breast feed.    Objective:  Vitals:    20 0756 20 1330 20 1842 01/10/20 0005   BP: 118/80 (!) 132/94 (!) 138/96 110/77   Pulse: 68      Resp: 12  14 16   Temp: 98.4  F (36.9  C)  96.8  F (36  C) 98.3  F (36.8  C)   TempSrc: Oral  Axillary Oral   SpO2:           General: NAD, resting comfortably  CV: Regular rate, well perfused.   Pulm: Normal respiratory effort.  Abd: Soft, non-tender, non-distended. Fundus is firm and below the umbilicus.    Ext: No lower extremity edema bilaterally. No calf tenderness.    Assessment/Plan:  Domenico Wilder is a 29 year old  female who is PPD#3 s/p uncomplicated .    - Encourage routine post-operative goals including ambulation and incentive spirometry  - PNC: Rh pos. Rubella immune. No intervention indicated.  - Pain: controlled on oral medications  - Heme: Hgb 11.3> mL>9.8. Pt asymptomatic. Will discharge home with iron.  - GI: continue anti-emetics and stool softeners as needed.  - : Voiding spontaneously.  - Infant: Stable in room  - Feeding: Plans on breastfeeding.  - BC: Undecided, plans to discuss in clinic  -  consult for h/o polysubstance abuse and scant prenatal care    Gestational hypertension:  - BP's normotensive to low mild range, no anti-hypertensives indicated  - HELLP labs wnl  - No sx of preeclampsia    Discharge to home today.     Aviva Etienne MD, MD  Obstetrics and Gyncology, PGY-3  01/10/20 6:09 AM

## 2020-01-10 NOTE — PLAN OF CARE
VSS. Assessment WNL. Voiding adequate amounts w/o difficulty. Pain well managed pt often declines pain meds. Breastfeeding indp and supp with donor breastmilk; unable to produce colostrum yet. Mother is pumping; encouraged to pump more and states she has a breastpump at home already. Bonding well with  and FOB in room and very supportive and involved with cares. Continue to support.

## 2020-01-10 NOTE — LACTATION NOTE
Follow up visit on day of discharge. Enrique has been breastfeeding, mom says latches very well, and taking up to 30 mL of donor milk after feedings (mom's choice). His weight loss at 48 hours was -7.3% from birthweight, bilirubin low risk, diaper output adequate for age. Domenico has history of ETOH, opioids, polysubstance abuse, went to treatment and last positive UDS was May, 2019 (negative in December and January). Domenico also has history of GERD, insomnia, smokes cigarettes, MDD, anxiety and gestational HTN. She  her first child for about a month, stopped when her supply ran out.     Reviewed supply and demand, risks of early supplements unless medically needed and importance of pumping each time Enrique gets formula or donor milk in order to trigger her supply. Encouraged frequent skin to skin and hand expressing (mom says she is able to get drops out) after each feeding until milk is in, hands on pumping with every supplement. Reviewed verbally how to use hands when pump and pulled up Maximizing Milk video for parents to watch while waiting for discharge this afternoon. Offer support and encouragement, answered questions. Domenico has a Medela pump at home, taking home the extra parts from hospital. They plan follow up at [facility], encouraged to follow up within a week with Saurabh ALTAMIRANO's to check in on milk supply and for support with breastfeeding.

## 2020-03-10 NOTE — TELEPHONE ENCOUNTER
----- Message from Rusty Garvey MD sent at 3/10/2020  3:37 PM EDT -----  MRi of brain was normal Agree with plan. Thanks, Lise Rojas CNM

## 2020-09-19 NOTE — PROGRESS NOTES
Labor progress note    S: Patient doing well.     O:  Patient Vitals for the past 24 hrs:   BP Temp Temp src Resp   20 1040 131/88 -- -- 16   20 0935 118/79 98  F (36.7  C) Oral 16   Gen: resting comfortably  SVE: 3/50/-3    FHT: baseline 125, mod nirmal, accels present, no decels  Oran: 3-4 in 10    Assessment  Ms. Domenico Wilder is a 29 year old , at 41w0d by 8w6d US who presents for induction of labor.     Plan  Admit to L&D  Labor:   Anticipate . S/p miso for cervical ripening, will start pitocin now.   FWB:    Category I FHT.  Continue EFM and toco  Pain:     Desires possibly epidural for analgesia  PNC:     Rh pos, Rubella imm, GBS pos, GCT passed, Placenta anterior   Fen/GI: Clear liquid diet, IVF     Has not had GCT done, will monitor BG q4h in labor.     Josselyn Jordan MD  OB/GYN PGY-2  20 3:27 PM   No

## 2021-07-23 NOTE — PROGRESS NOTES
SUBJECTIVE:   Domenico Wilder is a 27 year old female who presents to clinic today for the following health issues:      ADDICTION MEDICINE NOTE:    DOING WELL    REMAINS AT [facility]    SAW PSYCHIATRIST WHO ASSESSED HER AS BIPOLAR - STARTED LAMICTAL BUT SHE DEVELOPED RASH  NOW OFF ZOLOFT AND ON ABILIFY AND WELLBUTRIN    LIKE IT AT [facility]    MOTIVATED FOR RECOVERY    WILL GO TO TREATMENT AND LODGING AT [facility] IN A MONTHS    DISCUSSED RECOVERY    CONTINUE SAME    RE-CHECK 1 MONTH    Problem list and histories reviewed & adjusted, as indicated.  Additional history: as documented    Patient Active Problem List   Diagnosis     Depression     Insomnia     GERD (gastroesophageal reflux disease)     Opioid dependence with withdrawal (H)     Alcohol dependence with uncomplicated withdrawal (H)     Opioid use disorder, severe, dependence (H)     Alcoholism (H)     Anxiety     Major depressive disorder, recurrent, moderate (H)     Polysubstance abuse     Smoker     Need for Tdap vaccination     Chemical dependency (H)     Past Surgical History:   Procedure Laterality Date     NO HISTORY OF SURGERY         Social History   Substance Use Topics     Smoking status: Former Smoker     Packs/day: 0.50     Years: 13.00     Types: Cigarettes     Start date: 12/28/2017     Smokeless tobacco: Never Used     Alcohol use Yes      Comment: 1 liter per day vodka     Family History   Problem Relation Age of Onset     Allergies Mother      Depression Mother      Alcohol/Drug Mother      Prostate Cancer Paternal Grandfather      DIABETES Maternal Grandfather      non insulin dependent         Current Outpatient Prescriptions   Medication Sig Dispense Refill     ARIPiprazole (ABILIFY) 5 MG tablet Take 5 mg by mouth daily       buprenorphine HCl-naloxone HCl (SUBOXONE) 8-2 MG per film Take 1 film each morning and one-half  film each evening 42 Film 0     buPROPion (WELLBUTRIN XL) 150 MG 24 hr tablet Take 1 tablet (150 mg) by mouth  Pt resting in bed, given another blanket, denies current complaints, will continue to monitor   every morning 30 tablet 1     folic acid (FOLVITE) 1 MG tablet Take 1 tablet (1 mg) by mouth daily 30 tablet 1     hydrOXYzine (ATARAX) 25 MG tablet Take 1-2 tablets (25-50 mg) by mouth every 6 hours as needed for anxiety 120 tablet 1     ibuprofen (ADVIL/MOTRIN) 600 MG tablet Take 1 tablet (600 mg) by mouth every 6 hours as needed for moderate pain 120 tablet      omeprazole (PRILOSEC) 20 MG CR capsule Take 1 capsule (20 mg) by mouth every morning (before breakfast) 30 capsule 1     traZODone (DESYREL) 100 MG tablet Take 1 tablet (100 mg) by mouth nightly as needed for sleep 30 tablet 1     Allergies   Allergen Reactions     Cats      Dogs      Labs reviewed in EPIC      Reviewed and updated as needed this visit by clinical staffTobacco  Allergies  Meds       Reviewed and updated as needed this visit by Provider  Jasson         ROS:      OBJECTIVE:     BP 98/64 (BP Location: Left arm)  Pulse 95  Temp 98.5  F (36.9  C) (Oral)  Resp 16  Wt 163 lb (73.9 kg)  SpO2 98%  BMI 25.72 kg/m2  Body mass index is 25.72 kg/(m^2).       ROS:  Constitutional, HEENT, cardiovascular, pulmonary, gi and gu systems are negative, except as otherwise noted.    BP 98/64 (BP Location: Left arm)  Pulse 95  Temp 98.5  F (36.9  C) (Oral)  Resp 16  Wt 163 lb (73.9 kg)  SpO2 98%  BMI 25.72 kg/m2  EXAM:  GENERAL APPEARANCE: healthy, alert and no distress  EYES: Eyes grossly normal to inspection, PERRL and conjunctivae and sclerae normal  NEURO: Normal strength and tone, mentation intact and speech normal  PSYCH: mentation appears normal and affect normal/bright  MENTAL STATUS EXAM:  Appearance/Behavior: No apparent distress and Casually groomed  Speech: Normal  Mood/Affect: normal affect  Insight: Adequate      MN  - NO ISSUES; CHECKED 6/12/18      Diagnostic Test Results:  Results for orders placed or performed in visit on 06/12/18   Urine Drugs of Abuse Screen Panel 13   Result Value Ref Range    Cannabinoids  (46-mit-7-carboxy-9-THC) Not Detected NDET^Not Detected ng/mL    Phencyclidine (Phencyclidine) Not Detected NDET^Not Detected ng/mL    Cocaine (Benzoylecgonine) Not Detected NDET^Not Detected ng/mL    Methamphetamine (d-Methamphetamine) Not Detected NDET^Not Detected ng/mL    Opiates (Morphine) Not Detected NDET^Not Detected ng/mL    Amphetamine (d-Amphetamine) Not Detected NDET^Not Detected ng/mL    Benzodiazepines (Nordiazepam) Not Detected NDET^Not Detected ng/mL    Tricyclic Antidepressants (Desipramine) Not Detected NDET^Not Detected ng/mL    Methadone (Methadone) Not Detected NDET^Not Detected ng/mL    Barbiturates (Butalbital) Not Detected NDET^Not Detected ng/mL    Oxycodone (Oxycodone) Not Detected NDET^Not Detected ng/mL    Propoxyphene (Norpropoxyphene) Not Detected NDET^Not Detected ng/mL    Buprenorphine (Buprenorphine) Detected, Abnormal Result (A) NDET^Not Detected ng/mL       ASSESSMENT:   OPIOID DEPENDENCE  PREGNANCY    PLAN:       ICD-10-CM    1. Opioid use disorder, severe, dependence (H) F11.20 Urine Drugs of Abuse Screen Panel 13     buprenorphine HCl-naloxone HCl (SUBOXONE) 8-2 MG per film           MEDICATIONS:   Orders Placed This Encounter   Medications     ARIPiprazole (ABILIFY) 5 MG tablet     Sig: Take 5 mg by mouth daily     buprenorphine HCl-naloxone HCl (SUBOXONE) 8-2 MG per film     Sig: Take 1 film each morning and one-half  film each evening     Dispense:  42 Film     Refill:  0          - Continue other medications without change  FUTURE APPOINTMENTS:       - Follow-up visit in 4 WEEKS    Troy Fiore MD  [facility] ADDICTION MEDICINE

## 2021-11-16 NOTE — PROGRESS NOTES
Joan Michael was seen and treated in our emergency department on 11/16/2021. He may return to school on 11/27/2021. If you have any questions or concerns, please don't hesitate to call.       YOAN Davis Re: Discharge planning    Writer met with patient to establish discharge plan. She is interested in [facility] for treatment and some kind of maintenance program. Reports IV heroin use up 2 grams per day for the past three years. She has had some minimal periods of sobriety. Currently homeless. Rule 25 update completed by this writer with recommendations for residential programming and medication assistance. Update with CPA sent to [facility] for review. Writer called [facility] and they likely have a female bed on Wednesday. Writer faxed over original Rule 25 and update for clinical review.    Lindsey Herndon, LPCC, LADC

## 2022-06-16 NOTE — PROGRESS NOTES
Client did not show for her 1:30 session.   DESTIN Stockton     Dutasteride Pregnancy And Lactation Text: This medication is absolutely contraindicated in women, especially during pregnancy and breast feeding. Feminization of male fetuses is possible if taking while pregnant.

## 2022-09-20 NOTE — TELEPHONE ENCOUNTER
"Requested Prescriptions   Pending Prescriptions Disp Refills     hydrOXYzine (VISTARIL) 25 MG capsule [Pharmacy Med Name: HYDROXYZINE EMILE 25 MG CAP] 30 capsule 0    Last Written Prescription Date:  4/10/18  Last Fill Quantity: 120,  # refills: 1   Last office visit: No previous visit found with prescribing provider:  12/29/15   Future Office Visit:   Next 5 appointments (look out 90 days)     Jun 12, 2018 10:00 AM CDT   [place] Visit [place] Troy Fiore MD   [facility] [place]    [address]  [address]  [place] [address]   [phone]                  Sig: TAKE 1 TO 2 CAPSULES BY MOUTH EVERY 8 HOURS AS NEEDED FOR ANXIETY    Antihistamines Protocol Passed    5/24/2018  5:31 PM       Passed - Recent (12 mo) or future (30 days) visit within the authorizing provider's specialty    Patient had office visit in the last 12 months or has a visit in the next 30 days with authorizing provider or within the authorizing provider's specialty.  See \"Patient Info\" tab in inbasket, or \"Choose Columns\" in Meds & Orders section of the refill encounter.           Passed - Patient is age 3 or older    Apply age and/or weight-based dosing for peds patients age 3 and older.    Forward request to provider for patients under the age of 3.            " done

## 2022-12-05 NOTE — PATIENT INSTRUCTIONS
Continue your Buprenorphine 8 mg  films/tabs  one each morning and 1/2 each evening     Follow up 1 month    A prescription has been sent to your pharmacy of choice.  If a prior authorization is required it may take several days to get your medication.  Please make sure your pharmacy had your contact information so they can contact you when it is ready to     You are at risk for overdose  ( including risk of death)  with return to use of opioids after a period of abstinence because your tolerance will have decreased dramatically.      It is strongly recommended that you abstain from alcohol, benzodiazepines (Xanax Valium, Klonipin) , THC, opioids and other drugs of abuse.  Use of these substances increases your risk of relapse for opioids.  Using these substances with Buprenorphine also incresaes your risk of overdose/death (especially alcohol/benzodiazepines).     You are encouraged to have some type of recovery program in addition to medication treatment.  Medication alone is generally not enough to lead to long term recovery.  This may include having some type of sober network, avoiding isolating, avoiding triggers (people, places, things you associate with using opioids).   Such supports may include Alcoholics Anonymous, Narcotics anonymous or other self help organizations as well as counseling.  We can help provide resources to these services.      Narcan kit prescriptions are available if you do not have one.       The addiction medicine clinic number is [phone].    If you cannot make your appointment please call the office and reschedule immediately. If you are out of medication a bridge can be sent to your pharmacy to last until the date of your rescheduled appointment. Our clinic is open from Monday-Friday 0800-4:30pm and there is not an ON CALL after hours service.  If medical care is needed after hours or on the weekend you will need to contact your primary care physician or go to an Urgent  Continuous EEG monitoring record    Patient name: Ginna Vicente    START: 12/04/2022 @ 09:00am   END: 12/05/2022 @ 09:00am        Electroencephalographer: Ant Tinsley MD PhD      CLINICAL DETAILS:  This EEG was performed on this 75 year old female admitted for Altered mental Status and concern for subclinical seizures      TECHNICAL DETAILS:  Continuous video-EEG monitoring was performed with 27 surface scalp electrodes placed according to the International 10-20 electrode placement system, using a 32-channel AltspaceVR headbox. All EEG and video information was acquired digitally, including the use of automated spike and seizure detection software to detect epileptiform activity. An event button was also available to be depressed during clinical events.     12/04/2022 22:00pm to 09:00am on 12/05/2022  SEIZURES:  None  INTERICTAL:  Frequent transient right posterior sharps involving C4/P4 and O2.      PUSHBUTTONS:  None  BACKGROUND:  Abundant generalized low amplitude 0.5 Hz delta slowing of the background  No clear anterior-posterior gradient.  EKG:  regular    12/04/2022 09:00am to 22:00pm   SEIZURES:  None  INTERICTAL:  Frequent transient right posterior sharps involving C4/P4 and O2.      PUSHBUTTONS:  None  BACKGROUND:  Abundant generalized low amplitude 0.5 Hz delta slowing of the background  No clear anterior-posterior gradient.  EKG:  regular    CLINICAL INTERPRETATION:  This was an abnormal tracing for age and state due to a severe generalized slow wave abnormality indicating diffuse cerebral dysfunction which can be of multiple causes, including structural, or vascular abnormalities, toxic/metabolic conditions, hydrocephalus, or postictal conditions.  Frequent transient right sided discharges indicate a region of cortical hyperexcitability that may be associated with focal seizures.  No focal slowing, or seizures were seen during this recording.  Clinical correlation is advised.          Ant Tinsley  Care or ER.     MyChart messages and telephone calls from patients are taken care of by the nursing team within 24 business hours if received between Monday 8am - Friday 4:30pm. Therefore if a refill/bridge is needed it is important to call in advance so you do not run out of medications.     [facility] does not accept [facility] or [employer] Medical assistance insurance.               MD PhD

## 2023-06-30 NOTE — PROGRESS NOTES
Labor progress note  2020 7:10 PM      S: Patient doing well. Feeling a little more comfortable with epidural but it was just placed.      O:  Patient Vitals for the past 24 hrs:   BP Temp Temp src Resp SpO2   20 1900 121/82 -- -- -- 99 %   20 137/83 -- -- -- --   20 1855 116/79 -- -- -- --   20 1853 96/59 -- -- -- --   20 1851 90/55 -- -- -- 99 %   20 184 113/71 -- -- -- --   20 124/80 -- -- -- --   20 1845 130/89 -- -- -- --   20 1843 138/83 -- -- -- --   20 1628 123/79 98  F (36.7  C) Oral -- --   20 1040 131/88 -- -- 16 --   20 0935 118/79 98  F (36.7  C) Oral 16 --   Gen: resting comfortably  SVE: 3/50/-3    FHT: baseline 125, mod nirmal, accels present, no decels  [employer]: 3-4 in 10    Assessment  Ms. Domenico Wilder is a 29 year old , at 41w0d by 8w6d US who presents for induction of labor.     Plan    Labor:   Anticipate . S/p miso for cervical ripening, will start pitocin now.   FWB:    Category I FHT.  Continue EFM and toco  Pain:     Desires possibly epidural for analgesia  PNC:     Rh pos, Rubella imm, GBS pos, GCT passed, Placenta anterior   Fen/GI: Clear liquid diet, IVF     Has not had GCT done, will monitor BG q4h in labor.     Rubi Perdomo MD  Obstetrics & Gynecology, PGY-2  2020 7:11 PM       Attending Only

## 2023-09-28 ENCOUNTER — HOSPITAL ENCOUNTER (INPATIENT)
Facility: CLINIC | Age: 33
LOS: 3 days | Discharge: HOME | End: 2023-10-02
Attending: EMERGENCY MEDICINE | Admitting: INTERNAL MEDICINE
Payer: COMMERCIAL

## 2023-09-28 ENCOUNTER — TELEPHONE (OUTPATIENT)
Dept: BEHAVIORAL HEALTH | Facility: CLINIC | Age: 33
End: 2023-09-28

## 2023-09-28 DIAGNOSIS — F10.90 ALCOHOL USE DISORDER: ICD-10-CM

## 2023-09-28 DIAGNOSIS — F10.230 ALCOHOL DEPENDENCE WITH UNCOMPLICATED WITHDRAWAL (H): ICD-10-CM

## 2023-09-28 DIAGNOSIS — E87.6 HYPOKALEMIA: ICD-10-CM

## 2023-09-28 LAB
ALBUMIN SERPL BCG-MCNC: 4.5 G/DL (ref 3.5–5.2)
ALCOHOL BREATH TEST: 0.33 (ref 0–0.01)
ALCOHOL BREATH TEST: 0.35 (ref 0–0.01)
ALP SERPL-CCNC: 122 U/L (ref 35–104)
ALT SERPL W P-5'-P-CCNC: 106 U/L (ref 0–50)
AMPHETAMINES UR QL SCN: ABNORMAL
ANION GAP SERPL CALCULATED.3IONS-SCNC: 17 MMOL/L (ref 7–15)
AST SERPL W P-5'-P-CCNC: 165 U/L (ref 0–45)
BARBITURATES UR QL SCN: ABNORMAL
BASOPHILS # BLD AUTO: 0.1 10E3/UL (ref 0–0.2)
BASOPHILS NFR BLD AUTO: 1 %
BENZODIAZ UR QL SCN: ABNORMAL
BILIRUB SERPL-MCNC: 0.4 MG/DL
BUN SERPL-MCNC: 3.9 MG/DL (ref 6–20)
BZE UR QL SCN: ABNORMAL
CALCIUM SERPL-MCNC: 8.4 MG/DL (ref 8.6–10)
CANNABINOIDS UR QL SCN: ABNORMAL
CHLORIDE SERPL-SCNC: 103 MMOL/L (ref 98–107)
CREAT SERPL-MCNC: 0.5 MG/DL (ref 0.51–0.95)
EGFRCR SERPLBLD CKD-EPI 2021: >90 ML/MIN/1.73M2
EOSINOPHIL # BLD AUTO: 0 10E3/UL (ref 0–0.7)
EOSINOPHIL NFR BLD AUTO: 1 %
ERYTHROCYTE [DISTWIDTH] IN BLOOD BY AUTOMATED COUNT: 14.8 % (ref 10–15)
FENTANYL UR QL: ABNORMAL
GLUCOSE SERPL-MCNC: 115 MG/DL (ref 70–99)
HCG UR QL: NEGATIVE
HCO3 SERPL-SCNC: 25 MMOL/L (ref 22–29)
HCT VFR BLD AUTO: 45.2 % (ref 35–47)
HGB BLD-MCNC: 15.8 G/DL (ref 11.7–15.7)
IMM GRANULOCYTES # BLD: 0 10E3/UL
IMM GRANULOCYTES NFR BLD: 0 %
LYMPHOCYTES # BLD AUTO: 1.7 10E3/UL (ref 0.8–5.3)
LYMPHOCYTES NFR BLD AUTO: 37 %
MAGNESIUM SERPL-MCNC: 2.3 MG/DL (ref 1.7–2.3)
MCH RBC QN AUTO: 29.6 PG (ref 26.5–33)
MCHC RBC AUTO-ENTMCNC: 35 G/DL (ref 31.5–36.5)
MCV RBC AUTO: 85 FL (ref 78–100)
MONOCYTES # BLD AUTO: 0.4 10E3/UL (ref 0–1.3)
MONOCYTES NFR BLD AUTO: 9 %
NEUTROPHILS # BLD AUTO: 2.4 10E3/UL (ref 1.6–8.3)
NEUTROPHILS NFR BLD AUTO: 52 %
NRBC # BLD AUTO: 0 10E3/UL
NRBC BLD AUTO-RTO: 0 /100
OPIATES UR QL SCN: ABNORMAL
PCP QUAL URINE (ROCHE): ABNORMAL
PLATELET # BLD AUTO: 340 10E3/UL (ref 150–450)
POTASSIUM SERPL-SCNC: 2.3 MMOL/L (ref 3.4–5.3)
POTASSIUM SERPL-SCNC: 2.5 MMOL/L (ref 3.4–5.3)
PROT SERPL-MCNC: 7.6 G/DL (ref 6.4–8.3)
RBC # BLD AUTO: 5.33 10E6/UL (ref 3.8–5.2)
SODIUM SERPL-SCNC: 145 MMOL/L (ref 135–145)
WBC # BLD AUTO: 4.6 10E3/UL (ref 4–11)

## 2023-09-28 PROCEDURE — 82075 ASSAY OF BREATH ETHANOL: CPT | Performed by: EMERGENCY MEDICINE

## 2023-09-28 PROCEDURE — 250N000009 HC RX 250: Performed by: EMERGENCY MEDICINE

## 2023-09-28 PROCEDURE — 250N000011 HC RX IP 250 OP 636: Mod: JZ | Performed by: FAMILY MEDICINE

## 2023-09-28 PROCEDURE — 99285 EMERGENCY DEPT VISIT HI MDM: CPT | Mod: 25 | Performed by: EMERGENCY MEDICINE

## 2023-09-28 PROCEDURE — 96365 THER/PROPH/DIAG IV INF INIT: CPT | Performed by: EMERGENCY MEDICINE

## 2023-09-28 PROCEDURE — 81025 URINE PREGNANCY TEST: CPT | Performed by: EMERGENCY MEDICINE

## 2023-09-28 PROCEDURE — 250N000013 HC RX MED GY IP 250 OP 250 PS 637: Performed by: FAMILY MEDICINE

## 2023-09-28 PROCEDURE — HZ2ZZZZ DETOXIFICATION SERVICES FOR SUBSTANCE ABUSE TREATMENT: ICD-10-PCS | Performed by: EMERGENCY MEDICINE

## 2023-09-28 PROCEDURE — 84132 ASSAY OF SERUM POTASSIUM: CPT | Performed by: EMERGENCY MEDICINE

## 2023-09-28 PROCEDURE — 80307 DRUG TEST PRSMV CHEM ANLYZR: CPT | Performed by: EMERGENCY MEDICINE

## 2023-09-28 PROCEDURE — 36415 COLL VENOUS BLD VENIPUNCTURE: CPT | Performed by: EMERGENCY MEDICINE

## 2023-09-28 PROCEDURE — 80053 COMPREHEN METABOLIC PANEL: CPT | Performed by: EMERGENCY MEDICINE

## 2023-09-28 PROCEDURE — 99285 EMERGENCY DEPT VISIT HI MDM: CPT | Performed by: EMERGENCY MEDICINE

## 2023-09-28 PROCEDURE — 85025 COMPLETE CBC W/AUTO DIFF WBC: CPT | Performed by: EMERGENCY MEDICINE

## 2023-09-28 PROCEDURE — 250N000013 HC RX MED GY IP 250 OP 250 PS 637: Performed by: EMERGENCY MEDICINE

## 2023-09-28 PROCEDURE — 250N000011 HC RX IP 250 OP 636: Performed by: EMERGENCY MEDICINE

## 2023-09-28 PROCEDURE — 96375 TX/PRO/DX INJ NEW DRUG ADDON: CPT | Performed by: EMERGENCY MEDICINE

## 2023-09-28 PROCEDURE — 96367 TX/PROPH/DG ADDL SEQ IV INF: CPT | Performed by: EMERGENCY MEDICINE

## 2023-09-28 PROCEDURE — 83735 ASSAY OF MAGNESIUM: CPT | Performed by: EMERGENCY MEDICINE

## 2023-09-28 PROCEDURE — 82075 ASSAY OF BREATH ETHANOL: CPT | Mod: 91 | Performed by: EMERGENCY MEDICINE

## 2023-09-28 RX ORDER — HYDROXYZINE PAMOATE 25 MG/1
25 CAPSULE ORAL EVERY 8 HOURS PRN
COMMUNITY
Start: 2023-08-25 | End: 2024-09-23

## 2023-09-28 RX ORDER — ONDANSETRON 4 MG/1
4 TABLET, ORALLY DISINTEGRATING ORAL
Status: COMPLETED | OUTPATIENT
Start: 2023-09-28 | End: 2023-09-28

## 2023-09-28 RX ORDER — ONDANSETRON 2 MG/ML
4 INJECTION INTRAMUSCULAR; INTRAVENOUS ONCE
Status: COMPLETED | OUTPATIENT
Start: 2023-09-28 | End: 2023-09-28

## 2023-09-28 RX ORDER — POTASSIUM CHLORIDE 7.45 MG/ML
10 INJECTION INTRAVENOUS ONCE
Status: COMPLETED | OUTPATIENT
Start: 2023-09-28 | End: 2023-09-29

## 2023-09-28 RX ORDER — ONDANSETRON 4 MG/1
4 TABLET, ORALLY DISINTEGRATING ORAL ONCE
Status: DISCONTINUED | OUTPATIENT
Start: 2023-09-28 | End: 2023-09-28

## 2023-09-28 RX ORDER — POTASSIUM CHLORIDE 1.5 G/1.58G
40 POWDER, FOR SOLUTION ORAL ONCE
Status: COMPLETED | OUTPATIENT
Start: 2023-09-28 | End: 2023-09-28

## 2023-09-28 RX ORDER — DIAZEPAM 5 MG
5-20 TABLET ORAL EVERY 30 MIN PRN
Status: DISCONTINUED | OUTPATIENT
Start: 2023-09-28 | End: 2023-09-30

## 2023-09-28 RX ORDER — ARIPIPRAZOLE 5 MG/1
1 TABLET ORAL DAILY
COMMUNITY
Start: 2023-09-14 | End: 2024-03-15

## 2023-09-28 RX ORDER — POTASSIUM CHLORIDE 750 MG/1
40 TABLET, EXTENDED RELEASE ORAL ONCE
Status: COMPLETED | OUTPATIENT
Start: 2023-09-28 | End: 2023-09-28

## 2023-09-28 RX ADMIN — FOLIC ACID: 5 INJECTION, SOLUTION INTRAMUSCULAR; INTRAVENOUS; SUBCUTANEOUS at 20:57

## 2023-09-28 RX ADMIN — POTASSIUM CHLORIDE 40 MEQ: 750 TABLET, EXTENDED RELEASE ORAL at 18:46

## 2023-09-28 RX ADMIN — POTASSIUM CHLORIDE 10 MEQ: 7.46 INJECTION, SOLUTION INTRAVENOUS at 21:59

## 2023-09-28 RX ADMIN — POTASSIUM CHLORIDE 40 MEQ: 750 TABLET, EXTENDED RELEASE ORAL at 17:12

## 2023-09-28 RX ADMIN — ONDANSETRON 4 MG: 4 TABLET, ORALLY DISINTEGRATING ORAL at 17:17

## 2023-09-28 RX ADMIN — DIAZEPAM 20 MG: 5 TABLET ORAL at 23:11

## 2023-09-28 RX ADMIN — POTASSIUM CHLORIDE 40 MEQ: 1.5 POWDER, FOR SOLUTION ORAL at 16:06

## 2023-09-28 RX ADMIN — DIAZEPAM 5 MG: 5 TABLET ORAL at 19:59

## 2023-09-28 RX ADMIN — POTASSIUM CHLORIDE 40 MEQ: 1.5 POWDER, FOR SOLUTION ORAL at 21:59

## 2023-09-28 RX ADMIN — DIAZEPAM 10 MG: 5 TABLET ORAL at 17:11

## 2023-09-28 RX ADMIN — DIAZEPAM 10 MG: 5 TABLET ORAL at 23:54

## 2023-09-28 RX ADMIN — DIAZEPAM 5 MG: 5 TABLET ORAL at 21:44

## 2023-09-28 RX ADMIN — ONDANSETRON 4 MG: 2 INJECTION INTRAMUSCULAR; INTRAVENOUS at 22:11

## 2023-09-28 ASSESSMENT — ACTIVITIES OF DAILY LIVING (ADL)
ADLS_ACUITY_SCORE: 35

## 2023-09-28 NOTE — ED PROVIDER NOTES
ED Provider Note  [facility]      History     Chief Complaint   Patient presents with    Alcohol Intoxication     Patient arrives with mom, she drinks a pint a day (100 proof), last drink was on the way here. Patient arrives severely depressed. Patient has a seizure history, happened about a year ago.     HPI  Domenico Wilder is a 33 year old female with a past medical history of polysubstance abuse, alcohol abuse, GERD, and depression who presents to the ED seeking detox for 1 pint per day of 100 proof alcohol.  She has been drinking daily for the past year and last drink was prior to arrival.  Hx of withdrawal seizures, last being 2-3 years ago.  No DTs  denies si.  She says she is having severe depression.  She saw a provider for the depression and anxiety but they would not prescribe until she wasn't using denies abuse of other substances. She denies being pregnant. Mother says she is at the point of not functioning and has not been going to work this past week. She gets diarrhea/n/v/shakes when withdrawing.     Past Medical History  Past Medical History:   Diagnosis Date    Anxiety     Chickenpox     Depression     Depressive disorder     GERD (gastroesophageal reflux disease)     Substance abuse (H)      Past Surgical History:   Procedure Laterality Date    NO HISTORY OF SURGERY       acetaminophen (TYLENOL) 325 MG tablet  ferrous gluconate (FERGON) 324 (38 Fe) MG tablet  hydrocortisone (ANUSOL-HC) 2.5 % cream  ibuprofen (ADVIL/MOTRIN) 600 MG tablet  Prenatal Vit-Fe Fumarate-FA (PRENATAL MULTIVITAMIN PLUS IRON) 27-0.8 MG TABS per tablet  senna-docusate (SENOKOT-S/PERICOLACE) 8.6-50 MG tablet      Allergies   Allergen Reactions    Cats     Dogs      Family History  Family History   Problem Relation Age of Onset    Allergies Mother     Depression Mother     Alcohol/Drug Mother     Prostate Cancer Paternal Grandfather     Diabetes Maternal Grandfather         non insulin dependent  "    Social History   Social History     Tobacco Use    Smoking status: Every Day     Packs/day: 0.50     Years: 13.00     Pack years: 6.50     Types: Cigarettes     Start date: 12/28/2017    Smokeless tobacco: Never   Substance Use Topics    Alcohol use: Yes     Comment: 1 liter per day vodka    Drug use: No     Types: Marijuana, IV, Methamphetamines, Opiates     Comment: 1-2 grams of heroine daily, occasional meth and marijuana      Past medical history, past surgical history, medications, allergies, family history, and social history were reviewed with the patient. No additional pertinent items.      A complete review of systems was performed with pertinent positives and negatives noted in the HPI, and all other systems negative.    Physical Exam   BP: 115/78  Pulse: 79  Temp: 98.4  F (36.9  C)  Resp: 18  Height: 165.1 cm (5' 5\")  SpO2: 100 %  Physical Exam  Vitals and nursing note reviewed.   Constitutional:       General: She is not in acute distress.     Appearance: She is not ill-appearing, toxic-appearing or diaphoretic.   HENT:      Head: Normocephalic and atraumatic.      Right Ear: External ear normal.      Left Ear: External ear normal.      Nose: No congestion or rhinorrhea.   Eyes:      General: No scleral icterus.     Extraocular Movements: Extraocular movements intact.   Cardiovascular:      Rate and Rhythm: Normal rate and regular rhythm.   Pulmonary:      Effort: Pulmonary effort is normal.      Breath sounds: Normal breath sounds.   Abdominal:      General: Abdomen is flat. There is no distension.      Palpations: Abdomen is soft. There is no mass.      Tenderness: There is no abdominal tenderness. There is no guarding or rebound.      Hernia: No hernia is present.   Musculoskeletal:         General: No signs of injury. Normal range of motion.      Cervical back: Normal range of motion.   Skin:     Coloration: Skin is not jaundiced.   Neurological:      General: No focal deficit present.      " Mental Status: She is alert and oriented to person, place, and time.   Psychiatric:         Attention and Perception: Attention and perception normal.         Mood and Affect: Mood is anxious and depressed.         Speech: Speech normal.         Behavior: Behavior normal. Behavior is cooperative.         Thought Content: Thought content normal.         Cognition and Memory: Cognition and memory normal.         Judgment: Judgment normal.           ED Course, Procedures, & Data      Procedures                     No results found for any visits on 09/28/23.  Medications   diazepam (VALIUM) tablet 5-20 mg (has no administration in time range)     Labs Ordered and Resulted from Time of ED Arrival to Time of ED Departure - No data to display  No orders to display          Critical care was not performed.     Medical Decision Making  The patient's presentation was of high complexity (a chronic illness severe exacerbation, progression, or side effect of treatment).    The patient's evaluation involved:  an assessment requiring an independent historian (mother)  review of 3+ test result(s) ordered prior to this encounter (see separate area of note for details)  discussion of management or test interpretation with another health professional ()    The patient's management necessitated high risk (a decision regarding hospitalization).    Assessment & Plan    Domenico Wilder is a 33 year old female with a past medical history of polysubstance abuse, alcohol abuse, GERD, and depression who presents to the ED seeking detox for 1 pint per day of 100 proof alcohol.  No si/hi but suffering with depression and anxiety.  Admission labs ordered and reviewed. Potassium is 2.5.  will given kdur 40 meq po now and another dose at 6 pm.  Will check potassium at 8 pm. Will sign out lab recheck to Dr. Ni. I called her clinically into intake and they will watch for potassium repeat.  Will request a micd admission for 3a.      Patient did not tolerate liquid potassium so switched to pill.     I have reviewed the nursing notes. I have reviewed the findings, diagnosis, plan and need for follow up with the patient.    New Prescriptions    No medications on file       Final diagnoses:   Alcohol use disorder       Bryanna Church MD  [facility] EMERGENCY DEPARTMENT  9/28/2023     Bryanna Church MD  09/28/23 1616       Bryanna Church MD  09/28/23 1658       Bryanna Church MD  09/28/23 [id number]

## 2023-09-28 NOTE — MEDICATION SCRIBE - ADMISSION MEDICATION HISTORY
Medication Scribe Admission Medication History    Admission medication history is complete. The information provided in this note is only as accurate as the sources available at the time of the update.    Medication reconciliation/reorder completed by provider prior to medication history? No    Information Source(s): Patient and CareEverywhere/SureScripts via in-person    Pertinent Information: Patient reported she hasn't started abilify and zoloft because she has been drinking alcohol.     Changes made to PTA medication list:  Added: abilify, zoloft, hydroxyzine, nexplanon  Deleted: prenatals, senna, anusol-hc, fergon  Changed: None    Medication Affordability:       Allergies reviewed with patient and updates made in EHR: yes    Medication History Completed By: Uyen Hawkins 9/28/2023 5:36 PM    Prior to Admission medications    Medication Sig Last Dose Taking? Auth Provider Long Term End Date   acetaminophen (TYLENOL) 325 MG tablet Take 2 tablets (650 mg) by mouth every 6 hours as needed for mild pain Start after Delivery. More than a month Yes Josselyn Jordan MD     ARIPiprazole (ABILIFY) 5 MG tablet Take 1 tablet by mouth daily  Yes Reported, Patient No    etonogestrel (NEXPLANON) 68 MG IMPL Inject 68 mg Subcutaneous  Yes Reported, Patient Yes    hydrOXYzine (VISTARIL) 25 MG capsule Take 25 mg by mouth every 8 hours as needed for anxiety Past Week Yes Reported, Patient     ibuprofen (ADVIL/MOTRIN) 600 MG tablet Take 1 tablet (600 mg) by mouth every 6 hours as needed for moderate pain Start after delivery More than a month Yes Josselyn Jordan MD No    sertraline (ZOLOFT) 50 MG tablet Take 50 mg by mouth daily  Yes Reported, Patient No

## 2023-09-28 NOTE — ED TRIAGE NOTES
Patient arrives with mom, she drinks a pint a day (100 proof), last drink was on the way here. Patient arrives severely depressed. Patient has a seizure history, happened about a year ago.     Triage Assessment       Row Name 09/28/23 1400       Triage Assessment (Adult)    Airway WDL WDL       Respiratory WDL    Respiratory WDL WDL       Skin Circulation/Temperature WDL    Skin Circulation/Temperature WDL WDL       Cardiac WDL    Cardiac WDL WDL       Peripheral/Neurovascular WDL    Peripheral Neurovascular WDL WDL       Cognitive/Neuro/Behavioral WDL    Cognitive/Neuro/Behavioral WDL WDL                     Patient called to nurse station complaining of SOB. Staff RN checked on her. Pt states she feels SOB when she coughs. O2 sat taken and observed to be 98% on RA. Dr. Wallace notified earlier for same condition. It appears to be related to her coughing and the oral swelling. Will continue to monitor.

## 2023-09-28 NOTE — ED NOTES
Pt anxious, tremerous, and nauseous- states she feels like she is withdrawing. Writer scored pt on MSSA and gave valium. Pt started to take PO potassium pills, feeling nauseous. MD informed and gave zofran. Pt will finish taking the other 2 PO potassium pills when nausea is relieved

## 2023-09-28 NOTE — TELEPHONE ENCOUNTER
S: [facility] , Provider Dr Church calling at 4:55pm with clinical on a 33 year old/Female presenting for alcohol detox.     B: Pt presents for ETOH detox.   Currently reports drinking 1 pt 100% Proof for a year.    Patient reports last use was prior to ER Arrival.  Pt AIME: .33  Pt  denies hx of DT  Pt  endorses hx of seizures. Last seizure:  2-3 years ago  Pt endorsing the following symptoms of withdrawal: Shaky, Tremulous, Anxious, and Nausea  MSSA Score: 9    Pt endorses depression, but no acute mental health or medical concerns.   Pt denies other drug use: None Amount/frequency: N/A    Does Pt have a detox care plan in [facility]? no  Does pt present with specific needs, assistive devices, or exclusionary criteria? None  Is the patient ambulating, eating and drinking in the ED? Pt still getting potassium replacement @ 7:15pm....pending    A: Pt meets criteria to be presented for IP detox admission. Patient is voluntary    COVID Symptoms: No  If yes, COVID test required   Utox: Positive for Cannabis  CMP: Abnormalities: Potasssium WAS 2.5.  Replaced with oral 1st dose at 5, 2nd around 9pm, and redraw labs.      ;     CBC: WNL  HCG: Negative     R: Patient cleared and ready for behavioral bed placement: Yes    Pt is meeting criteria for presentation to 3A/MICD    Does Patient need a Transfer Center request created? No, Pt is located within [facility] ED, [facility] ED, or [place] ED

## 2023-09-28 NOTE — ED PROVIDER NOTES
Patient was signed out to me at shift change by Dr. Church.  Plan for voluntary admission to unit 3A.  Is noted to have a low potassium and anion gap.  He is receiving a banana bag and potassium replacement with a plan to admit to 3 A afterwards.  We will recheck potassium around 8 PM.  Addendum:  Patient's potassium is still quite low.  We will give doses of IV potassium replacement and another dose of oral potassium replacement.  Patient states they are feeling slightly weak and as if they are in alcohol withdrawal.  We will recheck potassium after this attempt at replacement, if we are not making satisfactory progress the patient may need to be admitted to internal medicine service.  Currently however, still planning on 3 a admission assuming we can get the potassium replaced appropriately.      Crow Ni MD  09/28/23 2778       Crow Ni MD  09/28/23 [id number]

## 2023-09-29 ENCOUNTER — TELEPHONE (OUTPATIENT)
Dept: BEHAVIORAL HEALTH | Facility: CLINIC | Age: 33
End: 2023-09-29
Payer: COMMERCIAL

## 2023-09-29 PROBLEM — E87.6 HYPOKALEMIA: Status: ACTIVE | Noted: 2023-09-29

## 2023-09-29 PROBLEM — F10.90 ALCOHOL USE DISORDER: Status: ACTIVE | Noted: 2023-09-29

## 2023-09-29 LAB
ALBUMIN SERPL BCG-MCNC: 3.6 G/DL (ref 3.5–5.2)
ALP SERPL-CCNC: 92 U/L (ref 35–104)
ALT SERPL W P-5'-P-CCNC: 75 U/L (ref 0–50)
ANION GAP SERPL CALCULATED.3IONS-SCNC: 12 MMOL/L (ref 7–15)
ANION GAP SERPL CALCULATED.3IONS-SCNC: 14 MMOL/L (ref 7–15)
AST SERPL W P-5'-P-CCNC: 106 U/L (ref 0–45)
BILIRUB SERPL-MCNC: 0.9 MG/DL
BUN SERPL-MCNC: 4.2 MG/DL (ref 6–20)
BUN SERPL-MCNC: 4.3 MG/DL (ref 6–20)
CALCIUM SERPL-MCNC: 7.7 MG/DL (ref 8.6–10)
CALCIUM SERPL-MCNC: 7.8 MG/DL (ref 8.6–10)
CHLORIDE SERPL-SCNC: 102 MMOL/L (ref 98–107)
CHLORIDE SERPL-SCNC: 103 MMOL/L (ref 98–107)
CREAT SERPL-MCNC: 0.5 MG/DL (ref 0.51–0.95)
CREAT SERPL-MCNC: 0.53 MG/DL (ref 0.51–0.95)
DEPRECATED HCO3 PLAS-SCNC: 25 MMOL/L (ref 22–29)
DEPRECATED HCO3 PLAS-SCNC: 26 MMOL/L (ref 22–29)
EGFRCR SERPLBLD CKD-EPI 2021: >90 ML/MIN/1.73M2
EGFRCR SERPLBLD CKD-EPI 2021: >90 ML/MIN/1.73M2
ERYTHROCYTE [DISTWIDTH] IN BLOOD BY AUTOMATED COUNT: 14.7 % (ref 10–15)
GLUCOSE SERPL-MCNC: 106 MG/DL (ref 70–99)
GLUCOSE SERPL-MCNC: 99 MG/DL (ref 70–99)
HCT VFR BLD AUTO: 36.6 % (ref 35–47)
HGB BLD-MCNC: 12.6 G/DL (ref 11.7–15.7)
MAGNESIUM SERPL-MCNC: 1.5 MG/DL (ref 1.7–2.3)
MAGNESIUM SERPL-MCNC: 2.6 MG/DL (ref 1.7–2.3)
MCH RBC QN AUTO: 29.4 PG (ref 26.5–33)
MCHC RBC AUTO-ENTMCNC: 34.4 G/DL (ref 31.5–36.5)
MCV RBC AUTO: 86 FL (ref 78–100)
PHOSPHATE SERPL-MCNC: 1.5 MG/DL (ref 2.5–4.5)
PHOSPHATE SERPL-MCNC: 3.1 MG/DL (ref 2.5–4.5)
PLATELET # BLD AUTO: 271 10E3/UL (ref 150–450)
POTASSIUM SERPL-SCNC: 2.3 MMOL/L (ref 3.4–5.3)
POTASSIUM SERPL-SCNC: 2.6 MMOL/L (ref 3.4–5.3)
POTASSIUM SERPL-SCNC: 3.1 MMOL/L (ref 3.4–5.3)
PROT SERPL-MCNC: 5.9 G/DL (ref 6.4–8.3)
RBC # BLD AUTO: 4.28 10E6/UL (ref 3.8–5.2)
SODIUM SERPL-SCNC: 141 MMOL/L (ref 135–145)
SODIUM SERPL-SCNC: 141 MMOL/L (ref 135–145)
WBC # BLD AUTO: 4.1 10E3/UL (ref 4–11)

## 2023-09-29 PROCEDURE — 36415 COLL VENOUS BLD VENIPUNCTURE: CPT | Performed by: INTERNAL MEDICINE

## 2023-09-29 PROCEDURE — 250N000013 HC RX MED GY IP 250 OP 250 PS 637: Performed by: EMERGENCY MEDICINE

## 2023-09-29 PROCEDURE — 120N000002 HC R&B MED SURG/OB UMMC

## 2023-09-29 PROCEDURE — 36415 COLL VENOUS BLD VENIPUNCTURE: CPT | Performed by: EMERGENCY MEDICINE

## 2023-09-29 PROCEDURE — 96366 THER/PROPH/DIAG IV INF ADDON: CPT | Performed by: EMERGENCY MEDICINE

## 2023-09-29 PROCEDURE — 250N000013 HC RX MED GY IP 250 OP 250 PS 637: Performed by: INTERNAL MEDICINE

## 2023-09-29 PROCEDURE — 999N000007 HC SITE CHECK

## 2023-09-29 PROCEDURE — 84100 ASSAY OF PHOSPHORUS: CPT | Performed by: INTERNAL MEDICINE

## 2023-09-29 PROCEDURE — 82310 ASSAY OF CALCIUM: CPT | Performed by: INTERNAL MEDICINE

## 2023-09-29 PROCEDURE — 85027 COMPLETE CBC AUTOMATED: CPT | Performed by: INTERNAL MEDICINE

## 2023-09-29 PROCEDURE — 258N000001 HC RX 258: Performed by: INTERNAL MEDICINE

## 2023-09-29 PROCEDURE — 999N000127 HC STATISTIC PERIPHERAL IV START W US GUIDANCE

## 2023-09-29 PROCEDURE — 250N000011 HC RX IP 250 OP 636: Mod: JZ | Performed by: INTERNAL MEDICINE

## 2023-09-29 PROCEDURE — 999N000040 HC STATISTIC CONSULT NO CHARGE VASC ACCESS

## 2023-09-29 PROCEDURE — 250N000009 HC RX 250: Performed by: INTERNAL MEDICINE

## 2023-09-29 PROCEDURE — 83735 ASSAY OF MAGNESIUM: CPT | Performed by: INTERNAL MEDICINE

## 2023-09-29 PROCEDURE — 99223 1ST HOSP IP/OBS HIGH 75: CPT | Performed by: INTERNAL MEDICINE

## 2023-09-29 PROCEDURE — 84132 ASSAY OF SERUM POTASSIUM: CPT | Performed by: INTERNAL MEDICINE

## 2023-09-29 PROCEDURE — 80053 COMPREHEN METABOLIC PANEL: CPT | Performed by: EMERGENCY MEDICINE

## 2023-09-29 PROCEDURE — 258N000003 HC RX IP 258 OP 636: Performed by: INTERNAL MEDICINE

## 2023-09-29 PROCEDURE — 250N000011 HC RX IP 250 OP 636: Mod: JZ | Performed by: FAMILY MEDICINE

## 2023-09-29 RX ORDER — OLANZAPINE 5 MG/1
5-10 TABLET, ORALLY DISINTEGRATING ORAL EVERY 6 HOURS PRN
Status: DISCONTINUED | OUTPATIENT
Start: 2023-09-29 | End: 2023-10-02 | Stop reason: HOSPADM

## 2023-09-29 RX ORDER — PROCHLORPERAZINE MALEATE 10 MG
10 TABLET ORAL EVERY 6 HOURS PRN
Status: DISCONTINUED | OUTPATIENT
Start: 2023-09-29 | End: 2023-10-02 | Stop reason: HOSPADM

## 2023-09-29 RX ORDER — CLONIDINE HYDROCHLORIDE 0.1 MG/1
0.1 TABLET ORAL EVERY 8 HOURS
Status: DISCONTINUED | OUTPATIENT
Start: 2023-09-29 | End: 2023-10-02 | Stop reason: HOSPADM

## 2023-09-29 RX ORDER — IBUPROFEN 600 MG/1
600 TABLET, FILM COATED ORAL EVERY 6 HOURS PRN
Status: DISCONTINUED | OUTPATIENT
Start: 2023-09-29 | End: 2023-10-02 | Stop reason: HOSPADM

## 2023-09-29 RX ORDER — HYDROXYZINE HYDROCHLORIDE 25 MG/1
25 TABLET, FILM COATED ORAL EVERY 8 HOURS PRN
Status: DISCONTINUED | OUTPATIENT
Start: 2023-09-29 | End: 2023-10-02 | Stop reason: HOSPADM

## 2023-09-29 RX ORDER — GABAPENTIN 300 MG/1
900 CAPSULE ORAL EVERY 8 HOURS
Status: COMPLETED | OUTPATIENT
Start: 2023-09-29 | End: 2023-10-02

## 2023-09-29 RX ORDER — GABAPENTIN 300 MG/1
300 CAPSULE ORAL EVERY 8 HOURS
Status: DISCONTINUED | OUTPATIENT
Start: 2023-10-04 | End: 2023-10-02 | Stop reason: HOSPADM

## 2023-09-29 RX ORDER — FOLIC ACID 1 MG/1
1 TABLET ORAL DAILY
Status: DISCONTINUED | OUTPATIENT
Start: 2023-09-29 | End: 2023-10-02 | Stop reason: HOSPADM

## 2023-09-29 RX ORDER — GABAPENTIN 600 MG/1
1200 TABLET ORAL ONCE
Status: COMPLETED | OUTPATIENT
Start: 2023-09-29 | End: 2023-09-29

## 2023-09-29 RX ORDER — PROCHLORPERAZINE 25 MG
25 SUPPOSITORY, RECTAL RECTAL EVERY 12 HOURS PRN
Status: DISCONTINUED | OUTPATIENT
Start: 2023-09-29 | End: 2023-10-02 | Stop reason: HOSPADM

## 2023-09-29 RX ORDER — GABAPENTIN 300 MG/1
600 CAPSULE ORAL EVERY 8 HOURS
Status: DISCONTINUED | OUTPATIENT
Start: 2023-10-02 | End: 2023-10-02 | Stop reason: HOSPADM

## 2023-09-29 RX ORDER — AMOXICILLIN 250 MG
1 CAPSULE ORAL 2 TIMES DAILY PRN
Status: DISCONTINUED | OUTPATIENT
Start: 2023-09-29 | End: 2023-10-02 | Stop reason: HOSPADM

## 2023-09-29 RX ORDER — AMOXICILLIN 250 MG
2 CAPSULE ORAL 2 TIMES DAILY PRN
Status: DISCONTINUED | OUTPATIENT
Start: 2023-09-29 | End: 2023-10-02 | Stop reason: HOSPADM

## 2023-09-29 RX ORDER — MAGNESIUM SULFATE HEPTAHYDRATE 40 MG/ML
4 INJECTION, SOLUTION INTRAVENOUS ONCE
Status: COMPLETED | OUTPATIENT
Start: 2023-09-29 | End: 2023-09-29

## 2023-09-29 RX ORDER — POTASSIUM CHLORIDE 1.5 G/1.58G
40 POWDER, FOR SOLUTION ORAL ONCE
Status: COMPLETED | OUTPATIENT
Start: 2023-09-29 | End: 2023-09-29

## 2023-09-29 RX ORDER — ONDANSETRON 4 MG/1
4 TABLET, ORALLY DISINTEGRATING ORAL EVERY 6 HOURS PRN
Status: DISCONTINUED | OUTPATIENT
Start: 2023-09-29 | End: 2023-10-02 | Stop reason: HOSPADM

## 2023-09-29 RX ORDER — HALOPERIDOL 5 MG/ML
2.5-5 INJECTION INTRAMUSCULAR EVERY 6 HOURS PRN
Status: DISCONTINUED | OUTPATIENT
Start: 2023-09-29 | End: 2023-10-02 | Stop reason: HOSPADM

## 2023-09-29 RX ORDER — ACETAMINOPHEN 325 MG/1
650 TABLET ORAL EVERY 6 HOURS PRN
Status: DISCONTINUED | OUTPATIENT
Start: 2023-09-29 | End: 2023-10-02 | Stop reason: HOSPADM

## 2023-09-29 RX ORDER — DEXTROSE MONOHYDRATE, SODIUM CHLORIDE, SODIUM LACTATE, POTASSIUM CHLORIDE, CALCIUM CHLORIDE 5; 600; 310; 179; 20 G/100ML; MG/100ML; MG/100ML; MG/100ML; MG/100ML
INJECTION, SOLUTION INTRAVENOUS CONTINUOUS
Status: DISCONTINUED | OUTPATIENT
Start: 2023-09-29 | End: 2023-09-30

## 2023-09-29 RX ORDER — FLUMAZENIL 0.1 MG/ML
0.2 INJECTION, SOLUTION INTRAVENOUS
Status: DISCONTINUED | OUTPATIENT
Start: 2023-09-29 | End: 2023-09-30

## 2023-09-29 RX ORDER — POTASSIUM CHLORIDE 7.45 MG/ML
10 INJECTION INTRAVENOUS
Status: COMPLETED | OUTPATIENT
Start: 2023-09-29 | End: 2023-09-29

## 2023-09-29 RX ORDER — CALCIUM GLUCONATE 20 MG/ML
1 INJECTION, SOLUTION INTRAVENOUS ONCE
Status: COMPLETED | OUTPATIENT
Start: 2023-09-29 | End: 2023-09-29

## 2023-09-29 RX ORDER — GABAPENTIN 100 MG/1
100 CAPSULE ORAL EVERY 8 HOURS
Status: DISCONTINUED | OUTPATIENT
Start: 2023-10-06 | End: 2023-10-02 | Stop reason: HOSPADM

## 2023-09-29 RX ORDER — ONDANSETRON 2 MG/ML
4 INJECTION INTRAMUSCULAR; INTRAVENOUS EVERY 6 HOURS PRN
Status: DISCONTINUED | OUTPATIENT
Start: 2023-09-29 | End: 2023-10-02 | Stop reason: HOSPADM

## 2023-09-29 RX ORDER — MULTIPLE VITAMINS W/ MINERALS TAB 9MG-400MCG
1 TAB ORAL DAILY
Status: DISCONTINUED | OUTPATIENT
Start: 2023-09-29 | End: 2023-10-02 | Stop reason: HOSPADM

## 2023-09-29 RX ADMIN — CLONIDINE HYDROCHLORIDE 0.1 MG: 0.1 TABLET ORAL at 21:22

## 2023-09-29 RX ADMIN — POTASSIUM CHLORIDE 40 MEQ: 1.5 POWDER, FOR SOLUTION ORAL at 10:21

## 2023-09-29 RX ADMIN — CLONIDINE HYDROCHLORIDE 0.1 MG: 0.1 TABLET ORAL at 13:58

## 2023-09-29 RX ADMIN — GABAPENTIN 900 MG: 300 CAPSULE ORAL at 13:58

## 2023-09-29 RX ADMIN — POTASSIUM CHLORIDE 10 MEQ: 10 INJECTION, SOLUTION INTRAVENOUS at 10:19

## 2023-09-29 RX ADMIN — GABAPENTIN 1200 MG: 600 TABLET, FILM COATED ORAL at 06:01

## 2023-09-29 RX ADMIN — DIAZEPAM 5 MG: 5 TABLET ORAL at 10:39

## 2023-09-29 RX ADMIN — POTASSIUM CHLORIDE 10 MEQ: 10 INJECTION, SOLUTION INTRAVENOUS at 09:12

## 2023-09-29 RX ADMIN — CALCIUM GLUCONATE 1 G: 20 INJECTION, SOLUTION INTRAVENOUS at 06:37

## 2023-09-29 RX ADMIN — POTASSIUM CHLORIDE 10 MEQ: 10 INJECTION, SOLUTION INTRAVENOUS at 07:53

## 2023-09-29 RX ADMIN — DIAZEPAM 5 MG: 5 TABLET ORAL at 10:34

## 2023-09-29 RX ADMIN — DIAZEPAM 20 MG: 5 TABLET ORAL at 03:52

## 2023-09-29 RX ADMIN — MULTIPLE VITAMINS W/ MINERALS TAB 1 TABLET: TAB at 07:47

## 2023-09-29 RX ADMIN — MAGNESIUM SULFATE HEPTAHYDRATE 4 G: 40 INJECTION, SOLUTION INTRAVENOUS at 06:45

## 2023-09-29 RX ADMIN — DIAZEPAM 5 MG: 5 TABLET ORAL at 17:52

## 2023-09-29 RX ADMIN — POTASSIUM CHLORIDE 10 MEQ: 10 INJECTION, SOLUTION INTRAVENOUS at 06:34

## 2023-09-29 RX ADMIN — DIAZEPAM 10 MG: 5 TABLET ORAL at 08:05

## 2023-09-29 RX ADMIN — DIAZEPAM 10 MG: 5 TABLET ORAL at 01:38

## 2023-09-29 RX ADMIN — POTASSIUM CHLORIDE 10 MEQ: 7.46 INJECTION, SOLUTION INTRAVENOUS at 01:32

## 2023-09-29 RX ADMIN — DIAZEPAM 10 MG: 5 TABLET ORAL at 20:00

## 2023-09-29 RX ADMIN — SODIUM PHOSPHATE, MONOBASIC, MONOHYDRATE AND SODIUM PHOSPHATE, DIBASIC, ANHYDROUS 15 MMOL: 276; 142 INJECTION, SOLUTION INTRAVENOUS at 10:11

## 2023-09-29 RX ADMIN — DIAZEPAM 10 MG: 5 TABLET ORAL at 06:11

## 2023-09-29 RX ADMIN — THIAMINE HCL TAB 100 MG 100 MG: 100 TAB at 07:48

## 2023-09-29 RX ADMIN — SODIUM PHOSPHATE, MONOBASIC, MONOHYDRATE AND SODIUM PHOSPHATE, DIBASIC, ANHYDROUS 15 MMOL: 276; 142 INJECTION, SOLUTION INTRAVENOUS at 12:30

## 2023-09-29 RX ADMIN — GABAPENTIN 900 MG: 300 CAPSULE ORAL at 21:22

## 2023-09-29 RX ADMIN — POTASSIUM CHLORIDE 10 MEQ: 10 INJECTION, SOLUTION INTRAVENOUS at 12:24

## 2023-09-29 RX ADMIN — CLONIDINE HYDROCHLORIDE 0.1 MG: 0.1 TABLET ORAL at 06:01

## 2023-09-29 RX ADMIN — DEXTROSE MONOHYDRATE, SODIUM CHLORIDE, SODIUM LACTATE, POTASSIUM CHLORIDE, CALCIUM CHLORIDE 1000 ML: 5; 600; 310; 179; 20 INJECTION, SOLUTION INTRAVENOUS at 14:11

## 2023-09-29 RX ADMIN — DIAZEPAM 10 MG: 5 TABLET ORAL at 14:02

## 2023-09-29 RX ADMIN — FOLIC ACID 1 MG: 1 TABLET ORAL at 07:47

## 2023-09-29 ASSESSMENT — ACTIVITIES OF DAILY LIVING (ADL)
ADLS_ACUITY_SCORE: 35
ADLS_ACUITY_SCORE: 31
ADLS_ACUITY_SCORE: 35
ADLS_ACUITY_SCORE: 33

## 2023-09-29 NOTE — ED NOTES
Pt continues to be A/O x 4, pleasant, tearful, anxious.  MSSA has been done and Valium given as ordered.  Pt felt nauseous after taking the liquid potassium replacement. IV Zofran was given. Pt still  vomited after IV Zofran. MD has been made aware, no further anti nausea med ordered. Per MD to continue to monitor patient, if patient fails oral challenge patient will probably go to medical unit.  Pt's IV infiltrated, potassium IV replacement delayed.MD aware

## 2023-09-29 NOTE — ED PROVIDER NOTES
Sign Out Provider: Dr. Knight    Sign Out Plan: 33-year-old female here for voluntary admission to 3 a detox however low potassium, potassium remains low despite replacement in the emergency department.  Patient signed out to me pending ongoing potassium replacement and reevaluation.    Reassessment: Potassium continues to be replaced however persistently low with potassium at 2.3.  I discussed patient management with internal medicine team and will plan on medical admission for hypokalemia, alcohol intoxication, alcohol withdrawals.  Patient understands and agrees with plan.    Disposition: Admit IM       Elicia Pinto MD  09/29/23 0523

## 2023-09-29 NOTE — H&P
[facility]    History and Physical - Hospitalist Service, GOLD TEAM 19       Date of Admission:  9/28/2023    Assessment & Plan      Domenico Wilder is a 33 year old female admitted on 9/28/2023. She has a history of polysubstance abuse including alcohol, GERD, depression, and anxiety and presents for detox from EtOH. She has been drinking more than 1 pint/day of 100 proof EtOH for the past year. She has been feeling more anxiety/depression in the past month, so she saw a doctor who prescribed sertraline and abilify and hydroxyzine. She has taken the hydroxyzine but not started the other 2 meds yet.    Acute alcohol intoxication (0.348) with withdrawal, hx of withdrawal seizures 2-3 yrs ago, hx of polysubstance abuse- received a banana bag and started on MSSA protocol in ED, only marijuana on UDS  - MSSA protocol with valium  - seizure precautions  - gabapentin protocol  - clonidine  - po vit when she is no longer nauseated  - interested in treatment program after detox- no specific plans yet, but was sober for yrs after being in a treatment program in the past  - zofran, compazine prn    Hypokalemia, Hypomagnesiumia, Hypocalcemia,hypophasphatemia- K 2.3 despite replacement and normal Mg  - RN driven replacement protocols for K and Mg and Phos- watch for refeeding syndrome  - telemetry  - replace calcium    Anxiety and Depression-  - hold hold sertraline and abilify that she hasn't started taking yet  - hydroxyzine prn       Diet: Combination Diet Clear Liquid nauseated now, but when not, can eat  DVT Prophylaxis: Ambulate every shift  Esposito Catheter: Not present  Lines: None     Cardiac Monitoring: ACTIVE order. Indication: Electrolyte Imbalance (24 hours)- Magnesium <1.3 mg/ml; Potassium < =2.8 or > 5.5 mg/ml  Code Status: Full Code    Clinically Significant Risk Factors Present on Admission        # Hypokalemia: Lowest K = 2.3 mmol/L in last 2 days, will replace as  needed   # Hypocalcemia: Lowest Ca = 7.8 mg/dL in last 2 days, will monitor and replace as appropriate   # Hypomagnesemia: Lowest Mg = 1.5 mg/dL in last 2 days, will replace as needed                       Disposition Plan      Expected Discharge Date: 10/01/2023                  Chelsea Iraheta MD  Hospitalist Service, GOLD TEAM 19  [facility]  Securely message with [employer] (more info)  Text page via [facility] Paging/Directory   See signed in provider for up to date coverage information    ______________________________________________________________________    Chief Complaint   Intoxicated, want to detoxify from alcohol    History is obtained from the patient    History of Present Illness   Domenico Wilder is a 33 year old female who has a history of polysubstance abuse including alcohol, GERD, depression, and anxiety and presents for detox from EtOH. She has been drinking more than 1 pint/day of 100 proof EtOH for the past year. She has been feeling more anxiety/depression in the past month, so she saw a doctor who prescribed sertraline and abilify and hydroxyzine. She has taken the hydroxyzine but not started the other 2 meds yet. She has a hx of withdrawal seizures 2-3 yrs ago. She was sober for years in the past after completing treatment, and she is planning to go to treatment after detox this time-- just has no specific plans yet.    She currently feels sore all over, hot, clammy, and tremulous. She often has diarrhea, and with withdrawal she gets n/v/tremulous.      Past Medical History    Past Medical History:   Diagnosis Date    Anxiety     Chickenpox     Depression     Depressive disorder     GERD (gastroesophageal reflux disease)     Substance abuse (H)    Polysubstance abuse- EtoH, marijuana  Has 3 kids    Past Surgical History   Past Surgical History:   Procedure Laterality Date    NO HISTORY OF SURGERY         Prior to Admission Medications   Prior to  Admission Medications   Prescriptions Last Dose Informant Patient Reported? Taking?   ARIPiprazole (ABILIFY) 5 MG tablet- not taking yet   Yes Yes   Sig: Take 1 tablet by mouth daily   acetaminophen (TYLENOL) 325 MG tablet More than a month  No Yes   Sig: Take 2 tablets (650 mg) by mouth every 6 hours as needed for mild pain Start after Delivery.   etonogestrel (NEXPLANON) 68 MG IMPL   Yes Yes   Sig: Inject 68 mg Subcutaneous   hydrOXYzine (VISTARIL) 25 MG capsule- taking Past Week  Yes Yes   Sig: Take 25 mg by mouth every 8 hours as needed for anxiety   ibuprofen (ADVIL/MOTRIN) 600 MG tablet More than a month  No Yes   Sig: Take 1 tablet (600 mg) by mouth every 6 hours as needed for moderate pain Start after delivery   sertraline (ZOLOFT) 50 MG tablet- not taking yet   Yes Yes   Sig: Take 50 mg by mouth daily      Facility-Administered Medications: None        Social History   I have reviewed this patient's social history and updated it with pertinent information if needed.  Social History     Tobacco Use    Smoking status: Every Day     Packs/day: 0.50     Years: 13.00     Pack years: 6.50     Types: Cigarettes     Start date: 12/28/2017    Smokeless tobacco: Never   Substance Use Topics    Alcohol use: Yes     Comment: 1 liter per day vodka    Drug use: No     Types: Marijuana, IV, Methamphetamines, Opiates     Comment: 1-2 grams of heroine daily, occasional meth and marijuana   Lives with her mother and boyfriend (father of her 3 kids).    Allergies   Allergies   Allergen Reactions    Cats     Dogs     No drug allergies    Physical Exam   Vital Signs: Temp: 98.1  F (36.7  C) Temp src: Oral BP: 112/67 Pulse: 81   Resp: 19 SpO2: 96 % O2 Device: None (Room air)    Weight: 150 lbs 0 oz  General: tremulous, anxious, restless, easily interactive, sitting on bed  HEENT: abdias sclerae anicteric and noninjected, dry MM, no rhinorrhea  CV: tachycardic while I was in room to 120s, no mrg  Resp: CTAB, no w/c, no increased  work of breathing  Abd: soft, NT/ND, +BS  Extrem: no pitting pedal edema abdias, MAEE  Neuro: alert, oriented, tremulous  Psych: anxious, restless    Medical Decision Making       75 MINUTES SPENT BY ME on the date of service doing chart review, history, exam, documentation & further activities per the note.      Data     I have personally reviewed the following data over the past 24 hrs:    4.6  \   15.8 (H)   / 340     141 102 4.2 (L) /  106 (H)   2.3 (LL) 25 0.50 (L) \     ALT: 106 (H) AST: 165 (H) AP: 122 (H) TBILI: 0.4   ALB: 4.5 TOT PROTEIN: 7.6 LIPASE: N/A       Imaging results reviewed over the past 24 hrs:   No results found for this or any previous visit (from the past 24 hour(s)).

## 2023-09-29 NOTE — ED NOTES
Pt continues to be A/O x 4, pleasant, cooperative. Tearful, anxious when awake.  Cardiac monitor: NSR. Denies any CP or SOB.  Independent with ADL's and ambulation.  PIV: SL. Will be replacing K per protocol. MD aware of the critical K result 2.3  MSSA continuously done. Valium given as directed.  VSS. O2 sat RA > 95%. Pt only taking ice chips - able to keep it down.   Side rails padded - hx of seizures

## 2023-09-29 NOTE — TELEPHONE ENCOUNTER
R:    12:05p Called [facility] ED Liza to inquire about update on pt. Liza informed that pt will be admitting to 6 MED SURG. Intake has removed pt from detox [facility].

## 2023-09-30 LAB
MAGNESIUM SERPL-MCNC: 2.2 MG/DL (ref 1.7–2.3)
PHOSPHATE SERPL-MCNC: 1.9 MG/DL (ref 2.5–4.5)
PHOSPHATE SERPL-MCNC: 3.8 MG/DL (ref 2.5–4.5)
POTASSIUM SERPL-SCNC: 3.3 MMOL/L (ref 3.4–5.3)
POTASSIUM SERPL-SCNC: 3.6 MMOL/L (ref 3.4–5.3)

## 2023-09-30 PROCEDURE — 120N000002 HC R&B MED SURG/OB UMMC

## 2023-09-30 PROCEDURE — 250N000013 HC RX MED GY IP 250 OP 250 PS 637: Performed by: STUDENT IN AN ORGANIZED HEALTH CARE EDUCATION/TRAINING PROGRAM

## 2023-09-30 PROCEDURE — 250N000013 HC RX MED GY IP 250 OP 250 PS 637: Performed by: INTERNAL MEDICINE

## 2023-09-30 PROCEDURE — 84132 ASSAY OF SERUM POTASSIUM: CPT | Performed by: STUDENT IN AN ORGANIZED HEALTH CARE EDUCATION/TRAINING PROGRAM

## 2023-09-30 PROCEDURE — 36415 COLL VENOUS BLD VENIPUNCTURE: CPT | Performed by: STUDENT IN AN ORGANIZED HEALTH CARE EDUCATION/TRAINING PROGRAM

## 2023-09-30 PROCEDURE — 84100 ASSAY OF PHOSPHORUS: CPT | Performed by: INTERNAL MEDICINE

## 2023-09-30 PROCEDURE — 84132 ASSAY OF SERUM POTASSIUM: CPT | Performed by: INTERNAL MEDICINE

## 2023-09-30 PROCEDURE — 83735 ASSAY OF MAGNESIUM: CPT | Performed by: STUDENT IN AN ORGANIZED HEALTH CARE EDUCATION/TRAINING PROGRAM

## 2023-09-30 PROCEDURE — 36415 COLL VENOUS BLD VENIPUNCTURE: CPT | Performed by: INTERNAL MEDICINE

## 2023-09-30 PROCEDURE — 99233 SBSQ HOSP IP/OBS HIGH 50: CPT | Performed by: INTERNAL MEDICINE

## 2023-09-30 RX ORDER — POTASSIUM CHLORIDE 1500 MG/1
40 TABLET, EXTENDED RELEASE ORAL ONCE
Status: COMPLETED | OUTPATIENT
Start: 2023-09-30 | End: 2023-09-30

## 2023-09-30 RX ORDER — POTASSIUM CHLORIDE 1500 MG/1
20 TABLET, EXTENDED RELEASE ORAL ONCE
Status: COMPLETED | OUTPATIENT
Start: 2023-09-30 | End: 2023-09-30

## 2023-09-30 RX ORDER — DIAZEPAM 10 MG/2ML
5-10 INJECTION, SOLUTION INTRAMUSCULAR; INTRAVENOUS EVERY 30 MIN PRN
Status: DISCONTINUED | OUTPATIENT
Start: 2023-09-30 | End: 2023-10-02 | Stop reason: HOSPADM

## 2023-09-30 RX ORDER — DIAZEPAM 10 MG
10 TABLET ORAL EVERY 30 MIN PRN
Status: DISCONTINUED | OUTPATIENT
Start: 2023-09-30 | End: 2023-10-02 | Stop reason: HOSPADM

## 2023-09-30 RX ORDER — FLUMAZENIL 0.1 MG/ML
0.2 INJECTION, SOLUTION INTRAVENOUS
Status: DISCONTINUED | OUTPATIENT
Start: 2023-09-30 | End: 2023-10-02 | Stop reason: HOSPADM

## 2023-09-30 RX ADMIN — THIAMINE HCL TAB 100 MG 100 MG: 100 TAB at 08:19

## 2023-09-30 RX ADMIN — HYDROXYZINE HYDROCHLORIDE 25 MG: 25 TABLET, FILM COATED ORAL at 06:40

## 2023-09-30 RX ADMIN — DIAZEPAM 10 MG: 10 TABLET ORAL at 14:40

## 2023-09-30 RX ADMIN — CLONIDINE HYDROCHLORIDE 0.1 MG: 0.1 TABLET ORAL at 13:20

## 2023-09-30 RX ADMIN — DIAZEPAM 10 MG: 10 TABLET ORAL at 22:12

## 2023-09-30 RX ADMIN — DIAZEPAM 10 MG: 10 TABLET ORAL at 08:29

## 2023-09-30 RX ADMIN — POTASSIUM CHLORIDE 40 MEQ: 1500 TABLET, EXTENDED RELEASE ORAL at 11:37

## 2023-09-30 RX ADMIN — IBUPROFEN 600 MG: 600 TABLET, FILM COATED ORAL at 06:40

## 2023-09-30 RX ADMIN — FOLIC ACID 1 MG: 1 TABLET ORAL at 08:19

## 2023-09-30 RX ADMIN — POTASSIUM & SODIUM PHOSPHATES POWDER PACK 280-160-250 MG 2 PACKET: 280-160-250 PACK at 18:04

## 2023-09-30 RX ADMIN — POTASSIUM CHLORIDE 20 MEQ: 1500 TABLET, EXTENDED RELEASE ORAL at 18:04

## 2023-09-30 RX ADMIN — MULTIPLE VITAMINS W/ MINERALS TAB 1 TABLET: TAB at 08:19

## 2023-09-30 RX ADMIN — POTASSIUM & SODIUM PHOSPHATES POWDER PACK 280-160-250 MG 2 PACKET: 280-160-250 PACK at 14:40

## 2023-09-30 RX ADMIN — GABAPENTIN 900 MG: 300 CAPSULE ORAL at 13:21

## 2023-09-30 RX ADMIN — POTASSIUM & SODIUM PHOSPHATES POWDER PACK 280-160-250 MG 2 PACKET: 280-160-250 PACK at 11:37

## 2023-09-30 RX ADMIN — ACETAMINOPHEN 650 MG: 325 TABLET, FILM COATED ORAL at 22:12

## 2023-09-30 RX ADMIN — CLONIDINE HYDROCHLORIDE 0.1 MG: 0.1 TABLET ORAL at 22:06

## 2023-09-30 RX ADMIN — GABAPENTIN 900 MG: 300 CAPSULE ORAL at 06:22

## 2023-09-30 RX ADMIN — HYDROXYZINE HYDROCHLORIDE 25 MG: 25 TABLET, FILM COATED ORAL at 14:40

## 2023-09-30 RX ADMIN — GABAPENTIN 900 MG: 300 CAPSULE ORAL at 22:06

## 2023-09-30 ASSESSMENT — ACTIVITIES OF DAILY LIVING (ADL)
ADLS_ACUITY_SCORE: 33

## 2023-09-30 NOTE — PLAN OF CARE
"    /74   Pulse 86   Temp 99.8  F (37.7  C) (Oral)   Resp 16   Ht 1.651 m (5' 5\")   Wt 68 kg (150 lb)   SpO2 97%   BMI 24.96 kg/m      Pt. Transferred from ED to unit  A&O X 3, Able to make needs known  MSSA score 17, Valium given, re-assessed  Seizure precautions maintained  Continent of B/B, Ambulates with assist x 1   Generalized weakness noted  Phosphorus RN managed to be drawn AM  POC continued    "

## 2023-09-30 NOTE — PLAN OF CARE
Patient alert and oriented. Slept most of this shift. Pain managed with PRN Atarax and Ibuprofen with effective results. Up and candida. Utilizing the bathroom independently without any concern. CIWA at 4 and 5 not needing Valium. Remains on clear liquid diet. Resting in bed with call light within reach. Continue with current plan of care.

## 2023-09-30 NOTE — PROGRESS NOTES
[facility]    Medicine Progress Note - Hospitalist Service, GOLD TEAM 19    Date of Admission:  9/28/2023    Assessment & Plan      Domenico Wilder is a 33 year old female admitted on 9/28/2023. She has a history of polysubstance abuse including alcohol, GERD, depression, and anxiety and presents for detox from EtOH. She has been drinking more than 1 pint/day of 100 proof EtOH for the past year. She has been feeling more anxiety/depression in the past month, so she saw a doctor who prescribed sertraline and abilify and hydroxyzine. She has taken the hydroxyzine but not started the other 2 meds yet.    Acute alcohol intoxication (0.348) with withdrawal, hx of withdrawal seizures 2-3 yrs ago, hx of polysubstance abuse- received a banana bag and started on MSSA protocol in ED, only marijuana on UDS  - MSSA protocol with valium  - seizure precautions  - gabapentin protocol  - clonidine  - po vit when she is no longer nauseated  - interested in treatment program after detox- no specific plans yet, but was sober for yrs after being in a treatment program in the past  - zofran, compazine prn    Hypokalemia, Hypomagnesiumia, Hypocalcemia,hypophasphatemia- K 2.3 despite replacement and normal Mg  - RN driven replacement protocols for K and Mg and Phos- watch for refeeding syndrome  - telemetry  - replace calcium    Anxiety and Depression-  - hold hold sertraline and abilify that she hasn't started taking yet  - hydroxyzine prn       Diet: Advance Diet as Tolerated: Clear Liquid Diet    DVT Prophylaxis: Enoxaparin (Lovenox) SQ  Esposito Catheter: Not present  Lines: None     Cardiac Monitoring: None  Code Status: Full Code      Clinically Significant Risk Factors        # Hypokalemia: Lowest K = 2.3 mmol/L in last 2 days, will replace as needed   # Hypocalcemia: Lowest Ca = 7.7 mg/dL in last 2 days, will monitor and replace as appropriate   # Hypomagnesemia: Lowest Mg = 1.5 mg/dL in  last 2 days, will replace as needed                         Disposition Plan     Expected Discharge Date: 10/01/2023                  Adal Flores MD  Hospitalist Service, GOLD TEAM 19  [facility]  Securely message with [employer] (more info)  Text page via [employer] Paging/Directory   See signed in provider for up to date coverage information  ______________________________________________________________________    Interval History   No acute events overnight. CIWA have improved significantly. No complaints noted. Asking to eat.     Physical Exam   Vital Signs: Temp: 98.8  F (37.1  C) Temp src: Oral BP: 111/73 Pulse: 71   Resp: 16 SpO2: 99 % O2 Device: None (Room air)    Weight: 150 lbs 0 oz    General: AO X 3, lying comfortably in bed in no apparent distress  HEENT: pupils equal and reactive to light and accommodation, extraocular movements are intact; oral mucosa moist  Neck: Supple no raised JVD, no rigidity  Respiratory: lungs b/l clear to auscultation, no wheezing or crepitations  CVS: nl s1 s2, regular rate and rhythm, no murmur  P/A: soft, nt,nd, no guarding rigidity or rebound tenderness  Ext: no edema  Neuro: no focal neurological deficits noted, cranial nerves 2-12 grossly intact  Psychiatry: normal mood and affect  Skin: No obvious skin rashes or ulcers      Medical Decision Making       55 MINUTES SPENT BY ME on the date of service doing chart review, history, exam, documentation & further activities per the note.      Data     I have personally reviewed the following data over the past 24 hrs:    N/A  \   N/A   / N/A     N/A N/A N/A /  N/A   3.3 (L) N/A N/A \       Imaging results reviewed over the past 24 hrs:   No results found for this or any previous visit (from the past 24 hour(s)).

## 2023-09-30 NOTE — PLAN OF CARE
"Goal Outcome Evaluation:      Plan of Care Reviewed With: patient    Overall Patient Progress: no changeOverall Patient Progress: no change  Discharge plan: Home when medically stable.       VS: /73 (BP Location: Left arm, Patient Position: Sitting, Cuff Size: Adult Regular)   Pulse 71   Temp 98.8  F (37.1  C) (Oral)   Resp 16   Ht 1.651 m (5' 5\")   Wt 68 kg (150 lb)   SpO2 99%   BMI 24.96 kg/m    Lung sounds clear, afebrile this shift.    O2: Sating >95% on RA, denies SOB/Chest pain. Denies N/V   Output: Voids spontaneously in bathroom   Last BM:  bowel sounds normoactive x4. LBM unknown.    Activity: Up independently in room   Up for meals? Yes   Skin: All visible skin intact   Pain: Denies pain   CMS: AO x4, Denies N/T   Dressing: None   Diet: Regular diet   LDA: R/L PIV/SL   Equipment:  IV pole, personal belongings   Plan: Call light within reach, bed in a low position. Able to make needs known. Continue to monitor with POC.   Additional Info:  Received 2 doses of valium 10 mg for CIWA of 9&8. Ciwa score of 6 @ 1633. PRN atarax administered for anxiety.            " Patient arrives with c/o right sided jaw pain that radiates to her lip. Denies injury. Patient states pain started \"yesterday\".

## 2023-10-01 LAB
ANION GAP SERPL CALCULATED.3IONS-SCNC: 9 MMOL/L (ref 7–15)
BUN SERPL-MCNC: 7.5 MG/DL (ref 6–20)
CALCIUM SERPL-MCNC: 8.6 MG/DL (ref 8.6–10)
CHLORIDE SERPL-SCNC: 106 MMOL/L (ref 98–107)
CREAT SERPL-MCNC: 0.6 MG/DL (ref 0.51–0.95)
DEPRECATED HCO3 PLAS-SCNC: 26 MMOL/L (ref 22–29)
EGFRCR SERPLBLD CKD-EPI 2021: >90 ML/MIN/1.73M2
GLUCOSE SERPL-MCNC: 96 MG/DL (ref 70–99)
MAGNESIUM SERPL-MCNC: 1.8 MG/DL (ref 1.7–2.3)
PHOSPHATE SERPL-MCNC: 3.5 MG/DL (ref 2.5–4.5)
POTASSIUM SERPL-SCNC: 3.5 MMOL/L (ref 3.4–5.3)
SODIUM SERPL-SCNC: 141 MMOL/L (ref 135–145)

## 2023-10-01 PROCEDURE — 250N000013 HC RX MED GY IP 250 OP 250 PS 637: Performed by: INTERNAL MEDICINE

## 2023-10-01 PROCEDURE — 250N000013 HC RX MED GY IP 250 OP 250 PS 637: Performed by: STUDENT IN AN ORGANIZED HEALTH CARE EDUCATION/TRAINING PROGRAM

## 2023-10-01 PROCEDURE — 36415 COLL VENOUS BLD VENIPUNCTURE: CPT | Performed by: INTERNAL MEDICINE

## 2023-10-01 PROCEDURE — 83735 ASSAY OF MAGNESIUM: CPT | Performed by: INTERNAL MEDICINE

## 2023-10-01 PROCEDURE — 120N000002 HC R&B MED SURG/OB UMMC

## 2023-10-01 PROCEDURE — 84100 ASSAY OF PHOSPHORUS: CPT | Performed by: INTERNAL MEDICINE

## 2023-10-01 PROCEDURE — 80048 BASIC METABOLIC PNL TOTAL CA: CPT | Performed by: INTERNAL MEDICINE

## 2023-10-01 PROCEDURE — 99232 SBSQ HOSP IP/OBS MODERATE 35: CPT | Performed by: INTERNAL MEDICINE

## 2023-10-01 RX ORDER — MAGNESIUM OXIDE 400 MG/1
400 TABLET ORAL EVERY 4 HOURS
Status: COMPLETED | OUTPATIENT
Start: 2023-10-01 | End: 2023-10-01

## 2023-10-01 RX ORDER — POTASSIUM CHLORIDE 1500 MG/1
20 TABLET, EXTENDED RELEASE ORAL ONCE
Status: COMPLETED | OUTPATIENT
Start: 2023-10-01 | End: 2023-10-01

## 2023-10-01 RX ADMIN — Medication 5 MG: at 22:19

## 2023-10-01 RX ADMIN — POTASSIUM CHLORIDE 20 MEQ: 1500 TABLET, EXTENDED RELEASE ORAL at 09:41

## 2023-10-01 RX ADMIN — MAGNESIUM OXIDE TAB 400 MG (241.3 MG ELEMENTAL MG) 400 MG: 400 (241.3 MG) TAB at 09:41

## 2023-10-01 RX ADMIN — MAGNESIUM OXIDE TAB 400 MG (241.3 MG ELEMENTAL MG) 400 MG: 400 (241.3 MG) TAB at 13:06

## 2023-10-01 RX ADMIN — CLONIDINE HYDROCHLORIDE 0.1 MG: 0.1 TABLET ORAL at 06:19

## 2023-10-01 RX ADMIN — GABAPENTIN 900 MG: 300 CAPSULE ORAL at 06:20

## 2023-10-01 RX ADMIN — GABAPENTIN 900 MG: 300 CAPSULE ORAL at 22:02

## 2023-10-01 RX ADMIN — GABAPENTIN 900 MG: 300 CAPSULE ORAL at 13:06

## 2023-10-01 RX ADMIN — ACETAMINOPHEN 650 MG: 325 TABLET, FILM COATED ORAL at 22:19

## 2023-10-01 RX ADMIN — THIAMINE HCL TAB 100 MG 100 MG: 100 TAB at 09:18

## 2023-10-01 RX ADMIN — CLONIDINE HYDROCHLORIDE 0.1 MG: 0.1 TABLET ORAL at 13:06

## 2023-10-01 RX ADMIN — FOLIC ACID 1 MG: 1 TABLET ORAL at 09:18

## 2023-10-01 RX ADMIN — HYDROXYZINE HYDROCHLORIDE 25 MG: 25 TABLET, FILM COATED ORAL at 22:19

## 2023-10-01 RX ADMIN — MULTIPLE VITAMINS W/ MINERALS TAB 1 TABLET: TAB at 09:18

## 2023-10-01 RX ADMIN — CLONIDINE HYDROCHLORIDE 0.1 MG: 0.1 TABLET ORAL at 22:05

## 2023-10-01 ASSESSMENT — ACTIVITIES OF DAILY LIVING (ADL)
ADLS_ACUITY_SCORE: 33

## 2023-10-01 NOTE — PROGRESS NOTES
[facility]    Medicine Progress Note - Hospitalist Service, GOLD TEAM 19    Date of Admission:  9/28/2023    Assessment & Plan      Domenico Wilder is a 33 year old female admitted on 9/28/2023. She has a history of polysubstance abuse including alcohol, GERD, depression, and anxiety and presents for detox from EtOH. She has been drinking more than 1 pint/day of 100 proof EtOH for the past year. She has been feeling more anxiety/depression in the past month, so she saw a doctor who prescribed sertraline and abilify and hydroxyzine. She has taken the hydroxyzine but not started the other 2 meds yet.    Acute alcohol intoxication (0.348) with withdrawal, hx of withdrawal seizures 2-3 yrs ago, hx of polysubstance abuse- received a banana bag and started on MSSA protocol in ED, only marijuana on UDS  - MSSA protocol with valium  - seizure precautions  - gabapentin protocol  - clonidine  - po vit when she is no longer nauseated  - interested in treatment program after detox- no specific plans yet, but was sober for yrs after being in a treatment program in the past  - zofran, compazine prn    Hypokalemia, Hypomagnesiumia, Hypocalcemia,hypophasphatemia- K 2.3 despite replacement and normal Mg  - RN driven replacement protocols for K and Mg and Phos- watch for refeeding syndrome  - telemetry  - replace calcium    Anxiety and Depression-  - hold hold sertraline and abilify that she hasn't started taking yet  - hydroxyzine prn       Diet: Advance Diet as Tolerated: Regular Diet Adult    DVT Prophylaxis: Enoxaparin (Lovenox) SQ  Esposito Catheter: Not present  Lines: None     Cardiac Monitoring: None  Code Status: Full Code      Clinically Significant Risk Factors        # Hypokalemia: Lowest K = 3.1 mmol/L in last 2 days, will replace as needed                             Disposition Plan     Expected Discharge Date: 10/01/2023                  Adal Flores MD  Hospitalist Service,  GOLD TEAM 19  [facility]  Securely message with [employer] (more info)  Text page via [employer] Paging/Directory   See signed in provider for up to date coverage information  ______________________________________________________________________    Interval History   No acute events overnight. CIWA have improved significantly. No complaints noted. Partially tolerating oral diet.      Physical Exam   Vital Signs: Temp: 98.9  F (37.2  C) Temp src: Oral BP: 107/73 Pulse: 81   Resp: 16 SpO2: 99 % O2 Device: None (Room air)    Weight: 150 lbs 0 oz    General: AO X 3, lying comfortably in bed in no apparent distress  HEENT: pupils equal and reactive to light and accommodation, extraocular movements are intact; oral mucosa moist  Neck: Supple no raised JVD, no rigidity  Respiratory: lungs b/l clear to auscultation, no wheezing or crepitations  CVS: nl s1 s2, regular rate and rhythm, no murmur  P/A: soft, nt,nd, no guarding rigidity or rebound tenderness  Ext: no edema  Neuro: no focal neurological deficits noted, cranial nerves 2-12 grossly intact  Psychiatry: normal mood and affect  Skin: No obvious skin rashes or ulcers      Medical Decision Making       55 MINUTES SPENT BY ME on the date of service doing chart review, history, exam, documentation & further activities per the note.      Data     I have personally reviewed the following data over the past 24 hrs:    N/A  \   N/A   / N/A     141 106 7.5 /  96   3.5 26 0.60 \       Imaging results reviewed over the past 24 hrs:   No results found for this or any previous visit (from the past 24 hour(s)).

## 2023-10-01 NOTE — PLAN OF CARE
"Goal Outcome Evaluation:    Shift: 4181-4360    VS: /80   Pulse 81   Temp 98.9  F (37.2  C) (Oral)   Resp 16   Ht 1.651 m (5' 5\")   Wt 68 kg (150 lb)   SpO2 99%   BMI 24.96 kg/m      Pain:  Denies pain. Endorses anxiety. Not scoring for ciwas   Neuro: A&Ox4, able to make needs known  Resp: WDL, no SOB or chest pain reported. Stable on RA  Diet: Reg diet   Skin: No new skin concerns   LDA: 2 PIVs, saline locked  Activity: Ind in room   Output: voids spontaneously without difficulty    Call light within reach     Plan: plan of care ongoing     "

## 2023-10-01 NOTE — PLAN OF CARE
Goal Outcome Evaluation:      Plan of Care Reviewed With: patient    Overall Patient Progress: improvingOverall Patient Progress: improving    Outcome Evaluation: Pt had valium 10 mg x 1, tylenol given x 1 for back pain. denies sob, chest pain, numbness/ tingling. No BM. no acute events

## 2023-10-02 VITALS
HEART RATE: 78 BPM | WEIGHT: 150 LBS | SYSTOLIC BLOOD PRESSURE: 111 MMHG | OXYGEN SATURATION: 98 % | HEIGHT: 65 IN | RESPIRATION RATE: 18 BRPM | DIASTOLIC BLOOD PRESSURE: 75 MMHG | BODY MASS INDEX: 24.99 KG/M2 | TEMPERATURE: 99.9 F

## 2023-10-02 LAB
ANION GAP SERPL CALCULATED.3IONS-SCNC: 8 MMOL/L (ref 7–15)
BUN SERPL-MCNC: 4.8 MG/DL (ref 6–20)
CALCIUM SERPL-MCNC: 8.7 MG/DL (ref 8.6–10)
CHLORIDE SERPL-SCNC: 105 MMOL/L (ref 98–107)
CREAT SERPL-MCNC: 0.6 MG/DL (ref 0.51–0.95)
DEPRECATED HCO3 PLAS-SCNC: 29 MMOL/L (ref 22–29)
EGFRCR SERPLBLD CKD-EPI 2021: >90 ML/MIN/1.73M2
GLUCOSE SERPL-MCNC: 114 MG/DL (ref 70–99)
MAGNESIUM SERPL-MCNC: 1.9 MG/DL (ref 1.7–2.3)
POTASSIUM SERPL-SCNC: 3.6 MMOL/L (ref 3.4–5.3)
SODIUM SERPL-SCNC: 142 MMOL/L (ref 135–145)

## 2023-10-02 PROCEDURE — 83735 ASSAY OF MAGNESIUM: CPT | Performed by: INTERNAL MEDICINE

## 2023-10-02 PROCEDURE — 99239 HOSP IP/OBS DSCHRG MGMT >30: CPT | Performed by: INTERNAL MEDICINE

## 2023-10-02 PROCEDURE — 80048 BASIC METABOLIC PNL TOTAL CA: CPT | Performed by: INTERNAL MEDICINE

## 2023-10-02 PROCEDURE — 36415 COLL VENOUS BLD VENIPUNCTURE: CPT | Performed by: INTERNAL MEDICINE

## 2023-10-02 PROCEDURE — 250N000013 HC RX MED GY IP 250 OP 250 PS 637: Performed by: INTERNAL MEDICINE

## 2023-10-02 RX ORDER — GABAPENTIN 300 MG/1
300 CAPSULE ORAL EVERY 8 HOURS
Qty: 6 CAPSULE | Refills: 0 | Status: SHIPPED | OUTPATIENT
Start: 2023-10-04 | End: 2024-09-24

## 2023-10-02 RX ORDER — GABAPENTIN 100 MG/1
100 CAPSULE ORAL EVERY 8 HOURS
Qty: 9 CAPSULE | Refills: 0 | Status: SHIPPED | OUTPATIENT
Start: 2023-10-06 | End: 2024-03-15

## 2023-10-02 RX ORDER — HYDROXYZINE HYDROCHLORIDE 25 MG/1
25 TABLET, FILM COATED ORAL 3 TIMES DAILY PRN
Qty: 90 TABLET | Refills: 0 | Status: SHIPPED | OUTPATIENT
Start: 2023-10-02 | End: 2024-06-18

## 2023-10-02 RX ORDER — GABAPENTIN 300 MG/1
600 CAPSULE ORAL EVERY 8 HOURS
Qty: 5 CAPSULE | Refills: 0 | Status: SHIPPED | OUTPATIENT
Start: 2023-10-02 | End: 2024-03-15

## 2023-10-02 RX ORDER — GABAPENTIN 100 MG/1
100 CAPSULE ORAL EVERY 8 HOURS
Qty: 9 CAPSULE | Refills: 0 | Status: SHIPPED | OUTPATIENT
Start: 2023-10-06 | End: 2023-10-02

## 2023-10-02 RX ADMIN — GABAPENTIN 600 MG: 300 CAPSULE ORAL at 14:00

## 2023-10-02 RX ADMIN — CLONIDINE HYDROCHLORIDE 0.1 MG: 0.1 TABLET ORAL at 14:00

## 2023-10-02 RX ADMIN — MULTIPLE VITAMINS W/ MINERALS TAB 1 TABLET: TAB at 08:20

## 2023-10-02 RX ADMIN — CLONIDINE HYDROCHLORIDE 0.1 MG: 0.1 TABLET ORAL at 06:07

## 2023-10-02 RX ADMIN — GABAPENTIN 900 MG: 300 CAPSULE ORAL at 06:07

## 2023-10-02 RX ADMIN — FOLIC ACID 1 MG: 1 TABLET ORAL at 08:20

## 2023-10-02 RX ADMIN — THIAMINE HCL TAB 100 MG 100 MG: 100 TAB at 08:20

## 2023-10-02 ASSESSMENT — ACTIVITIES OF DAILY LIVING (ADL)
ADLS_ACUITY_SCORE: 33

## 2023-10-02 NOTE — PROGRESS NOTES
Pt. discharged at 4:20 to home, and left with personal belongings. Pt. received complete discharge paperwork. Pt. was given times of last dose for all discharge medications in writing on discharge medication sheets. Discharge teaching included gabapentin and hydroxyzine  medication, pain management, activity restrictions. Pt. to follow up with primary provider within 7 days. Pt. had no further questions at the time of discharge and no unmet needs were identified.

## 2023-10-02 NOTE — PLAN OF CARE
"Goal Outcome Evaluation:       Patient is alert and oriented x4. Able to make needs known. On RA stable. Denies N/V, numbness/tingling or CP. Pt up independently. PIV SL.  LBM 10/02. Voiding spontaneously with no difficulty. Ate 75% of meals.  Skin CDI. Bed in low position. Call light within reach. Discharge plan pending this afternoon. Continue with POC.      /89 (BP Location: Left arm)   Pulse 73   Temp 98.9  F (37.2  C) (Oral)   Resp 20   Ht 1.651 m (5' 5\")   Wt 68 kg (150 lb)   SpO2 98%   BMI 24.96 kg/m                  "

## 2023-10-02 NOTE — PROGRESS NOTES
"VS: /81 (BP Location: Left arm)   Pulse 71   Temp 98.8  F (37.1  C) (Oral)   Resp 16   Ht 1.651 m (5' 5\")   Wt 68 kg (150 lb)   SpO2 98%   BMI 24.96 kg/m      O2: 98% on RA, denies SOB/Chest pain. Denies N/V   Output: Continent of bowel and bladder   Last BM: 10/01/23   Activity: Up independently in room   Skin: All visible skin intact   Pain: Prn tylenol given for back pain,   CMS: AO x4, Denies N/T   Diet: Regular diet   LDA: Right and left PIV saline lock   Equipment:  IV pole, personal belongings   Plan: Call light within reach, bed in a low position. Continue POC.   Additional Info:         "

## 2023-10-02 NOTE — DISCHARGE SUMMARY
[facility]  Hospitalist Discharge Summary      Date of Admission:  9/28/2023  Date of Discharge:  10/2/2023  Discharging Provider: Adal Flores MD  Discharge Service: Hospitalist Service, GOLD TEAM 19    Discharge Diagnoses   Moderate to severe alcohol use disorder and dependence  Acute uncomplicated withdrawal       Clinically Significant Risk Factors          Follow-ups Needed After Discharge   Follow-up Appointments     Adult [facility] Follow-up and recommended labs and tests      Follow up with primary care provider, Saurab[facility], within 7   days for hospital follow- up.  No follow up labs or test are needed.      Appointments on [place] and/or [facility] (with [facility] or [facility]   provider or service). Call [phone] if you haven't heard regarding   these appointments within 7 days of discharge.        She is not interested in inpatient treatment, chemical dependency service will see her this afternoon, and provide her with outpatient treatment programs prior to dismissal.      Unresulted Labs Ordered in the Past 30 Days of this Admission       No orders found from 8/29/2023 to 9/29/2023.        These results will be followed up by PCP.    Discharge Disposition   Discharged to home  Condition at discharge: Stable    Hospital Course   Domenico Wilder is a 33 year old female admitted on 9/28/2023. She has a history of polysubstance abuse including alcohol, GERD, depression, and anxiety and presents for detox from EtOH. She has been drinking more than 1 pint/day of 100 proof EtOH for the past year. She has been feeling more anxiety/depression in the past month, so she saw a doctor who prescribed sertraline and abilify and hydroxyzine. She has taken the hydroxyzine but not started the other 2 meds yet.     On arrival, she was already in moderate to severe withdrawal and was requiring IV diazepam. She was subsequently transition to oral diazepam and oral  gabapentin tapered dose as the withdrawal eased over the last 72 hours. She is tolerating regular diet without nausea or vomiting. Withdrawal symptoms have subsided completely,and last oral diazepam was 12 hours ago. She complete her oral tapered gabapentin outpatient, script sent to pharmacy on file. No changes to her medications.      She is not interested in inpatient treatment, Hopefully  or someone for chemical dependency service will see her this afternoon, and provide her with options for outpatient treatment programs prior to dismissal. She will need to follow up with her PCP within 7 days of discharge.     Acute alcohol intoxication (0.348) with withdrawal, hx of withdrawal seizures 2-3 yrs ago, hx of polysubstance abuse- received a banana bag and started on MSSA protocol in ED, only marijuana on UDS  - MSSA protocol with valium  - seizure precautions  - gabapentin protocol  - clonidine  - po vit when she is no longer nauseated  - interested in treatment program after detox- no specific plans yet, but was sober for yrs after being in a treatment program in the past  - zofran, compazine prn    Hypokalemia, Hypomagnesiumia, Hypocalcemia,hypophasphatemia-resolved  - RN driven replacement protocols for K and Mg and Phos- watch for refeeding syndrome    Anxiety and Depression-stable  - hold hold sertraline and abilify that she hasn't started taking yet  - hydroxyzine prn    Consultations This Hospital Stay   NURSING TO CONSULT FOR VASCULAR ACCESS CARE IP CONSULT  CHEMICAL DEPENDENCY IP CONSULT    Code Status   Full Code    Time Spent on this Encounter   IAdal MD, personally saw the patient today and spent greater than 30 minutes discharging this patient.       Adal Flores MD  [facility] MED SURG  [address] [place] [id number]  Phone: [phone]  Fax: [phone]  ______________________________________________________________________    Physical Exam   Vital  Signs: Temp: 98.9  F (37.2  C) Temp src: Oral BP: 121/89 Pulse: 73   Resp: 20 SpO2: 98 % O2 Device: None (Room air)    Weight: 150 lbs 0 oz  General: AO X 3, lying comfortably in bed in no apparent distress  HEENT: pupils equal and reactive to light and accommodation, extraocular movements are intact; oral mucosa moist  Neck: Supple no raised JVD, no rigidity  Respiratory: lungs b/l clear to auscultation, no wheezing or crepitations  CVS: nl s1 s2, regular rate and rhythm, no murmur  P/A: soft, nt,nd, no guarding rigidity or rebound tenderness  Ext: no edema  Neuro: no focal neurological deficits noted, cranial nerves 2-12 grossly intact  Psychiatry: normal mood and affect  Skin: No obvious skin rashes or ulcers         Primary Care Physician   [facility]    Discharge Orders      Reason for your hospital stay    Alcohol withdrawal     Activity    Your activity upon discharge: activity as tolerated     Adult [facility] Follow-up and recommended labs and tests    Follow up with primary care provider, [facility], within 7 days for hospital follow- up.  No follow up labs or test are needed.      Appointments on [place] and/or [facility] (with [facility] or [facility] provider or service). Call [phone] if you haven't heard regarding these appointments within 7 days of discharge.     Diet    Follow this diet upon discharge: Orders Placed This Encounter      Advance Diet as Tolerated: Regular Diet Adult       Significant Results and Procedures     Results for orders placed or performed during the hospital encounter of 02/25/19   POC US RETROPERITONEAL LIMITED    Impression    [facility] Procedure Note    Limited Bedside ED Renal Ultrasound:    PERFORMED BY: Dr. Rolf Del Cid  INDICATIONS:  Flank Pain  PROBE: Low frequency convex probe  BODY LOCATION:  Abdomen  FINDINGS:  The ultrasound was performed with longitudinal and transverse views.   Right Kidney:   Hydronephrosis:  None   Renal  cyst:  None  Left Kidney:   Hydronephrosis:  None   Renal cyst:  None  INTERPRETATION:  The evaluation of the kidneys was normal without evidence of hydronephrosis or cysts.  IMAGE DOCUMENTATION: Images were archived to hard drive.     CT Thoracic Spine w Contrast    Narrative    CT THORACIC SPINE W CONTRAST  2/25/2019 11:38 PM      HISTORY: Midback pain. I.V. drug user. Subjective fever.    TECHNIQUE: Multiplanar imaging of the thoracic spine with intravenous  contrast. Radiation dose for this scan was reduced using automated  exposure control, adjustment of the mA and/or kV according to patient  size, or iterative reconstruction technique. 79 mL Isovue-370.     COMPARISON:  None.    FINDINGS: There is no thoracic spine fracture or malalignment. No  evidence of arthritis. No abnormal disc spaces or paraspinal fluid  collections to suggest infection. No evidence of spinal stenosis. The  visualized lungs are clear. There is a 0.5 cm right thyroid nodule. In  a patient without known thyroid disease, an incidental thyroid nodule  without microcalcifications measuring <1.0 cm in size is most likely  benign and does not typically require follow-up.      Impression    IMPRESSION: No acute abnormality.    JOSE J CORONADO MD   CT Lumbar Spine w Contrast    Narrative    CT LUMBAR SPINE W CONTRAST  2/25/2019 11:43 PM      HISTORY: Severe thoracic and lumbar spine pain. Vomiting. I.V. drug  user. Subjective fever.    TECHNIQUE: Multiplanar imaging of the lumbar spine without intravenous  contrast. Radiation dose for this scan was reduced using automated  exposure control, adjustment of the mA and/or kV according to patient  size, or iterative reconstruction technique. 79 mL Isovue-370.     COMPARISON: None.    FINDINGS: There is no acute fracture or malalignment. There is mild  disc height loss at L5-S1 with mild central disc bulge. No other disc  space abnormalities. No paraspinal fluid collection to suggest  infection.       Impression    IMPRESSION: Mild L5-S1 degenerative disc disease. No acute  abnormality.    JOSE J CORONADO MD       Discharge Medications   Current Discharge Medication List        START taking these medications    Details   !! gabapentin (NEURONTIN) 100 MG capsule Take 1 capsule (100 mg) by mouth every 8 hours  Qty: 9 capsule, Refills: 0    Associated Diagnoses: Alcohol use disorder; Alcohol dependence with uncomplicated withdrawal (H)      !! gabapentin (NEURONTIN) 300 MG capsule Take 2 capsules (600 mg) by mouth every 8 hours  Qty: 5 capsule, Refills: 0    Associated Diagnoses: Alcohol use disorder; Alcohol dependence with uncomplicated withdrawal (H)      !! gabapentin (NEURONTIN) 300 MG capsule Take 1 capsule (300 mg) by mouth every 8 hours  Qty: 6 capsule, Refills: 0    Associated Diagnoses: Alcohol use disorder; Alcohol dependence with uncomplicated withdrawal (H)       !! - Potential duplicate medications found. Please discuss with provider.        CONTINUE these medications which have NOT CHANGED    Details   acetaminophen (TYLENOL) 325 MG tablet Take 2 tablets (650 mg) by mouth every 6 hours as needed for mild pain Start after Delivery.  Qty: 100 tablet, Refills: 0    Associated Diagnoses:  (normal spontaneous vaginal delivery)      ARIPiprazole (ABILIFY) 5 MG tablet Take 1 tablet by mouth daily      etonogestrel (NEXPLANON) 68 MG IMPL Inject 68 mg Subcutaneous      hydrOXYzine (VISTARIL) 25 MG capsule Take 25 mg by mouth every 8 hours as needed for anxiety      sertraline (ZOLOFT) 50 MG tablet Take 50 mg by mouth daily           STOP taking these medications       ibuprofen (ADVIL/MOTRIN) 600 MG tablet Comments:   Reason for Stopping:             Allergies   Allergies   Allergen Reactions    Cats     Dogs

## 2024-01-02 ENCOUNTER — HOSPITAL ENCOUNTER (EMERGENCY)
Facility: CLINIC | Age: 34
Discharge: HOME | End: 2024-01-02
Attending: EMERGENCY MEDICINE | Admitting: EMERGENCY MEDICINE
Payer: COMMERCIAL

## 2024-01-02 VITALS
BODY MASS INDEX: 25.83 KG/M2 | OXYGEN SATURATION: 98 % | HEART RATE: 87 BPM | TEMPERATURE: 97.9 F | RESPIRATION RATE: 18 BRPM | SYSTOLIC BLOOD PRESSURE: 116 MMHG | WEIGHT: 155 LBS | HEIGHT: 65 IN | DIASTOLIC BLOOD PRESSURE: 84 MMHG

## 2024-01-02 DIAGNOSIS — B34.9 VIRAL SYNDROME: ICD-10-CM

## 2024-01-02 LAB
FLUAV RNA SPEC QL NAA+PROBE: NEGATIVE
FLUBV RNA RESP QL NAA+PROBE: NEGATIVE
GROUP A STREP BY PCR: NOT DETECTED
RSV RNA SPEC NAA+PROBE: NEGATIVE
SARS-COV-2 RNA RESP QL NAA+PROBE: NEGATIVE

## 2024-01-02 PROCEDURE — 99283 EMERGENCY DEPT VISIT LOW MDM: CPT | Performed by: EMERGENCY MEDICINE

## 2024-01-02 PROCEDURE — 87637 SARSCOV2&INF A&B&RSV AMP PRB: CPT | Performed by: EMERGENCY MEDICINE

## 2024-01-02 PROCEDURE — 87651 STREP A DNA AMP PROBE: CPT | Performed by: EMERGENCY MEDICINE

## 2024-01-02 PROCEDURE — 250N000012 HC RX MED GY IP 250 OP 636 PS 637: Performed by: EMERGENCY MEDICINE

## 2024-01-02 RX ADMIN — DEXAMETHASONE 10 MG: 2 TABLET ORAL at 13:05

## 2024-01-02 ASSESSMENT — ACTIVITIES OF DAILY LIVING (ADL): ADLS_ACUITY_SCORE: 33

## 2024-01-02 NOTE — LETTER
January 2, 2024      To Whom It May Concern:      Domenico Wilder was seen in our Emergency Department today, 1/2/24.  She has a very contagious virus. She should not work until 1/6/24.     Thank you for your understanding in this matter.       Sincerely,        Ulices Hector MD

## 2024-01-02 NOTE — ED PROVIDER NOTES
History     Chief Complaint   Patient presents with    Eye Problem     HPI  Domenico Wilder is a 33 year old female who presents with discharge and crusting of her both eyes that started yesterday.  She says her eyes are getting swollen.  Is they itch.  No vision changes.  No fevers.  She has been having cold-like symptoms for a few days.  She is eating.  Both her children are sick with very similar symptoms. denies eye pain or vision changes.    Allergies:  Allergies   Allergen Reactions    Cats     Dogs        Problem List:    Patient Active Problem List    Diagnosis Date Noted    Hypokalemia 2023     Priority: Medium    Alcohol use disorder 2023     Priority: Medium    Encounter for induction of labor 2020     Priority: Medium     (normal spontaneous vaginal delivery) 2020     Priority: Medium    Encounter for triage in pregnant patient 2019     Priority: Medium    Chemical dependency (H) 2018     Priority: Medium    Need for Tdap vaccination 2018     Priority: Medium    Opioid use disorder, severe, dependence (H) 2017     Priority: Medium    Alcohol dependence with uncomplicated withdrawal (H) 2017     Priority: Medium    Opioid dependence with withdrawal (H) 2017     Priority: Medium    Polysubstance abuse (H) 2016     Priority: Medium    Alcoholism (H) 2015     Priority: Medium    Major depressive disorder, recurrent, moderate (H) 2015     Priority: Medium    Smoker 2015     Priority: Medium    Insomnia 10/06/2008     Priority: Medium    GERD (gastroesophageal reflux disease) 10/06/2008     Priority: Medium        Past Medical History:    Past Medical History:   Diagnosis Date    Anxiety     Chickenpox     Depression     Depressive disorder     GERD (gastroesophageal reflux disease)     Substance abuse (H)        Past Surgical History:    Past Surgical History:   Procedure Laterality Date    NO HISTORY OF SURGERY    "      Family History:    Family History   Problem Relation Age of Onset    Allergies Mother     Depression Mother     Alcohol/Drug Mother     Prostate Cancer Paternal Grandfather     Diabetes Maternal Grandfather         non insulin dependent       Social History:  Marital Status:  Single [1]  Social History     Tobacco Use    Smoking status: Every Day     Packs/day: 0.50     Years: 13.00     Additional pack years: 0.00     Total pack years: 6.50     Types: Cigarettes     Start date: 12/28/2017    Smokeless tobacco: Never   Substance Use Topics    Alcohol use: Yes     Comment: 1 liter per day vodka    Drug use: No     Types: Marijuana, IV, Methamphetamines, Opiates     Comment: 1-2 grams of heroine daily, occasional meth and marijuana        Medications:    acetaminophen (TYLENOL) 325 MG tablet  ARIPiprazole (ABILIFY) 5 MG tablet  etonogestrel (NEXPLANON) 68 MG IMPL  gabapentin (NEURONTIN) 100 MG capsule  gabapentin (NEURONTIN) 300 MG capsule  gabapentin (NEURONTIN) 300 MG capsule  hydrOXYzine (ATARAX) 25 MG tablet  hydrOXYzine (VISTARIL) 25 MG capsule  sertraline (ZOLOFT) 50 MG tablet          Review of Systems    Physical Exam   BP: 116/84  Pulse: 87  Temp: 97.9  F (36.6  C)  Resp: 18  Height: 165.1 cm (5' 5\")  Weight: 70.3 kg (155 lb)  SpO2: 98 %      Physical Exam  HENT:      Mouth/Throat:      Pharynx: No oropharyngeal exudate.      Comments: Oropharynx is clear without exudates or uvula deviation or posterior oropharynx swelling, clearing secretions  Eyes:      Extraocular Movements: Extraocular movements intact.      Comments: Both eyes are injected with limbic sparing, there is clear discharge from both eyes, there is some lymphedema below each eye, there is no periorbital skin changes, there is no proptosis and no pain with eye movements, PERRL, no pus   Cardiovascular:      Rate and Rhythm: Normal rate.      Pulses: Normal pulses.      Heart sounds: No murmur heard.  Pulmonary:      Effort: No respiratory " distress.      Breath sounds: No wheezing or rales.   Musculoskeletal:      Cervical back: No rigidity.   Lymphadenopathy:      Cervical: No cervical adenopathy.   Skin:     Capillary Refill: Capillary refill takes less than 2 seconds.      Coloration: Skin is not jaundiced.         ED Course              ED Course as of 01/02/24 1454   Tue Jan 02, 2024   1357 Negative strep   1441 Negative COVID, negative flu, negative RSV.   1453 Dated the patient.  I discussed care of almost certainly viral conjunctivitis.  I recommended warm compresses, showers, frequent hand hygiene.  I suggested artificial tears though the evidence on this is not great.  Her mother wanted me to give antibiotics for this and I repeatedly said that it seemed almost certainly to be viral because the whole family seems to have a similar syndrome.  There is also no evidence that this is bacterial because there is no purulent discharge and it is both of the patient's eyes.  The patient felt reassured.  I recommended frequent showering and hot compresses.  I gave her a work note at her request.  She is in agreement that she will follow-up with her regular provider this week.  I think it is safe for her to be discharged at this time.     Procedures           Results for orders placed or performed during the hospital encounter of 01/02/24 (from the past 24 hour(s))   Symptomatic Influenza A/B, RSV, & SARS-CoV2 PCR (COVID-19) Nose    Specimen: Nose; Swab   Result Value Ref Range    Influenza A PCR Negative Negative    Influenza B PCR Negative Negative    RSV PCR Negative Negative    SARS CoV2 PCR Negative Negative    Narrative    Testing was performed using the Xpert Xpress CoV2/Flu/RSV Assay on the [employer] GeneXpert Instrument. This test should be ordered for the detection of SARS-CoV-2, influenza, and RSV viruses in individuals who meet clinical and/or epidemiological criteria. Test performance is unknown in asymptomatic patients. This test is for in  vitro diagnostic use under the FDA EUA for laboratories certified under CLIA to perform high or moderate complexity testing. This test has not been FDA cleared or approved. A negative result does not rule out the presence of PCR inhibitors in the specimen or target RNA in concentration below the limit of detection for the assay. If only one viral target is positive but coinfection with multiple targets is suspected, the sample should be re-tested with another FDA cleared, approved, or authorized test, if coinfection would change clinical management. This test was validated by the [facility]. These laboratories are certified under the Clinical Laboratory Improvement Amendments of 1988 (CLIA-88) as qualified to perform high complexity laboratory testing.   Group A Streptococcus PCR Throat Swab    Specimen: Throat; Swab   Result Value Ref Range    Group A strep by PCR Not Detected Not Detected    Narrative    The Xpert Xpress Strep A test, performed on the the[employer], is a rapid, qualitative in vitro diagnostic test for the detection of Streptococcus pyogenes (Group A ß-hemolytic Streptococcus, Strep A) in throat swab specimens from patients with signs and symptoms of pharyngitis. The Xpert Xpress Strep A test can be used as an aid in the diagnosis of Group A Streptococcal pharyngitis. The assay is not intended to monitor treatment for Group A Streptococcus infections. The Xpert Xpress Strep A test utilizes an automated real-time polymerase chain reaction (PCR) to detect Streptococcus pyogenes DNA.       Medications   dexAMETHasone (DECADRON) tablet 10 mg (10 mg Oral $Given 1/2/24 8110)       Assessments & Plan (with Medical Decision Making)     Given other people in the house with nearly exactly the same symptoms, I suspect this is a viral conjunctivitis.  I think the swelling of the eyes is due to some reactive lymph node swelling.  I do not think there is any evidence of  orbital or preseptal cellulitis.  There is no evidence of bacterial conjunctivitis.  Encouraged good hand hygiene and warm compresses.  I will check for strep and COVID. Will give dexamethasone for sore throat.    I have reviewed the nursing notes.    I have reviewed the findings, diagnosis, plan and need for follow up with the patient.      Medical Decision Making  The patient's presentation was of low complexity (an acute and uncomplicated illness or injury).    The patient's evaluation involved:  ordering and/or review of 2 test(s) in this encounter (see separate area of note for details)    The patient's management necessitated moderate risk (prescription drug management including medications given in the ED).        New Prescriptions    No medications on file       Final diagnoses:   Viral syndrome       1/2/2024   [facility] EMERGENCY DEPT       Ulices Hector MD  01/02/24 2513

## 2024-01-02 NOTE — ED TRIAGE NOTES
Pt presents with eye swelling, pain, draining and itchiness that started yesterday.      Triage Assessment (Adult)       Row Name 01/02/24 1200          Triage Assessment    Airway WDL WDL        Respiratory WDL    Respiratory WDL WDL        Skin Circulation/Temperature WDL    Skin Circulation/Temperature WDL WDL        Cardiac WDL    Cardiac WDL WDL        Peripheral/Neurovascular WDL    Peripheral Neurovascular WDL WDL        Cognitive/Neuro/Behavioral WDL    Cognitive/Neuro/Behavioral WDL WDL

## 2024-01-02 NOTE — DISCHARGE INSTRUCTIONS
You have a virus.    Use warm compresses on your eyes, shower frequently, and use good hand hygiene.    You can buy artificial tears for itching, which may help (though evidence on this is not very good).    You can take 1000 milligrams of Tylenol and 800 milligrams of ibuprofen every 8 hours for pain.  You can stagger these so that you are getting one of them every 4 hours.    This should get better with time.    Stay hydrated.     Please follow up with your regular doctor this week.

## 2024-01-02 NOTE — ED NOTES
Cold flu s/s for a few days and noticed eye swelling yesterday. Left eye is more swollen than right with yellowish watery discharge and they were crusted closed this morning. Pt reports they hurt and feels pressure on her eyes

## 2024-03-15 ENCOUNTER — HOSPITAL ENCOUNTER (EMERGENCY)
Facility: CLINIC | Age: 34
Discharge: HOME | End: 2024-03-15
Attending: EMERGENCY MEDICINE | Admitting: EMERGENCY MEDICINE
Payer: COMMERCIAL

## 2024-03-15 VITALS
RESPIRATION RATE: 18 BRPM | HEART RATE: 111 BPM | SYSTOLIC BLOOD PRESSURE: 148 MMHG | DIASTOLIC BLOOD PRESSURE: 99 MMHG | WEIGHT: 150 LBS | BODY MASS INDEX: 24.96 KG/M2 | TEMPERATURE: 98.6 F | OXYGEN SATURATION: 96 %

## 2024-03-15 DIAGNOSIS — R45.851 PASSIVE SUICIDAL IDEATIONS: ICD-10-CM

## 2024-03-15 DIAGNOSIS — F10.129 ALCOHOL ABUSE WITH INTOXICATION (H): ICD-10-CM

## 2024-03-15 LAB
ALBUMIN SERPL BCG-MCNC: 4.5 G/DL (ref 3.5–5.2)
ALCOHOL BREATH TEST: 0.41 (ref 0–0.01)
ALP SERPL-CCNC: 87 U/L (ref 40–150)
ALT SERPL W P-5'-P-CCNC: 14 U/L (ref 0–50)
AMPHETAMINES UR QL SCN: ABNORMAL
ANION GAP SERPL CALCULATED.3IONS-SCNC: 13 MMOL/L (ref 7–15)
AST SERPL W P-5'-P-CCNC: 20 U/L (ref 0–45)
BARBITURATES UR QL SCN: ABNORMAL
BASOPHILS # BLD AUTO: 0.1 10E3/UL (ref 0–0.2)
BASOPHILS NFR BLD AUTO: 2 %
BENZODIAZ UR QL SCN: ABNORMAL
BILIRUB DIRECT SERPL-MCNC: <0.2 MG/DL (ref 0–0.3)
BILIRUB SERPL-MCNC: <0.2 MG/DL
BUN SERPL-MCNC: 10.3 MG/DL (ref 6–20)
BZE UR QL SCN: ABNORMAL
CALCIUM SERPL-MCNC: 8.6 MG/DL (ref 8.6–10)
CANNABINOIDS UR QL SCN: ABNORMAL
CHLORIDE SERPL-SCNC: 110 MMOL/L (ref 98–107)
CREAT SERPL-MCNC: 0.58 MG/DL (ref 0.51–0.95)
DEPRECATED HCO3 PLAS-SCNC: 23 MMOL/L (ref 22–29)
EGFRCR SERPLBLD CKD-EPI 2021: >90 ML/MIN/1.73M2
EOSINOPHIL # BLD AUTO: 0.2 10E3/UL (ref 0–0.7)
EOSINOPHIL NFR BLD AUTO: 3 %
ERYTHROCYTE [DISTWIDTH] IN BLOOD BY AUTOMATED COUNT: 16.1 % (ref 10–15)
FENTANYL UR QL: ABNORMAL
GLUCOSE SERPL-MCNC: 97 MG/DL (ref 70–99)
HCG UR QL: NEGATIVE
HCT VFR BLD AUTO: 41.2 % (ref 35–47)
HGB BLD-MCNC: 13.6 G/DL (ref 11.7–15.7)
IMM GRANULOCYTES # BLD: 0 10E3/UL
IMM GRANULOCYTES NFR BLD: 0 %
LYMPHOCYTES # BLD AUTO: 2.2 10E3/UL (ref 0.8–5.3)
LYMPHOCYTES NFR BLD AUTO: 37 %
MCH RBC QN AUTO: 28.3 PG (ref 26.5–33)
MCHC RBC AUTO-ENTMCNC: 33 G/DL (ref 31.5–36.5)
MCV RBC AUTO: 86 FL (ref 78–100)
MONOCYTES # BLD AUTO: 0.3 10E3/UL (ref 0–1.3)
MONOCYTES NFR BLD AUTO: 5 %
NEUTROPHILS # BLD AUTO: 3.1 10E3/UL (ref 1.6–8.3)
NEUTROPHILS NFR BLD AUTO: 53 %
NRBC # BLD AUTO: 0 10E3/UL
NRBC BLD AUTO-RTO: 0 /100
OPIATES UR QL SCN: ABNORMAL
PCP QUAL URINE (ROCHE): ABNORMAL
PLATELET # BLD AUTO: 410 10E3/UL (ref 150–450)
POTASSIUM SERPL-SCNC: 3.5 MMOL/L (ref 3.4–5.3)
PROT SERPL-MCNC: 7.1 G/DL (ref 6.4–8.3)
RBC # BLD AUTO: 4.8 10E6/UL (ref 3.8–5.2)
SODIUM SERPL-SCNC: 146 MMOL/L (ref 135–145)
WBC # BLD AUTO: 5.9 10E3/UL (ref 4–11)

## 2024-03-15 PROCEDURE — 99284 EMERGENCY DEPT VISIT MOD MDM: CPT | Performed by: EMERGENCY MEDICINE

## 2024-03-15 PROCEDURE — 82248 BILIRUBIN DIRECT: CPT | Performed by: EMERGENCY MEDICINE

## 2024-03-15 PROCEDURE — 82075 ASSAY OF BREATH ETHANOL: CPT | Performed by: EMERGENCY MEDICINE

## 2024-03-15 PROCEDURE — 80307 DRUG TEST PRSMV CHEM ANLYZR: CPT | Performed by: EMERGENCY MEDICINE

## 2024-03-15 PROCEDURE — 85025 COMPLETE CBC W/AUTO DIFF WBC: CPT | Performed by: EMERGENCY MEDICINE

## 2024-03-15 PROCEDURE — 81025 URINE PREGNANCY TEST: CPT | Performed by: EMERGENCY MEDICINE

## 2024-03-15 PROCEDURE — 80053 COMPREHEN METABOLIC PANEL: CPT | Performed by: EMERGENCY MEDICINE

## 2024-03-15 PROCEDURE — 80048 BASIC METABOLIC PNL TOTAL CA: CPT | Performed by: EMERGENCY MEDICINE

## 2024-03-15 PROCEDURE — 36415 COLL VENOUS BLD VENIPUNCTURE: CPT | Performed by: EMERGENCY MEDICINE

## 2024-03-15 PROCEDURE — 99283 EMERGENCY DEPT VISIT LOW MDM: CPT | Performed by: EMERGENCY MEDICINE

## 2024-03-15 ASSESSMENT — COLUMBIA-SUICIDE SEVERITY RATING SCALE - C-SSRS
5. HAVE YOU STARTED TO WORK OUT OR WORKED OUT THE DETAILS OF HOW TO KILL YOURSELF? DO YOU INTEND TO CARRY OUT THIS PLAN?: NO
4. HAVE YOU HAD THESE THOUGHTS AND HAD SOME INTENTION OF ACTING ON THEM?: YES
2. HAVE YOU ACTUALLY HAD ANY THOUGHTS OF KILLING YOURSELF IN THE PAST MONTH?: YES
3. HAVE YOU BEEN THINKING ABOUT HOW YOU MIGHT KILL YOURSELF?: YES
6. HAVE YOU EVER DONE ANYTHING, STARTED TO DO ANYTHING, OR PREPARED TO DO ANYTHING TO END YOUR LIFE?: NO
1. IN THE PAST MONTH, HAVE YOU WISHED YOU WERE DEAD OR WISHED YOU COULD GO TO SLEEP AND NOT WAKE UP?: NO

## 2024-03-15 ASSESSMENT — ACTIVITIES OF DAILY LIVING (ADL)
ADLS_ACUITY_SCORE: 35

## 2024-03-16 NOTE — ED TRIAGE NOTES
Suicidal: Patient reports feeling very unhappy for a long time.  Patient is tearful in triage with mom as support   Alcohol Intoxication: Patient had a drink a few hours ago and states she has been drinking on and off for a while      Triage Assessment (Adult)       Row Name 03/15/24 1461          Triage Assessment    Airway WDL WDL        Respiratory WDL    Respiratory WDL WDL        Skin Circulation/Temperature WDL    Skin Circulation/Temperature WDL WDL        Cardiac WDL    Cardiac WDL WDL        Cognitive/Neuro/Behavioral WDL    Cognitive/Neuro/Behavioral WDL X;mood/behavior     Mood/Behavior sad

## 2024-03-16 NOTE — ED PROVIDER NOTES
"    [facility] EMERGENCY DEPARTMENT ([facility])    3/15/24      ED PROVIDER NOTE      History     Chief Complaint   Patient presents with    Suicidal     Patient reports feeling very unhappy for a long time.  Patient is tearful in triage with mom as support    Alcohol Intoxication     Patient had a drink a few hours ago and states she has been drinking on and off for a while     HPI    Domenico Wilder is a 33 year old female with a history of polysubstance abuse disorder, alcohol abuse disorder, major depressive disorder who presents to the emergency department today with suicidal ideation and requesting detox.  Patient is somewhat difficult to interview here in the emergency department as she is severely intoxicated and quite emotional and tearful.  She is here with her mother.  Patient reports that she \"does not like herself very much\". She states that she has been having worsening problems with suicidal ideation but does not have a specific plan.  She has a history of SIB by cutting but has not done this since she was a teenager.  Patient denies any current plans for SIB or suicidal plans.  She reports that she is having increasing stressors lately, particularly with regard to a relationship she is having that is ending.  Apparently there has been some significant issues with regard to her drinking and this long-term relationship which has ended or is in the process of ending, which she describes as very stressful for her.    Patient reports that alcohol abuse is an on-again/off-again problem for her.  She was admitted to our hospital in December for alcohol abuse and alcohol withdrawal.  The patient ultimately was discharged home and subsequently followed up at [facility] for an outpatient CD treatment program which she has continued to attend 3 times a week.  However she relapsed on alcohol immediately following her discharge from the hospital and has been intermittently drinking while she has " been attending her CD treatment program.    Her history is a bit unclear with regard to how much she is drinking, at first she told me she was drinking 10 shots of vodka daily for a month, but then she states she is drinking every other day ever since she got out of the hospital in December.  Her mother, who has accompanied her here, states that she tends to hide her alcohol use and that she is not certain exactly how much she is using.  She does report a remote history of alcohol withdrawal seizures.  She denies any drug use.    This part of the medical record was transcribed by Leta Grossman Medical Scribe, from a dictation done by Lauren Lopez MD.     Past Medical History  Past Medical History:   Diagnosis Date    Anxiety     Chickenpox     Depression     Depressive disorder     GERD (gastroesophageal reflux disease)     Substance abuse (H)      Past Surgical History:   Procedure Laterality Date    NO HISTORY OF SURGERY       etonogestrel (NEXPLANON) 68 MG IMPL  gabapentin (NEURONTIN) 300 MG capsule  hydrOXYzine (ATARAX) 25 MG tablet  sertraline (ZOLOFT) 50 MG tablet  acetaminophen (TYLENOL) 325 MG tablet  hydrOXYzine (VISTARIL) 25 MG capsule      Allergies   Allergen Reactions    Cats     Dogs      Family History  Family History   Problem Relation Age of Onset    Allergies Mother     Depression Mother     Alcohol/Drug Mother     Prostate Cancer Paternal Grandfather     Diabetes Maternal Grandfather         non insulin dependent     Social History   Social History     Tobacco Use    Smoking status: Every Day     Packs/day: 0.50     Years: 13.00     Additional pack years: 0.00     Total pack years: 6.50     Types: Cigarettes     Start date: 12/28/2017    Smokeless tobacco: Never   Substance Use Topics    Alcohol use: Yes     Comment: 1 liter per day vodka    Drug use: No     Types: Marijuana, IV, Methamphetamines, Opiates     Comment: 1-2 grams of heroine daily, occasional meth and marijuana      Past  medical history, past surgical history, medications, allergies, family history, and social history were reviewed with the patient. No additional pertinent items.      A medically appropriate review of systems was performed with pertinent positives and negatives noted in the HPI, and all other systems negative.    Physical Exam   BP: (!) 148/99  Pulse: 111  Temp: 98.6  F (37  C)  Resp: 18  Weight: 68 kg (150 lb)  SpO2: 96 %  Physical Exam  Vitals and nursing note reviewed.   Constitutional:       General: She is in acute distress.      Appearance: She is not diaphoretic.      Comments: Severely intoxicated adult female, very emotional and tearful, relying on mother for support here in the ED   HENT:      Head: Atraumatic.      Mouth/Throat:      Mouth: Mucous membranes are moist.      Pharynx: Oropharynx is clear. No oropharyngeal exudate.   Eyes:      General: No scleral icterus.  Cardiovascular:      Rate and Rhythm: Tachycardia present.      Pulses: Normal pulses.      Heart sounds: Normal heart sounds. No murmur heard.  Pulmonary:      Effort: Pulmonary effort is normal. No respiratory distress.      Breath sounds: Normal breath sounds. No wheezing.   Abdominal:      General: Bowel sounds are normal.      Palpations: Abdomen is soft.      Tenderness: There is no abdominal tenderness. There is no guarding.   Musculoskeletal:         General: No tenderness.   Skin:     General: Skin is warm.      Findings: No rash.   Neurological:      Mental Status: She is alert.      Comments: Intoxicated, slurred speech   Psychiatric:         Mood and Affect: Mood is anxious. Affect is tearful.         Speech: Speech is slurred.         Thought Content: Thought content includes suicidal ideation. Thought content does not include suicidal plan.      Comments: Disheveled appearance.  Intoxicated, very tearful and tangential.  No active SI but endorses passive SI.  No AH.  Not physically agitated or aggressive.         ED Course,  Procedures, & Data      Procedures            Results for orders placed or performed during the hospital encounter of 03/15/24   HCG qualitative urine     Status: Normal   Result Value Ref Range    hCG Urine Qualitative Negative Negative   Hepatic function panel     Status: Normal   Result Value Ref Range    Protein Total 7.1 6.4 - 8.3 g/dL    Albumin 4.5 3.5 - 5.2 g/dL    Bilirubin Total <0.2 <=1.2 mg/dL    Alkaline Phosphatase 87 40 - 150 U/L    AST 20 0 - 45 U/L    ALT 14 0 - 50 U/L    Bilirubin Direct <0.20 0.00 - 0.30 mg/dL   Basic metabolic panel     Status: Abnormal   Result Value Ref Range    Sodium 146 (H) 135 - 145 mmol/L    Potassium 3.5 3.4 - 5.3 mmol/L    Chloride 110 (H) 98 - 107 mmol/L    Carbon Dioxide (CO2) 23 22 - 29 mmol/L    Anion Gap 13 7 - 15 mmol/L    Urea Nitrogen 10.3 6.0 - 20.0 mg/dL    Creatinine 0.58 0.51 - 0.95 mg/dL    GFR Estimate >90 >60 mL/min/1.73m2    Calcium 8.6 8.6 - 10.0 mg/dL    Glucose 97 70 - 99 mg/dL   Urine Drug Screen Panel     Status: Abnormal   Result Value Ref Range    Amphetamines Urine Screen Negative Screen Negative    Barbituates Urine Screen Negative Screen Negative    Benzodiazepine Urine Screen Negative Screen Negative    Cannabinoids Urine Screen Positive (A) Screen Negative    Cocaine Urine Screen Negative Screen Negative    Fentanyl Qual Urine Screen Negative Screen Negative    Opiates Urine Screen Negative Screen Negative    PCP Urine Screen Negative Screen Negative   CBC with platelets and differential     Status: Abnormal   Result Value Ref Range    WBC Count 5.9 4.0 - 11.0 10e3/uL    RBC Count 4.80 3.80 - 5.20 10e6/uL    Hemoglobin 13.6 11.7 - 15.7 g/dL    Hematocrit 41.2 35.0 - 47.0 %    MCV 86 78 - 100 fL    MCH 28.3 26.5 - 33.0 pg    MCHC 33.0 31.5 - 36.5 g/dL    RDW 16.1 (H) 10.0 - 15.0 %    Platelet Count 410 150 - 450 10e3/uL    % Neutrophils 53 %    % Lymphocytes 37 %    % Monocytes 5 %    % Eosinophils 3 %    % Basophils 2 %    % Immature  Granulocytes 0 %    NRBCs per 100 WBC 0 <1 /100    Absolute Neutrophils 3.1 1.6 - 8.3 10e3/uL    Absolute Lymphocytes 2.2 0.8 - 5.3 10e3/uL    Absolute Monocytes 0.3 0.0 - 1.3 10e3/uL    Absolute Eosinophils 0.2 0.0 - 0.7 10e3/uL    Absolute Basophils 0.1 0.0 - 0.2 10e3/uL    Absolute Immature Granulocytes 0.0 <=0.4 10e3/uL    Absolute NRBCs 0.0 10e3/uL   Alcohol breath test POCT     Status: Abnormal   Result Value Ref Range    Alcohol Breath Test 0.405 (A) 0.00 - 0.01   Urine Drug Screen     Status: Abnormal    Narrative    The following orders were created for panel order Urine Drug Screen.  Procedure                               Abnormality         Status                     ---------                               -----------         ------                     Urine Drug Screen Panel[[id number]]      Abnormal            Final result                 Please view results for these tests on the individual orders.   CBC with Platelets & Differential     Status: Abnormal    Narrative    The following orders were created for panel order CBC with Platelets & Differential.  Procedure                               Abnormality         Status                     ---------                               -----------         ------                     CBC with platelets and d...[[id number]]  Abnormal            Final result                 Please view results for these tests on the individual orders.     Medications - No data to display  Labs Ordered and Resulted from Time of ED Arrival to Time of ED Departure   BASIC METABOLIC PANEL - Abnormal       Result Value    Sodium 146 (*)     Potassium 3.5      Chloride 110 (*)     Carbon Dioxide (CO2) 23      Anion Gap 13      Urea Nitrogen 10.3      Creatinine 0.58      GFR Estimate >90      Calcium 8.6      Glucose 97     URINE DRUG SCREEN PANEL - Abnormal    Amphetamines Urine Screen Negative      Barbituates Urine Screen Negative      Benzodiazepine Urine Screen Negative       Cannabinoids Urine Screen Positive (*)     Cocaine Urine Screen Negative      Fentanyl Qual Urine Screen Negative      Opiates Urine Screen Negative      PCP Urine Screen Negative     CBC WITH PLATELETS AND DIFFERENTIAL - Abnormal    WBC Count 5.9      RBC Count 4.80      Hemoglobin 13.6      Hematocrit 41.2      MCV 86      MCH 28.3      MCHC 33.0      RDW 16.1 (*)     Platelet Count 410      % Neutrophils 53      % Lymphocytes 37      % Monocytes 5      % Eosinophils 3      % Basophils 2      % Immature Granulocytes 0      NRBCs per 100 WBC 0      Absolute Neutrophils 3.1      Absolute Lymphocytes 2.2      Absolute Monocytes 0.3      Absolute Eosinophils 0.2      Absolute Basophils 0.1      Absolute Immature Granulocytes 0.0      Absolute NRBCs 0.0     ALCOHOL BREATH TEST POCT - Abnormal    Alcohol Breath Test 0.405 (*)    HCG QUALITATIVE URINE - Normal    hCG Urine Qualitative Negative     HEPATIC FUNCTION PANEL - Normal    Protein Total 7.1      Albumin 4.5      Bilirubin Total <0.2      Alkaline Phosphatase 87      AST 20      ALT 14      Bilirubin Direct <0.20       No orders to display          Critical care was not performed.     Medical Decision Making  The patient's presentation was of high complexity (a chronic illness severe exacerbation, progression, or side effect of treatment).    The patient's evaluation involved:  an assessment requiring an independent historian (see separate area of note for details)  review of external note(s) from 1 sources (see separate area of note for details)  ordering and/or review of 3+ test(s) in this encounter (see separate area of note for details)  discussion of management or test interpretation with another health professional (see separate area of note for details)    The patient's management necessitated only low risk treatment.    Assessment & Plan    Patient presents for above complaints.  On my evaluation she is severely intoxicated, tearful, gives a somewhat  meandering story.  Her breathalyzer here in the emergency department is 0.4.  She will ultimately need a ODEC assessment for mental health reasons but will need to be more sober before this happens.  There are no detox beds available here at [place].  I have placed a call to [facility] in order to try to secure a bed for her at a community detox facility, currently awaiting a callback.    CBC shows a normal white count of 5.9, hemoglobin of 13.6, normal platelet count.  BMP shows a sodium of 146, chloride of 110, otherwise within normal limits.  Hepatic panel is within normal limits.  hCG is negative.  UDS is positive for cannabinoids.    Ultimately [facility] did except the patient if there [facility] location for detox.  Patient will be discharged with her mother who will transfer her to detox at [facility].    I have reviewed the nursing notes. I have reviewed the findings, diagnosis, plan and need for follow up with the patient.    Discharge Medication List as of 3/15/2024 10:20 PM          Final diagnoses:   Alcohol abuse with intoxication (H24)   Passive suicidal ideations       Lauren Lopez MD  [facility] EMERGENCY DEPARTMENT  3/15/2024     Lauren Lopez MD  03/15/24 5866

## 2024-03-16 NOTE — DISCHARGE INSTRUCTIONS
You have been seen in the emergency department today for alcohol intoxication.  Your blood work is normal.  We have secured a detox bed for you at [facility] in [place].  Please go there for admission at this time

## 2024-06-17 ENCOUNTER — APPOINTMENT (OUTPATIENT)
Dept: GENERAL RADIOLOGY | Facility: CLINIC | Age: 34
End: 2024-06-17
Attending: EMERGENCY MEDICINE
Payer: COMMERCIAL

## 2024-06-17 ENCOUNTER — TELEPHONE (OUTPATIENT)
Dept: BEHAVIORAL HEALTH | Facility: CLINIC | Age: 34
End: 2024-06-17

## 2024-06-17 ENCOUNTER — HOSPITAL ENCOUNTER (EMERGENCY)
Facility: CLINIC | Age: 34
Discharge: HOME | End: 2024-06-18
Attending: EMERGENCY MEDICINE | Admitting: EMERGENCY MEDICINE
Payer: COMMERCIAL

## 2024-06-17 DIAGNOSIS — E87.6 HYPOKALEMIA: ICD-10-CM

## 2024-06-17 DIAGNOSIS — F10.220 ALCOHOL DEPENDENCE WITH UNCOMPLICATED INTOXICATION (H): ICD-10-CM

## 2024-06-17 LAB
ALBUMIN SERPL BCG-MCNC: 4.8 G/DL (ref 3.5–5.2)
ALBUMIN UR-MCNC: 10 MG/DL
ALCOHOL BREATH TEST: 0.31 (ref 0–0.01)
ALP SERPL-CCNC: 109 U/L (ref 40–150)
ALT SERPL W P-5'-P-CCNC: 13 U/L (ref 0–50)
AMPHETAMINES UR QL SCN: ABNORMAL
ANION GAP SERPL CALCULATED.3IONS-SCNC: 21 MMOL/L (ref 7–15)
APPEARANCE UR: CLEAR
AST SERPL W P-5'-P-CCNC: 26 U/L (ref 0–45)
BARBITURATES UR QL SCN: ABNORMAL
BASOPHILS # BLD AUTO: 0.1 10E3/UL (ref 0–0.2)
BASOPHILS NFR BLD AUTO: 1 %
BENZODIAZ UR QL SCN: ABNORMAL
BILIRUB SERPL-MCNC: 0.5 MG/DL
BILIRUB UR QL STRIP: NEGATIVE
BUN SERPL-MCNC: 9.3 MG/DL (ref 6–20)
BZE UR QL SCN: ABNORMAL
CALCIUM SERPL-MCNC: 9.7 MG/DL (ref 8.6–10)
CANNABINOIDS UR QL SCN: ABNORMAL
CHLORIDE SERPL-SCNC: 98 MMOL/L (ref 98–107)
COLOR UR AUTO: ABNORMAL
CREAT SERPL-MCNC: 0.58 MG/DL (ref 0.51–0.95)
DEPRECATED HCO3 PLAS-SCNC: 25 MMOL/L (ref 22–29)
EGFRCR SERPLBLD CKD-EPI 2021: >90 ML/MIN/1.73M2
EOSINOPHIL # BLD AUTO: 0.2 10E3/UL (ref 0–0.7)
EOSINOPHIL NFR BLD AUTO: 1 %
ERYTHROCYTE [DISTWIDTH] IN BLOOD BY AUTOMATED COUNT: 13.3 % (ref 10–15)
FENTANYL UR QL: ABNORMAL
GLUCOSE SERPL-MCNC: 131 MG/DL (ref 70–99)
GLUCOSE UR STRIP-MCNC: NEGATIVE MG/DL
HCG UR QL: NEGATIVE
HCT VFR BLD AUTO: 42.8 % (ref 35–47)
HGB BLD-MCNC: 14.5 G/DL (ref 11.7–15.7)
HGB UR QL STRIP: NEGATIVE
IMM GRANULOCYTES # BLD: 0 10E3/UL
IMM GRANULOCYTES NFR BLD: 0 %
KETONES UR STRIP-MCNC: NEGATIVE MG/DL
LEUKOCYTE ESTERASE UR QL STRIP: NEGATIVE
LYMPHOCYTES # BLD AUTO: 2 10E3/UL (ref 0.8–5.3)
LYMPHOCYTES NFR BLD AUTO: 16 %
MAGNESIUM SERPL-MCNC: 2.4 MG/DL (ref 1.7–2.3)
MCH RBC QN AUTO: 28.8 PG (ref 26.5–33)
MCHC RBC AUTO-ENTMCNC: 33.9 G/DL (ref 31.5–36.5)
MCV RBC AUTO: 85 FL (ref 78–100)
MONOCYTES # BLD AUTO: 0.8 10E3/UL (ref 0–1.3)
MONOCYTES NFR BLD AUTO: 6 %
NEUTROPHILS # BLD AUTO: 9.3 10E3/UL (ref 1.6–8.3)
NEUTROPHILS NFR BLD AUTO: 76 %
NITRATE UR QL: NEGATIVE
NRBC # BLD AUTO: 0 10E3/UL
NRBC BLD AUTO-RTO: 0 /100
OPIATES UR QL SCN: ABNORMAL
PCP QUAL URINE (ROCHE): ABNORMAL
PH UR STRIP: 6.5 [PH] (ref 5–7)
PLATELET # BLD AUTO: 366 10E3/UL (ref 150–450)
POTASSIUM SERPL-SCNC: 2.7 MMOL/L (ref 3.4–5.3)
PROT SERPL-MCNC: 8.2 G/DL (ref 6.4–8.3)
RBC # BLD AUTO: 5.03 10E6/UL (ref 3.8–5.2)
RBC URINE: 0 /HPF
SODIUM SERPL-SCNC: 144 MMOL/L (ref 135–145)
SP GR UR STRIP: 1 (ref 1–1.03)
UROBILINOGEN UR STRIP-MCNC: NORMAL MG/DL
WBC # BLD AUTO: 12.4 10E3/UL (ref 4–11)
WBC URINE: <1 /HPF

## 2024-06-17 PROCEDURE — 80053 COMPREHEN METABOLIC PANEL: CPT | Performed by: EMERGENCY MEDICINE

## 2024-06-17 PROCEDURE — 83735 ASSAY OF MAGNESIUM: CPT | Performed by: EMERGENCY MEDICINE

## 2024-06-17 PROCEDURE — 96366 THER/PROPH/DIAG IV INF ADDON: CPT | Performed by: EMERGENCY MEDICINE

## 2024-06-17 PROCEDURE — 250N000011 HC RX IP 250 OP 636: Mod: JZ | Performed by: EMERGENCY MEDICINE

## 2024-06-17 PROCEDURE — 72100 X-RAY EXAM L-S SPINE 2/3 VWS: CPT

## 2024-06-17 PROCEDURE — 96375 TX/PRO/DX INJ NEW DRUG ADDON: CPT | Performed by: EMERGENCY MEDICINE

## 2024-06-17 PROCEDURE — 99284 EMERGENCY DEPT VISIT MOD MDM: CPT | Mod: 25 | Performed by: EMERGENCY MEDICINE

## 2024-06-17 PROCEDURE — 81001 URINALYSIS AUTO W/SCOPE: CPT | Performed by: EMERGENCY MEDICINE

## 2024-06-17 PROCEDURE — 250N000009 HC RX 250: Performed by: EMERGENCY MEDICINE

## 2024-06-17 PROCEDURE — 81025 URINE PREGNANCY TEST: CPT | Performed by: EMERGENCY MEDICINE

## 2024-06-17 PROCEDURE — 250N000011 HC RX IP 250 OP 636: Performed by: EMERGENCY MEDICINE

## 2024-06-17 PROCEDURE — 258N000003 HC RX IP 258 OP 636: Mod: JZ | Performed by: EMERGENCY MEDICINE

## 2024-06-17 PROCEDURE — 99285 EMERGENCY DEPT VISIT HI MDM: CPT | Performed by: EMERGENCY MEDICINE

## 2024-06-17 PROCEDURE — 96361 HYDRATE IV INFUSION ADD-ON: CPT | Performed by: EMERGENCY MEDICINE

## 2024-06-17 PROCEDURE — 250N000013 HC RX MED GY IP 250 OP 250 PS 637: Performed by: EMERGENCY MEDICINE

## 2024-06-17 PROCEDURE — 82075 ASSAY OF BREATH ETHANOL: CPT | Performed by: EMERGENCY MEDICINE

## 2024-06-17 PROCEDURE — 80307 DRUG TEST PRSMV CHEM ANLYZR: CPT | Performed by: EMERGENCY MEDICINE

## 2024-06-17 PROCEDURE — 96365 THER/PROPH/DIAG IV INF INIT: CPT | Performed by: EMERGENCY MEDICINE

## 2024-06-17 PROCEDURE — 36415 COLL VENOUS BLD VENIPUNCTURE: CPT | Performed by: EMERGENCY MEDICINE

## 2024-06-17 PROCEDURE — 85025 COMPLETE CBC W/AUTO DIFF WBC: CPT | Performed by: EMERGENCY MEDICINE

## 2024-06-17 RX ORDER — HYDROXYZINE HYDROCHLORIDE 50 MG/1
50 TABLET, FILM COATED ORAL AT BEDTIME
Status: DISCONTINUED | OUTPATIENT
Start: 2024-06-18 | End: 2024-06-18 | Stop reason: HOSPADM

## 2024-06-17 RX ORDER — FOLIC ACID 1 MG/1
1 TABLET ORAL DAILY
Status: CANCELLED | OUTPATIENT
Start: 2024-06-18

## 2024-06-17 RX ORDER — ONDANSETRON 4 MG/1
4 TABLET, FILM COATED ORAL EVERY 6 HOURS PRN
Status: CANCELLED | OUTPATIENT
Start: 2024-06-17

## 2024-06-17 RX ORDER — TRAZODONE HYDROCHLORIDE 50 MG/1
50 TABLET, FILM COATED ORAL
Status: CANCELLED | OUTPATIENT
Start: 2024-06-17

## 2024-06-17 RX ORDER — AMOXICILLIN 250 MG
1 CAPSULE ORAL 2 TIMES DAILY PRN
Status: CANCELLED | OUTPATIENT
Start: 2024-06-17

## 2024-06-17 RX ORDER — GABAPENTIN 300 MG/1
300 CAPSULE ORAL 3 TIMES DAILY
Status: DISCONTINUED | OUTPATIENT
Start: 2024-06-18 | End: 2024-06-18 | Stop reason: HOSPADM

## 2024-06-17 RX ORDER — TRAZODONE HYDROCHLORIDE 100 MG/1
100 TABLET ORAL AT BEDTIME
Status: DISCONTINUED | OUTPATIENT
Start: 2024-06-18 | End: 2024-06-18 | Stop reason: HOSPADM

## 2024-06-17 RX ORDER — ATENOLOL 50 MG/1
50 TABLET ORAL DAILY PRN
Status: CANCELLED | OUTPATIENT
Start: 2024-06-17

## 2024-06-17 RX ORDER — DIAZEPAM 5 MG
5-20 TABLET ORAL EVERY 30 MIN PRN
Status: DISCONTINUED | OUTPATIENT
Start: 2024-06-17 | End: 2024-06-18 | Stop reason: HOSPADM

## 2024-06-17 RX ORDER — OLANZAPINE 10 MG/1
10 TABLET ORAL 3 TIMES DAILY PRN
Status: CANCELLED | OUTPATIENT
Start: 2024-06-17

## 2024-06-17 RX ORDER — POTASSIUM CHLORIDE 1.5 G/1.58G
40 POWDER, FOR SOLUTION ORAL ONCE
Status: COMPLETED | OUTPATIENT
Start: 2024-06-17 | End: 2024-06-17

## 2024-06-17 RX ORDER — MULTIPLE VITAMINS W/ MINERALS TAB 9MG-400MCG
1 TAB ORAL DAILY
Status: CANCELLED | OUTPATIENT
Start: 2024-06-18

## 2024-06-17 RX ORDER — ACETAMINOPHEN 325 MG/1
650 TABLET ORAL EVERY 4 HOURS PRN
Status: CANCELLED | OUTPATIENT
Start: 2024-06-17

## 2024-06-17 RX ORDER — OLANZAPINE 10 MG/2ML
10 INJECTION, POWDER, FOR SOLUTION INTRAMUSCULAR 3 TIMES DAILY PRN
Status: CANCELLED | OUTPATIENT
Start: 2024-06-17

## 2024-06-17 RX ORDER — POTASSIUM CHLORIDE 7.45 MG/ML
10 INJECTION INTRAVENOUS ONCE
Status: COMPLETED | OUTPATIENT
Start: 2024-06-17 | End: 2024-06-18

## 2024-06-17 RX ORDER — HYDROXYZINE HYDROCHLORIDE 25 MG/1
25 TABLET, FILM COATED ORAL EVERY 4 HOURS PRN
Status: CANCELLED | OUTPATIENT
Start: 2024-06-17

## 2024-06-17 RX ORDER — ONDANSETRON 2 MG/ML
4 INJECTION INTRAMUSCULAR; INTRAVENOUS ONCE
Status: COMPLETED | OUTPATIENT
Start: 2024-06-17 | End: 2024-06-17

## 2024-06-17 RX ORDER — LOPERAMIDE HCL 2 MG
2 CAPSULE ORAL 4 TIMES DAILY PRN
Status: CANCELLED | OUTPATIENT
Start: 2024-06-17

## 2024-06-17 RX ORDER — MAGNESIUM HYDROXIDE/ALUMINUM HYDROXICE/SIMETHICONE 120; 1200; 1200 MG/30ML; MG/30ML; MG/30ML
30 SUSPENSION ORAL EVERY 4 HOURS PRN
Status: CANCELLED | OUTPATIENT
Start: 2024-06-17

## 2024-06-17 RX ORDER — DIAZEPAM 5 MG
5-20 TABLET ORAL EVERY 30 MIN PRN
Status: CANCELLED | OUTPATIENT
Start: 2024-06-17

## 2024-06-17 RX ADMIN — POTASSIUM CHLORIDE 10 MEQ: 7.46 INJECTION, SOLUTION INTRAVENOUS at 21:47

## 2024-06-17 RX ADMIN — DIAZEPAM 10 MG: 5 TABLET ORAL at 23:33

## 2024-06-17 RX ADMIN — SODIUM CHLORIDE 1000 ML: 9 INJECTION, SOLUTION INTRAVENOUS at 16:40

## 2024-06-17 RX ADMIN — ONDANSETRON 4 MG: 2 INJECTION INTRAMUSCULAR; INTRAVENOUS at 23:46

## 2024-06-17 RX ADMIN — POTASSIUM CHLORIDE 40 MEQ: 1.5 POWDER, FOR SOLUTION ORAL at 20:40

## 2024-06-17 RX ADMIN — FOLIC ACID: 5 INJECTION, SOLUTION INTRAMUSCULAR; INTRAVENOUS; SUBCUTANEOUS at 18:57

## 2024-06-17 ASSESSMENT — ACTIVITIES OF DAILY LIVING (ADL)
ADLS_ACUITY_SCORE: 35

## 2024-06-17 ASSESSMENT — COLUMBIA-SUICIDE SEVERITY RATING SCALE - C-SSRS
6. HAVE YOU EVER DONE ANYTHING, STARTED TO DO ANYTHING, OR PREPARED TO DO ANYTHING TO END YOUR LIFE?: NO
1. IN THE PAST MONTH, HAVE YOU WISHED YOU WERE DEAD OR WISHED YOU COULD GO TO SLEEP AND NOT WAKE UP?: NO
2. HAVE YOU ACTUALLY HAD ANY THOUGHTS OF KILLING YOURSELF IN THE PAST MONTH?: NO

## 2024-06-17 NOTE — ED PROVIDER NOTES
ED Provider Note  [facility]      History     Chief Complaint   Patient presents with     Alcohol Intoxication     HPI  Domenico Wilder is a 34 year old female who presents to the emergency department seeking detox from alcohol.  Patient states that she has been drinking alcohol daily since her last detox at [facility] in March of this year.  She states that she drinks at least a pint of vodka per day.  She becomes tremulous and nauseous when she does not drink.  She denies a history of alcohol withdrawal seizures or DTs.  The patient endorses depression but denies suicidal ideation.  She denies any recent fall or injury.  She denies any recent illness.  She does complain of low back pain.  She is uncertain if she strained it or injured it in any other way recently.  Patient denies any abdominal pain.  No nausea or vomiting.  No fever.    Past Medical History  Past Medical History:   Diagnosis Date     Anxiety      Chickenpox      Depression      Depressive disorder      GERD (gastroesophageal reflux disease)      Substance abuse (H)      Past Surgical History:   Procedure Laterality Date     NO HISTORY OF SURGERY       acetaminophen (TYLENOL) 325 MG tablet  etonogestrel (NEXPLANON) 68 MG IMPL  gabapentin (NEURONTIN) 300 MG capsule  hydrOXYzine (ATARAX) 25 MG tablet  hydrOXYzine (VISTARIL) 25 MG capsule  sertraline (ZOLOFT) 50 MG tablet      Allergies   Allergen Reactions     Cats      Dogs      Family History  Family History   Problem Relation Age of Onset     Allergies Mother      Depression Mother      Alcohol/Drug Mother      Prostate Cancer Paternal Grandfather      Diabetes Maternal Grandfather         non insulin dependent     Social History   Social History     Tobacco Use     Smoking status: Every Day     Current packs/day: 0.50     Average packs/day: 0.5 packs/day for 13.0 years (6.5 ttl pk-yrs)     Types: Cigarettes     Start date: 12/28/2017     Smokeless tobacco: Never    Substance Use Topics     Alcohol use: Yes     Comment: 1 liter per day vodka     Drug use: No     Types: Marijuana, IV, Methamphetamines, Opiates     Comment: 1-2 grams of heroine daily, occasional meth and marijuana      A medically appropriate review of systems was performed with pertinent positives and negatives noted in the HPI, and all other systems negative.    Physical Exam   BP: (!) 127/91  Pulse: 64  Temp: 99.2  F (37.3  C)  Resp: 18  SpO2: 97 %  Physical Exam  Vitals and nursing note reviewed.   Constitutional:       General: She is not in acute distress.     Appearance: Normal appearance. She is not diaphoretic.   HENT:      Head: Normocephalic and atraumatic.   Eyes:      Extraocular Movements: Extraocular movements intact.      Conjunctiva/sclera: Conjunctivae normal.   Cardiovascular:      Rate and Rhythm: Normal rate.      Heart sounds: Normal heart sounds.   Pulmonary:      Effort: Pulmonary effort is normal. No respiratory distress.      Breath sounds: Normal breath sounds.   Abdominal:      Palpations: Abdomen is soft.      Tenderness: There is no abdominal tenderness.   Musculoskeletal:         General: No tenderness. Normal range of motion.      Cervical back: Normal range of motion and neck supple. No tenderness.      Thoracic back: No tenderness.      Lumbar back: No tenderness.   Skin:     General: Skin is warm.      Findings: No rash.   Neurological:      General: No focal deficit present.      Mental Status: She is alert.      Motor: No weakness.      Coordination: Coordination normal.   Psychiatric:         Mood and Affect: Affect is flat.         Thought Content: Thought content does not include suicidal ideation.           ED Course, Procedures, & Data     ED Course as of 06/18/24 0416   Tue Jun 18, 2024   0144 Potassium(!): 2.7     Reviewed emergency department encounter and hospital admission at [facility] on 3/16/2024 for alcohol intoxication and detox.  Reviewed emergency  department counter 3/15/2024 for alcohol use disorder.  Reviewed previous CBC, comprehensive metabolic panel, magnesium level.    Procedures       Results for orders placed or performed during the hospital encounter of 06/17/24   HCG qualitative urine     Status: Normal   Result Value Ref Range    hCG Urine Qualitative Negative Negative   Urine Drug Screen Panel     Status: Abnormal   Result Value Ref Range    Amphetamines Urine Screen Negative Screen Negative    Barbituates Urine Screen Negative Screen Negative    Benzodiazepine Urine Screen Negative Screen Negative    Cannabinoids Urine Screen Positive (A) Screen Negative    Cocaine Urine Screen Negative Screen Negative    Fentanyl Qual Urine Screen Negative Screen Negative    Opiates Urine Screen Negative Screen Negative    PCP Urine Screen Negative Screen Negative   Comprehensive metabolic panel     Status: Abnormal   Result Value Ref Range    Sodium 144 135 - 145 mmol/L    Potassium 2.7 (L) 3.4 - 5.3 mmol/L    Carbon Dioxide (CO2) 25 22 - 29 mmol/L    Anion Gap 21 (H) 7 - 15 mmol/L    Urea Nitrogen 9.3 6.0 - 20.0 mg/dL    Creatinine 0.58 0.51 - 0.95 mg/dL    GFR Estimate >90 >60 mL/min/1.73m2    Calcium 9.7 8.6 - 10.0 mg/dL    Chloride 98 98 - 107 mmol/L    Glucose 131 (H) 70 - 99 mg/dL    Alkaline Phosphatase 109 40 - 150 U/L    AST 26 0 - 45 U/L    ALT 13 0 - 50 U/L    Protein Total 8.2 6.4 - 8.3 g/dL    Albumin 4.8 3.5 - 5.2 g/dL    Bilirubin Total 0.5 <=1.2 mg/dL   Magnesium     Status: Abnormal   Result Value Ref Range    Magnesium 2.4 (H) 1.7 - 2.3 mg/dL   UA with Microscopic reflex to Culture     Status: Abnormal    Specimen: Urine, Midstream   Result Value Ref Range    Color Urine Straw Colorless, Straw, Light Yellow, Yellow    Appearance Urine Clear Clear    Glucose Urine Negative Negative mg/dL    Bilirubin Urine Negative Negative    Ketones Urine Negative Negative mg/dL    Specific Gravity Urine 1.004 1.003 - 1.035    Blood Urine Negative Negative     pH Urine 6.5 5.0 - 7.0    Protein Albumin Urine 10 (A) Negative mg/dL    Urobilinogen Urine Normal Normal, 2.0 mg/dL    Nitrite Urine Negative Negative    Leukocyte Esterase Urine Negative Negative    RBC Urine 0 <=2 /HPF    WBC Urine <1 <=5 /HPF    Narrative    Urine Culture not indicated   CBC with platelets and differential     Status: Abnormal   Result Value Ref Range    WBC Count 12.4 (H) 4.0 - 11.0 10e3/uL    RBC Count 5.03 3.80 - 5.20 10e6/uL    Hemoglobin 14.5 11.7 - 15.7 g/dL    Hematocrit 42.8 35.0 - 47.0 %    MCV 85 78 - 100 fL    MCH 28.8 26.5 - 33.0 pg    MCHC 33.9 31.5 - 36.5 g/dL    RDW 13.3 10.0 - 15.0 %    Platelet Count 366 150 - 450 10e3/uL    % Neutrophils 76 %    % Lymphocytes 16 %    % Monocytes 6 %    % Eosinophils 1 %    % Basophils 1 %    % Immature Granulocytes 0 %    NRBCs per 100 WBC 0 <1 /100    Absolute Neutrophils 9.3 (H) 1.6 - 8.3 10e3/uL    Absolute Lymphocytes 2.0 0.8 - 5.3 10e3/uL    Absolute Monocytes 0.8 0.0 - 1.3 10e3/uL    Absolute Eosinophils 0.2 0.0 - 0.7 10e3/uL    Absolute Basophils 0.1 0.0 - 0.2 10e3/uL    Absolute Immature Granulocytes 0.0 <=0.4 10e3/uL    Absolute NRBCs 0.0 10e3/uL   Alcohol breath test POCT     Status: Abnormal   Result Value Ref Range    Alcohol Breath Test 0.31 (A) 0.00 - 0.01   Urine Drug Screen     Status: Abnormal    Narrative    The following orders were created for panel order Urine Drug Screen.  Procedure                               Abnormality         Status                     ---------                               -----------         ------                     Urine Drug Screen Panel[[id number]]      Abnormal            Final result                 Please view results for these tests on the individual orders.   CBC with platelets differential     Status: Abnormal    Narrative    The following orders were created for panel order CBC with platelets differential.  Procedure                               Abnormality         Status                      ---------                               -----------         ------                     CBC with platelets and d...[[id number]]  Abnormal            Final result                 Please view results for these tests on the individual orders.               Results for orders placed or performed during the hospital encounter of 06/17/24   HCG qualitative urine     Status: Normal   Result Value Ref Range    hCG Urine Qualitative Negative Negative   Urine Drug Screen Panel     Status: Abnormal   Result Value Ref Range    Amphetamines Urine Screen Negative Screen Negative    Barbituates Urine Screen Negative Screen Negative    Benzodiazepine Urine Screen Negative Screen Negative    Cannabinoids Urine Screen Positive (A) Screen Negative    Cocaine Urine Screen Negative Screen Negative    Fentanyl Qual Urine Screen Negative Screen Negative    Opiates Urine Screen Negative Screen Negative    PCP Urine Screen Negative Screen Negative   Comprehensive metabolic panel     Status: Abnormal   Result Value Ref Range    Sodium 144 135 - 145 mmol/L    Potassium 2.7 (L) 3.4 - 5.3 mmol/L    Carbon Dioxide (CO2) 25 22 - 29 mmol/L    Anion Gap 21 (H) 7 - 15 mmol/L    Urea Nitrogen 9.3 6.0 - 20.0 mg/dL    Creatinine 0.58 0.51 - 0.95 mg/dL    GFR Estimate >90 >60 mL/min/1.73m2    Calcium 9.7 8.6 - 10.0 mg/dL    Chloride 98 98 - 107 mmol/L    Glucose 131 (H) 70 - 99 mg/dL    Alkaline Phosphatase 109 40 - 150 U/L    AST 26 0 - 45 U/L    ALT 13 0 - 50 U/L    Protein Total 8.2 6.4 - 8.3 g/dL    Albumin 4.8 3.5 - 5.2 g/dL    Bilirubin Total 0.5 <=1.2 mg/dL   Magnesium     Status: Abnormal   Result Value Ref Range    Magnesium 2.4 (H) 1.7 - 2.3 mg/dL   UA with Microscopic reflex to Culture     Status: Abnormal    Specimen: Urine, Midstream   Result Value Ref Range    Color Urine Straw Colorless, Straw, Light Yellow, Yellow    Appearance Urine Clear Clear    Glucose Urine Negative Negative mg/dL    Bilirubin Urine Negative Negative    Ketones  Urine Negative Negative mg/dL    Specific Gravity Urine 1.004 1.003 - 1.035    Blood Urine Negative Negative    pH Urine 6.5 5.0 - 7.0    Protein Albumin Urine 10 (A) Negative mg/dL    Urobilinogen Urine Normal Normal, 2.0 mg/dL    Nitrite Urine Negative Negative    Leukocyte Esterase Urine Negative Negative    RBC Urine 0 <=2 /HPF    WBC Urine <1 <=5 /HPF    Narrative    Urine Culture not indicated   CBC with platelets and differential     Status: Abnormal   Result Value Ref Range    WBC Count 12.4 (H) 4.0 - 11.0 10e3/uL    RBC Count 5.03 3.80 - 5.20 10e6/uL    Hemoglobin 14.5 11.7 - 15.7 g/dL    Hematocrit 42.8 35.0 - 47.0 %    MCV 85 78 - 100 fL    MCH 28.8 26.5 - 33.0 pg    MCHC 33.9 31.5 - 36.5 g/dL    RDW 13.3 10.0 - 15.0 %    Platelet Count 366 150 - 450 10e3/uL    % Neutrophils 76 %    % Lymphocytes 16 %    % Monocytes 6 %    % Eosinophils 1 %    % Basophils 1 %    % Immature Granulocytes 0 %    NRBCs per 100 WBC 0 <1 /100    Absolute Neutrophils 9.3 (H) 1.6 - 8.3 10e3/uL    Absolute Lymphocytes 2.0 0.8 - 5.3 10e3/uL    Absolute Monocytes 0.8 0.0 - 1.3 10e3/uL    Absolute Eosinophils 0.2 0.0 - 0.7 10e3/uL    Absolute Basophils 0.1 0.0 - 0.2 10e3/uL    Absolute Immature Granulocytes 0.0 <=0.4 10e3/uL    Absolute NRBCs 0.0 10e3/uL   Alcohol breath test POCT     Status: Abnormal   Result Value Ref Range    Alcohol Breath Test 0.31 (A) 0.00 - 0.01   Urine Drug Screen     Status: Abnormal    Narrative    The following orders were created for panel order Urine Drug Screen.  Procedure                               Abnormality         Status                     ---------                               -----------         ------                     Urine Drug Screen Panel[[id number]]      Abnormal            Final result                 Please view results for these tests on the individual orders.   CBC with platelets differential     Status: Abnormal    Narrative    The following orders were created for panel order  CBC with platelets differential.  Procedure                               Abnormality         Status                     ---------                               -----------         ------                     CBC with platelets and d...[[id number]]  Abnormal            Final result                 Please view results for these tests on the individual orders.     Medications   sodium chloride 0.9% BOLUS 1,000 mL (1,000 mLs Intravenous $New Bag 6/17/24 1640)   potassium chloride 10 mEq in 100 mL sterile water infusion (has no administration in time range)   potassium chloride (KLOR-CON) Packet 40 mEq (has no administration in time range)   dextrose 5% and 0.9% NaCl 1,000 mL with Infuvite Adult 10 mL, thiamine 100 mg, folic acid 1 mg infusion (has no administration in time range)     Labs Ordered and Resulted from Time of ED Arrival to Time of ED Departure   URINE DRUG SCREEN PANEL - Abnormal       Result Value    Amphetamines Urine Screen Negative      Barbituates Urine Screen Negative      Benzodiazepine Urine Screen Negative      Cannabinoids Urine Screen Positive (*)     Cocaine Urine Screen Negative      Fentanyl Qual Urine Screen Negative      Opiates Urine Screen Negative      PCP Urine Screen Negative     COMPREHENSIVE METABOLIC PANEL - Abnormal    Sodium 144      Potassium 2.7 (*)     Carbon Dioxide (CO2) 25      Anion Gap 21 (*)     Urea Nitrogen 9.3      Creatinine 0.58      GFR Estimate >90      Calcium 9.7      Chloride 98      Glucose 131 (*)     Alkaline Phosphatase 109      AST 26      ALT 13      Protein Total 8.2      Albumin 4.8      Bilirubin Total 0.5     MAGNESIUM - Abnormal    Magnesium 2.4 (*)    ROUTINE UA WITH MICROSCOPIC REFLEX TO CULTURE - Abnormal    Color Urine Straw      Appearance Urine Clear      Glucose Urine Negative      Bilirubin Urine Negative      Ketones Urine Negative      Specific Gravity Urine 1.004      Blood Urine Negative      pH Urine 6.5      Protein Albumin Urine 10 (*)      Urobilinogen Urine Normal      Nitrite Urine Negative      Leukocyte Esterase Urine Negative      RBC Urine 0      WBC Urine <1     CBC WITH PLATELETS AND DIFFERENTIAL - Abnormal    WBC Count 12.4 (*)     RBC Count 5.03      Hemoglobin 14.5      Hematocrit 42.8      MCV 85      MCH 28.8      MCHC 33.9      RDW 13.3      Platelet Count 366      % Neutrophils 76      % Lymphocytes 16      % Monocytes 6      % Eosinophils 1      % Basophils 1      % Immature Granulocytes 0      NRBCs per 100 WBC 0      Absolute Neutrophils 9.3 (*)     Absolute Lymphocytes 2.0      Absolute Monocytes 0.8      Absolute Eosinophils 0.2      Absolute Basophils 0.1      Absolute Immature Granulocytes 0.0      Absolute NRBCs 0.0     ALCOHOL BREATH TEST POCT - Abnormal    Alcohol Breath Test 0.31 (*)    HCG QUALITATIVE URINE - Normal    hCG Urine Qualitative Negative       XR Lumbar Spine 2/3 Views    (Results Pending)          Critical care was not performed.     Medical Decision Making  The patient's presentation was of high complexity (an acute health issue posing potential threat to life or bodily function).    The patient's evaluation involved:  an assessment requiring an independent historian (patient's mother)  review of external note(s) from 3+ sources (see separate area of note for details)  review of 3+ test result(s) ordered prior to this encounter (see separate area of note for details)  ordering and/or review of 3+ test(s) in this encounter (see separate area of note for details)  discussion of management or test interpretation with another health professional (mental health intake)    The patient's management necessitated high risk (a decision regarding hospitalization).    Assessment & Plan    34 year old female with history of alcohol use disorder treatment department seeking detox.  She has been drinking alcohol daily and experiences withdrawal symptoms when she does not drink.  She does complain of low back pain.  She is  unsure if she fell.  Radiograph pending.  Screening labs remarkable for mild leukocytosis but no symptoms of infection.  Urinalysis does not appear infected.  No respiratory symptoms.  Patient is also hypokalemic with a potassium of 2.7.  IV and oral replacement given.  Magnesium level normal.  Patient given 1 L normal saline as well as a D5 normal saline banana bag.  She appears medically stable for detox admission provided lumbar spine radiograph unremarkable.  Signed out at change of shift.    I have reviewed the nursing notes. I have reviewed the findings, diagnosis, plan and need for follow up with the patient.    New Prescriptions    No medications on file       Final diagnoses:   Alcohol dependence with uncomplicated intoxication (H)   Hypokalemia     Chart documentation was completed with Dragon voice-recognition software. Even though reviewed, this chart may still contain some grammatical, spelling, and word errors.     Jose Martin Escalante Md    [facility] EMERGENCY DEPARTMENT  6/17/2024     Jose Martin Escalante MD  06/17/24 1739       Guille Corona DO  06/18/24 1274

## 2024-06-17 NOTE — TELEPHONE ENCOUNTER
S: [facility] , Provider Jose Martin Escalante calling at 5:30 PM with clinical on a 34 year old/Female presenting for alcohol detox.     B: Pt presents for ETOH detox.   Currently reports drinking at least 1 pint of vodka daily for years, was admitted to detox in March but relapsed afterwards daily since [facility].    Patient reports last use was 1 hr prior to coming into ED.  Pt AIME: .31  Pt  denies hx of DT  Pt  denies hx of seizures. Last seizure: N/A  Pt endorsing the following symptoms of withdrawal: Tremulous and Nausea  MSSA Score: 0- K is low, being replaced with IV and oral treatment    Pt denies acute mental health or medical concerns.   Pt endorses other drug use: (!) CANNABIS PRODUCTS Amount/frequency: N/A    Does Pt have a detox care plan in [facility]? No  Does pt present with specific needs, assistive devices, or exclusionary criteria? None  Is the patient ambulating, eating and drinking in the ED? Yes    A: Pt meets criteria to be presented for IP detox admission. Patient is voluntary    COVID Symptoms: No  If yes, COVID test required   Utox: Positive for THC  Magnesium: Abnormalities: 2.4  CMP: Abnormalities: K 2.7, Anion Gap 21, Glucose 131  CBC: Abnormalities: WBC Count 12.4, Absolute Neutrophils 9.3  HCG: Negative     R: Patient cleared and ready for behavioral bed placement: Yes    Pt is meeting criteria for presentation to 3A/CD    Does Patient need a Transfer Center request created? No, Pt is located within [facility] ED, [facility] ED, or [place] [facility]

## 2024-06-17 NOTE — ED TRIAGE NOTES
Pt is here seeking alcohol detox, last drink was about 30 minutes ago. Pt states she drinks more than a pint of vodka daily.

## 2024-06-18 VITALS
HEART RATE: 80 BPM | OXYGEN SATURATION: 99 % | SYSTOLIC BLOOD PRESSURE: 111 MMHG | RESPIRATION RATE: 16 BRPM | DIASTOLIC BLOOD PRESSURE: 83 MMHG | TEMPERATURE: 98 F

## 2024-06-18 LAB
POTASSIUM SERPL-SCNC: 2.8 MMOL/L (ref 3.4–5.3)
POTASSIUM SERPL-SCNC: 4.2 MMOL/L (ref 3.4–5.3)

## 2024-06-18 PROCEDURE — 96375 TX/PRO/DX INJ NEW DRUG ADDON: CPT | Performed by: EMERGENCY MEDICINE

## 2024-06-18 PROCEDURE — 250N000013 HC RX MED GY IP 250 OP 250 PS 637: Performed by: FAMILY MEDICINE

## 2024-06-18 PROCEDURE — 96376 TX/PRO/DX INJ SAME DRUG ADON: CPT | Performed by: EMERGENCY MEDICINE

## 2024-06-18 PROCEDURE — 36415 COLL VENOUS BLD VENIPUNCTURE: CPT | Performed by: FAMILY MEDICINE

## 2024-06-18 PROCEDURE — 250N000013 HC RX MED GY IP 250 OP 250 PS 637: Performed by: EMERGENCY MEDICINE

## 2024-06-18 PROCEDURE — 250N000011 HC RX IP 250 OP 636: Mod: JZ | Performed by: EMERGENCY MEDICINE

## 2024-06-18 PROCEDURE — 84132 ASSAY OF SERUM POTASSIUM: CPT | Performed by: FAMILY MEDICINE

## 2024-06-18 RX ORDER — ERGOCALCIFEROL 1.25 MG/1
50000 CAPSULE, LIQUID FILLED ORAL WEEKLY
COMMUNITY
Start: 2024-03-29 | End: 2024-09-23

## 2024-06-18 RX ORDER — MAGNESIUM HYDROXIDE/ALUMINUM HYDROXICE/SIMETHICONE 120; 1200; 1200 MG/30ML; MG/30ML; MG/30ML
15 SUSPENSION ORAL ONCE
Status: COMPLETED | OUTPATIENT
Start: 2024-06-18 | End: 2024-06-18

## 2024-06-18 RX ORDER — ONDANSETRON 2 MG/ML
4 INJECTION INTRAMUSCULAR; INTRAVENOUS EVERY 30 MIN PRN
Status: DISCONTINUED | OUTPATIENT
Start: 2024-06-18 | End: 2024-06-18 | Stop reason: HOSPADM

## 2024-06-18 RX ORDER — TRAZODONE HYDROCHLORIDE 100 MG/1
100 TABLET ORAL AT BEDTIME
COMMUNITY

## 2024-06-18 RX ORDER — POTASSIUM CHLORIDE 1.5 G/1.58G
40 POWDER, FOR SOLUTION ORAL ONCE
Status: COMPLETED | OUTPATIENT
Start: 2024-06-18 | End: 2024-06-18

## 2024-06-18 RX ORDER — LORAZEPAM 2 MG/ML
1 INJECTION INTRAMUSCULAR ONCE
Status: COMPLETED | OUTPATIENT
Start: 2024-06-18 | End: 2024-06-18

## 2024-06-18 RX ORDER — LANOLIN ALCOHOL/MO/W.PET/CERES
1 CREAM (GRAM) TOPICAL DAILY
COMMUNITY
Start: 2024-03-30 | End: 2024-09-23

## 2024-06-18 RX ORDER — SERTRALINE HYDROCHLORIDE 100 MG/1
1 TABLET, FILM COATED ORAL
COMMUNITY
Start: 2024-06-05 | End: 2024-09-23

## 2024-06-18 RX ORDER — FOLIC ACID 1 MG/1
1 TABLET ORAL DAILY
COMMUNITY
Start: 2024-04-15 | End: 2024-09-23

## 2024-06-18 RX ORDER — MULTIVITAMIN WITH FOLIC ACID 400 MCG
1 TABLET ORAL DAILY
COMMUNITY
Start: 2024-03-29 | End: 2024-09-23

## 2024-06-18 RX ORDER — NALTREXONE HYDROCHLORIDE 50 MG/1
50 TABLET, FILM COATED ORAL DAILY
COMMUNITY
Start: 2023-11-13

## 2024-06-18 RX ORDER — FAMOTIDINE 20 MG/1
20 TABLET, FILM COATED ORAL 2 TIMES DAILY
COMMUNITY

## 2024-06-18 RX ADMIN — ONDANSETRON 4 MG: 2 INJECTION INTRAMUSCULAR; INTRAVENOUS at 02:56

## 2024-06-18 RX ADMIN — LORAZEPAM 1 MG: 2 INJECTION INTRAMUSCULAR; INTRAVENOUS at 02:57

## 2024-06-18 RX ADMIN — ALUMINUM HYDROXIDE, MAGNESIUM HYDROXIDE, AND SIMETHICONE 15 ML: 1200; 120; 1200 SUSPENSION ORAL at 14:17

## 2024-06-18 RX ADMIN — DIAZEPAM 10 MG: 5 TABLET ORAL at 09:35

## 2024-06-18 RX ADMIN — DIAZEPAM 10 MG: 5 TABLET ORAL at 02:57

## 2024-06-18 RX ADMIN — POTASSIUM CHLORIDE 40 MEQ: 1.5 POWDER, FOR SOLUTION ORAL at 12:44

## 2024-06-18 RX ADMIN — GABAPENTIN 300 MG: 300 CAPSULE ORAL at 09:34

## 2024-06-18 RX ADMIN — POTASSIUM CHLORIDE 40 MEQ: 1.5 POWDER, FOR SOLUTION ORAL at 11:34

## 2024-06-18 RX ADMIN — HYDROXYZINE HYDROCHLORIDE 50 MG: 50 TABLET, FILM COATED ORAL at 00:24

## 2024-06-18 RX ADMIN — GABAPENTIN 300 MG: 300 CAPSULE ORAL at 13:37

## 2024-06-18 RX ADMIN — ONDANSETRON 4 MG: 2 INJECTION INTRAMUSCULAR; INTRAVENOUS at 09:34

## 2024-06-18 RX ADMIN — DIAZEPAM 10 MG: 5 TABLET ORAL at 13:37

## 2024-06-18 RX ADMIN — DIAZEPAM 10 MG: 5 TABLET ORAL at 11:09

## 2024-06-18 RX ADMIN — TRAZODONE HYDROCHLORIDE 100 MG: 100 TABLET ORAL at 01:00

## 2024-06-18 ASSESSMENT — ACTIVITIES OF DAILY LIVING (ADL)
ADLS_ACUITY_SCORE: 35

## 2024-06-18 NOTE — TELEPHONE ENCOUNTER
R:    8: 32 PM Paged provider to review for [facility]    8:43 PM Pt accepted to [facility].    8:53 PM Pt placed in queue, admit board updated, and report info exchanged.    10:22 PM Indicia completed.

## 2024-06-18 NOTE — ED PROVIDER NOTES
This patient was signed out to me pending admission to detox and recheck of her potassium.  She presented voluntarily seeking detox but blood work had noted low potassium.  Potassium was supplemented and then was normal.  While waiting for admission the patient stated she was no longer interested in detox.  I reevaluated her and continue to recommend inpatient detox for management of withdrawal symptoms and monitoring.  I discussed the possibility of worsening withdrawal.  The patient continued to decline detox admission.  She was sober and did not appear impaired, appeared to have decision-making capacity and so was discharged.  Return precautions were discussed and all questions answered.     Dc Larson MD  06/18/24 2991

## 2024-06-18 NOTE — MEDICATION SCRIBE - ADMISSION MEDICATION HISTORY
Medication Scribe Admission Medication History    Admission medication history is complete. The information provided in this note is only as accurate as the sources available at the time of the update.    Information Source(s): Patient and CareEverywhere/SureScripts via in-person    Pertinent Information: Pt not taking vitamins/supplements regularly.     Changes made to PTA medication list:  Added:   Thiamine   Vitamin D   Famotidine   Folic acid   Multivitamin   Trazodone   Naltrexone   Nicotine patch   Lozenge   Deleted:   Atarax   Changed:   Sertraline 50 mg to 100 mg     Allergies reviewed with patient and updates made in EHR: yes    Medication History Completed By: Nellie York 6/18/2024 11:33 AM    PTA Med List   Medication Sig Last Dose    acetaminophen (TYLENOL) 325 MG tablet Take 2 tablets (650 mg) by mouth every 6 hours as needed for mild pain Start after Delivery. Past Month    etonogestrel (NEXPLANON) 68 MG IMPL Inject 68 mg Subcutaneous     famotidine (PEPCID) 20 MG tablet Take 20 mg by mouth 2 times daily 6/17/2024    folic acid (FOLVITE) 1 MG tablet Take 1 tablet by mouth daily Past Month    gabapentin (NEURONTIN) 300 MG capsule Take 1 capsule (300 mg) by mouth every 8 hours 6/17/2024    hydrOXYzine (VISTARIL) 25 MG capsule Take 25 mg by mouth every 8 hours as needed for anxiety 6/17/2024    Multiple Vitamin (TAB-A-DEVAUGHN) TABS Take 1 tablet by mouth daily Past Month    naltrexone (DEPADE/REVIA) 50 MG tablet Take 50 mg by mouth daily Past Month    nicotine (NICODERM CQ) 7 MG/24HR 24 hr patch Place 1 patch onto the skin daily Past Month    nicotine (NICORETTE) 4 MG lozenge Place 4 mg inside cheek as needed Past Month    sertraline (ZOLOFT) 100 MG tablet Take 1 tablet by mouth daily at 2 pm Past Week    thiamine (B-1) 100 MG tablet Take 1 tablet by mouth daily Past Month    traZODone (DESYREL) 100 MG tablet Take 100 mg by mouth daily 6/16/2024    vitamin D2 (ERGOCALCIFEROL) 02159 units (1250 mcg)  capsule Take 50,000 Units by mouth once a week

## 2024-06-18 NOTE — ED PROVIDER NOTES
Patient was seen on earlier shift requesting detox from alcohol.  Was medically cleared including replacement of electrolytes and placed in the queue for detox admission.  Continues to board in ED awaiting bed placement.  Will be available for any concerns that should arise while awaiting transfer to the inpatient unit.  No current concerns reported at signout.     Crow Ni MD  06/18/24 0685

## 2024-06-18 NOTE — ED NOTES
writer found  with vape on her hand,  another safety screen conducted vape and purse taken from room and put in locker.

## 2024-06-18 NOTE — DISCHARGE INSTRUCTIONS
If you wish to reconsider inpatient detox or have other new symptoms or concerns please return to the emergency department.

## 2024-06-18 NOTE — ED NOTES
Pt.s purse given to mother at patients request.  RN is okay with this.  Mother instructed not to leave the purse in the room.

## 2024-08-10 ENCOUNTER — HOSPITAL ENCOUNTER (EMERGENCY)
Facility: CLINIC | Age: 34
Discharge: HOME | End: 2024-08-11
Attending: EMERGENCY MEDICINE | Admitting: EMERGENCY MEDICINE
Payer: COMMERCIAL

## 2024-08-10 VITALS
DIASTOLIC BLOOD PRESSURE: 91 MMHG | TEMPERATURE: 98.3 F | HEART RATE: 79 BPM | SYSTOLIC BLOOD PRESSURE: 128 MMHG | OXYGEN SATURATION: 97 % | RESPIRATION RATE: 18 BRPM

## 2024-08-10 DIAGNOSIS — F19.10 POLYSUBSTANCE ABUSE (H): ICD-10-CM

## 2024-08-10 DIAGNOSIS — F10.90 ALCOHOL USE DISORDER: ICD-10-CM

## 2024-08-10 LAB
ALBUMIN SERPL BCG-MCNC: 4.2 G/DL (ref 3.5–5.2)
ALCOHOL BREATH TEST: 0.28 (ref 0–0.01)
ALP SERPL-CCNC: 96 U/L (ref 40–150)
ALT SERPL W P-5'-P-CCNC: 9 U/L (ref 0–50)
AMPHETAMINES UR QL SCN: ABNORMAL
ANION GAP SERPL CALCULATED.3IONS-SCNC: 13 MMOL/L (ref 7–15)
AST SERPL W P-5'-P-CCNC: 20 U/L (ref 0–45)
BARBITURATES UR QL SCN: ABNORMAL
BASOPHILS # BLD AUTO: 0.1 10E3/UL (ref 0–0.2)
BASOPHILS NFR BLD AUTO: 1 %
BENZODIAZ UR QL SCN: ABNORMAL
BILIRUB SERPL-MCNC: <0.2 MG/DL
BUN SERPL-MCNC: 13.1 MG/DL (ref 6–20)
BZE UR QL SCN: ABNORMAL
CALCIUM SERPL-MCNC: 8.3 MG/DL (ref 8.8–10.4)
CANNABINOIDS UR QL SCN: ABNORMAL
CHLORIDE SERPL-SCNC: 106 MMOL/L (ref 98–107)
CREAT SERPL-MCNC: 0.57 MG/DL (ref 0.51–0.95)
EGFRCR SERPLBLD CKD-EPI 2021: >90 ML/MIN/1.73M2
EOSINOPHIL # BLD AUTO: 0.1 10E3/UL (ref 0–0.7)
EOSINOPHIL NFR BLD AUTO: 1 %
ERYTHROCYTE [DISTWIDTH] IN BLOOD BY AUTOMATED COUNT: 14.9 % (ref 10–15)
FENTANYL UR QL: ABNORMAL
GLUCOSE SERPL-MCNC: 98 MG/DL (ref 70–99)
HCG UR QL: NEGATIVE
HCO3 SERPL-SCNC: 22 MMOL/L (ref 22–29)
HCT VFR BLD AUTO: 38.7 % (ref 35–47)
HGB BLD-MCNC: 12.7 G/DL (ref 11.7–15.7)
IMM GRANULOCYTES # BLD: 0 10E3/UL
IMM GRANULOCYTES NFR BLD: 0 %
LYMPHOCYTES # BLD AUTO: 2.5 10E3/UL (ref 0.8–5.3)
LYMPHOCYTES NFR BLD AUTO: 28 %
MCH RBC QN AUTO: 28.2 PG (ref 26.5–33)
MCHC RBC AUTO-ENTMCNC: 32.8 G/DL (ref 31.5–36.5)
MCV RBC AUTO: 86 FL (ref 78–100)
MONOCYTES # BLD AUTO: 0.4 10E3/UL (ref 0–1.3)
MONOCYTES NFR BLD AUTO: 4 %
NEUTROPHILS # BLD AUTO: 6.1 10E3/UL (ref 1.6–8.3)
NEUTROPHILS NFR BLD AUTO: 66 %
NRBC # BLD AUTO: 0 10E3/UL
NRBC BLD AUTO-RTO: 0 /100
OPIATES UR QL SCN: ABNORMAL
PCP QUAL URINE (ROCHE): ABNORMAL
PLATELET # BLD AUTO: 310 10E3/UL (ref 150–450)
POTASSIUM SERPL-SCNC: 3.7 MMOL/L (ref 3.4–5.3)
PROT SERPL-MCNC: 7.3 G/DL (ref 6.4–8.3)
RBC # BLD AUTO: 4.51 10E6/UL (ref 3.8–5.2)
SODIUM SERPL-SCNC: 141 MMOL/L (ref 135–145)
WBC # BLD AUTO: 9.2 10E3/UL (ref 4–11)

## 2024-08-10 PROCEDURE — 36415 COLL VENOUS BLD VENIPUNCTURE: CPT | Performed by: EMERGENCY MEDICINE

## 2024-08-10 PROCEDURE — 99284 EMERGENCY DEPT VISIT MOD MDM: CPT | Performed by: EMERGENCY MEDICINE

## 2024-08-10 PROCEDURE — 80053 COMPREHEN METABOLIC PANEL: CPT | Performed by: EMERGENCY MEDICINE

## 2024-08-10 PROCEDURE — 81025 URINE PREGNANCY TEST: CPT | Performed by: EMERGENCY MEDICINE

## 2024-08-10 PROCEDURE — 85025 COMPLETE CBC W/AUTO DIFF WBC: CPT | Performed by: EMERGENCY MEDICINE

## 2024-08-10 PROCEDURE — 85004 AUTOMATED DIFF WBC COUNT: CPT | Performed by: EMERGENCY MEDICINE

## 2024-08-10 PROCEDURE — 99283 EMERGENCY DEPT VISIT LOW MDM: CPT | Performed by: EMERGENCY MEDICINE

## 2024-08-10 PROCEDURE — 80307 DRUG TEST PRSMV CHEM ANLYZR: CPT | Performed by: EMERGENCY MEDICINE

## 2024-08-10 ASSESSMENT — ACTIVITIES OF DAILY LIVING (ADL)
ADLS_ACUITY_SCORE: 35
ADLS_ACUITY_SCORE: 35

## 2024-08-11 NOTE — ED NOTES
"Late entry:  Pt was loud, obnoxious in triage lobby, was threatening to leave. Pt's accompanying kept hollering at pt \" sit down! You are not going home\"  Pt was brought in to ED. Pt immediately started to leave when pt was placed in the HW. YOKO was put in place. Behavioral code was called. Charge RN Aysha was helping with the situation.   " 2019

## 2024-08-11 NOTE — ED PROVIDER NOTES
ED Provider Note  [facility]      History     Chief Complaint   Patient presents with    Alcohol Intoxication     Presents with her mom. Patient just came back from vacation with her mom. Patient was found passed out from drinking. Patient was drinking 2 pint and daily. Last drink was 3 hours ago.      The history is provided by the patient and medical records.     Domenico Wilder is a 34 year old female with history of severe alcohol use disorder with withdrawal seizures, alcoholic ketoacidosis, polysubstance abuse, ROMANA, MDD who presents to the emergency department for alcohol intoxication.    Mother at bedside. Mother states that patient has been agitated and irritated.  Mother brought her here because she was intoxicated. Patient last drink at 5 PM, and has also used marijuana.  Patient currently has nausea, and has pain in her back and head.  Patient does not want to keep drinking, wants inpatient treatment. She is very adamant that she does not want detox and does not want to be admitted to the hospital today. Patient denies SI/HI.     Past Medical History  Past Medical History:   Diagnosis Date    Anxiety     Chickenpox     Depression     Depressive disorder     GERD (gastroesophageal reflux disease)     Substance abuse (H)      Past Surgical History:   Procedure Laterality Date    NO HISTORY OF SURGERY       acetaminophen (TYLENOL) 325 MG tablet  etonogestrel (NEXPLANON) 68 MG IMPL  famotidine (PEPCID) 20 MG tablet  folic acid (FOLVITE) 1 MG tablet  gabapentin (NEURONTIN) 300 MG capsule  hydrOXYzine (VISTARIL) 25 MG capsule  Multiple Vitamin (TAB-A-DEVAUGHN) TABS  naltrexone (DEPADE/REVIA) 50 MG tablet  nicotine (NICODERM CQ) 7 MG/24HR 24 hr patch  nicotine (NICORETTE) 4 MG lozenge  sertraline (ZOLOFT) 100 MG tablet  thiamine (B-1) 100 MG tablet  traZODone (DESYREL) 100 MG tablet  vitamin D2 (ERGOCALCIFEROL) 33555 units (1250 mcg) capsule      Allergies   Allergen Reactions    Cats      Dogs      Family History  Family History   Problem Relation Age of Onset    Allergies Mother     Depression Mother     Alcohol/Drug Mother     Prostate Cancer Paternal Grandfather     Diabetes Maternal Grandfather         non insulin dependent     Social History   Social History     Tobacco Use    Smoking status: Every Day     Current packs/day: 0.50     Average packs/day: 0.5 packs/day for 13.0 years (6.5 ttl pk-yrs)     Types: Cigarettes     Start date: 12/28/2017    Smokeless tobacco: Never   Substance Use Topics    Alcohol use: Yes     Comment: 1 liter per day vodka    Drug use: No     Types: Marijuana, IV, Methamphetamines, Opiates     Comment: 1-2 grams of heroine daily, occasional meth and marijuana      A medically appropriate review of systems was performed with pertinent positives and negatives noted in the HPI, and all other systems negative.    Physical Exam      Physical Exam  Constitutional:       Comments: Emotionally; intermittently yelling at mom   HENT:      Head: Normocephalic and atraumatic.   Cardiovascular:      Pulses: Normal pulses.   Pulmonary:      Effort: Pulmonary effort is normal. No respiratory distress.   Abdominal:      General: There is no distension.      Palpations: There is no mass.      Tenderness: There is no abdominal tenderness.      Hernia: No hernia is present.   Musculoskeletal:      Cervical back: No rigidity or tenderness.   Neurological:      General: No focal deficit present.      Mental Status: She is oriented to person, place, and time.   Psychiatric:      Comments: No SI or HI           ED Course, Procedures, & Data      Procedures             Results for orders placed or performed during the hospital encounter of 08/10/24   Comprehensive metabolic panel     Status: Abnormal   Result Value Ref Range    Sodium 141 135 - 145 mmol/L    Potassium 3.7 3.4 - 5.3 mmol/L    Carbon Dioxide (CO2) 22 22 - 29 mmol/L    Anion Gap 13 7 - 15 mmol/L    Urea Nitrogen 13.1 6.0 - 20.0  mg/dL    Creatinine 0.57 0.51 - 0.95 mg/dL    GFR Estimate >90 >60 mL/min/1.73m2    Calcium 8.3 (L) 8.8 - 10.4 mg/dL    Chloride 106 98 - 107 mmol/L    Glucose 98 70 - 99 mg/dL    Alkaline Phosphatase 96 40 - 150 U/L    AST 20 0 - 45 U/L    ALT 9 0 - 50 U/L    Protein Total 7.3 6.4 - 8.3 g/dL    Albumin 4.2 3.5 - 5.2 g/dL    Bilirubin Total <0.2 <=1.2 mg/dL   HCG qualitative urine     Status: Normal   Result Value Ref Range    hCG Urine Qualitative Negative Negative   CBC with platelets and differential     Status: None   Result Value Ref Range    WBC Count 9.2 4.0 - 11.0 10e3/uL    RBC Count 4.51 3.80 - 5.20 10e6/uL    Hemoglobin 12.7 11.7 - 15.7 g/dL    Hematocrit 38.7 35.0 - 47.0 %    MCV 86 78 - 100 fL    MCH 28.2 26.5 - 33.0 pg    MCHC 32.8 31.5 - 36.5 g/dL    RDW 14.9 10.0 - 15.0 %    Platelet Count 310 150 - 450 10e3/uL    % Neutrophils 66 %    % Lymphocytes 28 %    % Monocytes 4 %    % Eosinophils 1 %    % Basophils 1 %    % Immature Granulocytes 0 %    NRBCs per 100 WBC 0 <1 /100    Absolute Neutrophils 6.1 1.6 - 8.3 10e3/uL    Absolute Lymphocytes 2.5 0.8 - 5.3 10e3/uL    Absolute Monocytes 0.4 0.0 - 1.3 10e3/uL    Absolute Eosinophils 0.1 0.0 - 0.7 10e3/uL    Absolute Basophils 0.1 0.0 - 0.2 10e3/uL    Absolute Immature Granulocytes 0.0 <=0.4 10e3/uL    Absolute NRBCs 0.0 10e3/uL   Urine Drug Screen Panel     Status: Abnormal   Result Value Ref Range    Amphetamines Urine Screen Negative Screen Negative    Barbituates Urine Screen Negative Screen Negative    Benzodiazepine Urine Screen Positive (A) Screen Negative    Cannabinoids Urine Screen Positive (A) Screen Negative    Cocaine Urine Screen Negative Screen Negative    Fentanyl Qual Urine Screen Negative Screen Negative    Opiates Urine Screen Negative Screen Negative    PCP Urine Screen Negative Screen Negative   Alcohol breath test POCT     Status: Abnormal   Result Value Ref Range    Alcohol Breath Test 0.276 (A) 0.00 - 0.01   CBC with platelets  differential     Status: None    Narrative    The following orders were created for panel order CBC with platelets differential.  Procedure                               Abnormality         Status                     ---------                               -----------         ------                     CBC with platelets and d...[[id number]]                      Final result                 Please view results for these tests on the individual orders.   Urine Drug Screen     Status: Abnormal    Narrative    The following orders were created for panel order Urine Drug Screen.  Procedure                               Abnormality         Status                     ---------                               -----------         ------                     Urine Drug Screen Panel[[id number]]      Abnormal            Final result                 Please view results for these tests on the individual orders.     Medications - No data to display       No results found for any visits on 08/10/24.  Medications - No data to display  Labs Ordered and Resulted from Time of ED Arrival to Time of ED Departure - No data to display  No orders to display          Critical care was not performed.     Medical Decision Making  The patient's presentation was of moderate complexity (a chronic illness mild to moderate exacerbation, progression, or side effect of treatment).    The patient's evaluation involved:  review of external note(s) from 3+ sources (see separate area of note for details)  review of 3+ test result(s) ordered prior to this encounter (see separate area of note for details)  ordering and/or review of 3+ test(s) in this encounter (see separate area of note for details)    The patient's management necessitated moderate risk (prescription drug management including medications given in the ED).    Assessment & Plan    Patient is a 34-year-old female with a known history of polysubstance abuse as well as alcohol abuse who presents to  the ER with her mother due to concern for alcohol use disorder.  Patient's mother wanted her to be admitted to detox but patient is very adamant that she does not want to be admitted.  She refuses to be admitted here or go to [facility] detox.  I offered to keep the patient in the ER until she is sober but mother is now willing and able to take the patient home.  They are can have her follow-up for an outpatient treatment facility.  No other signs of trauma.  No SI or HI.  Patient will be discharged home with a sober ride.    I have reviewed the nursing notes. I have reviewed the findings, diagnosis, plan and need for follow up with the patient.    New Prescriptions    No medications on file       Final diagnoses:   Alcohol use disorder   Polysubstance abuse (H)   I, Warren Paz, am serving as a trained medical scribe to document services personally performed by Gricelda Nicole MD, based to the provider's statements to me.     I, Gricelda Nicole MD, was physically present and have reviewed and verified the accuracy of this note documented by Warren Paz.     Gricelda Nicole MD  [facility] EMERGENCY DEPARTMENT  8/10/2024     Gricelda Nicole MD  08/11/24 0053

## 2024-08-11 NOTE — DISCHARGE INSTRUCTIONS
Please call [phone] to ask about detox bed availability if you change your mind and want to be admitted to detox.       Return to the ER if any other problems/concerns.

## 2024-09-18 ENCOUNTER — TRANSFERRED RECORDS (OUTPATIENT)
Dept: HEALTH INFORMATION MANAGEMENT | Facility: CLINIC | Age: 34
End: 2024-09-18
Payer: COMMERCIAL

## 2024-09-20 ENCOUNTER — TELEPHONE (OUTPATIENT)
Dept: ADDICTION MEDICINE | Facility: CLINIC | Age: 34
End: 2024-09-20

## 2024-09-20 ENCOUNTER — TELEPHONE (OUTPATIENT)
Dept: BEHAVIORAL HEALTH | Facility: CLINIC | Age: 34
End: 2024-09-20
Payer: COMMERCIAL

## 2024-09-20 NOTE — TELEPHONE ENCOUNTER
Writer received YADY assessment from Ciro @ [facility]. [phone]. Faxed to [facility] for scanning.    Pt is placed on [facility] wait list pending insurance verification.

## 2024-09-20 NOTE — TELEPHONE ENCOUNTER
----- Message from Kendell Davison sent at 9/20/2024 10:33 AM CDT -----  Regarding: New Admission  Scheduling Request    Patient Name: Domenico Wilder  Location of programming: [facility]  Start Date: September / 23 / 2024  Group: E on Monday at 12:00 PM  Attending Provider (MD): Sriram  Duration of Appointment in minutes: 840  Visit Type: In-person or Treatment - 870

## 2024-09-23 ENCOUNTER — HOSPITAL ENCOUNTER (OUTPATIENT)
Dept: BEHAVIORAL HEALTH | Facility: CLINIC | Age: 34
Discharge: HOME | End: 2024-09-23
Attending: FAMILY MEDICINE
Payer: COMMERCIAL

## 2024-09-23 ENCOUNTER — TRANSFERRED RECORDS (OUTPATIENT)
Dept: HEALTH INFORMATION MANAGEMENT | Facility: CLINIC | Age: 34
End: 2024-09-23

## 2024-09-23 VITALS — BODY MASS INDEX: 26.66 KG/M2 | WEIGHT: 160 LBS | HEIGHT: 65 IN

## 2024-09-23 DIAGNOSIS — F33.1 MAJOR DEPRESSIVE DISORDER, RECURRENT EPISODE, MODERATE (H): Primary | ICD-10-CM

## 2024-09-23 DIAGNOSIS — F17.200 NICOTINE DEPENDENCE: Primary | ICD-10-CM

## 2024-09-23 LAB — CREAT UR-MCNC: 83 MG/DL

## 2024-09-23 PROCEDURE — H2035 A/D TX PROGRAM, PER HOUR: HCPCS | Mod: HQ

## 2024-09-23 PROCEDURE — 80360 METHYLPHENIDATE: CPT | Performed by: FAMILY MEDICINE

## 2024-09-23 PROCEDURE — 80367 DRUG SCREENING PROPOXYPHENE: CPT | Performed by: FAMILY MEDICINE

## 2024-09-23 PROCEDURE — 1002N00001 HC LODGING PLUS FACILITY CHARGE ADULT

## 2024-09-23 PROCEDURE — H2035 A/D TX PROGRAM, PER HOUR: HCPCS

## 2024-09-23 RX ORDER — IBUPROFEN 200 MG
600 TABLET ORAL EVERY 6 HOURS PRN
COMMUNITY

## 2024-09-23 RX ORDER — OLANZAPINE 10 MG/1
10 TABLET ORAL AT BEDTIME
COMMUNITY
End: 2024-09-24

## 2024-09-23 RX ORDER — DICYCLOMINE HYDROCHLORIDE 10 MG/1
10 CAPSULE ORAL
COMMUNITY

## 2024-09-23 RX ORDER — SERTRALINE HYDROCHLORIDE 100 MG/1
100 TABLET, FILM COATED ORAL
COMMUNITY

## 2024-09-23 RX ORDER — CYCLOBENZAPRINE HCL 5 MG
5 TABLET ORAL
COMMUNITY

## 2024-09-23 RX ORDER — VITAMIN B COMPLEX
1 TABLET ORAL DAILY
COMMUNITY

## 2024-09-23 RX ORDER — MAGNESIUM HYDROXIDE/ALUMINUM HYDROXICE/SIMETHICONE 120; 1200; 1200 MG/30ML; MG/30ML; MG/30ML
30 SUSPENSION ORAL EVERY 6 HOURS PRN
COMMUNITY

## 2024-09-23 RX ORDER — GUAIFENESIN/DEXTROMETHORPHAN 100-10MG/5
10 SYRUP ORAL EVERY 4 HOURS PRN
COMMUNITY

## 2024-09-23 RX ORDER — AMOXICILLIN 250 MG
2 CAPSULE ORAL DAILY PRN
COMMUNITY

## 2024-09-23 RX ORDER — PANTOPRAZOLE SODIUM 40 MG/1
40 TABLET, DELAYED RELEASE ORAL DAILY
COMMUNITY

## 2024-09-23 RX ORDER — ACETAMINOPHEN 500 MG
500-1000 TABLET ORAL EVERY 8 HOURS PRN
COMMUNITY

## 2024-09-23 RX ORDER — GABAPENTIN 300 MG/1
900 CAPSULE ORAL 3 TIMES DAILY
COMMUNITY

## 2024-09-23 RX ORDER — HYDROXYZINE PAMOATE 25 MG/1
25 CAPSULE ORAL 3 TIMES DAILY PRN
COMMUNITY

## 2024-09-23 RX ORDER — LORATADINE 10 MG/1
10 TABLET ORAL DAILY PRN
COMMUNITY

## 2024-09-23 ASSESSMENT — PATIENT HEALTH QUESTIONNAIRE - PHQ9: SUM OF ALL RESPONSES TO PHQ QUESTIONS 1-9: 17

## 2024-09-23 ASSESSMENT — ANXIETY QUESTIONNAIRES
7. FEELING AFRAID AS IF SOMETHING AWFUL MIGHT HAPPEN: MORE THAN HALF THE DAYS
GAD7 TOTAL SCORE: 15
4. TROUBLE RELAXING: MORE THAN HALF THE DAYS
5. BEING SO RESTLESS THAT IT IS HARD TO SIT STILL: MORE THAN HALF THE DAYS
3. WORRYING TOO MUCH ABOUT DIFFERENT THINGS: MORE THAN HALF THE DAYS
1. FEELING NERVOUS, ANXIOUS, OR ON EDGE: MORE THAN HALF THE DAYS
2. NOT BEING ABLE TO STOP OR CONTROL WORRYING: MORE THAN HALF THE DAYS
GAD7 TOTAL SCORE: 15
6. BECOMING EASILY ANNOYED OR IRRITABLE: NEARLY EVERY DAY

## 2024-09-23 NOTE — GROUP NOTE
Group Therapy Documentation    PATIENT'S NAME: Domenico Wilder  MRN:   [id number]  :   1990  ACCT. NUMBER: [id number]  DATE OF SERVICE: 24  START TIME: 1:30 PM  END TIME:  2:30 PM  FACILITATOR(S): Jun Meza LADC  TOPIC: BEH Group Therapy  Number of patients attending the group:  7  Group Length:  1 Hour    Dimensions addressed: 3, 4, 5, and 6    Summary of Group / Topics Discussed:    Recovery Principles, Mindfulness/Relaxation, Coping/DBT informed care, Trauma informed care, Disease of addiction, Emotions/expression, Relapse prevention, and Self-care activities      Group Attendance:  Attended group session    Patient's response to the group topic/interactions:  cooperative with task    Patient appeared to be Attentive and Engaged.        Client specific details:  Domenico participated in afternoon group. After admitting she joined group for the last hour and she took part in a mindfulness activiety.
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
## 2024-09-23 NOTE — PROGRESS NOTES
Initial Services Plan    Before your first treatment group, please do the following    Immediate health & safety concerns: Look for a support network (such as AA, NA, DBT group, a Yarsanism group, etc.)    Suggestions for client during the time between intake & completion of treatment plan:  Tour your treatment center (unit or outpatient clinic).  Introduce yourself to the treatment group.  Spend time getting to know your peers.  Complete your psycho-social paperwork.  Complete the problem list for your treatment plan.  Start drug and alcohol use history.  Review your patient or client handbook.    Client issues to be addressed in the first treatment sessions:  Other Pt denies at this time    STEVE Sr  9/23/2024
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
## 2024-09-23 NOTE — PROGRESS NOTES
Progress Note    This patient had a Full Comprehensive Substance Use Disorder assessment on 9/19/2024 completed by STEVE Crabtree.  This patient was seen for a face to face update of the Full Comprehensive Substance Use Disorder assessment on 9/23/2024 by STEVE Sr.  OUTSIDE: A paper copy of the Full Comprehensive Substance Use Disorder assessment will be placed in the patient's paper chart until being scanned into the patient's electronic medical record in Epic under the Media tab.    Alcohol/Drug use since the last CD evaluation (include date of last use):     No additional substances use since the last CD evaluation     Please note any other clinical changes since the last CD evaluation (such as medication changes, additional legal charges, detoxification admissions, overdoses, etc.)     No significant changes since the last CD evaluation       ASAM Dimensions Original scores Current Scores   I.) Intoxication and Withdrawal: 1 0   II.) Biomedical:  0 0   III.) Emotional and Behavioral:  2 2   IV.) Readiness to Change:  0 0   V.) Relapse Potential: 4 4   VI.) Recovery Environmental: 3 3     Please list clinical justifications for the above ASAM score changes since the original comprehensive assessment:     The patient's score on Dimension I changed from a 1 to a 0.  The patient had not consumed any alcohol or drugs since 9/18/2024 and she does not appear to have any current risk of having significant withdrawal symptoms.        Current AIME: Current UA:     0.000     Positive for Benzodiazepines and THC and negative for all other screened drugs.       PHQ-9, ROMANA-7   PHQ-9 on 9/23/2024 ROMANA-7 on 9/23/2024   The patient's PHQ-9 score was 17 out of 27, indicating moderately severe depression.   The patient's ROMANA-7 score was 15 out of 21, indicating severe anxiety.       [place]-Suicide Severity Rating Scale Reassessment   Have you ever wished you were dead or that you could go to sleep and not wake up?   Past Month:  No     Have you actually had any thoughts of killing yourself?  Past Month:  No     Have you been thinking about how you might do this?     Past Month:  No   Lifetime:  Yes, Describe: Pt reports 1 attempt at 14 years old   Have you had these thoughts and had some intention of acting on them?     Past Month:  No   Lifetime:  Yes, Describe: Pt reports 1 attempt at 14 years old   Have you started to work out the details of how to kill yourself?   Past Month:  No   Lifetime:  Yes, Describe: Pt reports 1 attempt at 14 years old   Do you intend to carry out this plan?   No     When you have the thoughts how long do they last?  Fleeting - few seconds or minutes     Are there things - anyone or anything (i.e. family, Sabianism, pain of death) that stopped you from wanting to die or acting on thoughts of suicide?  Uncertain that protective factors stopped you       2008  [employer] [facility]  Used with permission by Lynne Chaudhry, PhD.       Guide to C-SSRS Risk Ratings   NO IDEATION:  with no active thoughts IDEATION: with a wish to die. IDEATION: with active thoughts. Risk Ratings   If Yes No No 0 - Very Low Risk   If NA Yes No 1 - Low Risk   If NA Yes Yes 2 - Low/moderate risk   IDEATION: associated thoughts of methods without intent or plan INTENT: Intent to follow through on suicide PLAN: Plan to follow through on suicide Risk Ratings cont...   If Yes No No 3 - Moderate Risk   If Yes Yes No 4 - High Risk   If Yes Yes Yes 5 - High Risk   The patient's ADDITIONAL RISK FACTORS and lack of PROTECTIVE FACTORS may increase their overall suicide risk ratings.     Additional Risk Factors:   Someone close to the patient (family member/friend) completed a suicide    Significant history of having untreated or poorly treated mental health symptoms    Significant history of untreated or poorly treated chronic pain issues    Tendency to be socially isolated and/or cut off from the support of  "others    Significant history of trauma and/or abuse issues    History of impulsive or aggressive behaviors   Protective Factors:   Having people in his/her life that would prevent the patient from considering a suicide attempt (i.e. young children, spouse, parents, etc.)    Having pet(s) that give companionship and/or would prevent the patient from considering a suicide attempt    An absence of chronic health problems or stable and well treated chronic health issues    A positive relationship with his/her clinical medical and/or mental health providers    Having easy access to supportive family members    No significant protective factors     Risk Status   1. - Low Risk: Evaluation Counselors:  Document in Epic / SBAR to counselor \"Low Risk\".      Treatment Counselors:  Reassess upon admission as applicable, assess weekly in progress notes under Dimension 3 and summarize in Discharge / Treatment summary under Dimension 3.     Additional information to support suicide risk rating: There was no additional information to provide at this time.      "

## 2024-09-23 NOTE — TELEPHONE ENCOUNTER
Dr Bhatia,  This pt is admitting to  today and she missed her Psychiatry appt with [facility] /  on 9/20/24,because she was in detox for AUD.   Pt was prescribed the following while in hospital on 8/19/24      Pt has been taking this medication as prescribed and even while at [facility].  Pt takes this medication at night time.  She is currently out.    I attempted to make an appt for her today and the soonest I can get her into [facility] is on 10/16/24.  (See below)        Please advise     Lamar Gatica RN    [facility]  Nurse Liaison / CD [facility] ([facility])  [facility] [address]  O:[phone]  F:[phone]  [place] [id number]  LP After hours(UC):[phone]  LP admin counselor:[phone]  CD assess:[phone]

## 2024-09-23 NOTE — PROGRESS NOTES
Name: Domenico Wilder  Date: 9/23/2024  Medical Record: [id number]    Envelope Number: [id number]    List of Contents (List each item separately in new row):    Vitamin B-1 100 mg/ Multivitamin tabs/ Dicyclomine 10 mg  Admission:  I am responsible for any personal items that are not sent to the safe or pharmacy.  [facility] is not responsible for loss, theft or damage of any property in my possession.    Patient Signature:  ___________________________________________       Date/Time:__________________________    Staff Signature: __________________________________       Date/Time:__________________________    [facility] Staff person, if patient is unable/unwilling to sign:      __________________________________________________________       Date/Time: __________________________    **All medications are packaged by LP staff and securely stored on [employer] plus. Medications left by patients at discharge will be packaged by LP staff and transported by LP staff to inpatient pharmacy for storage.**    Discharge:  [facility] has returned all of my personal belongings:    Patient Signature: ________________________________________     Date/Time: ____________________________________    Staff Signature: ______________________________________     Date/Time:_____________________________________

## 2024-09-24 ENCOUNTER — HOSPITAL ENCOUNTER (OUTPATIENT)
Dept: BEHAVIORAL HEALTH | Facility: CLINIC | Age: 34
Discharge: HOME | End: 2024-09-24
Attending: FAMILY MEDICINE
Payer: COMMERCIAL

## 2024-09-24 PROCEDURE — 1002N00001 HC LODGING PLUS FACILITY CHARGE ADULT

## 2024-09-24 PROCEDURE — H2035 A/D TX PROGRAM, PER HOUR: HCPCS | Mod: HQ

## 2024-09-24 RX ORDER — OLANZAPINE 10 MG/1
10 TABLET ORAL AT BEDTIME
Qty: 30 TABLET | Refills: 0 | Status: SHIPPED | OUTPATIENT
Start: 2024-09-24

## 2024-09-24 NOTE — GROUP NOTE
Group Therapy Documentation    PATIENT'S NAME: Domenico Wilder  MRN:   [id number]  :   1990  ACCT. NUMBER: [id number]  DATE OF SERVICE: 24  START TIME:  9:00 AM  END TIME: 11:00 AM  FACILITATOR(S): Jun Meza LADC  TOPIC: BEH Group Therapy  Number of patients attending the group:  4  Group Length:  2 Hours    Dimensions addressed: 3, 4, 5, and 6    Summary of Group / Topics Discussed:    Recovery Principles, Cognitive behavioral therapy skills, Mindfulness/Relaxation, Coping/DBT informed care, Trauma informed care, Disease of addiction, Emotions/expression, Relapse prevention, and Self-care activities      Group Attendance:  Attended group session    Patient's response to the group topic/interactions:  cooperative with task    Patient appeared to be Attentive and Engaged.        Client specific details:  Domenico participated in morning group. She checked in and took part in the graduation of one of her peer's. For the second half of group she listened to and gave positive feedback on her peer's assignment.
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
## 2024-09-24 NOTE — TELEPHONE ENCOUNTER
Thank you for rescheduling her appt with [facility].      Rx sent    1. Major depressive disorder, recurrent episode, moderate (H)    - OLANZapine (ZYPREXA) 10 MG tablet; Take 1 tablet (10 mg) by mouth at bedtime.  Dispense: 30 tablet; Refill: 0

## 2024-09-24 NOTE — PROGRESS NOTES
Name: Domenico Wilder  Date: 9/24/2024  Medical Record: [id number]    Envelope Number: [id number]    List of Contents (List each item separately in new row):   Cell Phone      Admission:  I am responsible for any personal items that are not sent to the safe or pharmacy.  [facility] is not responsible for loss, theft or damage of any property in my possession.      Patient Signature:  ___________________________________________       Date/Time:__________________________    Staff Signature: __________________________________       Date/Time:__________________________    Discharge:  [facility] has returned all of my personal belongings:    Patient Signature: ________________________________________     Date/Time: ____________________________________    Staff Signature: ______________________________________     Date/Time:_____________________________________    [FreeTextEntry1] : Patient presents today for CPE.\par   [de-identified] : Patient has been in good overall health this past year.  She has mildly elevated cholesterol -declines medication treatment.  Her Cardiac Calcium score test last year 10/2023 was 1.

## 2024-09-24 NOTE — PROGRESS NOTES
Writer reiterated that she could do telehealth while in treatment. Gave her information to call her therapist and help her set-up an appointment.   STEVE Marie on 9/24/2024 at 12:11 PM

## 2024-09-24 NOTE — GROUP NOTE
Group Therapy Documentation    PATIENT'S NAME: Domenico Wilder  MRN:   [id number]  :   1990  ACCT. NUMBER: [id number]  DATE OF SERVICE: 24  START TIME:  3:00 PM  END TIME:  4:00 PM  FACILITATOR(S): Jun Meza [facility]; Rosa M Orr LAD; Dc Cope [facility];  SCOPE CYSTOSCOPE FLEXIBLE CYF-5 #[id number]  TOPIC: BEH Group Therapy  Number of patients attending the group:  23  Group Length:  2 Hours    Dimensions addressed: 3, 4, and 5    Summary of Group / Topics Discussed:    Coping/DBT informed care      Group participants viewed a lecture presented by counseling staff regarding DBT skills. Participants provided feedback throughout group session.       Group Attendance:  Attended group session    Patient's response to the group topic/interactions:  cooperative with task    Patient appeared to be Actively participating.        Client specific details:   Patient actively engaged in group discussion.

## 2024-09-24 NOTE — GROUP NOTE
Group Therapy Documentation    PATIENT'S NAME: Domenico Wilder  MRN:   [id number]  :   1990  ACCT. NUMBER: [id number]  DATE OF SERVICE: 24  START TIME: 12:30 PM  END TIME:  2:30 PM  FACILITATOR(S): Adriana Ibrahim LADC  TOPIC: BEH Group Therapy  Number of patients attending the group:  7  Group Length:  2 Hours    Dimensions addressed: 4    Summary of Group / Topics Discussed:    Recovery Principles    Spiritual Group Therapy consisted of Spiritual Care and addressing Principles that are important in recovery, and wellness of self. Patients and facilitators (Lea & STEVE)  reviewed topics related to sense of self, identity, and relations to others within spirituality/relision/nonspiritual concepts.       Group Attendance:  Attended group session    Patient's response to the group topic/interactions:  cooperative with task    Patient appeared to be Actively participating.        Client specific details:  Domenico Attended small group therapy. Patient engaged in discussion and participated in sharing feedback to their peers.

## 2024-09-25 ENCOUNTER — HOSPITAL ENCOUNTER (OUTPATIENT)
Dept: BEHAVIORAL HEALTH | Facility: CLINIC | Age: 34
Discharge: HOME | End: 2024-09-25
Attending: FAMILY MEDICINE
Payer: COMMERCIAL

## 2024-09-25 LAB
GABAPENTIN UR QL CFM: PRESENT
NORDIAZEPAM UR QL CFM: PRESENT
OXAZEPAM UR QL CFM: PRESENT
TEMAZEPAM UR QL CFM: PRESENT

## 2024-09-25 PROCEDURE — H2035 A/D TX PROGRAM, PER HOUR: HCPCS | Mod: HQ | Performed by: COUNSELOR

## 2024-09-25 PROCEDURE — H2035 A/D TX PROGRAM, PER HOUR: HCPCS | Mod: HQ

## 2024-09-25 PROCEDURE — 1002N00001 HC LODGING PLUS FACILITY CHARGE ADULT

## 2024-09-25 NOTE — GROUP NOTE
"Group Therapy Documentation    PATIENT'S NAME: Domenico Wilder  MRN:   [id number]  :   1990  ACCT. NUMBER: [id number]  DATE OF SERVICE: 24  START TIME:  9:45 AM  END TIME: 11:30 AM  FACILITATOR(S): Caitlyn Santoro LADC; Zeynep Weems; Jamila Sibley  TOPIC: BEH Group Therapy  Number of patients attending the group:  6  Group Length:  2 Hours    Dimensions addressed: 3, 4, 5, and 6    Summary of Group / Topics Discussed:    Recovery Principles, Co-occurring Illness Education and Symptom management, Emotions/expression, Relapse Prevention.     Patients completed daily check ins and the question of the day  Patients discussed life lessons, phrases, quotes that have been helpful in making changes or progress.   Patients engaged in reading and processing daily meditations.    Facilitator: DESTIN Mae provided/reviewed handouts on  The Cycle of Anger  and  Anger Icebergs  and  Anger Warning Signs - Anger Management Skills.\"     Zeynep and patients discussed Treatment Goals and Treatment Options relating to topic.         Group Attendance:  Attended group session    Patient's response to the group topic/interactions:  cooperative with task    Patient appeared to be Engaged.        Client specific details:  Patient shared feeling positive. Patient shared appropriate feedbacks during discussion.    "

## 2024-09-25 NOTE — GROUP NOTE
"Group Therapy Documentation    PATIENT'S NAME: Domenico Wilder  MRN:   [id number]  :   1990  ACCT. NUMBER: [id number]  DATE OF SERVICE: 24  START TIME: 12:30 PM  END TIME:  2:30 PM  FACILITATOR(S): Adriana Ibrahim LADC  TOPIC: BEH Group Therapy  Number of patients attending the group:  6  Group Length:  2 Hours    Dimensions addressed: 4    Summary of Group / Topics Discussed:    Recovery Principles and Emotions/expression    Patients completed daily check ins and the question of the day, \" What is your favorite tv/movie genre?\"     Patients presented assignments addressing relapse prevention plans and identifying triggers and cues and shared feedback.    Patients engaged in reading and processing daily meditations.      Group Attendance:  Attended group session    Patient's response to the group topic/interactions:  cooperative with task    Patient appeared to be Actively participating.        Client specific details:  Domenico Attended small group therapy. Patient engaged in discussion and participated in sharing feedback to their peers.       " Prescription approved per [facility] Refill Protocol.  Princess Atkins RN

## 2024-09-25 NOTE — GROUP NOTE
Group Therapy Documentation    PATIENT'S NAME: Domenico Wilder  MRN:   [id number]  :   1990  ACCT. NUMBER: [id number]  DATE OF SERVICE: 24  START TIME:  8:30 AM  END TIME:  9:30 AM  FACILITATOR(S): Anisah Campbell LADC; Dc Cope LADC  TOPIC: BEH Group Therapy  Number of patients attending the group:  25  Group Length:  1 Hours    Dimensions addressed: 3, 4, 5, and 6    Summary of Group / Topics Discussed:    Sober coping skills, Balanced lifestyle, and Disease of addiction    Patients attended a skills lecture related to the benefits of yoga as a coping strategy to manage symptoms of addiction and to regulate mental health symptoms.       Group Attendance:  Attended group session    Patient's response to the group topic/interactions:  cooperative with task and listened actively    Patient appeared to be Attentive and Engaged.        Client specific details:  Patient appeared actively attentive to today's lecture.

## 2024-09-26 ENCOUNTER — HOSPITAL ENCOUNTER (OUTPATIENT)
Dept: BEHAVIORAL HEALTH | Facility: CLINIC | Age: 34
Discharge: HOME | End: 2024-09-26
Attending: FAMILY MEDICINE
Payer: COMMERCIAL

## 2024-09-26 PROCEDURE — 1002N00001 HC LODGING PLUS FACILITY CHARGE ADULT

## 2024-09-26 PROCEDURE — H2035 A/D TX PROGRAM, PER HOUR: HCPCS | Mod: HQ

## 2024-09-26 NOTE — GROUP NOTE
Group Therapy Documentation    PATIENT'S NAME: Domenico Wilder  MRN:   [id number]  :   1990  ACCT. NUMBER: [id number]  DATE OF SERVICE: 24  START TIME: 12:30 PM  END TIME:  2:30 PM  FACILITATOR(S): Rosa M Orr LADC  TOPIC: BEH Group Therapy  Number of patients attending the group:  6    Group Length:  2 Hours    Dimensions addressed: 3, 4, and 5    Summary of Group / Topics Discussed:    Recovery Principles, Sober coping skills, and Relapse prevention      Group Attendance:  Attended group session    Patient's response to the group topic/interactions:  cooperative with task    Patient appeared to be Actively participating, Attentive, and Engaged.        Client specific details:  Patient attended group session and was attentive and participative.

## 2024-09-26 NOTE — GROUP NOTE
Group Therapy Documentation    PATIENT'S NAME: Domenico Wilder  MRN:   [id number]  :   1990  ACCT. NUMBER: [id number]  DATE OF SERVICE: 24  START TIME:  3:00 PM  END TIME:  4:00 PM  FACILITATOR(S): Kareem Caldwell LADC  TOPIC: BEH Group Therapy  Number of patients attending the group:  6  Group Length:  1 Hours    Dimensions addressed: 1, 2, 3, 4, 5, and 6    Summary of Group / Topics Discussed:    Disease of addiction      Group Attendance:  Attended group session    Patient's response to the group topic/interactions:  cooperative with task    Patient appeared to be Actively participating.        Client specific details:  Domenico participated and interacted appropriately with peers and staff in skills group. No triggers to use noted or discussed.

## 2024-09-26 NOTE — GROUP NOTE
"Group Therapy Documentation    PATIENT'S NAME: Domenico Wilder  MRN:   [id number]  :   1990  ACCT. NUMBER: [id number]  DATE OF SERVICE: 24  START TIME:  9:00 AM  END TIME: 11:00 AM  FACILITATOR(S): Jamila Sibley Kristine  TOPIC: BEH Group Therapy  Number of patients attending the group:  6  Group Length:  2 Hours    Dimensions addressed: 3, 4, and 5    Summary of Group / Topics Discussed:    Mindfulness/Relaxation, Emotions/expression, and Relapse prevention    Initial start of group started with check-ins, feelings, question of the day, and a reading. Group members processed the reading in group as a mindfulness activity. A few assignments were presented and processed through peer feedback in group. Group ended with the serenity prayer.      Group Attendance:  Attended group session    Patient's response to the group topic/interactions:  cooperative with task, discussed personal experience with topic, expressed understanding of topic, gave appropriate feedback to peers, and listened actively    Patient appeared to be Actively participating and Attentive.        Client specific details: Domenico shared feeling \"optimistic,\" but did not elaborate. Pt shared appropriate feedback during the discussion and assignment presentation.    "

## 2024-09-26 NOTE — PROGRESS NOTES
Acknowledgement of Current Treatment Plan - Initial Treatment Plan       INITIAL TREATMENT PLAN:     1. I have met and reviewed my treatment plan with my clinician. I was given my assignments that correlate with treatment plan strategies.    Name Signature/Date   Domenico Wilder     1990    Name of Clinician   Signature/Date   Caitlyn Santoro [facility] Santoro [facility] on 9/26/2024 at 12:07 PM       2. I have completed and turned in my Safety Plan.  I am able to request a hard copy of this plan.  Patient signature/date:      _________________________________________________      3. Last Use Date: 9/18/2024  Patient signature/date:     __________________________________________________

## 2024-09-27 ENCOUNTER — HOSPITAL ENCOUNTER (OUTPATIENT)
Dept: BEHAVIORAL HEALTH | Facility: CLINIC | Age: 34
Discharge: HOME | End: 2024-09-27
Attending: FAMILY MEDICINE
Payer: COMMERCIAL

## 2024-09-27 PROCEDURE — H2035 A/D TX PROGRAM, PER HOUR: HCPCS | Mod: HQ

## 2024-09-27 PROCEDURE — 1002N00001 HC LODGING PLUS FACILITY CHARGE ADULT

## 2024-09-27 NOTE — GROUP NOTE
------------------Group Therapy Documentation    PATIENT'S NAME: Domenico Wilder  MRN:   [id number]  :   1990  [id number]  DATE OF SERVICE: 24  START TIME: 12:30 PM  END TIME:  2:30 PM  FACILITATOR(S): Adriana Ibrahim LADC  TOPIC: BEH Group Therapy  Number of patients attending the group:  6  Group Length:  2 Hours    Dimensions addressed: 4, 5, and 6    Summary of Group / Topics Discussed:    Recovery Principles, Sober coping skills, Disease of addiction, Emotions/expression, and Sober Living      Patients viewed an addiction/mental health based film. This film addressed: addiction as a disease, relationships and addiction, engagement in AA, and evaluating priorities when getting sober.   Patients engaged in a thorough discussion about the film, and shared personal experiences, and how the film helped them process their own life events.       Group Attendance:  Attended group session    Patient's response to the group topic/interactions:  cooperative with task    Patient appeared to be Actively participating.        Client specific details:  Domenico Attended small group therapy. Patient engaged in discussion and participated in sharing feedback to their peers.   .

## 2024-09-27 NOTE — GROUP NOTE
Group Therapy Documentation    PATIENT'S NAME: Domenico Wilder  MRN:   [id number]  :   1990  ACCT. NUMBER: [id number]  DATE OF SERVICE: 24  START TIME:  9:00 AM  END TIME: 11:00 AM  FACILITATOR(S): Caitlyn Santoro LADC  TOPIC: BEH Group Therapy  Number of patients attending the group:  6  Group Length:  2 Hours    Dimensions addressed: 3, 4, 5, and 6    Summary of Group / Topics Discussed:    Relationship/socialization, Mindfulness/Relaxation, Disease of addiction, Emotions/expression, Leisure explorations/use of leisure time, and Self-care activities  Session started check-in questions and check-in with feelings and affirmations. Daily reading and discussion for the day in relation to honesty. Presented and discussed ways to get out of a slump and get motivated again. Latter part was a group art therapeutic activity where patients did a painting with three significant colors of how they are currently feeling with treatment. Session ended with the serenity prayer.       Group Attendance:  Attended group session    Patient's response to the group topic/interactions:  cooperative with task    Patient appeared to be Engaged.        Client specific details:  Domenico shared feeling motivated. Domenico shared appropriate feedbacks during discussion and participated in the group activity.

## 2024-09-28 ENCOUNTER — HOSPITAL ENCOUNTER (OUTPATIENT)
Dept: BEHAVIORAL HEALTH | Facility: CLINIC | Age: 34
Discharge: HOME | End: 2024-09-28
Attending: FAMILY MEDICINE
Payer: COMMERCIAL

## 2024-09-28 PROCEDURE — H2035 A/D TX PROGRAM, PER HOUR: HCPCS | Mod: HQ

## 2024-09-28 PROCEDURE — 1002N00001 HC LODGING PLUS FACILITY CHARGE ADULT

## 2024-09-28 NOTE — GROUP NOTE
Group Therapy Documentation    PATIENT'S NAME: Domenico Wilder  MRN:   [id number]  :   1990  ACCT. NUMBER: [id number]  DATE OF SERVICE: 24  START TIME:  9:00 AM  END TIME: 11:00 AM  FACILITATOR(S): Jun Meza LADC  TOPIC: BEH Group Therapy  Number of patients attending the group:  6  Group Length:  2 Hours    Dimensions addressed: 3, 4, and 5    Summary of Group / Topics Discussed:    Recovery Principles, Relationship/socialization, and Balanced lifestyle      Group Attendance:  Attended group session    Patient's response to the group topic/interactions:  cooperative with task    Patient appeared to be Attentive and Engaged.        Client specific details:  The patient participated in the morning lecture on Recovery Resources and how to find them.

## 2024-09-28 NOTE — GROUP NOTE
Group Therapy Documentation    PATIENT'S NAME: Domenico Wiledr  MRN:   [id number]  :   1990  ACCT. NUMBER: [id number]  DATE OF SERVICE: 24  START TIME: 12:30 PM  END TIME:  2:30 PM  FACILITATOR(S): Kareem Caldwell LADC  TOPIC: BEH Group Therapy  Number of patients attending the group:  2  Group Length:  2 Hours    Dimensions addressed: 1, 2, 3, 4, 5, and 6    Summary of Group / Topics Discussed:    Relationship/socialization      Group Attendance:  Attended group session    Patient's response to the group topic/interactions:  cooperative with task    Patient appeared to be Actively participating.        Client specific details:  Domenico participated and interacted appropriately with peers and staff in PM group. No triggers to use noted or discussed.

## 2024-09-29 ENCOUNTER — HOSPITAL ENCOUNTER (OUTPATIENT)
Dept: BEHAVIORAL HEALTH | Facility: CLINIC | Age: 34
Discharge: HOME | End: 2024-09-29
Attending: FAMILY MEDICINE
Payer: COMMERCIAL

## 2024-09-29 PROCEDURE — H2035 A/D TX PROGRAM, PER HOUR: HCPCS | Mod: HQ

## 2024-09-29 PROCEDURE — 1002N00001 HC LODGING PLUS FACILITY CHARGE ADULT

## 2024-09-29 NOTE — GROUP NOTE
Psychoeducation Group Documentation    PATIENT'S NAME: Domenico Wilder  MRN:   [id number]  :   1990  ACCT. NUMBER: [id number]  DATE OF SERVICE: 24  START TIME:  8:45 AM  END TIME: 10:45 AM  FACILITATOR(S): Jun Meza LADC; Moody Farrell RN  TOPIC: BEH Pyschoeducation  Number of patients attending the group:  6  Group Length:  2 Hours    Skills Group Therapy Type: Daily living/independence skills and Medication education    Summary of Group / Topics Discussed:    Balanced lifestyle skills, Symptom management skills, and Medication management skills        Group Attendance:  Attended group session    Patient's response to the group topic/interactions:  cooperative with task    Patient appeared to be Attentive and Engaged.         Client specific details:  The patient participated in the morning nursing lecture on HIV/AIDS.

## 2024-09-29 NOTE — GROUP NOTE
Group Therapy Documentation    PATIENT'S NAME: Domenico Wilder  MRN:   [id number]  :   1990  ACCT. NUMBER: [id number]  DATE OF SERVICE: 24  START TIME: 12:30 PM  END TIME:  1:30 PM  FACILITATOR(S): Teagan Hunt LADC; Jun Meza LADC  TOPIC: BEH Group Therapy  Number of patients attending the group:  24  Group Length:  1 Hours    Dimensions addressed: 5 and 6    Summary of Group / Topics Discussed:    Sober coping skills and Relationship/socialization    Group Attendance:  Attended group session    Patient's response to the group topic/interactions:  cooperative with task    Patient appeared to be Attentive and Engaged.        Client specific details: Pt listened respectfully to a presentation on power and control vs equity in relationships and asked appropriate questions.

## 2024-09-30 ENCOUNTER — HOSPITAL ENCOUNTER (OUTPATIENT)
Dept: BEHAVIORAL HEALTH | Facility: CLINIC | Age: 34
Discharge: HOME | End: 2024-09-30
Attending: FAMILY MEDICINE
Payer: COMMERCIAL

## 2024-09-30 PROCEDURE — 1002N00001 HC LODGING PLUS FACILITY CHARGE ADULT

## 2024-09-30 PROCEDURE — H2035 A/D TX PROGRAM, PER HOUR: HCPCS | Mod: HQ

## 2024-09-30 NOTE — PROGRESS NOTES
"[facility] Weekly Treatment Plan Review      Date span:  24 to 24    Patient did not have any absences during this time period (list absence dates and reason for absence).     Treatment Plan initiated on: 24.    Dimension1: Acute Intoxication/Withdrawal Potential -   Previous Dimension Ratin  Current Dimension Ratin  Date of Last Use: 24  Any reports of withdrawal symptoms - Yes, \"hot/cold sweats at night\"    Dimension 2: Biomedical Conditions & Complications -   Previous Dimension Ratin  Current Dimension Ratin  Medical Concerns:  None reported.  Current Medications & Medication Changes:  Current Outpatient Medications   Medication Sig Dispense Refill    acetaminophen (TYLENOL) 500 MG tablet Take 500-1,000 mg by mouth every 8 hours as needed for mild pain.      alum & mag hydroxide-simethicone (MAALOX) 200-200-20 MG/5ML SUSP suspension Take 30 mLs by mouth every 6 hours as needed for indigestion.      benzocaine-menthol (CEPACOL) 15-3.6 MG lozenge Place 1 lozenge inside cheek every 2 hours as needed for sore throat.      cyclobenzaprine (FLEXERIL) 5 MG tablet Take 5 mg by mouth. Take 1-2 tabs (5-10mg) by mouth three times daily for muscle spasms      dicyclomine (BENTYL) 10 MG capsule Take 10 mg by mouth. Take 1 capsule by mouth four times daily before meals and at bedtime x4 days      etonogestrel (NEXPLANON) 68 MG IMPL Inject 68 mg Subcutaneous      famotidine (PEPCID) 20 MG tablet Take 20 mg by mouth 2 times daily      gabapentin (NEURONTIN) 600 MG tablet Take 600 mg by mouth. Take 1 and 1/2 tab (900MG) Three times daily. Order from Dr Tana Garcia 24      guaiFENesin-dextromethorphan (ROBITUSSIN DM) 100-10 MG/5ML syrup Take 10 mLs by mouth every 4 hours as needed for cough.      hydrOXYzine los (VISTARIL) 25 MG capsule Take 25 mg by mouth 3 times daily as needed for anxiety.      ibuprofen (ADVIL/MOTRIN) 200 MG tablet Take 600 mg by mouth every 6 hours as needed " for pain.      loratadine (CLARITIN) 10 MG tablet Take 10 mg by mouth daily as needed for allergies.      melatonin 5 MG tablet Take 5 mg by mouth nightly as needed for sleep.      naltrexone (DEPADE/REVIA) 50 MG tablet Take 50 mg by mouth daily      nicotine polacrilex (NICORETTE) 4 MG gum Place 1 each (4 mg) inside cheek as needed. 220 each 1    OLANZapine (ZYPREXA) 10 MG tablet Take 1 tablet (10 mg) by mouth at bedtime. 30 tablet 0    pantoprazole (PROTONIX) 40 MG EC tablet Take 40 mg by mouth daily.      senna-docusate (SENOKOT-S/PERICOLACE) 8.6-50 MG tablet Take 2 tablets by mouth daily as needed for constipation.      sertraline (ZOLOFT) 100 MG tablet Take 100 mg by mouth. Take 1.5 tablets (150mg) by mouth once daily      traZODone (DESYREL) 100 MG tablet Take 100 mg by mouth at bedtime.      Vitamin D3 (CHOLECALCIFEROL) 25 mcg (1000 units) tablet Take 1 tablet by mouth daily.       No current facility-administered medications for this encounter.     Facility-Administered Medications Ordered in Other Encounters   Medication Dose Route Frequency Provider Last Rate Last Admin    Self Administer Medications: Behavioral Services   Does not apply See Admin Instructions Christina Bhatia MD         Medication Prescriber: See chart.  Taking meds as prescribed? Yes  Medication side effects or concerns:  None reported.   Outside medical appointments this week (list provider and reason for visit): None reported.    Narrative: Pt seems fully functioning and seeks medical attention as needed. She attended lecture given by  nurse on HIV/AIDS on 24.     Dimension 3: Emotional/Behavioral Conditions & Complications -   Previous Dimension Ratin  Current Dimension Ratin  PHQ2:       2024     4:00 PM 2018     1:39 PM   PHQ-2 (  [employer])   Q1: Little interest or pleasure in doing things 1 1   Q2: Feeling down, depressed or hopeless 1 1   PHQ-2 Score 2 2      GAD2:       2024     4:00 PM   ROMANA-2  "  Feeling nervous, anxious, or on edge 1   Not being able to stop or control worrying 1   ROMANA-2 Total Score 2     Mental health diagnosis depression and anxiety   Date of last SIB:  Patient denies.  Date of  last SI:  March 2024  Date of last HI: Patient denies.  Behavioral Targets: Follow recommendations of medical provider. Report any alcohol or drug use to counselor and any increase in withdrawal symptoms to nurse. Stabilize and maintain mental health.   Risk factors: The patient lacks relapse prevention, coping, and refusal skills, as well as a sober peer support network. She has dual issues of MI and CD, a tendency to isolate, and a significant history of trauma, abuse, guilt, shame, grief, and loss issues.  Protective factors:  restricted access to lethal means (firearms), dedication to family/friends, safe and stable environment, abstinence from substances, adherence with prescribed medication, agreement to use safety plan, living with other people, structured day, positive social skills, and access to a variety of clinical interventions  Current MH Assignments:  \"Creating New Beliefs\"    Narrative: Current Mental Health symptoms include: \"a little anxiety\" reported. See below for suicide risk assessment. Pt does not endorse thoughts of self-harm or suicide ideation at this time.  She reports that her stress level has decreased; coping skills to manage stress used were \"talking with peers and family, journaling, keeping busy.\" Pt reported that her mood has not significantly changed this past week.    Randolph Suicide Severity Rating Scale (Short Version)      1/7/2020    11:40 AM 9/28/2023     2:05 PM 9/29/2023     8:08 AM 1/2/2024    12:00 PM 3/15/2024     7:05 PM 6/17/2024     2:00 PM 8/10/2024     9:56 PM   Randolph Suicide Severity Rating (Short Version)   Over the past 2 weeks have you felt down, depressed, or hopeless? no yes  no      Over the past 2 weeks have you had thoughts of killing yourself? no " "no  no      Have you ever attempted to kill yourself? no no  no      Q1 Wished to be Dead (Past Month)   no  0-->no 0-->no 0-->no   Q2 Suicidal Thoughts (Past Month)   no  1-->yes 0-->no 0-->no   Q3 Suicidal Thought Method   no  1-->yes     Q4 Suicidal Intent without Specific Plan   no  1-->yes     Q5 Suicide Intent with Specific Plan   no  0-->no     Q6 Suicide Behavior (Lifetime)   no  0-->no 0-->no 0-->no   Level of Risk per Screen   low risk  high risk no risks indicated no risks indicated   Interventions   Monitored via video           Dimension 4: Treatment Acceptance / Resistance -   Previous Dimension Ratin  Current Dimension Ratin  YADY Diagnosis: Alcohol Use Disorder F10.20  Cannabis Use Disorder F12.20  Commitment to tx process/Stage of change- Pt consistently attends and participates in groups and lectures. She appears to be in the contemplative stage of change at this time.  YADY assignments - \"Step One,\" \"Drug Use History\"    Narrative - Pt reports her motivation this week is \"my children, myself, and my family.\" Pt reports that her aftercare plans include outpatient at [facility]. She reports that she has gotten along with peers and staff this week.     Dimension 5: Relapse / Continued Problem Potential -   Previous Dimension Ratin  Current Dimension Ratin  Relapses this week - None  Urges to use - YES, List see below  UA results - positive for BZO, THC    Narrative-  Pt reports craving 5/10, ten being high. She reported not experiencing any specific triggers to use, and used the following coping skill(s): \"hanging out with peers, coloring, playing cards, watching movies.\" Pt participated in the spirituality group, facilitated by [facility] and  counseling staff was present during group.     Dimension 6: Recovery Environment -   Previous Dimension Ratin  Current Dimension Ratin  Family Involvement - n/a  Summarize attendance at family groups and family " "sessions - n/a  Family supportive of treatment?  Yes  Recreational activities - \"playing cards, watching movies.\"    Narrative - Pt is spending free time with same-gender peers and attending at least 3 virtual 12-step meetings weekly while in [place]. She reports having had contact with her spouse, mother and children this week. Pt participated in weekend workshop on relationships and completed all activities.    Progress made on transition planning goals: ISP, tx plan    Justification for Continued Treatment at this Level of Care: Risk ratings indicate continued need for this level of care. Pt has been unable to maintain abstinence from alcohol and drugs while at the outpatient level of care, lacks long-term sober maintenance skills, lacks sober coping skills and has mental health concerns which are exacerbated by substance use.   Treatment coordination activities this week:   none at this time  Need for peer recovery support referral? No    Discharge Planning:  Target Discharge Date/Timeframe:  10-21-24  Med Mgmt Provider/Appt:  ERIC  Ind therapy Provider/Appt:  JAMIED  Family therapy Provider/Appt:  ERIC  Other referrals:  none at this time.    Has vulnerable adult status changed? No    Interdisciplinary Clinical Supervision including: STEVE, RN, and Supervisor/manager    Are Treatment Plan goals/objectives effective? Yes  *If no, list changes to treatment plan:    Are the current goals meeting client's needs? Yes  *If no, list the changes to treatment plan.    Patient Input / Response: Pt contributed to treatment plan review.    *Client agrees with any changes to the treatment plan: N/A  *Client received copy of changes: N/A  *Client is aware of right to access a treatment plan review: Yes    STEVE Guerrero    "

## 2024-09-30 NOTE — GROUP NOTE
Group Therapy Documentation    PATIENT'S NAME: Domenico Wilder  MRN:   [id number]  :   1990  ACCT. NUMBER: [id number]  DATE OF SERVICE: 24  START TIME:  9:00 AM  END TIME: 11:00 AM  FACILITATOR(S): Caitlyn Santoor LADC  TOPIC: BEH Group Therapy  Number of patients attending the group:  6  Group Length:  2 Hours    Dimensions addressed: 3, 4, 5, and 6    Summary of Group / Topics Discussed:    Recovery Principles, Relationship/socialization, Balanced lifestyle, Mindfulness/Relaxation, Emotions/expression, and Relapse prevention  Started the group with a mindfulness activity through guided meditation. Discussed the daily reading from [employer]. Peer presented assignments, processed and shared feedbacks. Ended session with the serenity prayer.      Group Attendance:  Attended group session    Patient's response to the group topic/interactions:  cooperative with task    Patient appeared to be Engaged.        Client specific details:  Patient shared feeling positive. Patient participated in the guided meditation and shared appropriate feedback during discussion.

## 2024-09-30 NOTE — GROUP NOTE
"Group Therapy Documentation    PATIENT'S NAME: Domenico Wilder  MRN:   [id number]  :   1990  ACCT. NUMBER: [id number]  DATE OF SERVICE: 24  START TIME: 12:30 PM  END TIME:  2:30 PM  FACILITATOR(S): Teagan Hunt LADC  TOPIC: BEH Group Therapy  Number of patients attending the group:  6  Group Length:  2 Hours    Dimensions addressed: 4 and 5    Summary of Group / Topics Discussed:    Sober coping skills, Relationship/socialization, and Relapse prevention    Group Attendance:  Attended group session    Patient's response to the group topic/interactions:  cooperative with task    Patient appeared to be Actively participating, Attentive, and Engaged.        Client specific details: Pt offered supportive feedback to her peers on their \"Creating New Beliefs\" and \"Am I Teaching Additive Behaviors?\" assignments, and took part in a group discussion of how to cope with the uncontrollable.  "

## 2024-10-01 ENCOUNTER — HOSPITAL ENCOUNTER (OUTPATIENT)
Dept: BEHAVIORAL HEALTH | Facility: CLINIC | Age: 34
Discharge: HOME | End: 2024-10-01
Attending: FAMILY MEDICINE
Payer: COMMERCIAL

## 2024-10-01 PROCEDURE — H2035 A/D TX PROGRAM, PER HOUR: HCPCS | Mod: HQ

## 2024-10-01 PROCEDURE — 1002N00001 HC LODGING PLUS FACILITY CHARGE ADULT

## 2024-10-01 PROCEDURE — H2035 A/D TX PROGRAM, PER HOUR: HCPCS | Mod: HQ | Performed by: COUNSELOR

## 2024-10-01 NOTE — GROUP NOTE
Group Therapy Documentation    PATIENT'S NAME: Domenico Wilder  MRN:   [id number]  :   1990  ACCT. NUMBER: [id number]  DATE OF SERVICE: 10/01/24  START TIME: 12:30 PM  END TIME:  2:30 PM  FACILITATOR(S): Adriana Ibrahim LADC  TOPIC: BEH Group Therapy  Number of patients attending the group:  7  Group Length:  2 Hours    Dimensions addressed: 4, 5, 6,     Summary of Group / Topics Discussed:    Spiritual Care      Spiritual Group Therapy consisted of Spiritual Care and addressing Principles that are important in recovery, and wellness of self. Patients and facilitators ([place] & STEVE)  reviewed topics related to sense of self, identity, and relations to others within spirituality/relision/nonspiritual concepts.       Group Attendance:  Attended group session    Patient's response to the group topic/interactions:  cooperative with task    Patient appeared to be Actively participating.        Client specific details:  Domenico Attended spiritually focused small group therapy. Patient remained engaged, participated, and explored personal insights of spirituality in their recovery. No other concerns reproted

## 2024-10-01 NOTE — GROUP NOTE
Psychoeducation Group Documentation    PATIENT'S NAME: Domenico Wilder  MRN:   [id number]  :   1990  ACCT. NUMBER: [id number]  DATE OF SERVICE: 10/01/24  START TIME:  3:00 PM  END TIME:  4:00 PM  FACILITATOR(S): Anisha Campbell LADC; SRI MEEKS  TOPIC: BEH Pyschoeducation  Number of patients attending the group:  27  Group Length:  1 Hours    Skills Group Therapy Type: Recovery skills and Healthy behaviors development    Summary of Group / Topics Discussed:    Balanced lifestyle skills, Symptom management skills, and Relapse prevention skills    Patient's received psychoeducation related to research into co-occurring disorders.  [facility] researcher Mathieu presented.          Group Attendance:  Attended group session    Patient's response to the group topic/interactions:  cooperative with task and listened actively    Patient appeared to be Attentive and Engaged.         Client specific details:  Patient presented as active and engaged during this educational lecture.

## 2024-10-01 NOTE — GROUP NOTE
"Group Therapy Documentation    PATIENT'S NAME: Domenico Wilder  MRN:   [id number]  :   1990  ACCT. NUMBER: [id number]  DATE OF SERVICE: 10/01/24  START TIME:  9:00 AM  END TIME: 11:00 AM  FACILITATOR(S): Jamila Sibley; Caitlyn Santoro  TOPIC: BEH Group Therapy  Number of patients attending the group:  6  Group Length:  2 Hours    Dimensions addressed: 3, 4, and 5    Summary of Group / Topics Discussed:    Recovery Principles, Sober coping skills, Relationship/socialization, Mindfulness/Relaxation, Coping/DBT informed care, Disease of addiction, Emotions/expression, and Relapse prevention    Initial start of group started with check-ins, feelings, question of the day, and a guided meditation. Group read the daily mediation and discussed any significance from the reading to their lives and their recovery. The LO PASTRANA DBT skill was integrated into group through filling out a worksheet and processing it with peers. One assignment was presented and processed through peer feedback.Group ended with one graduation and the serenity prayer.      Group Attendance:  Attended group session    Patient's response to the group topic/interactions:  cooperative with task, expressed understanding of topic, gave appropriate feedback to peers, and listened actively    Patient appeared to be Actively participating, Attentive, and Engaged.        Client specific details: Domenico shared feeling \"joyful\" during check-in. Domenico shared appropriate feedback during the reading discussion, as well as the discussion following the assignment presentation at the end of group. Domenico filled out the DEAR MAN worksheet; processed it through peer feedback; and was receptive to the peer feedback.    "

## 2024-10-02 ENCOUNTER — HOSPITAL ENCOUNTER (OUTPATIENT)
Dept: BEHAVIORAL HEALTH | Facility: CLINIC | Age: 34
Discharge: HOME | End: 2024-10-02
Attending: FAMILY MEDICINE
Payer: COMMERCIAL

## 2024-10-02 ENCOUNTER — VIRTUAL VISIT (OUTPATIENT)
Dept: BEHAVIORAL HEALTH | Facility: CLINIC | Age: 34
End: 2024-10-02
Payer: COMMERCIAL

## 2024-10-02 DIAGNOSIS — F33.1 MAJOR DEPRESSIVE DISORDER, RECURRENT, MODERATE (H): Primary | ICD-10-CM

## 2024-10-02 PROCEDURE — H2035 A/D TX PROGRAM, PER HOUR: HCPCS | Mod: HQ

## 2024-10-02 PROCEDURE — 1002N00001 HC LODGING PLUS FACILITY CHARGE ADULT

## 2024-10-02 PROCEDURE — 99207 PR DROP WITH A PROCEDURE: CPT | Mod: 95

## 2024-10-02 ASSESSMENT — ANXIETY QUESTIONNAIRES
1. FEELING NERVOUS, ANXIOUS, OR ON EDGE: MORE THAN HALF THE DAYS
GAD7 TOTAL SCORE: 10
3. WORRYING TOO MUCH ABOUT DIFFERENT THINGS: MORE THAN HALF THE DAYS
6. BECOMING EASILY ANNOYED OR IRRITABLE: SEVERAL DAYS
GAD7 TOTAL SCORE: 10
5. BEING SO RESTLESS THAT IT IS HARD TO SIT STILL: SEVERAL DAYS
2. NOT BEING ABLE TO STOP OR CONTROL WORRYING: MORE THAN HALF THE DAYS
7. FEELING AFRAID AS IF SOMETHING AWFUL MIGHT HAPPEN: SEVERAL DAYS

## 2024-10-02 ASSESSMENT — COLUMBIA-SUICIDE SEVERITY RATING SCALE - C-SSRS
TOTAL  NUMBER OF INTERRUPTED ATTEMPTS SINCE LAST CONTACT: NO
6. HAVE YOU EVER DONE ANYTHING, STARTED TO DO ANYTHING, OR PREPARED TO DO ANYTHING TO END YOUR LIFE?: NO
2. HAVE YOU ACTUALLY HAD ANY THOUGHTS OF KILLING YOURSELF?: NO
TOTAL  NUMBER OF ABORTED OR SELF INTERRUPTED ATTEMPTS SINCE LAST CONTACT: NO
1. SINCE LAST CONTACT, HAVE YOU WISHED YOU WERE DEAD OR WISHED YOU COULD GO TO SLEEP AND NOT WAKE UP?: NO
SUICIDE, SINCE LAST CONTACT: NO
ATTEMPT SINCE LAST CONTACT: NO

## 2024-10-02 ASSESSMENT — PATIENT HEALTH QUESTIONNAIRE - PHQ9
5. POOR APPETITE OR OVEREATING: SEVERAL DAYS
SUM OF ALL RESPONSES TO PHQ QUESTIONS 1-9: 7

## 2024-10-02 NOTE — PROGRESS NOTES
"    [facility]         PATIENT'S NAME: Domenico Wilder  PREFERRED NAME: Domenico  PRONOUNS:       MRN: [id number]  : 1990  ADDRESS: [address] [place], [place] [address]  [facility] [address]  ACCT. NUMBER:  [id number]  DATE OF SERVICE: 10/02/24  START TIME: 8:00 am  END TIME: 9:30 am  PREFERRED PHONE: [phone]  May we leave a program related message: Yes  EMERGENCY CONTACT: was not obtained pt declined .  SERVICE MODALITY:  Video Visit:      Provider verified identity through the following two step process.  Patient provided:  Patient  and Patient address    Telemedicine Visit: The patient's condition can be safely assessed and treated via synchronous audio and visual telemedicine encounter.      Reason for Telemedicine Visit: Patient convenience (e.g. access to timely appointments / distance to available provider)    Originating Site (Patient Location): Patient's home    Distant Site (Provider Location): [facility] [place]    Consent:  The patient/guardian has verbally consented to: the potential risks and benefits of telemedicine (video visit) versus in person care; bill my insurance or make self-payment for services provided; and responsibility for payment of non-covered services.     Patient would like the video invitation sent by:  My Chart    Mode of Communication:  Video Conference via [facility]    Distant Location (Provider):  Off-site    As the provider I attest to compliance with applicable laws and regulations related to telemedicine.    UNIVERSAL ADULT Mental Health DIAGNOSTIC ASSESSMENT    Identifying Information:  Patient is a 34 year old,    individual.  Patient was referred for an assessment by  [facility] program careteam and leadership .  Patient attended the session alone.    Chief Complaint:   The reason for seeking services at this time is: \"depression hx \".  The problem(s) began \"years ago\" .  Pt currently attending " "[facility] program.    Per cart review, pt with a hx of MDD and alcohol use dx, no notable psych hosp hx, with information available in epic ehr at the time of this note.  Pt today presents with depression sxs including anhedonia, sadness, low mood, low motivation, change in appetite, change in sleep, more days than not, in the same 2 week period consistent with MDD, PHQ9 screener appears congruent.  Pt reports anxiety sxs including excessive worry and nervousness.  Pt denies current trauma sxs.  Pt denies current SI, plan, intent, SIB, HI, AH/VH, and/or veena sxs, at this point in time.  CSSR completed. Pt reports hopeful looking forward to seeing family.  Pt reports medication compliance, adequate supports, and denies current substance use.  Pt reports protective factors including:  access to variety of clinical interventions, hopeful, future-oriented, attached by fam/friends, willingness to engage in therapy, committed to sobriety, goal-focused, good listener, has a previous history of therapy, insightful, intelligent, motivated, open to learning, and open to suggestions / feedback.    Patient has attempted to resolve these concerns in the past through individual therapy and medication management, PHP .    Social/Family History:  Patient reported they grew up in   \"grew up in [place] .  They were raised by   both  .  Parents  .  Patient reported that their childhood was \"there was some dysfunction, parents fought a lot\".  Patient described their current relationships with family of origin as \"more stable and meaningful.     The patient describes their cultural background as  none per pt report.  Cultural influences and impact on patient's life structure, values, norms, and healthcare:  .  Contextual influences on patient's health include: Contextual Factors: Individual Factors mental health .    These factors will be addressed in the Preliminary Treatment plan. Patient identified their preferred " "language to be english . Patient reported they does not need the assistance of an  or other support involved in therapy.     Patient reported had no significant delays in developmental tasks.   Patient's highest education level was   graduated high school, some college .  Patient identified the following learning problems: none reported.  Modifications will not be used to assist communication in therapy. \ Patient reports they are  able to understand written materials.    Patient reported the following relationship history partnered.  Patient's current relationship status is  partnered.   Patient identified their sexual orientation as heterosexual .  Patient reported having 3  child(austin). Patient identified  parents, few good friends, spouse, grandma,  as part of their support system.  Patient identified the quality of these relationships as stable and consistent ,  .      Patient's current living/housing situation involves lives with parents and her children .  The immediate members of family and household include  ,me, my parents and my children  ,   and they report that housing is stable.    Patient is currently have a job on a leave of absence .  Patient reports their finances are obtained through employment . Patient  does not identify finances as a current stressor.      Patient reported that they \"breifly years and years ago\"  been involved with the legal system.   . Patient   report being under probation/ parole/ jurisdiction. They are not under any current court jurisdiction. .    Patient's Strengths and Limitations:  Patient identified the following strengths or resources that will help them succeed in treatment: Jewish / Samaritan, magen / spirituality, friends / good social support, family support, insight, motivation, sober support group / recovery support , and work ethic. Things that may interfere with the patient's success in treatment include: none identified.     Assessments:  The " following assessments were completed by patient for this visit:  PHQ9:       10/6/2008     2:30 PM 9/17/2018     1:39 PM 9/23/2024    12:00 PM 10/2/2024     8:06 AM   PHQ-9 SCORE   PHQ-9 Total Score 8      PHQ-9 Total Score  6 17 7     GAD7:       9/17/2018     1:39 PM 9/23/2024    12:00 PM 10/2/2024     8:06 AM   ROMANA-7 SCORE   Total Score 12 15 10     CAGE-AID:       10/2/2024     8:06 AM   CAGE-AID Total Score   Total Score 4     PROMIS 10-Global Health (only subscores and total score):       10/2/2024     8:06 AM   PROMIS-10 Scores Only   Global Physical Health Score 16     [place] Suicide Severity Rating Scale (Short Version)      9/28/2023     2:05 PM 9/29/2023     8:08 AM 1/2/2024    12:00 PM 3/15/2024     7:05 PM 6/17/2024     2:00 PM 8/10/2024     9:56 PM 10/2/2024    12:00 PM   [place] Suicide Severity Rating (Short Version)   Over the past 2 weeks have you felt down, depressed, or hopeless? yes  no       Over the past 2 weeks have you had thoughts of killing yourself? no  no       Have you ever attempted to kill yourself? no  no       Q1 Wished to be Dead (Past Month)  no  0-->no 0-->no 0-->no    Q2 Suicidal Thoughts (Past Month)  no  1-->yes 0-->no 0-->no    Q3 Suicidal Thought Method  no  1-->yes      Q4 Suicidal Intent without Specific Plan  no  1-->yes      Q5 Suicide Intent with Specific Plan  no  0-->no      Q6 Suicide Behavior (Lifetime)  no  0-->no 0-->no 0-->no    Level of Risk per Screen  low risk  high risk no risks indicated no risks indicated    Interventions  Monitored via video        1. Wish to be Dead (Since Last Contact)       N   2. Non-Specific Active Suicidal Thoughts (Since Last Contact)       N   Actual Attempt (Since Last Contact)       N   Has subject engaged in non-suicidal self-injurious behavior? (Since Last Contact)       N   Interrupted Attempts (Since Last Contact)       N   Aborted or Self-Interrupted Attempt (Since Last Contact)       N   Preparatory Acts or Behavior  "(Since Last Contact)       N   Suicide (Since Last Contact)       N   Calculated C-SSRS Risk Score (Since Last Contact)       No Risk Indicated       Personal and Family Medical History:  Patient   report a family history of mental health concerns.  Patient reports family history includes Alcohol/Drug in her mother; Allergies in her mother; Depression in her mother; Diabetes in her maternal grandfather; Prostate Cancer in her paternal grandfather..     Patient does report Mental Health Diagnosis and/or Treatment.  Patient Patient reported the following previous diagnoses which include(s):  depression and anxiety .  Patient reported symptoms began \"years ago\".   Patient has received mental health services in the past:  individual therapy, medication management, PHP .  Psychiatric Hospitalizations: None.  Patient denies a history of civil commitment.  Patient is receiving other mental health services.  These include  medication management .       Patient has had a physical exam to rule out medical causes for current symptoms.  Date of last physical exam was within the past year. Client was encouraged to follow up with PCP if symptoms were to develop. The patient  Anastasiya Cisneros [facility] in [facility] .  Patient reports no current medical concerns.  Patient denies any issues with pain..   There are not significant appetite / nutritional concerns / weight changes.   Patient does not report a history of head injury / trauma / cognitive impairment.      Patient reports current meds as:  PER EHR.  Current Outpatient Medications   Medication Sig Dispense Refill    acetaminophen (TYLENOL) 500 MG tablet Take 500-1,000 mg by mouth every 8 hours as needed for mild pain.      alum & mag hydroxide-simethicone (MAALOX) 200-200-20 MG/5ML SUSP suspension Take 30 mLs by mouth every 6 hours as needed for indigestion.      benzocaine-menthol (CEPACOL) 15-3.6 MG lozenge Place 1 lozenge inside cheek every 2 hours as needed " for sore throat.      cyclobenzaprine (FLEXERIL) 5 MG tablet Take 5 mg by mouth. Take 1-2 tabs (5-10mg) by mouth three times daily for muscle spasms      dicyclomine (BENTYL) 10 MG capsule Take 10 mg by mouth. Take 1 capsule by mouth four times daily before meals and at bedtime x4 days      etonogestrel (NEXPLANON) 68 MG IMPL Inject 68 mg Subcutaneous      famotidine (PEPCID) 20 MG tablet Take 20 mg by mouth 2 times daily      gabapentin (NEURONTIN) 600 MG tablet Take 600 mg by mouth. Take 1 and 1/2 tab (900MG) Three times daily. Order from Dr Tana Garcia 8/19/24      guaiFENesin-dextromethorphan (ROBITUSSIN DM) 100-10 MG/5ML syrup Take 10 mLs by mouth every 4 hours as needed for cough.      hydrOXYzine los (VISTARIL) 25 MG capsule Take 25 mg by mouth 3 times daily as needed for anxiety.      ibuprofen (ADVIL/MOTRIN) 200 MG tablet Take 600 mg by mouth every 6 hours as needed for pain.      loratadine (CLARITIN) 10 MG tablet Take 10 mg by mouth daily as needed for allergies.      melatonin 5 MG tablet Take 5 mg by mouth nightly as needed for sleep.      naltrexone (DEPADE/REVIA) 50 MG tablet Take 50 mg by mouth daily      nicotine polacrilex (NICORETTE) 4 MG gum Place 1 each (4 mg) inside cheek as needed. 220 each 1    OLANZapine (ZYPREXA) 10 MG tablet Take 1 tablet (10 mg) by mouth at bedtime. 30 tablet 0    pantoprazole (PROTONIX) 40 MG EC tablet Take 40 mg by mouth daily.      senna-docusate (SENOKOT-S/PERICOLACE) 8.6-50 MG tablet Take 2 tablets by mouth daily as needed for constipation.      sertraline (ZOLOFT) 100 MG tablet Take 100 mg by mouth. Take 1.5 tablets (150mg) by mouth once daily      traZODone (DESYREL) 100 MG tablet Take 100 mg by mouth at bedtime.      Vitamin D3 (CHOLECALCIFEROL) 25 mcg (1000 units) tablet Take 1 tablet by mouth daily.       No current facility-administered medications for this visit.     Facility-Administered Medications Ordered in Other Visits   Medication Dose Route Frequency  Provider Last Rate Last Admin    Self Administer Medications: Behavioral Services   Does not apply See Admin Instructions Christina Bhatia MD           Medication Adherence:  Patient reports  .  taking prescribed medications as prescribed.    Patient Allergies:  PER EHR.  Allergies   Allergen Reactions    Cats     Dogs        Medical History:  PER EHR.  Past Medical History:   Diagnosis Date    Anxiety     Chickenpox     Depression     Depressive disorder     GERD (gastroesophageal reflux disease)     Substance abuse (H)          Current Mental Status Exam:   Appearance:  Appropriate    Eye Contact:  Good   Psychomotor:  Normal       Gait / station:  no problem  Attitude / Demeanor: Cooperative   Speech      Rate / Production: Normal/ Responsive      Volume:  Normal  volume      Language:  no problems  Mood:   Normal  Affect:   Appropriate    Thought Content: Clear   Thought Process: Coherent       Associations: No loosening of associations  Insight:   Good   Judgment:  Intact   Orientation:  All  Attention/concentration: Good    Substance Use:   Patient did report a family history of substance use concerns; see medical history section for details.  Patient has received chemical dependency treatment in the past at [facility] .  Patient has ever been to detox.      Patient is currently receiving the following services[facility] . Patient reported the following problems as a result of their substance use:   none reported in dropdown box .  Pt currently attending [facility] MICD program, pt denies current substance use.    At this point in time:  Patient denies using alcohol.  Patient denies using tobacco.  Patient denies using cannabis.  Patient denies using caffeine.  Patient reports using/abusing the following substance(s). Patient reported no other substance use.  - Pt currently attending [facility] MICD [facility], pt denies current substance use.    Substance Use: No  symptoms - Pt currently attending [facility] program, pt denies current substance use.    Based on the positive CAGE score and clinical interview there  are not indications of drug or alcohol abuse. - Pt currently attending [facility] program, pt denies current substance use.    Significant Losses / Trauma / Abuse / Neglect Issues:   Patient   did not serve in the .  There are indications or report of significant loss, trauma, abuse or neglect issues related to: are no indications and client denies any losses, trauma, abuse, or neglect concerns.  Concerns for possible neglect are not present.       Safety Assessment:   Patient denies current homicidal ideation and behaviors.  Patient denies current self-injurious ideation and behaviors.    Patient denied risk behaviors associated with substance use.   Patient denies any high risk behaviors associated with mental health symptoms.  Patient reports the following current concerns for their personal safety: None.  Patient reports there  are not firearms in the house.       .    History of Safety Concerns:  Patient denied a history of homicidal ideation.     Patient denied a history of personal safety concerns.    Patient denied a history of assaultive behaviors.    Patient denied a history of sexual assault behaviors.     Patient denied a history of risk behaviors associated with substance use.  Patient denies any history of high risk behaviors associated with mental health symptoms.  Patient reports the following protective factors:    committed to sobriety, goal-focused, good listener, has a previous history of therapy, insightful, intelligent, motivated, open to learning, and open to suggestions / feedback .    Risk Plan:  See Recommendations for Safety and Risk Management Plan    Review of Symptoms per patient report:   Depression: Change in sleep, Change in appetite, and Feeling sad, down, or depressed  Sandi:  No Symptoms  Psychosis: No  "Symptoms  Anxiety: Excessive worry, Social anxiety, and Ruminations  Panic:  Shortness of breath  Post Traumatic Stress Disorder:  No Symptoms   Eating Disorder: No Symptoms  ADD / ADHD:  No symptoms  Conduct Disorder: No symptoms  Autism Spectrum Disorder: No symptoms  Obsessive Compulsive Disorder: No Symptoms    Patient reports the following compulsive behaviors and treatment history:  none reported in dropdown box. Would like to address intermittent \"picking when anxious\" .      Diagnostic Criteria:   Major Depressive Disorder  CRITERIA (A-C) REPRESENT A MAJOR DEPRESSIVE EPISODE - SELECT THESE CRITERIA  A) Recurrent episode(s) - symptoms have been present during the same 2-week period and represent a change from previous functioning 5 or more symptoms (required for diagnosis)   - Depressed mood. Note: In children and adolescents, can be irritable mood.     - Diminished interest or pleasure in all, or almost all, activities.    - Significant weight /weight loss.    - sleep changes/ sleep.    - Fatigue or loss of energy.     Unspecified Anxiety, by hx, per chart review.    Functional Status:  Patient reports the following functional impairments:  relationship(s) and self-care.     Nonprogrammatic care:  Patient is requesting basic services to address current mental health concerns.    Clinical Summary:  1. Psychosocial, Cultural and Contextual Factors: mental health  .  2. Principal DSM5 Diagnoses  (Sustained by DSM5 Criteria Listed Above):   296.32 (F33.1) Major Depressive Disorder, Recurrent Episode, Moderate _ and With anxious distress.  3. Other Diagnoses that is relevant to services:   300.00 (F41.9) Unspecified Anxiety Disorder.  4. Provisional Diagnosis:    5. Prognosis: Expect Improvement and Relieve Acute Symptoms.  6. Likely consequences of symptoms if not treated: possible continuation of sxs.  7. Client strengths include:  committed to sobriety, goal-focused, good listener, has a previous history of " therapy, insightful, intelligent, motivated, open to learning, and open to suggestions / feedback .     Recommendations:     1. Plan for Safety and Risk Management:   Safety and Risk: Recommended that patient call 911 or go to the local ED should there be a change in any of these risk factors..        2. Patient's identified magen / Islam / spiritual influences will be incorporated into care by pts personal preferences .     3. Initial Treatment will focus on:    Depressed Mood - anxiety .    4. Resources/Service Plan:    services are not indicated.   Modifications to assist communication are not indicated.   Additional disability accommodations are not indicated.      5. Collaboration:   Collaboration / coordination of treatment will be initiated with the following  support professionals: [facility] program, careteam, staff, dual therapist and leadership.      6.  Referrals:   The following referral(s) will be initiated: pt may benefit from individual therapy, [facility] dual therapist to assist with mental health appt setup in program aftercare plan.       A Release of Information has been obtained for the following: N/A.     Clinical Substantiation/medical necessity for the above recommendations:  The pt is a 34 year old female, with hx of mental health dxs in the past including MDD and Alcohol use dx, per chart review.  The pt has participated in previous tx interventions.  Pt currently attending [facility] MI/CD program.  Pt with hx of mental health sxs/dx alongside YADY dx hx.    7. YADY:    YADY:  Discussed the general effects of drugs and alcohol on health and well-being.     8. [place] Records:   These were reviewed at time of assessment.   Information in this assessment was obtained from the medical record and  provided by patient who is a good historian.    Patient will have open access to their mental health medical record.    9.   Interactive Complexity: No    10. Safety Plan:   "Reviewed safety plan with pt, pt agrees to follow, safety plan in this note and in pts active mychart.  If unable to follow safety plan call 911.  [facility] Safety Plan       Step 1: Warning signs:    Warning Signs    stress    use    overwhelmed    isolating      Step 2: Internal coping strategies - Things I can do to take my mind off my problems without contacting another person:    Strategies    meditation    journal    color    walk    bath      Step 3: People and social settings that provide distraction:    Name Contact Information    My mom Thi up rin phone    911     emergency [facility] Crisis Line:  [phone]        Places    outside nature      Step 4: People whom I can ask for help during a crisis:    Name Contact Information    911     [facility] Crisis Line:  [phone]       Step 5: Professionals or agencies I can contact during a crisis:    Clinician/Agency Name Phone Emergency Contact    911      [facility] Crisis Line:  [phone]        Local Emergency Department Emergency Department Address Emergency Department Phone    911        Suicide Prevention Lifeline Phone: Call or Text 926  Crisis Text Line: Text HOME to 398991     Step 6: Making the environment safer (plan for lethal means safety):      Optional: What is most important to me and worth living for?:   My kids\"     [facility] Safety Plan. Gabriela Aguayo and Troy Aquino. Used with permission of the authors.           **Provider Name/ Credentials:  Karon MANJARREZ, Psychotherapist Trainee  October 2, 2024        "

## 2024-10-02 NOTE — GROUP NOTE
"Group Therapy Documentation    PATIENT'S NAME: Domenico Wilder  MRN:   [id number]  :   1990  ACCT. NUMBER: [id number]  DATE OF SERVICE: 10/02/24  START TIME: 12:30 PM  END TIME:  2:30 PM  FACILITATOR(S): Jamila Sibley; Adriana Ibrahim LADC  TOPIC: BEH Group Therapy  Number of patients attending the group:  6  Group Length:  2 Hours    Dimensions addressed: 2, 3, 4, and 5    Summary of Group / Topics Discussed:    Relationship/socialization, Co-occurring illnesses symptom management, Disease of addiction, Emotions/expression, Relapse prevention, and Self-care activities    Initial start of group started with check-ins, feelings, and question of the day. Group members completed a handout identifying personal needs, and processed why their needs being met is important in their recovery. Group members dicussed how their substance use hindered their personal needs from getting met. Two \"Step One\" assignments were presented and discussed toward the end of group. Group ended with the serenity prayer.      Group Attendance:  Attended group session    Patient's response to the group topic/interactions:  cooperative with task, discussed personal experience with topic, expressed understanding of topic, gave appropriate feedback to peers, and listened actively    Patient appeared to be Actively participating, Attentive, and Engaged.        Client specific details: Domenico shared feeling \"content\" and \"optimistic\" during check-in. Domenico shared appropriate feedback during the \"needs\" handout discussion, as well as the discussion following one of the assignment presentations toward end of group. Domenico presented her \"Step One\" homework and was receptive to peer feedback.    "

## 2024-10-02 NOTE — GROUP NOTE
Group Therapy Documentation    PATIENT'S NAME: Domenico Wilder  MRN:   [id number]  :   1990  ACCT. NUMBER: [id number]  DATE OF SERVICE: 10/02/24  START TIME:  8:30 AM  END TIME:  9:30 AM  FACILITATOR(S): Michelle Meraz LADC; Dc Cope LADC; Bruce Roblero MD  TOPIC: BEH Group Therapy  Number of patients attending the group:  23  Group Length:  1 Hours    Dimensions addressed: 1, 2, and 4    Summary of Group / Topics Discussed:    Recovery Principles, Disease of addiction, and Medication management      Group Attendance:  Attended group session    Patient's response to the group topic/interactions:  cooperative with task    Patient appeared to be Attentive and Engaged.        Client specific details: Domenico was actively listening and engaged with Dr. Roblero's lecture on the disease of addiction

## 2024-10-02 NOTE — GROUP NOTE
"Group Therapy Documentation    PATIENT'S NAME: Domenico Wilder  MRN:   [id number]  :   1990  ACCT. NUMBER: [id number]  DATE OF SERVICE: 10/02/24  START TIME:  9:45 AM  END TIME: 11:30 AM  FACILITATOR(S): Caitlyn Santoro LADC; Zeynep Weems; Jamila Sibley  TOPIC: BEH Group Therapy  Number of patients attending the group:  6  Group Length:  2 Hours    Dimensions addressed: 3, 4, 5, and 6    Summary of Group / Topics Discussed:    Recovery Principles, Co-occurring Illness Education and Symptom management, Emotions/expression, Relapse Prevention.     Informational sheets on DBT skill P.L.E.A.S.E.D, and sleep hygiene Good Sleep for Good Health for discussion were provided.  Patients completed daily check ins and the question of the day  Patients discussed life lessons, phrases, quotes that have been helpful in making changes or progress.   Patients engaged in reading and processing daily meditations.    Facilitator: DESTIN Mae provided/reviewed handouts on  What is Trauma\",\"Little  t  trauma and Big  T  trauma\"\"Common Reactions to Trauma\",and \"Growing stronger from Trauma\"    Zeynep and patients discussed \"Suviving and Growing From Trauma.\"   Patients were able to discuss and ask mental health related questions and get feedback.            Group Attendance:  Attended group session    Patient's response to the group topic/interactions:  cooperative with task    Patient appeared to be Engaged.        Client specific details:  Domenico shared feeling positive. Domenico shared appropriate feedbacks during group activity and discussion.    "

## 2024-10-03 ENCOUNTER — HOSPITAL ENCOUNTER (OUTPATIENT)
Dept: BEHAVIORAL HEALTH | Facility: CLINIC | Age: 34
Discharge: HOME | End: 2024-10-03
Attending: FAMILY MEDICINE
Payer: COMMERCIAL

## 2024-10-03 ENCOUNTER — TELEPHONE (OUTPATIENT)
Dept: BEHAVIORAL HEALTH | Facility: CLINIC | Age: 34
End: 2024-10-03
Payer: COMMERCIAL

## 2024-10-03 PROCEDURE — H2035 A/D TX PROGRAM, PER HOUR: HCPCS | Mod: HQ

## 2024-10-03 PROCEDURE — 1002N00001 HC LODGING PLUS FACILITY CHARGE ADULT

## 2024-10-03 NOTE — TELEPHONE ENCOUNTER
----- Message from Leila EWING sent at 10/3/2024  1:00 PM CDT -----  Regarding: RE: IOP  Her SI is scheduled on Wednesday 10/23 at 3:00pm, she will also have her MD visit virtually on Thursday 10/24 at 3:00pm  ----- Message -----  From: Caitlyn Santoro [facility]  Sent: 10/3/2024  12:38 PM CDT  To: Leila Cantu; Adriana Ibrahim [facility]; #  Subject: IOP                                              IOP [facility]  REFERRAL        A.)  Name:  Domenico FOX)  MRN: [id number]    C.)  Referral is for: Patient is interested in [facility]    D.)  Notes: [facility] discharge on 10/21/2024

## 2024-10-03 NOTE — GROUP NOTE
Psychoeducation Group Documentation    PATIENT'S NAME: Domenico Wilder  MRN:   [id number]  :   1990  ACCT. NUMBER: [id number]  DATE OF SERVICE: 10/03/24  START TIME:  3:00 PM  END TIME:  4:00 PM  FACILITATOR(S): Adriana Ibrahim LADC; Kareem Caldwell LADC  TOPIC: BEH Pyschoeducation  Number of patients attending the group:  26  Group Length:  2 Hours    Skills Group Therapy Type: Recovery skills    Summary of Group / Topics Discussed:    Recovery Recreation    Patients viewed a  video. Ethan De Luna. /.  He speaks about his recovery journey, challenges, and life obstacles.  Patients engaged in a discussion after viewing the video             Group Attendance:  Attended group session    Patient's response to the group topic/interactions:  cooperative with task    Patient appeared to be Actively participating.         Client specific details:  Domenico attended afternoon skills group and participated in processing discussion. Patient remained engaged and participated throughout group session.       STEVE Lazo  .       Fall with Harm Risk

## 2024-10-03 NOTE — GROUP NOTE
"Group Therapy Documentation    PATIENT'S NAME: Domenico Wilder  MRN:   [id number]  :   1990  ACCT. NUMBER: [id number]  DATE OF SERVICE: 10/03/24  START TIME:  9:00 AM  END TIME: 11:00 AM  FACILITATOR(S): Caitlyn Santoro LADC  TOPIC: BEH Group Therapy  Number of patients attending the group:  6  Group Length:  2 Hours    Dimensions addressed: 3, 4, 5, and 6    Summary of Group / Topics Discussed:    Recovery Principles, Relationship/socialization, Balanced lifestyle, Mindfulness/Relaxation, Coping/DBT informed care, Disease of addiction, Emotions/expression, and Relapse prevention  Started the group with check-ins (feeling/s, affirmation and question \"On a scale from 1-10, where are you at in your recovery and what does that number mean to you?\". Assignment presentation, processing and feedbacks. Completed a sober activity worksheet \"I am Poem\". Ended session with serenity prayer.       Group Attendance:  Attended group session    Patient's response to the group topic/interactions:  cooperative with task    Patient appeared to be Engaged.        Client specific details:  Domenico reports feeling energized. Domenico presented her Creating New Beliefs assignment. Domenico shared and received appropriate feedbacks.     "

## 2024-10-04 ENCOUNTER — HOSPITAL ENCOUNTER (OUTPATIENT)
Dept: BEHAVIORAL HEALTH | Facility: CLINIC | Age: 34
Discharge: HOME | End: 2024-10-04
Attending: FAMILY MEDICINE
Payer: COMMERCIAL

## 2024-10-04 PROCEDURE — 1002N00001 HC LODGING PLUS FACILITY CHARGE ADULT

## 2024-10-04 PROCEDURE — H2035 A/D TX PROGRAM, PER HOUR: HCPCS | Mod: HQ

## 2024-10-04 NOTE — PROGRESS NOTES
Name: Domenico Wilder  Date: 10/4/2024  Medical Record: [id number]  Envelope Number: [id number]  List of Contents (List each item separately in new row):     Gabapentin 600 mg tabs    Admission:  I am responsible for any personal items that are not sent to the safe or pharmacy.  [facility] is not responsible for loss, theft or damage of any property in my possession.    Patient Signature:  ___________________________________________       Date/Time:__________________________    Staff Signature: __________________________________       Date/Time:__________________________    [facility] Staff person, if patient is unable/unwilling to sign:      __________________________________________________________       Date/Time: __________________________    **All medications are packaged by [facility] staff and securely stored on [facility]. Medications left by patients at discharge will be packaged by [facility] staff and transported by [facility] staff to inpatient pharmacy for storage.**    Discharge:  [facility] has returned all of my personal belongings:    Patient Signature: ________________________________________     Date/Time: ____________________________________    Staff Signature: ______________________________________     Date/Time:_____________________________________

## 2024-10-05 ENCOUNTER — HOSPITAL ENCOUNTER (OUTPATIENT)
Dept: BEHAVIORAL HEALTH | Facility: CLINIC | Age: 34
Discharge: HOME | End: 2024-10-05
Attending: FAMILY MEDICINE
Payer: COMMERCIAL

## 2024-10-05 PROCEDURE — H2035 A/D TX PROGRAM, PER HOUR: HCPCS | Mod: HQ

## 2024-10-05 PROCEDURE — 1002N00001 HC LODGING PLUS FACILITY CHARGE ADULT

## 2024-10-05 NOTE — GROUP NOTE
Group Therapy Documentation    PATIENT'S NAME: Domenico Wilder  MRN:   [id number]  :   1990  ACCT. NUMBER: [id number]  DATE OF SERVICE: 10/05/24  START TIME: 12:30 PM  END TIME:  2:30 PM  FACILITATOR(S): Michelle Meraz LADC; Caitlyn Santoro LADC  TOPIC: BEH Group Therapy  Number of patients attending the group:  24  Group Length:  2 Hours    Dimensions addressed: 3, 4, 5, and 6    Summary of Group / Topics Discussed:    Identifying individuals in sober support network that are supportive, neutral, and destructive. Identifying barriers to asking for help as well as how to verbalize needs. Socialization activity to encourage development of social skills in recovery and connect with treatment peers.      Group Attendance:  Attended group session    Patient's response to the group topic/interactions:  cooperative with task    Patient appeared to be Actively participating, Attentive, and Engaged.        Client specific details: Domenico was an active participant in weekend Relationships workshop on building a sober support network.

## 2024-10-05 NOTE — GROUP NOTE
"Group Therapy Documentation    PATIENT'S NAME: Domenico Wilder  MRN:   [id number]  :   1990  ACCT. NUMBER: [id number]  DATE OF SERVICE: 10/05/24  START TIME:  9:00 AM  END TIME: 11:00 AM  FACILITATOR(S): Caitlyn Santoro LADC  TOPIC: BEH Group Therapy  Number of patients attending the group:  24  Group Length:  2 Hours    Dimensions addressed: 3, 4, 5, and 6    Summary of Group / Topics Discussed:    Recovery Principles, Relationship/socialization, and Emotions/expression  A Eric talk video was presented and processed: \"What Porcupines Teach Us About Boundaries\". Through the group activity True colors Word Sort, patients discussed and shared which was their primary color identifying each color's characteristics, needs, liabilities and stressors. Continued the discussion on setting healthy boundaries. Presented an episode of \"mom's\" and discussed on significant scenes in relation to patient's recovery. Ended with serenity prayer.      Group Attendance:  Attended group session    Patient's response to the group topic/interactions:  cooperative with task    Patient appeared to be Engaged.        Client specific details:  Domenico attended AM group session. Domenico participated in the group activity. Domenico shared appropriate feedbacks.    "

## 2024-10-06 ENCOUNTER — HOSPITAL ENCOUNTER (OUTPATIENT)
Dept: BEHAVIORAL HEALTH | Facility: CLINIC | Age: 34
Discharge: HOME | End: 2024-10-06
Attending: FAMILY MEDICINE
Payer: COMMERCIAL

## 2024-10-06 PROCEDURE — H2035 A/D TX PROGRAM, PER HOUR: HCPCS | Mod: HQ

## 2024-10-06 PROCEDURE — 1002N00001 HC LODGING PLUS FACILITY CHARGE ADULT

## 2024-10-06 NOTE — GROUP NOTE
Group Therapy Documentation    PATIENT'S NAME: Domenico Wilder  MRN:   [id number]  :   1990  ACCT. NUMBER: [id number]  DATE OF SERVICE: 10/06/24  START TIME: 12:30 PM  END TIME:  2:30 PM  FACILITATOR(S): Dc Cope LADC  TOPIC: BEH Group Therapy  Number of patients attending the group:  24  Group Length:  2 Hours    Dimensions addressed: 3, 4, and 5    Summary of Group / Topics Discussed:    Sober coping skills, Relationship/socialization, Relapse prevention, and Self-care activities      Group lecture was presented counseling staff regarding anger and resentment. Participant completed a question/answer activity and provided feedback.       Group Attendance:  Attended group session    Patient's response to the group topic/interactions:  cooperative with task    Patient appeared to be Actively participating.        Client specific details:  Patient actively engaged in group discussion.

## 2024-10-06 NOTE — GROUP NOTE
Group Therapy Documentation    PATIENT'S NAME: Domenico Wilder  MRN:   [id number]  :   1990  ACCT. NUMBER: [id number]  DATE OF SERVICE: 10/06/24  START TIME:  8:45 AM  END TIME: 10:30 AM  FACILITATOR(S): Michelle Meraz LADC; Lamar Gatica RN; Dc Cope LADC  TOPIC: BEH Group Therapy  Number of patients attending the group:  24  Group Length:  2 Hours    Dimensions addressed: 2 and 4    Summary of Group / Topics Discussed:    Hepatitis C      Group Attendance:  Attended group session    Patient's response to the group topic/interactions:  cooperative with task    Patient appeared to be Attentive and Engaged.        Client specific details: Domenico was an active participant in RN lecture on Hepatitis C.

## 2024-10-07 ENCOUNTER — HOSPITAL ENCOUNTER (OUTPATIENT)
Dept: BEHAVIORAL HEALTH | Facility: CLINIC | Age: 34
Discharge: HOME | End: 2024-10-07
Attending: FAMILY MEDICINE
Payer: COMMERCIAL

## 2024-10-07 PROCEDURE — H2035 A/D TX PROGRAM, PER HOUR: HCPCS | Mod: HQ

## 2024-10-07 PROCEDURE — 1002N00001 HC LODGING PLUS FACILITY CHARGE ADULT

## 2024-10-07 NOTE — GROUP NOTE
"Group Therapy Documentation    PATIENT'S NAME: Domenico Wilder  MRN:   [id number]  :   1990  ACCT. NUMBER: [id number]  DATE OF SERVICE: 10/07/24  START TIME: 12:30 PM  END TIME:  2:30 PM  FACILITATOR(S): Adriana Ibrahim LADC  TOPIC: BEH Group Therapy  Number of patients attending the group:  4  Group Length:  2 Hours    Dimensions addressed: 4 and 5    Summary of Group / Topics Discussed:    Balanced lifestyle, Disease of addiction, Relapse prevention, and Goal Setting      Patients completed daily check ins and the question of the day, \" What do you enjoy about fall?\"    Patients presented assignments and shared feedback.    Patients engaged in reading and processing daily meditations.     Patients engaged in filling out a goals handout, discussed setting goals and the importance of breaking them down to be attainable.       Group Attendance:  Attended group session    Patient's response to the group topic/interactions:  cooperative with task    Patient appeared to be Actively participating.        Client specific details:  Nitesh Attended small group therapy. Patient engaged in discussion and participated in sharing feedback to their peers.   .    Nitesh shared her goal of wanting to go back to school and needing help with financial assistance. She was able to express to peers and gather resources.     "

## 2024-10-07 NOTE — GROUP NOTE
"Group Therapy Documentation    PATIENT'S NAME: Domenico Wilder  MRN:   [id number]  :   1990  ACCT. NUMBER: [id number]  DATE OF SERVICE: 10/07/24  START TIME:  9:00 AM  END TIME: 11:00 AM  FACILITATOR(S): Caitlyn Santoro LADC  TOPIC: BEH Group Therapy  Number of patients attending the group:  4  Group Length:  2 Hours    Dimensions addressed: 3, 4, 5, and 6    Summary of Group / Topics Discussed:    Recovery Principles, Mindfulness/Relaxation, Coping/DBT informed care, and Emotions/expression  Started the group with a guided meditation and reflection reading. Presentation and processing of assignments. Discussion and worksheet on \"Automatic Negative Thoughts\", identifying irrational thoughts and sharing how to change theses thoughts. Ended with the serenity prayer.      Group Attendance:  Attended group session    Patient's response to the group topic/interactions:  cooperative with task    Patient appeared to be Engaged.        Client specific details:  Domenico attended AM group therapy. Domenico shared appropriate feedbacks and engaged in group activity.    "

## 2024-10-08 ENCOUNTER — HOSPITAL ENCOUNTER (OUTPATIENT)
Dept: BEHAVIORAL HEALTH | Facility: CLINIC | Age: 34
Discharge: HOME | End: 2024-10-08
Attending: FAMILY MEDICINE
Payer: COMMERCIAL

## 2024-10-08 PROCEDURE — H2035 A/D TX PROGRAM, PER HOUR: HCPCS | Mod: HQ

## 2024-10-08 PROCEDURE — 1002N00001 HC LODGING PLUS FACILITY CHARGE ADULT

## 2024-10-08 NOTE — GROUP NOTE
Group Therapy Documentation    PATIENT'S NAME: Domenico Wilder  MRN:   [id number]  :   1990  ACCT. NUMBER: [id number]  DATE OF SERVICE: 10/08/24  START TIME:  9:00 AM  END TIME: 11:00 AM  FACILITATOR(S): Caitlyn Santoro LADC  TOPIC: BEH Group Therapy  Number of patients attending the group:  4  Group Length:  2 Hours    Dimensions addressed: 3, 4, 5, and 6    Summary of Group / Topics Discussed:    Recovery Principles, Relationship/socialization, Mindfulness/Relaxation, Emotions/expression, and Leisure explorations/use of leisure time  Check-in question and feeling sharing. Assignment processing and feedbacks. Family roles discussion and worksheet activity.       Group Attendance:  Attended group session    Patient's response to the group topic/interactions:  cooperative with task    Patient appeared to be Engaged.        Client specific details:  Domenico shared feeling positive. Domenico shared and processed her grief and loss assignment. Domenico shared and received appropriate feedbacks. Domenico participated in discussion and group activity.

## 2024-10-08 NOTE — PROGRESS NOTES
[facility] Weekly Treatment Plan Review    Treatment plan review for the following date span:  9/30/2024-10/6/2024    ATTENDANCE  Patient did not have any absences during this time period (list absence dates and reason for absence).        Weekly Treatment Plan Review     Treatment Plan initiated on: 9/26/2024    Dimension1: Acute Intoxication/Withdrawal Potential -   Date of Last Use 9/18/2024  Any reports of withdrawal symptoms - No. Patient denies any withdrawal symptoms during this date span.        Dimension 2: Biomedical Conditions & Complications -   Medical Concerns:  Patient denies medical concerns.   Vitals:   BP Readings from Last 3 Encounters:   08/10/24 (!) 128/91   06/18/24 111/83   03/15/24 (!) 148/99     Pulse Readings from Last 3 Encounters:   08/10/24 79   06/18/24 80   03/15/24 111     Current Medications & Medication Changes:  Current Outpatient Medications   Medication Sig Dispense Refill    acetaminophen (TYLENOL) 500 MG tablet Take 500-1,000 mg by mouth every 8 hours as needed for mild pain.      alum & mag hydroxide-simethicone (MAALOX) 200-200-20 MG/5ML SUSP suspension Take 30 mLs by mouth every 6 hours as needed for indigestion.      benzocaine-menthol (CEPACOL) 15-3.6 MG lozenge Place 1 lozenge inside cheek every 2 hours as needed for sore throat.      cyclobenzaprine (FLEXERIL) 5 MG tablet Take 5 mg by mouth. Take 1-2 tabs (5-10mg) by mouth three times daily for muscle spasms      dicyclomine (BENTYL) 10 MG capsule Take 10 mg by mouth. Take 1 capsule by mouth four times daily before meals and at bedtime x4 days      etonogestrel (NEXPLANON) 68 MG IMPL Inject 68 mg Subcutaneous      famotidine (PEPCID) 20 MG tablet Take 20 mg by mouth 2 times daily      gabapentin (NEURONTIN) 300 MG capsule Take 900 mg by mouth 3 times daily. Take three caps (900MG) Three times daily. Order from Dr Tana Garcia 8/19/24      guaiFENesin-dextromethorphan (ROBITUSSIN DM) 100-10 MG/5ML syrup Take 10 mLs by  mouth every 4 hours as needed for cough.      hydrOXYzine los (VISTARIL) 25 MG capsule Take 25 mg by mouth 3 times daily as needed for anxiety.      ibuprofen (ADVIL/MOTRIN) 200 MG tablet Take 600 mg by mouth every 6 hours as needed for pain.      loratadine (CLARITIN) 10 MG tablet Take 10 mg by mouth daily as needed for allergies.      melatonin 5 MG tablet Take 5 mg by mouth nightly as needed for sleep.      naltrexone (DEPADE/REVIA) 50 MG tablet Take 50 mg by mouth daily      nicotine polacrilex (NICORETTE) 4 MG gum Place 1 each (4 mg) inside cheek as needed. 220 each 1    OLANZapine (ZYPREXA) 10 MG tablet Take 1 tablet (10 mg) by mouth at bedtime. 30 tablet 0    pantoprazole (PROTONIX) 40 MG EC tablet Take 40 mg by mouth daily.      senna-docusate (SENOKOT-S/PERICOLACE) 8.6-50 MG tablet Take 2 tablets by mouth daily as needed for constipation.      sertraline (ZOLOFT) 100 MG tablet Take 100 mg by mouth. Take 1.5 tablets (150mg) by mouth once daily      traZODone (DESYREL) 100 MG tablet Take 100 mg by mouth at bedtime.      Vitamin D3 (CHOLECALCIFEROL) 25 mcg (1000 units) tablet Take 1 tablet by mouth daily.       No current facility-administered medications for this encounter.     Facility-Administered Medications Ordered in Other Encounters   Medication Dose Route Frequency Provider Last Rate Last Admin    Self Administer Medications: Behavioral Services   Does not apply See Admin Instructions Christina Bhatia MD         Taking meds as prescribed? Yes  Medication side effects or concerns:  Patient denies medication concerns during this date span.   Outside medical appointments this week (list provider and reason for visit):  Patient did not have any scheduled appointment during this date span.       Dimension 3: Emotional/Behavioral Conditions & Complications -   Mental health diagnosis: depression and anxiety  Date of last SIB:  Patient denies  Date of  last SI:  March 2024  Date of last HI: Patient  denies.  Behavioral Targets:  Report any changes in risk to staff, reports any use to counselors, reports any increase of withdrawal symptoms to nurses, stabilize and maintain mental health, follow recommendations of medical provider.  Current MH Assignments: creating new beliefs assignment    PHQ2:       10/8/2024     1:00 PM 10/2/2024     8:06 AM 9/30/2024     4:00 PM 9/17/2018     1:39 PM   PHQ-2 ( 1999 [employer])   Q1: Little interest or pleasure in doing things 1 1 1 1   Q2: Feeling down, depressed or hopeless 1 1 1 1   PHQ-2 Score 2 2 2 2      GAD2:       9/30/2024     4:00 PM 10/8/2024     1:00 PM   ROMANA-2   Feeling nervous, anxious, or on edge 1 1   Not being able to stop or control worrying 1 1   ROMANA-2 Total Score 2 2         Additional Narrative:   ROMANA and PHQ updated. Patient has reported having a community therapist and tony continue to see her after treatment.   Pt does not endorse thoughts of self-harm or suicide ideation currently. Patient report that their stress level has not changed this week; coping skills to manage stress used were talking with peers and coloring. Pt reported that their mood has not significantly changed this past week.  Current Mental Health symptoms include: Patient did not report any symptoms during.  Active interventions to stabilize mental health symptoms this week : spirituality group, small group therapy, therapeutic recreational activities, psychoeducational skills group, nursing awareness lecture.       Dimension 4: Treatment Acceptance / Resistance -   YADY Diagnosis:   Alcohol Use Disorder F10.20  Cannabis Use Disorder F12.20  Commitment to tx process/Stage of change- Patient appears to be in the contemplative stage of change  YADY assignments - completed Step One  Additional Narrative - Pt reports their motivation this week: my kids, and my self, want better for us. Pt reports that their aftercare plans includes: IOP, therapy and meetings. Patient report that they have  gotten along with peers and staff this week. Patient engaged in the following recreational activities: playing cards, taking a walk, coloring and watching movies.       Dimension 5: Relapse / Continued Problem Potential -   Relapses this week - None  Urges to use - YES, List did not identify  UA results - UA on 10/2/2024 positive for BZO and THC  Identified triggers - patient did not identify triggers or urges to use.  Coping skills identified - PMR, deep breathing and distraction.  Patient is able to utilize these skills when needed.    Additional Narrative- Pt reports having cravings this past week. Pt reports craving 1 out of 10, ten being high. Patient reported not experiencing any triggers to use but used the following coping skill(s): grounding and breathing techniques. Pt participated in the spirituality group, facilitated by Gin Mejia and  counseling staff was present during group. Patient participated in weekend workshop on relapse prevention and completed all activities    Dimension 6: Recovery Environment -   Family Involvement - my mom, kids, and spouse  Summarize attendance at family groups and family sessions - NA  Family supportive of program/stages?  Yes    Community support group attendance - nightly speaker meetings  Recreational activities - therapeutic recreational activity, playing cards, talking a walk, coloring and watching movies  Additional Narrative - Pt is spending free time with same-gender peers and attending at least 3 virtual 12-step meetings weekly while in . They participated in weekend workshop on relationships and completed all activities.     Progress made on transition planning goals: Patient established aftercare plans and plans to continue therapy after treatment for mental health concerns.     Justification for Continued Treatment at this Level of Care:  multiples attempts of IOP and detox but is unsuccessful, unable to stop on her own despite multiple consequences  to self and others, exacerbated mental health and physical health- co-occurring issues.  Treatment coordination activities this week:   NA  Need for peer recovery support referral? No    Discharge Planning:  Target Discharge Date/Timeframe:  10/21/2024   Med Mgmt Provider/Appt:  TBCARMEL   Ind therapy Provider/Appt:  TBD   Family therapy Provider/Appt:  TBD   Other referrals:  TBD        Dimension Scale Review     Prior ratings: Dim1 - 0 DIM2 - 0 DIM3 - 2 DIM4 - 1 DIM5 - 4 DIM6 -4     Current ratings: Dim1 - 0 DIM2 - 0 DIM3 - 2 DIM4 - 1 DIM5 - 4 DIM6 -4       If client is 18 or older, has vulnerable adult status change? No    Are Treatment Plan goals/objectives effective? Yes  *If no, list changes to treatment plan:    Are the current goals meeting client's needs? Yes  *If no, list the changes to treatment plan.    Client response:  Domenico responded through answering the weekly progress handout sheet.      *Client agrees with any changes to the treatment plan: NA  *Client received copy of changes: NA  *Client is aware of right to access a treatment plan review: YES    STEVE Marie on 10/8/2024 at 1:46 PM

## 2024-10-08 NOTE — GROUP NOTE
Group Therapy Documentation    PATIENT'S NAME: Domenico Wilder  MRN:   [id number]  :   1990  ACCT. NUMBER: [id number]  DATE OF SERVICE: 10/08/24  START TIME: 12:30 PM  END TIME:  2:30 PM  FACILITATOR(S): Adriana Ibrahim LADC  TOPIC: BEH Group Therapy  Number of patients attending the group:  4  Group Length:  2 Hours    Dimensions addressed: 4    Summary of Group / Topics Discussed:    Spiritual Care    Spiritual Group Therapy consisted of Spiritual Care and addressing Principles that are important in recovery, and wellness of self. Patients and facilitators (Dorado & STEVE)  reviewed topics related to sense of self, identity, and relations to others within spirituality/relision/nonspiritual concepts.       Group Attendance:  Attended group session    Patient's response to the group topic/interactions:  cooperative with task    Patient appeared to be Actively participating.        Client specific details:  Domenico Attended spiritually focused small group therapy. Patient remained engaged, participated, and explored personal insights of spirituality in their recovery. No other concerns reproted .

## 2024-10-08 NOTE — GROUP NOTE
"Group Therapy Documentation    PATIENT'S NAME: Domenico Wilder  MRN:   [id number]  :   1990  ACCT. NUMBER: [id number]  DATE OF SERVICE: 10/08/24  START TIME:  3:00 PM  END TIME:  4:00 PM  FACILITATOR(S): Caitlyn Santoro LADC; Jun Meza LADC; Rosa M Orr LADC  TOPIC: BEH Group Therapy  Number of patients attending the group:  24  Group Length:  1 Hours    Dimensions addressed: 3, 5, and 6    Summary of Group / Topics Discussed:    Recovery Principles, Sober coping skills, Relationship/socialization, and Relapse prevention  A Eric talk video was presented \"Wasted: Exposing the Family Effect of Addiction\" ; discussion and feedbacks were given after the video. A group activity was completed on Setting Boundaries, within 4-5 group members.       Group Attendance:  Attended group session    Patient's response to the group topic/interactions:  cooperative with task    Patient appeared to be Engaged.        Client specific details:  Domenico attended PM skills group and shared appropriate feedbacks.    "

## 2024-10-09 ENCOUNTER — HOSPITAL ENCOUNTER (OUTPATIENT)
Dept: BEHAVIORAL HEALTH | Facility: CLINIC | Age: 34
Discharge: HOME | End: 2024-10-09
Attending: FAMILY MEDICINE
Payer: COMMERCIAL

## 2024-10-09 ENCOUNTER — OFFICE VISIT (OUTPATIENT)
Dept: ADDICTION MEDICINE | Facility: CLINIC | Age: 34
End: 2024-10-09
Attending: FAMILY MEDICINE
Payer: COMMERCIAL

## 2024-10-09 VITALS — HEART RATE: 89 BPM | DIASTOLIC BLOOD PRESSURE: 79 MMHG | SYSTOLIC BLOOD PRESSURE: 113 MMHG

## 2024-10-09 DIAGNOSIS — F41.9 ANXIETY: ICD-10-CM

## 2024-10-09 DIAGNOSIS — G47.9 SLEEP DISTURBANCE: ICD-10-CM

## 2024-10-09 DIAGNOSIS — F10.90 ALCOHOL USE DISORDER: Primary | ICD-10-CM

## 2024-10-09 DIAGNOSIS — F33.1 MAJOR DEPRESSIVE DISORDER, RECURRENT, MODERATE (H): ICD-10-CM

## 2024-10-09 DIAGNOSIS — F11.21 OPIOID USE DISORDER, SEVERE, IN SUSTAINED REMISSION (H): ICD-10-CM

## 2024-10-09 LAB
AMPHETAMINE QUAL URINE POCT: NEGATIVE
BARBITURATE QUAL URINE POCT: NEGATIVE
BENZODIAZEPINE QUAL URINE POCT: ABNORMAL
BUPRENORPHINE QUAL URINE POCT: NEGATIVE
COCAINE QUAL URINE POCT: NEGATIVE
CREATININE QUAL URINE POCT: ABNORMAL
INTERNAL QC QUAL URINE POCT: ABNORMAL
MDMA QUAL URINE POCT: NEGATIVE
METHADONE QUAL URINE POCT: NEGATIVE
METHAMPHETAMINE QUAL URINE POCT: NEGATIVE
OPIATE QUAL URINE POCT: NEGATIVE
OXYCODONE QUAL URINE POCT: NEGATIVE
PH QUAL URINE POCT: ABNORMAL
PHENCYCLIDINE QUAL URINE POCT: NEGATIVE
POCT KIT EXPIRATION DATE: ABNORMAL
POCT KIT LOT NUMBER: ABNORMAL
SPECIFIC GRAVITY POCT: 1.01
TEMPERATURE URINE POCT: ABNORMAL
THC QUAL URINE POCT: ABNORMAL

## 2024-10-09 PROCEDURE — G2211 COMPLEX E/M VISIT ADD ON: HCPCS | Performed by: NURSE PRACTITIONER

## 2024-10-09 PROCEDURE — 1002N00001 HC LODGING PLUS FACILITY CHARGE ADULT

## 2024-10-09 PROCEDURE — 80305 DRUG TEST PRSMV DIR OPT OBS: CPT | Performed by: NURSE PRACTITIONER

## 2024-10-09 PROCEDURE — H2035 A/D TX PROGRAM, PER HOUR: HCPCS | Mod: HQ

## 2024-10-09 PROCEDURE — 99204 OFFICE O/P NEW MOD 45 MIN: CPT | Performed by: NURSE PRACTITIONER

## 2024-10-09 RX ORDER — BUPROPION HYDROCHLORIDE 150 MG/1
150 TABLET ORAL EVERY MORNING
Qty: 30 TABLET | Refills: 0 | Status: SHIPPED | OUTPATIENT
Start: 2024-10-09

## 2024-10-09 RX ORDER — HYDROXYZINE PAMOATE 25 MG/1
25 CAPSULE ORAL 3 TIMES DAILY PRN
Qty: 60 CAPSULE | Refills: 0 | Status: SHIPPED | OUTPATIENT
Start: 2024-10-09

## 2024-10-09 RX ORDER — GABAPENTIN 300 MG/1
900 CAPSULE ORAL 3 TIMES DAILY
Qty: 135 CAPSULE | Refills: 0 | Status: SHIPPED | OUTPATIENT
Start: 2024-10-09 | End: 2024-10-24

## 2024-10-09 RX ORDER — NALTREXONE HYDROCHLORIDE 50 MG/1
50 TABLET, FILM COATED ORAL DAILY
Qty: 30 TABLET | Refills: 0 | Status: SHIPPED | OUTPATIENT
Start: 2024-10-09

## 2024-10-09 RX ORDER — SERTRALINE HYDROCHLORIDE 100 MG/1
100 TABLET, FILM COATED ORAL DAILY
Qty: 30 TABLET | Refills: 0 | Status: SHIPPED | OUTPATIENT
Start: 2024-10-09

## 2024-10-09 RX ORDER — TRAZODONE HYDROCHLORIDE 100 MG/1
100 TABLET ORAL AT BEDTIME
Qty: 30 TABLET | Refills: 0 | Status: SHIPPED | OUTPATIENT
Start: 2024-10-09

## 2024-10-09 ASSESSMENT — PAIN SCALES - GENERAL: PAINLEVEL: SEVERE PAIN (6)

## 2024-10-09 NOTE — GROUP NOTE
"Group Therapy Documentation    PATIENT'S NAME: Domenico Wilder  MRN:   [id number]  :   1990  ACCT. NUMBER: [id number]  DATE OF SERVICE: 10/09/24  START TIME: 12:30 PM  END TIME:  2:30 PM  FACILITATOR(S): Adriana Ibrahim LADC  TOPIC: BEH Group Therapy  Number of patients attending the group:  5  Group Length:  2 Hours    Dimensions addressed: 3 and 4    Summary of Group / Topics Discussed:    Recovery Principles, Sober coping skills, Coping/DBT informed care, and Emotions/expression    Patients completed daily check ins and the question of the day, \" What the moment you realized you needed help with you AOD use?\"     Patients presented assignments and shared feedback.    Patients discussed  STOP & HALT Skills.     Patients engaged in reading and processing daily meditations.       Group Attendance:  Attended group session    Patient's response to the group topic/interactions:  cooperative with task    Patient appeared to be Actively participating.        Client specific details:  Domenico Attended small group therapy. Patient engaged in discussion and participated in sharing feedback to their peers.       "

## 2024-10-09 NOTE — PROGRESS NOTES
[facility] Addiction Medicine    A/P                                                    ASSESSMENT/PLAN    1. Alcohol use disorder  - s/p deotx admission at [place], now in programming at [facility]. Reports h/o escalating alcohol use over the past 1.5 years, feels due to depression (addressing as below), drinking up to 1 pint per day, last alcohol use 9/18/24. No current withdrawal symptoms.   Symptoms controlled with Naltrexone 50 mg daily. Continue. Refilled today.   Continue Gabapentin 900 mg TID   Continue programming at [facility]   - gabapentin (NEURONTIN) 300 MG capsule; Take 3 capsules (900 mg) by mouth 3 times daily for 15 days.  Dispense: 135 capsule; Refill: 0  - naltrexone (DEPADE/REVIA) 50 MG tablet; Take 1 tablet (50 mg) by mouth daily.  Dispense: 30 tablet; Refill: 0    2. Opioid use disorder, severe, in sustained remission (H)  Sustained remission since Feb 2019. No cravings. Previously following with Dr. Fiore.   Continue Naltrexone 50 mg daily   Narcan rx'ed today   Continue programmatic care   - naltrexone (DEPADE/REVIA) 50 MG tablet; Take 1 tablet (50 mg) by mouth daily.  Dispense: 30 tablet; Refill: 0  - naloxone (NARCAN) 4 MG/0.1ML nasal spray; Spray 1 spray (4 mg) into one nostril alternating nostrils as needed for opioid reversal. every 2-3 minutes until assistance arrives  Dispense: 2 each; Refill: 11    3. Major depressive disorder, recurrent, moderate (H)  Needs improvement.   Start Bupropion 150 mg daily   Decrease Sertraline to 100 mg daily   Pt history reviewed, pt denies history of seizures. (Denies alcohol withdrawal seizure history and generalized seizure history).   - Plans to follow up with psychiatry for long term management   - buPROPion (WELLBUTRIN XL) 150 MG 24 hr tablet; Take 1 tablet (150 mg) by mouth every morning.  Dispense: 30 tablet; Refill: 0  - sertraline (ZOLOFT) 100 MG tablet; Take 1 tablet (100 mg) by mouth daily.  Dispense: 30 tablet; Refill: 0    4.  Anxiety  Stable. Continue Hydroxyzine 25 mg TID PRN   - Plans to follow up with psychiatry for long term management   - hydrOXYzine los (VISTARIL) 25 MG capsule; Take 1 capsule (25 mg) by mouth 3 times daily as needed for anxiety.  Dispense: 60 capsule; Refill: 0    5. Sleep disturbance  Controlled. Continue Trazodone 100 mg at HS   - traZODone (DESYREL) 100 MG tablet; Take 1 tablet (100 mg) by mouth at bedtime.  Dispense: 30 tablet; Refill: 0    The longitudinal plan of care for the diagnosis(es)/condition(s) as documented were addressed during this visit. Due to the added complexity in care, I will continue to support Domenico in the subsequent management and with ongoing continuity of care.        PDMP Review         Value Time User    State PDMP site checked  Yes 10/9/2024  3:23 PM Litzy Rouse APRN CNP          10/04/2024 04/15/2024 3 Gabapentin 300 Mg Capsule 90.00 30 Te Pre [id number] [facility] ([id number]) 0/0  Medicaid [place]   09/05/2024 04/15/2024 1 Gabapentin 300 Mg Capsule 90.00 30 Te Pre [id number] [facility] ([id number]) 2/3  [place]icaid [place]   08/29/2024 08/19/2024 1 Gabapentin 600 Mg Tablet 135.00 30 Ye Heidi [id number] [facility] ([id number]) 0/0            RTC  Return for Follow up, with me, in person in 3-4 weeks .      Counseled the patient on the importance of having a recovery program in addition to medication to manage recovery.  Components include avoiding isolating, having willingness to change, avoiding triggers and managing cravings. Encouraged having some type of sober network and practicing honesty with trusted support person(s). Encouraged other services such as counseling, 12 step or other self-help organizations.      Opioid warning reviewed.  Risk of overdose following a period of abstinence due to decrease tolerance was discussed including risk of death.  Strongly recommended abstain from alcohol, benzodiazepines, THC, opioids and other drugs of abuse.  Increased risk of return to opioid use after use of these substances discussed.  Increased risk of overdose/death with use of other substances particularly benzodiazepines/alcohol reviewed.      SUBJECTIVE                                                      Visit performed In Person, face-to-face     polysubstance dependence (methamphetamine, cannabis, opioid), depression and anxiety. H/o OUD severe following with Dr. Fiore, last see on 8/16/2018    HPI:  Domencio Wilder is a 34 year old female with history of depression, anxiety, alcohol use disorder and opioid use disorder who presents for further evaluation of substance use disorder and management options.    - Last seen by Dr. Fiore 08/16/2018 , taking buprenorphine 12 mg per day for management of OUD severe at the time.     TODAY'S VISIT  HPI Oct 9, 2024  - here today for follow up. Last seen in this clinic in 2018 with Dr. Fiore.   - feels sertraline is no longer effective, has  been taking for quite some time, primarily depressive symptoms. She has taken bupropion in the past and would like to resume for depression.   - Not currently pregnant or breastfeeding. LMP was unknown, Nexplanon, menstrual periods are sporadic.   - trazodone for sleep, effective, not groggy in the morning   - hydroxyzine prn for anxiety, 25 mg tid prn . No h/o prolonged QT   - smoking 5 cigarettes per day, using NRT to reduce   - currently taking Naltrexone for AUD. Started a couple months ago. Feels it has been effective for alcohol cravings.   - chart review indicated alcohol withdrawal seizure, pt reports no h/o seizure.   - denies h/o alcohol withdrawal seizures or DT's.   - detox at [facility] prior to [facility], last alcohol use was 9/18/24.     Substance Use History:   Most recent use, past or recent hx of harmful use  ALCOHOL - over the past 1.5 years she started drinking more, feels due to depression, alcohol use escalated, drinking up to 1 pint per day, last alcohol use 9/18/24.   CANNABIS - occasional  PRESCRIPTION STIMULANTS - Denies   COCAINE/CRACK - Has  "tried in lifetime no use since teenager   METH/AMPHETAMINES - Has tried in the past, no recent use. Last use Feb 2019.   OPIATES - sustained remission since Feb 2019. No buprenorphine since that time. H/o of heroin use in her 20's. Denies cravings. Remembers this as \"worst couple years of my life\"   BENZODIAZEPINES - Only as prescribed for detox   KRATOM - denies   KETAMINE - denies    HALLUCINOGENS (including DXM) - denies  BEHAVIORAL (Gambling, Eating d/o, Compulsivity) - denies   NICOTINE  Cigarettes: 5 per day   Vaping: occasional   Past NRT/medication use: Yes       Previous withdrawal treatment episodes (e.g. detox): 5 times in total, has been to detox for opioids and alcohol   Previous [facility]Y treatment programs: 4 inpatient programs and 1 admission at [facility] for 14 days.   Hospitalizations or overdose: Yes   Medical complications from substance use: denies   IV Drug use?: Yes, not since Feb 2019   Previous Medication for Addiction Tx: Buprenorphine, Naltrexone   Longest period of full abstinence: 3 years   Activities that have previously supported abstinence: children, treatment, meetings, medication   Current Recovery Activities: [facility] then outpatient at [facility] in [place]       Infectious disease screening  Hep C: Not detected on 8/12/24/24      Lab Results   Component Value Date    HCV RNA Quant IU/ml HCV RNA Not Detected 12/07/2017    Log of HCV RNA Qt Not Calculated 12/07/2017    Hepatitis C Antibody Reactive 04/07/2018       HIV: non reactive on 8/12/24       Pregnancy Status - negative HCG on 9/18/24  LMP: unknown   Sexually active: yes   Birth control/barriers: Nexplanon     Psychiatric History (per patient report and problem list review)  Past diagnoses - Depression, anxiety  Current or past psychiatrist: [facility]   Current or past therapist:  Isela Peoples   Hospitalizations/TMS/ECT - denies   Suicide Attempts - denies   Medication trials - denies       SOCIAL HISTORY:  Housing status: with " parents, partner and children   Employment status: Employed part time and currently on REANNA - working at [employer]   Relationship status: Partnered  Children: 3 children, 13 yo (parents have custody), 3 yo and 3 yo.   Legal concerns related to use: Denies   Contact information up to date? Yes     3rd Party Involvement - denies       Medical History:  Problem list reviewed & adjusted, as indicated.  Patient Active Problem List   Diagnosis    Insomnia    GERD (gastroesophageal reflux disease)    Opioid dependence with withdrawal (H)    Alcohol dependence with uncomplicated withdrawal (H)    Opioid use disorder, severe, dependence (H)    Alcoholism (H)    Major depressive disorder, recurrent, moderate (H)    Polysubstance abuse (H)    Smoker    Need for Tdap vaccination    Chemical dependency (H)    Encounter for triage in pregnant patient    Encounter for induction of labor     (normal spontaneous vaginal delivery)    Hypokalemia    Alcohol use disorder       Past Medical History:   Diagnosis Date    Anxiety     Chickenpox     Depression     Depressive disorder     GERD (gastroesophageal reflux disease)     Substance abuse (H)        Past Surgical History:   Procedure Laterality Date    NO HISTORY OF SURGERY           Family History   Problem Relation Age of Onset    Allergies Mother     Depression Mother     Alcohol/Drug Mother     Prostate Cancer Paternal Grandfather     Diabetes Maternal Grandfather         non insulin dependent       Current Outpatient Medications   Medication Sig Dispense Refill    acetaminophen (TYLENOL) 500 MG tablet Take 500-1,000 mg by mouth every 8 hours as needed for mild pain.      alum & mag hydroxide-simethicone (MAALOX) 200-200-20 MG/5ML SUSP suspension Take 30 mLs by mouth every 6 hours as needed for indigestion.      benzocaine-menthol (CEPACOL) 15-3.6 MG lozenge Place 1 lozenge inside cheek every 2 hours as needed for sore throat.      cyclobenzaprine (FLEXERIL) 5 MG tablet Take 5  mg by mouth. Take 1-2 tabs (5-10mg) by mouth three times daily for muscle spasms      dicyclomine (BENTYL) 10 MG capsule Take 10 mg by mouth. Take 1 capsule by mouth four times daily before meals and at bedtime x4 days      etonogestrel (NEXPLANON) 68 MG IMPL Inject 68 mg Subcutaneous      famotidine (PEPCID) 20 MG tablet Take 20 mg by mouth 2 times daily      gabapentin (NEURONTIN) 300 MG capsule Take 900 mg by mouth 3 times daily. Take three caps (900MG) Three times daily. Order from Dr Tana Garcia 8/19/24      guaiFENesin-dextromethorphan (ROBITUSSIN DM) 100-10 MG/5ML syrup Take 10 mLs by mouth every 4 hours as needed for cough.      hydrOXYzine los (VISTARIL) 25 MG capsule Take 25 mg by mouth 3 times daily as needed for anxiety.      ibuprofen (ADVIL/MOTRIN) 200 MG tablet Take 600 mg by mouth every 6 hours as needed for pain.      loratadine (CLARITIN) 10 MG tablet Take 10 mg by mouth daily as needed for allergies.      melatonin 5 MG tablet Take 5 mg by mouth nightly as needed for sleep.      naltrexone (DEPADE/REVIA) 50 MG tablet Take 50 mg by mouth daily      nicotine polacrilex (NICORETTE) 4 MG gum Place 1 each (4 mg) inside cheek as needed. 220 each 1    OLANZapine (ZYPREXA) 10 MG tablet Take 1 tablet (10 mg) by mouth at bedtime. 30 tablet 0    pantoprazole (PROTONIX) 40 MG EC tablet Take 40 mg by mouth daily.      senna-docusate (SENOKOT-S/PERICOLACE) 8.6-50 MG tablet Take 2 tablets by mouth daily as needed for constipation.      sertraline (ZOLOFT) 100 MG tablet Take 100 mg by mouth. Take 1.5 tablets (150mg) by mouth once daily      traZODone (DESYREL) 100 MG tablet Take 100 mg by mouth at bedtime.      Vitamin D3 (CHOLECALCIFEROL) 25 mcg (1000 units) tablet Take 1 tablet by mouth daily.           Allergies   Allergen Reactions    Cats     Dogs        OBJECTIVE                                                      OBJECTIVE  PHYSICAL EXAM:  /79 (BP Location: Left arm, Patient Position: Sitting, Cuff  Size: Adult Regular)   Pulse 89     Physical Exam  Constitutional:       General: She is not in acute distress.     Appearance: Normal appearance.   Eyes:      General: No scleral icterus.     Extraocular Movements: Extraocular movements intact.   Cardiovascular:      Rate and Rhythm: Normal rate.   Pulmonary:      Effort: Pulmonary effort is normal.   Skin:     Coloration: Skin is not jaundiced.   Neurological:      Mental Status: She is alert and oriented to person, place, and time.   Psychiatric:         Mood and Affect: Mood normal.         Behavior: Behavior normal.         Thought Content: Thought content normal.         Judgment: Judgment normal.             PHQ-9 Score:       9/17/2018     1:39 PM 9/23/2024    12:00 PM 10/2/2024     8:06 AM   PHQ   PHQ-9 Total Score 6 17 7   Q9: Thoughts of better off dead/self-harm past 2 weeks Not at all Not at all Not at all       ROMANA-7 Score:      9/17/2018     1:39 PM 9/23/2024    12:00 PM 10/2/2024     8:06 AM   ROMANA-7 SCORE   Total Score 12 15 10       LABS (may not contain today's labs)                                                      Today's lab data  Results for orders placed or performed in visit on 10/09/24   Drugs of Abuse Screen Urine (POC CUPS) POCT     Status: Abnormal   Result Value Ref Range    POCT Kit Lot Number [id number]     POCT Kit Expiration Date 2026-05-15     Temperature Urine POCT 94 F 90 F, 92 F, 94 F, 96 F, 98 F, 100 F    Specific [place] POCT 1.015 1.005, 1.015, 1.025    pH Qual Urine POCT 5 pH 4 pH, 5 pH, 7 pH, 9 pH    Creatinine Qual Urine POCT 50 mg/dL 20 mg/dL, 50 mg/dL, 100 mg/dL, 200 mg/dL    Internal QC Qual Urine POCT Valid Valid    Amphetamine Qual Urine POCT Negative Negative    Barbiturate Qual Urine POCT Negative Negative    Buprenorphine Qual Urine POCT Negative Negative    Benzodiazepine Qual Urine POCT Screen Positive (A) Negative    Cocaine Qual Urine POCT Negative Negative    Methamphetamine Qual Urine POCT Negative Negative     MDMA Qual Urine POCT Negative Negative    Methadone Qual Urine POCT Negative Negative    Opiate Qual Urine POCT Negative Negative    Oxycodone Qual Urine POCT Negative Negative    Phencyclidine Qual Urine POCT Negative Negative    THC Qual Urine POCT Screen Positive (A) Negative       Component      Latest Ref Rng 9/23/2024  12:34 PM   Nordiazepam      Absent  Present !    Oxazepam      Absent  Present !    Temazepam      Absent  Present !    Gabapentin (Neurontin)      Absent  Present !       Legend:  ! Abnormal    Last Comprehensive Metabolic Panel:  Sodium   Date Value Ref Range Status   08/10/2024 141 135 - 145 mmol/L Final   05/21/2019 139 133 - 144 mmol/L Final     Potassium   Date Value Ref Range Status   08/10/2024 3.7 3.4 - 5.3 mmol/L Final   05/21/2019 3.6 3.4 - 5.3 mmol/L Final     Chloride   Date Value Ref Range Status   08/10/2024 106 98 - 107 mmol/L Final   05/21/2019 100 94 - 109 mmol/L Final     Carbon Dioxide   Date Value Ref Range Status   05/21/2019 30 20 - 32 mmol/L Final     Carbon Dioxide (CO2)   Date Value Ref Range Status   08/10/2024 22 22 - 29 mmol/L Final     Anion Gap   Date Value Ref Range Status   08/10/2024 13 7 - 15 mmol/L Final   05/21/2019 9 3 - 14 mmol/L Final     Glucose   Date Value Ref Range Status   08/10/2024 98 70 - 99 mg/dL Final   05/21/2019 78 70 - 99 mg/dL Final     Urea Nitrogen   Date Value Ref Range Status   08/10/2024 13.1 6.0 - 20.0 mg/dL Final   05/21/2019 6 (L) 7 - 30 mg/dL Final     Creatinine   Date Value Ref Range Status   08/10/2024 0.57 0.51 - 0.95 mg/dL Final   01/09/2020 0.52 0.52 - 1.04 mg/dL Final     GFR Estimate   Date Value Ref Range Status   08/10/2024 >90 >60 mL/min/1.73m2 Final     Comment:     eGFR calculated using 2021 CKD-EPI equation.   01/09/2020 >90 >60 mL/min/[1.73_m2] Final     Comment:     Non  GFR Calc  Starting 12/18/2018, serum creatinine based estimated GFR (eGFR) will be   calculated using the Chronic Kidney Disease  Epidemiology Collaboration   (CKD-EPI) equation.       Calcium   Date Value Ref Range Status   08/10/2024 8.3 (L) 8.8 - 10.4 mg/dL Final     Comment:     Reference intervals for this test were updated on 7/16/2024 to reflect our healthy population more accurately. There may be differences in the flagging of prior results with similar values performed with this method. Those prior results can be interpreted in the context of the updated reference intervals.   05/21/2019 9.3 8.5 - 10.1 mg/dL Final     Bilirubin Total   Date Value Ref Range Status   08/10/2024 <0.2 <=1.2 mg/dL Final   05/21/2019 0.2 0.2 - 1.3 mg/dL Final     Alkaline Phosphatase   Date Value Ref Range Status   08/10/2024 96 40 - 150 U/L Final   05/21/2019 77 40 - 150 U/L Final     ALT   Date Value Ref Range Status   08/10/2024 9 0 - 50 U/L Final   01/09/2020 18 0 - 50 U/L Final     AST   Date Value Ref Range Status   08/10/2024 20 0 - 45 U/L Final   01/09/2020 32 0 - 45 U/L Final       JOSE [facility] Addiction Medicine  [phone]

## 2024-10-09 NOTE — PROGRESS NOTES
Name: Domenico Wilder  Date: 10/9/2024  Medical Record: [id number]    Envelope Number: [id number]    List of Contents (List each item separately in new row):   Narcan 4mg/0.1ml LIQD    Admission:  I am responsible for any personal items that are not sent to the safe or pharmacy.  [facility] is not responsible for loss, theft or damage of any property in my possession.    Patient Signature:  ___________________________________________       Date/Time:__________________________    Staff Signature: __________________________________       Date/Time:__________________________    [facility] Staff person, if patient is unable/unwilling to sign:      __________________________________________________________       Date/Time: __________________________  **All medications are packaged by [facility] staff and securely stored on [facility]. Medications left by patients at discharge will be packaged by [facility] staff and transported by [facility] staff to inpatient pharmacy for storage.**  Discharge:  [facility] has returned all of my personal belongings:    Patient Signature: ________________________________________     Date/Time: ____________________________________    Staff Signature: ______________________________________     Date/Time:_____________________________________

## 2024-10-09 NOTE — GROUP NOTE
"Group Therapy Documentation    PATIENT'S NAME: Domenico Wilder  MRN:   [id number]  :   1990  ACCT. NUMBER: [id number]  DATE OF SERVICE: 10/09/24  START TIME:  9:45 AM  END TIME: 11:30 AM  FACILITATOR(S): Caitlyn Santoro LADC  TOPIC: BEH Group Therapy  Number of patients attending the group:  5  Group Length:  2 Hours    Dimensions addressed: 3, 5, and 6    Summary of Group / Topics Discussed:    Relationship/socialization, Co-occurring illnesses symptom management, Mindfulness/Relaxation, and Emotions/expression  Recovery Principles, Co-occurring Illness Education and Symptom management, Emotions/expression, Relapse Prevention.     Patients completed daily check ins and the question of the day  Patients listened to the poem You are Who You've Been Looking For and shared feedback.  Patients discussed life lessons, phrases, quotes that have been helpful in making changes or progress.   Patients engaged in reading and processing daily meditations.    Facilitator: DESTIN Mae provided/reviewed handouts on  What is anxiety  and  Symptoms of Anxiety  and  How does Anxiety Grow? - The Cycle of Anxiety.\"     Zeynep and patients discussed Treatment Goals and Treatment Options. Zeynep led a guided imagery exercise called  The Container.\"          Group Attendance:  Attended group session    Patient's response to the group topic/interactions:  cooperative with task    Patient appeared to be Engaged.        Client specific details:  Domenico shared feeling optimistic. Domenico listened actively and shared appropriate feedbacks during discussion.    "

## 2024-10-09 NOTE — PROGRESS NOTES
[facility] - Addiction Medicine       Rooming information:    Point of care urine drug screen positive for:  Lab Results   Component Value Date    BUP Negative 10/09/2024    BZO Screen Positive (A) 10/09/2024    BAR Negative 10/09/2024    ADEOLA Negative 10/09/2024    MAMP Negative 10/09/2024    AMP Negative 10/09/2024    MDMA Negative 10/09/2024    MTD Negative 10/09/2024    ULC726 Negative 10/09/2024    OXY Negative 10/09/2024    PCP Negative 10/09/2024    THC Screen Positive (A) 10/09/2024    TEMP 94 F 10/09/2024    SGPOCT 1.015 10/09/2024       *POC urine drug screen does not screen for Fentanyl    PHQ-9 Scores:       9/17/2018     1:39 PM 9/23/2024    12:00 PM 10/2/2024     8:06 AM   PHQ   PHQ-9 Total Score 6 17 7   Q9: Thoughts of better off dead/self-harm past 2 weeks Not at all Not at all Not at all     ROMANA-7 Scores:      9/17/2018     1:39 PM 9/23/2024    12:00 PM 10/2/2024     8:06 AM   ROMANA-7 SCORE   Total Score 12 15 10       Any other recent substance use:     Denies    NICOTINE-Yes: cigarettes  If using nicotine, ready to quit? Yes: nicotine nicotine gum    Side effects related to medications pt would like to discuss with provider (constipation, dry mouth, HA, GI upset, sedation?) No     Narcan currently available: N/A    Primary care provider: Provider Not In System     Mental health provider: yes (follow up on MH referral if needed)    Any housing, insurance deficits?: none    Contact information up to date? yes    3rd Party Involvement not today (please obtain JONNY if pt would like to include)      Seda Medley MA  October 9, 2024  2:07 PM

## 2024-10-09 NOTE — GROUP NOTE
Group Therapy Documentation    PATIENT'S NAME: Domenico Wilder  MRN:   [id number]  :   1990  ACCT. NUMBER: [id number]  DATE OF SERVICE: 10/09/24  START TIME:  8:30 AM  END TIME:  9:30 AM  FACILITATOR(S): Adriana Ibrahim LADC; Teagan Hunt LADC  TOPIC: BEH Group Therapy  Number of patients attending the group:  22  Group Length:  1 Hours    Dimensions addressed: 2 and 5    Summary of Group / Topics Discussed:    Balanced lifestyle and Co-occurring illnesses symptom management    Group Attendance:  Attended group session    Patient's response to the group topic/interactions:  cooperative with task    Patient appeared to be Attentive and Engaged.        Client specific details: Pt listened attentively to Dr. Momin's presentation on impactful emotions and asked appropriate questions.

## 2024-10-10 ENCOUNTER — HOSPITAL ENCOUNTER (OUTPATIENT)
Dept: BEHAVIORAL HEALTH | Facility: CLINIC | Age: 34
Discharge: HOME | End: 2024-10-10
Attending: FAMILY MEDICINE
Payer: COMMERCIAL

## 2024-10-10 PROCEDURE — H2035 A/D TX PROGRAM, PER HOUR: HCPCS | Mod: HQ

## 2024-10-10 PROCEDURE — 1002N00001 HC LODGING PLUS FACILITY CHARGE ADULT

## 2024-10-10 PROCEDURE — H2035 A/D TX PROGRAM, PER HOUR: HCPCS | Mod: HQ | Performed by: COUNSELOR

## 2024-10-10 NOTE — GROUP NOTE
Group Therapy Documentation    PATIENT'S NAME: Domenico Wilder  MRN:   [id number]  :   1990  ACCT. NUMBER: [id number]  DATE OF SERVICE: 10/10/24  START TIME:  9:00 AM  END TIME: 11:00 AM  FACILITATOR(S): Caitlyn Santoro LADC  TOPIC: BEH Group Therapy  Number of patients attending the group:  6  Group Length:  2 Hours    Dimensions addressed: 3, 5, and 6    Summary of Group / Topics Discussed:    Recovery Principles, Sober coping skills, Mindfulness/Relaxation, Emotions/expression, and Relapse prevention  Check-ins with feelings and sharing something interesting about themseleves. Assignment presentation and processing with feedbacks. Relapse Traps: death of a loved one, vacations and boredom - discussion and sharing. Serenity prayer.      Group Attendance:  Attended group session    Patient's response to the group topic/interactions:  cooperative with task    Patient appeared to be Engaged.        Client specific details:  Domenico shared feeling annoyed. Domenico shared and processed her assignment on Relapse Triggers and Cues with 25 Sober Activities. Domenico received appropriate feedbacks. Domenico listened attentively during the latter part of the group discussion.

## 2024-10-10 NOTE — GROUP NOTE
Group Therapy Documentation    PATIENT'S NAME: Domenico Wilder  MRN:   [id number]  :   1990  ACCT. NUMBER: [id number]  DATE OF SERVICE: 10/10/24  START TIME: 12:30 PM  END TIME:  2:30 PM  FACILITATOR(S): Adriana Ibrahim LADC  TOPIC: BEH Group Therapy  Number of patients attending the group:  6  Group Length:  2 Hours    Dimensions addressed: 4 and 5    Summary of Group / Topics Discussed:    Recovery Principles, Sober coping skills, and Self-care activities      Patients completed daily check ins and the question of the day.    Patients presented assignments and shared feedback.     Patients engaged in reading and processing daily meditations.      Group Attendance:  Attended group session    Patient's response to the group topic/interactions:  cooperative with task    Patient appeared to be Actively participating.        Client specific details:  Domenico Attended small group therapy. Patient engaged in discussion and participated in sharing feedback to their peers.   .

## 2024-10-10 NOTE — GROUP NOTE
Psychoeducation Group Documentation    PATIENT'S NAME: Domenico Wilder  MRN:   [id number]  :   1990  ACCT. NUMBER: [id number]  DATE OF SERVICE: 10/10/24  START TIME:  3:00 PM  END TIME:  4:00 PM  FACILITATOR(S): Anisha Campbell LADC  TOPIC: BEH Pyschoeducation  Number of patients attending the group:  27  Group Length:  1 Hours    Skills Group Therapy Type: Recovery skills    Summary of Group / Topics Discussed:    Symptom management skills    Patients attended psychoeducation group providing information about relevant research and co-occurring disorders.         Group Attendance:  Attended group session    Patient's response to the group topic/interactions:  cooperative with task and listened actively    Patient appeared to be Attentive and Engaged.         Client specific details:  Patient appeared attentive and actively listened throughout this group.

## 2024-10-11 ENCOUNTER — HOSPITAL ENCOUNTER (OUTPATIENT)
Dept: BEHAVIORAL HEALTH | Facility: CLINIC | Age: 34
Discharge: HOME | End: 2024-10-11
Attending: FAMILY MEDICINE
Payer: COMMERCIAL

## 2024-10-11 PROCEDURE — 1002N00001 HC LODGING PLUS FACILITY CHARGE ADULT

## 2024-10-11 PROCEDURE — H2035 A/D TX PROGRAM, PER HOUR: HCPCS | Mod: HQ

## 2024-10-11 NOTE — GROUP NOTE
Group Therapy Documentation    PATIENT'S NAME: Domenico Wilder  MRN:   [id number]  :   1990  ACCT. NUMBER: [id number]  DATE OF SERVICE: 10/11/24  START TIME: 12:30 PM  END TIME:  2:30 PM  FACILITATOR(S): Adriana Ibrahim [facility]; Teagan Hunt [facility]; Jun Meza [facility]  TOPIC: BEH Group Therapy  Number of patients attending the group:  27  Group Length:  2 Hours    Dimensions addressed: 4, 5, and 6    Summary of Group / Topics Discussed:    Recovery Principles, Sober coping skills, Relationship/socialization, and Self-care activities    Patients viewed an addiction/mental health based film. This film addressed: addiction as a disease, relationships and addiction, engagement in AA, and evaluating priorities when getting sober.   Patients engaged in a thorough discussion about the film, and shared personal experiences, and how the film helped them process their own life events.       Group Attendance:  Attended group session    Patient's response to the group topic/interactions:  cooperative with task    Patient appeared to be Actively participating.        Client specific details:  Domenico Attended small group therapy. Patient engaged in discussion and participated in sharing feedback to their peers.

## 2024-10-11 NOTE — GROUP NOTE
Group Therapy Documentation    PATIENT'S NAME: Domenico Wilder  MRN:   [id number]  :   1990  ACCT. NUMBER: [id number]  DATE OF SERVICE: 10/11/24  START TIME:  9:00 AM  END TIME: 11:00 AM  FACILITATOR(S): Adriana Ibrahim LADC  TOPIC: BEH Group Therapy  Number of patients attending the group:  8  Group Length:  2 Hours    Dimensions addressed: 4 and 5    Summary of Group / Topics Discussed:    Recovery Principles, Sober coping skills, Balanced lifestyle, Cognitive behavioral therapy skills, Disease of addiction, Emotions/expression, and Relapse prevention    Patients completed daily check ins and the question of the day.    Patients presented assignments and shared feedback.    Patients discussed essential topics to remaining sober. (Willingness to surrender, Shifting Mindsets, and Prevention Skills)      Patients engaged in reading and processing daily meditations.      Group Attendance:  Attended group session    Patient's response to the group topic/interactions:  cooperative with task    Patient appeared to be Actively participating.        Client specific details:  Domenico Attended small group therapy. Patient engaged in discussion and participated in sharing feedback to their peers.   .     Yes

## 2024-10-12 ENCOUNTER — HOSPITAL ENCOUNTER (OUTPATIENT)
Dept: BEHAVIORAL HEALTH | Facility: CLINIC | Age: 34
Discharge: HOME | End: 2024-10-12
Attending: FAMILY MEDICINE
Payer: COMMERCIAL

## 2024-10-12 PROCEDURE — H2035 A/D TX PROGRAM, PER HOUR: HCPCS | Mod: HQ | Performed by: COUNSELOR

## 2024-10-12 PROCEDURE — H2035 A/D TX PROGRAM, PER HOUR: HCPCS | Mod: HQ

## 2024-10-12 PROCEDURE — 1002N00001 HC LODGING PLUS FACILITY CHARGE ADULT

## 2024-10-12 NOTE — GROUP NOTE
Psychoeducation Group Documentation    PATIENT'S NAME: Domenico Wilder  MRN:   [id number]  :   1990  ACCT. NUMBER: [id number]  DATE OF SERVICE: 10/12/24  START TIME: 12:30 PM  END TIME:  2:30 PM  FACILITATOR(S): Rosa M Orr LADC; Anisha Campbell LADC; Yumiko Love LADC  TOPIC: BEH Pyschoeducation  Number of patients attending the group: 28  Group Length:  2 Hours    Skills Group Therapy Type: Recovery skills    Summary of Group / Topics Discussed:    Relapse prevention skills        Group Attendance:  Attended group session    Patient's response to the group topic/interactions:  cooperative with task    Patient appeared to be Actively participating.         Client specific details: Patient attended and participated in weekend relapse prevention workshop.

## 2024-10-12 NOTE — GROUP NOTE
Psychoeducation Group Documentation    PATIENT'S NAME: Domenico Wilder  MRN:   [id number]  :   1990  ACCT. NUMBER: [id number]  DATE OF SERVICE: 10/12/24  START TIME:  9:45 AM  END TIME: 11:15 AM  FACILITATOR(S): Anisha Campbell [facility]; Yumiko Love LADC  TOPIC: BEH Pyschoeducation  Number of patients attending the group:  27  Group Length:  1.5 Hours    Skills Group Therapy Type: Recovery skills and Emotion regulation skills    Summary of Group / Topics Discussed:    Symptom management skills and Relapse prevention skills    Patient attended a lecture on relapse prevention with co-occurring disorders and mental health symptoms.          Group Attendance:  Attended group session    Patient's response to the group topic/interactions:  cooperative with task and listened actively    Patient appeared to be Attentive and Engaged.         Client specific details:  Patient actively listened to presenter and appeared engaged throughout the discussion.

## 2024-10-13 ENCOUNTER — HOSPITAL ENCOUNTER (OUTPATIENT)
Dept: BEHAVIORAL HEALTH | Facility: CLINIC | Age: 34
Discharge: HOME | End: 2024-10-13
Attending: FAMILY MEDICINE
Payer: COMMERCIAL

## 2024-10-13 PROCEDURE — 1002N00001 HC LODGING PLUS FACILITY CHARGE ADULT

## 2024-10-13 PROCEDURE — H2035 A/D TX PROGRAM, PER HOUR: HCPCS | Mod: HQ | Performed by: COUNSELOR

## 2024-10-13 PROCEDURE — H2035 A/D TX PROGRAM, PER HOUR: HCPCS | Mod: HQ

## 2024-10-13 NOTE — GROUP NOTE
Psychoeducation Group Documentation    PATIENT'S NAME: Domenico Wilder  MRN:   [id number]  :   1990  ACCT. NUMBER: [id number]  DATE OF SERVICE: 10/13/24  START TIME:  8:45 AM  END TIME: 10:30 AM  FACILITATOR(S): Anisha Campbell LADC; Rosa M Orr LADC; Bernice Paredes RN  TOPIC: BEH Pyschoeducation  Number of patients attending the group:  27  Group Length:  2 Hours    Skills Group Therapy Type: Daily living/independence skills and Healthy behaviors development    Summary of Group / Topics Discussed:    Balanced lifestyle skills, Symptom management skills, and Relapse prevention skills    Staff nurse provided psychoeducation on the role of nutrition and sleep in relapse prevention.  Patients actively listened and interacted appropriately within the lecture.          Group Attendance:  Attended group session    Patient's response to the group topic/interactions:  cooperative with task and listened actively    Patient appeared to be Attentive and Engaged.         Client specific details:  Patient appeared attentive and engaged during this lecture.  Patient developed additional insights into strategies to decrease risk of relapse.

## 2024-10-13 NOTE — GROUP NOTE
Group Therapy Documentation    PATIENT'S NAME: Domenico Wilder  MRN:   [id number]  :   1990  ACCT. NUMBER: [id number]  DATE OF SERVICE: 10/13/24  START TIME: 12:30 PM  END TIME:  1:30 PM  FACILITATOR(S): Rosa M Orr LADC  TOPIC: BEH Group Therapy  Number of patients attending the group:  26    Group Length:  1 Hour    Dimensions addressed: 4 and 5    Summary of Group / Topics Discussed:    Balanced lifestyle and Relapse prevention      Group Attendance:  Attended group session    Patient's response to the group topic/interactions:  cooperative with task    Patient appeared to be Actively participating, Attentive, and Engaged.        Client specific details:  Patient attended group lecture and was attentive and participative.

## 2024-10-14 ENCOUNTER — HOSPITAL ENCOUNTER (OUTPATIENT)
Dept: BEHAVIORAL HEALTH | Facility: CLINIC | Age: 34
Discharge: HOME | End: 2024-10-14
Attending: FAMILY MEDICINE
Payer: COMMERCIAL

## 2024-10-14 PROCEDURE — H2035 A/D TX PROGRAM, PER HOUR: HCPCS | Mod: HQ

## 2024-10-14 PROCEDURE — 1002N00001 HC LODGING PLUS FACILITY CHARGE ADULT

## 2024-10-14 NOTE — PROGRESS NOTES
Name: Domenico Wilder  Date: 10/14/2024  Medical Record: [id number]    Envelope Number: [id number]  List of Contents (List each item separately in new row):   Gabapentin 600mg tabs (qty:4)  Admission:  I am responsible for any personal items that are not sent to the safe or pharmacy.  [facility] is not responsible for loss, theft or damage of any property in my possession.      Patient Signature:  ___________________________________________       Date/Time:__________________________    Staff Signature: __________________________________       Date/Time:__________________________    [facility] Staff person, if patient is unable/unwilling to sign:      __________________________________________________________       Date/Time: __________________________      **All medications are packaged by [facility] staff and securely stored on [facility]. Medications left by patients at discharge will be packaged by [facility] staff and transported by [facility] staff to inpatient pharmacy for storage.**  Discharge:  [facility] has returned all of my personal belongings:    Patient Signature: ________________________________________     Date/Time: ____________________________________    Staff Signature: ______________________________________     Date/Time:_____________________________________

## 2024-10-14 NOTE — GROUP NOTE
"Group Therapy Documentation    PATIENT'S NAME: Domenico Wilder  MRN:   [id number]  :   1990  ACCT. NUMBER: [id number]  DATE OF SERVICE: 10/14/24  START TIME: 12:30 PM  END TIME:  2:30 PM  FACILITATOR(S): Jun Meza LADC  TOPIC: BEH Group Therapy  Number of patients attending the group:  8  Group Length:  2 Hours    Dimensions addressed: 3, 4, 5, and 6    Summary of Group / Topics Discussed:    Recovery Principles, Coping/DBT informed care, Trauma informed care, Disease of addiction, Emotions/expression, Leisure explorations/use of leisure time, and Relapse prevention      Group Attendance:  Attended group session    Patient's response to the group topic/interactions:  cooperative with task    Patient appeared to be Attentive and Engaged.        Client specific details:  Domenico participated in afternoon group. For the first half of group she listened to and gave positive feedback on her peer's assignments. For the second half of group she took part in a reading and discussion on 8 Things To Be Grateful For In Sobriety. She presented her \"Relapse Prevention\" assignment to the group.    "

## 2024-10-14 NOTE — GROUP NOTE
Group Therapy Documentation    PATIENT'S NAME: Domenico Wilder  MRN:   [id number]  :   1990  ACCT. NUMBER: [id number]  DATE OF SERVICE: 10/14/24  START TIME:  9:00 AM  END TIME: 11:00 AM  FACILITATOR(S): Caitlyn Santoro LADC  TOPIC: BEH Group Therapy  Number of patients attending the group:  8  Group Length:  2 Hours    Dimensions addressed: 3, 4, and 6    Summary of Group / Topics Discussed:    Recovery Principles, Relationship/socialization, Mindfulness/Relaxation, and Emotions/expression    Patients checked-in with the group. Patients processed intense emotions in group. Patients shared appropriate feedbacks during discussion. Patient related topics of respect, understanding, group norms, honesty and learning when to ask for help. Patients also participated in a guided meditation mindfulness activity.      Group Attendance:  Attended group session    Patient's response to the group topic/interactions:  cooperative with task    Patient appeared to be Engaged.        Client specific details:  Domenico shared appropriate feedbacks and participated in the group activity.

## 2024-10-15 ENCOUNTER — HOSPITAL ENCOUNTER (OUTPATIENT)
Dept: BEHAVIORAL HEALTH | Facility: CLINIC | Age: 34
Discharge: HOME | End: 2024-10-15
Attending: FAMILY MEDICINE
Payer: COMMERCIAL

## 2024-10-15 PROCEDURE — 1002N00001 HC LODGING PLUS FACILITY CHARGE ADULT

## 2024-10-15 PROCEDURE — H2035 A/D TX PROGRAM, PER HOUR: HCPCS | Mod: HQ

## 2024-10-15 NOTE — GROUP NOTE
Group Therapy Documentation    PATIENT'S NAME: Domenico Wilder  MRN:   [id number]  :   1990  ACCT. NUMBER: [id number]  DATE OF SERVICE: 10/15/24  START TIME: 12:30 PM  END TIME:  2:30 PM  FACILITATOR(S): Adriana Ibrahim LADC  TOPIC: BEH Group Therapy  Number of patients attending the group:  8  Group Length:  2 Hours    Dimensions addressed: 4    Summary of Group / Topics Discussed:    Recovery Principles    Spiritual Group Therapy consisted of Spiritual Care and addressing Principles that are important in recovery, and wellness of self. Patients and facilitators (Lea & STEVE)  reviewed topics related to sense of self, identity, and relations to others within spirituality/relision/nonspiritual concepts.       Group Attendance:  Attended group session    Patient's response to the group topic/interactions:  cooperative with task    Patient appeared to be Actively participating.        Client specific details:  Domenico Attended spiritually focused small group therapy. Patient remained engaged, participated, and explored personal insights of spirituality in their recovery. No other concerns reproted .

## 2024-10-15 NOTE — GROUP NOTE
Psychoeducation Group Documentation    PATIENT'S NAME: Domenico Wilder  MRN:   [id number]  :   1990  ACCT. NUMBER: [id number]  DATE OF SERVICE: 10/15/24  START TIME:  3:00 PM  END TIME:  4:00 PM  FACILITATOR(S): Adriana Ibrahim LADC; Teagan Hunt LADC  TOPIC: BEH Pyschoeducation  Number of patients attending the group:  26  Group Length:  1 Hours    Skills Group Therapy Type: Recovery skills    Summary of Group / Topics Discussed:    Relapse prevention skills    Group Attendance:  Attended group session    Patient's response to the group topic/interactions:  cooperative with task    Patient appeared to be Attentive and Engaged.         Client specific details: Pt listened respectfully to JUSTIN Lee's presentation on recovery resources and asked appropriate questions.

## 2024-10-15 NOTE — GROUP NOTE
Group Therapy Documentation    PATIENT'S NAME: Domenico Wilder  MRN:   [id number]  :   1990  ACCT. NUMBER: [id number]  DATE OF SERVICE: 10/15/24  START TIME:  9:00 AM  END TIME: 11:00 AM  FACILITATOR(S): Caitlyn Santoro LADC  TOPIC: BEH Group Therapy  Number of patients attending the group:  8  Group Length:  2 Hours    Dimensions addressed: 3, 4, 5, and 6    Summary of Group / Topics Discussed:    Sober coping skills, Relationship/socialization, Mindfulness/Relaxation, Coping/DBT informed care, Emotions/expression, Leisure explorations/use of leisure time, and Self-care activities  Group check-ins and processing. Daily reflection reading and sharing. F.A.S.T. skill discussion. Latter part of group involved therapeutic recreational activity -painting the things that makes you smile. Ended with the serenity prayer.       Group Attendance:  Attended group session    Patient's response to the group topic/interactions:  cooperative with task    Patient appeared to be Engaged.        Client specific details:  Domenico shared appropriate feedbacks. Domenico participated in the group activity. Domenico was also observed to be withdrawn in group and after group.

## 2024-10-15 NOTE — PROGRESS NOTES
[facility] Weekly Treatment Plan Review    Treatment plan review for the following date span:  10/7/2024-10/14/2024    ATTENDANCE  Patient did not have any absences during this time period (list absence dates and reason for absence).        Weekly Treatment Plan Review     Treatment Plan initiated on: 9/26/2024    Dimension1: Acute Intoxication/Withdrawal Potential -   Date of Last Use 9/18/2024  Any reports of withdrawal symptoms - None reported.    Dimension 2: Biomedical Conditions & Complications -   Medical Concerns: None reported.  Vitals:   BP Readings from Last 3 Encounters:   10/09/24 113/79   08/10/24 (!) 128/91   06/18/24 111/83     Pulse Readings from Last 3 Encounters:   10/09/24 89   08/10/24 79   06/18/24 80     Current Medications & Medication Changes:  Current Outpatient Medications   Medication Sig Dispense Refill    acetaminophen (TYLENOL) 500 MG tablet Take 500-1,000 mg by mouth every 8 hours as needed for mild pain.      alum & mag hydroxide-simethicone (MAALOX) 200-200-20 MG/5ML SUSP suspension Take 30 mLs by mouth every 6 hours as needed for indigestion.      benzocaine-menthol (CEPACOL) 15-3.6 MG lozenge Place 1 lozenge inside cheek every 2 hours as needed for sore throat. (Patient not taking: Reported on 10/9/2024)      buPROPion (WELLBUTRIN XL) 150 MG 24 hr tablet Take 1 tablet (150 mg) by mouth every morning. 30 tablet 0    cyclobenzaprine (FLEXERIL) 5 MG tablet Take 5 mg by mouth. Take 1-2 tabs (5-10mg) by mouth three times daily for muscle spasms      dicyclomine (BENTYL) 10 MG capsule Take 10 mg by mouth. Take 1 capsule by mouth four times daily before meals and at bedtime x4 days (Patient not taking: Reported on 10/9/2024)      etonogestrel (NEXPLANON) 68 MG IMPL Inject 68 mg Subcutaneous      famotidine (PEPCID) 20 MG tablet Take 20 mg by mouth 2 times daily      gabapentin (NEURONTIN) 300 MG capsule Take 3 capsules (900 mg) by mouth 3 times daily for 15 days. 135 capsule 0     "hydrOXYzine los (VISTARIL) 25 MG capsule Take 1 capsule (25 mg) by mouth 3 times daily as needed for anxiety. 60 capsule 0    loratadine (CLARITIN) 10 MG tablet Take 10 mg by mouth daily as needed for allergies.      melatonin 5 MG tablet Take 5 mg by mouth nightly as needed for sleep.      naloxone (NARCAN) 4 MG/0.1ML nasal spray Spray 1 spray (4 mg) into one nostril alternating nostrils as needed for opioid reversal. every 2-3 minutes until assistance arrives 2 each 11    naltrexone (DEPADE/REVIA) 50 MG tablet Take 1 tablet (50 mg) by mouth daily. 30 tablet 0    nicotine polacrilex (NICORETTE) 4 MG gum Place 1 each (4 mg) inside cheek as needed. 220 each 1    OLANZapine (ZYPREXA) 10 MG tablet Take 1 tablet (10 mg) by mouth at bedtime. 30 tablet 0    pantoprazole (PROTONIX) 40 MG EC tablet Take 40 mg by mouth daily.      senna-docusate (SENOKOT-S/PERICOLACE) 8.6-50 MG tablet Take 2 tablets by mouth daily as needed for constipation.      sertraline (ZOLOFT) 100 MG tablet Take 1 tablet (100 mg) by mouth daily. 30 tablet 0    traZODone (DESYREL) 100 MG tablet Take 1 tablet (100 mg) by mouth at bedtime. 30 tablet 0    Vitamin D3 (CHOLECALCIFEROL) 25 mcg (1000 units) tablet Take 1 tablet by mouth daily.       No current facility-administered medications for this encounter.     Facility-Administered Medications Ordered in Other Encounters   Medication Dose Route Frequency Provider Last Rate Last Admin    Self Administer Medications: Behavioral Services   Does not apply See Admin Instructions Christina Bhatia MD         Taking meds as prescribed? Yes  Medication side effects or concerns: None reported.  Outside medical appointments this week (list provider and reason for visit): Patient reported that she went to an \"addiction medicine\" appointment.       Dimension 3: Emotional/Behavioral Conditions & Complications -   Mental health diagnosis: depression and anxiety  Date of last SIB:  Patient denies  Date of  last SI:  " "March 2024  Date of last HI: Patient denies.  Behavioral Targets:  Report any changes in risk to staff, reports any use to counselors, reports any increase of withdrawal symptoms to nurses, stabilize and maintain mental health, follow recommendations of medical provider.  Current MH Assignments: creating new beliefs assignment    PHQ2:       10/15/2024    10:00 AM 10/8/2024     1:00 PM 10/2/2024     8:06 AM 9/30/2024     4:00 PM 9/17/2018     1:39 PM   PHQ-2 ( 1999 [employer])   Q1: Little interest or pleasure in doing things 1 1 1 1 1   Q2: Feeling down, depressed or hopeless 1 1 1 1 1   PHQ-2 Score 2 2 2 2 2      GAD2:       9/30/2024     4:00 PM 10/8/2024     1:00 PM 10/15/2024    10:00 AM   ROMANA-2   Feeling nervous, anxious, or on edge 1 1 1   Not being able to stop or control worrying 1 1 1   ROMANA-2 Total Score 2 2 2         Additional Narrative:   ROMANA and PHQ updated. Patient has reported having a community therapist and tony continue to see her after treatment.   Pt does not endorse thoughts of self-harm or suicide ideation currently. Patient report that their stress level has not changed this week; coping skills to manage stress used were \"exercising and talking with family.\"  Pt reported that their mood has not significantly changed this past week.  Current Mental Health symptoms include: Patient did not report any symptoms during.  Active interventions to stabilize mental health symptoms this week : spirituality group, small group therapy, therapeutic recreational activities, psychoeducational skills group, nursing awareness lecture.       Dimension 4: Treatment Acceptance / Resistance -   YADY Diagnosis:   Alcohol Use Disorder F10.20  Cannabis Use Disorder F12.20  Commitment to tx process/Stage of change- Patient appears to be in the contemplative stage of change  YADY assignments - completed Step One  Additional Narrative - Pt reports their motivation this week was \"myself and my kids.\" Pt reports that their " "aftercare plans includes: IOP, therapy and meetings. Patient report that they have gotten along with peers and staff this week. Patient engaged in the following recreational activities: patient reported \"exercise, card games, and meetings.\"    Dimension 5: Relapse / Continued Problem Potential -   Relapses this week - None  Urges to use - None reported.  UA results - UA on 10/10/2024 positive for BZO and THC.  Identified triggers - patient did not identify triggers or urges to use.  Coping skills identified - PMR, deep breathing and distraction.  Patient is able to utilize these skills when needed.    Additional Narrative- Pt reports having cravings this past week. Pt reports craving 0 out of 10, ten being high. Patient reported not experiencing any triggers to use but used the following coping skill(s): grounding and breathing techniques. Pt participated in the spirituality group, facilitated by Gin Mejia and  counseling staff was present during group. Patient participated in weekend workshop on relapse prevention and completed all activities    Dimension 6: Recovery Environment -   Family Involvement - my mom, kids, and spouse  Summarize attendance at family groups and family sessions - NA  Family supportive of program/stages?  Yes    Community support group attendance - nightly speaker meetings  Recreational activities - therapeutic recreational activity, playing cards, talking a walk, coloring and watching movies  Additional Narrative - Pt is spending free time with same-gender peers and attending at least 3 virtual 12-step meetings weekly while in . They participated in weekend workshop on relationships and completed all activities.     Progress made on transition planning goals: Patient established aftercare plans and plans to continue therapy after treatment for mental health concerns.     Justification for Continued Treatment at this Level of Care:  multiples attempts of IOP and detox but is " unsuccessful, unable to stop on her own despite multiple consequences to self and others, exacerbated mental health and physical health- co-occurring issues.  Treatment coordination activities this week:   NA  Need for peer recovery support referral? No    Discharge Planning:  Target Discharge Date/Timeframe:  10/21/2024   Med Mgmt Provider/Appt:  TBD   Ind therapy Provider/Appt:  TBD   Family therapy Provider/Appt:  TBD   Other referrals:  TBD        Dimension Scale Review     Prior ratings: Dim1 - 0 DIM2 - 0 DIM3 - 2 DIM4 - 1 DIM5 - 4 DIM6 -4     Current ratings: Dim1 - 0 DIM2 - 0 DIM3 - 2 DIM4 - 1 DIM5 - 4 DIM6 -4       If client is 18 or older, has vulnerable adult status change? No    Are Treatment Plan goals/objectives effective? Yes  *If no, list changes to treatment plan:    Are the current goals meeting client's needs? Yes  *If no, list the changes to treatment plan.    Client response:  Domenico responded through answering the weekly progress handout sheet.      *Client agrees with any changes to the treatment plan: NA  *Client received copy of changes: NA  *Client is aware of right to access a treatment plan review: YES    STEVE Maguire      None

## 2024-10-16 ENCOUNTER — HOSPITAL ENCOUNTER (OUTPATIENT)
Dept: BEHAVIORAL HEALTH | Facility: CLINIC | Age: 34
Discharge: HOME | End: 2024-10-16
Attending: FAMILY MEDICINE
Payer: COMMERCIAL

## 2024-10-16 PROCEDURE — H2035 A/D TX PROGRAM, PER HOUR: HCPCS | Mod: HQ

## 2024-10-16 PROCEDURE — 1002N00001 HC LODGING PLUS FACILITY CHARGE ADULT

## 2024-10-16 NOTE — GROUP NOTE
Group Therapy Documentation    PATIENT'S NAME: Domenico Wilder  MRN:   [id number]  :   1990  ACCT. NUMBER: [id number]  DATE OF SERVICE: 10/16/24  START TIME:  9:45 AM  END TIME: 11:30 AM  FACILITATOR(S): Caitlyn Santoro LADC  TOPIC: BEH Group Therapy  Number of patients attending the group:  5  Group Length:  2 Hours    Dimensions addressed: 3, 4, and 5    Summary of Group / Topics Discussed:    Relationship/socialization, Mindfulness/Relaxation, Emotions/expression, and Leisure explorations/use of leisure time   Recovery Principles, Co-occurring Illness Education and Symptom management, Emotions/expression, Relapse Prevention.     Patients completed daily check ins and the question of the day  Patients completed a letter to self.  Patients engaged in reading and processing daily meditations.    Facilitator: DESTIN Mae   Understanding Depression. What is it? Why does it happen? from Dr. Teja Dockery,  The Chemistry of Jennifer  Depression and Women ([facility])  Coping with depression, Dr. Teja Dockery,  The Chemistry of Jennifer   Social Support      Group Attendance:  Excused from group session    Patient's response to the group topic/interactions:   excused    Patient appeared to be excused.        Client specific details:  Domenico excused due to a prior psych appointment. Domenico returned to group after the appointment and listened attentively.

## 2024-10-16 NOTE — GROUP NOTE
"Group Therapy Documentation    PATIENT'S NAME: Domenico Wilder  MRN:   [id number]  :   1990  ACCT. NUMBER: [id number]  DATE OF SERVICE: 10/16/24  START TIME: 12:30 PM  END TIME:  2:30 PM  FACILITATOR(S): Jamila Sibley; Adriana Ibrahim LADC  TOPIC: BEH Group Therapy  Number of patients attending the group: 7  Group Length:  2 Hours    Dimensions addressed: 3, 4 and 5    Summary of Group / Topics Discussed:    Recovery Principles, Disease of addiction, and Relapse prevention    The group started with check-ins, feelings, question of the day, and a reading.   Patients engaged in group activity/simulation related to Disease of Addiction. Patients listed their consequences of use, and things they were grateful for and put them in basket. Patients discussed the role of probability in active use and Addiction. Patients agreed that they are not in control once they have made the decision to go back and use. The probability of when, how, and what, are all completely out of their control when in active use. Patients shared situations in which the simulation was very realistic and effected them emotionally.   Staff shared why discussing probability, choice, decisions, and prevention are all related and important in this simulation. Patients shared ways in which you can prevent the \" dice from being rolled.\" Patients noted , \" if you are engaging in prevention and staying sober and working a framework, you can prevent the consequences.\"    Group ended with the serenity prayer.      Group Attendance:  Attended group session    Patient's response to the group topic/interactions:  cooperative with task, discussed personal experience with topic, expressed understanding of topic, gave appropriate feedback to peers, and listened actively    Patient appeared to be Actively participating, Attentive, and Engaged.        Client specific details: Domenico shared feeling  positive  during check-in. She shared appropriate feedback " "during the reading discussion, as well as the discussion following the \"Probability Simulation  activity toward the end of group.     "

## 2024-10-16 NOTE — GROUP NOTE
Group Therapy Documentation    PATIENT'S NAME: Domenico Wilder  MRN:   [id number]  :   1990  ACCT. NUMBER: [id number]  DATE OF SERVICE: 10/16/24  START TIME:  8:30 AM  END TIME:  9:30 AM  FACILITATOR(S): Kareem Caldwell LADC  TOPIC: BEH Group Therapy  Number of patients attending the group:  7  Group Length:  1 Hours    Dimensions addressed: 1 and 2    Summary of Group / Topics Discussed:    Disease of addiction      Group Attendance:  Attended group session    Patient's response to the group topic/interactions:  cooperative with task    Patient appeared to be Actively participating.        Client specific details:  Domenico participated and interacted appropriately with peers and staff in skills group. No triggers to use noted or discussed.

## 2024-10-17 ENCOUNTER — HOSPITAL ENCOUNTER (OUTPATIENT)
Dept: BEHAVIORAL HEALTH | Facility: CLINIC | Age: 34
Discharge: HOME | End: 2024-10-17
Attending: FAMILY MEDICINE
Payer: COMMERCIAL

## 2024-10-17 ENCOUNTER — TELEPHONE (OUTPATIENT)
Dept: BEHAVIORAL HEALTH | Facility: CLINIC | Age: 34
End: 2024-10-17
Payer: COMMERCIAL

## 2024-10-17 PROCEDURE — H2035 A/D TX PROGRAM, PER HOUR: HCPCS | Mod: HQ

## 2024-10-17 PROCEDURE — 1002N00001 HC LODGING PLUS FACILITY CHARGE ADULT

## 2024-10-17 PROCEDURE — H2035 A/D TX PROGRAM, PER HOUR: HCPCS | Mod: HQ | Performed by: COUNSELOR

## 2024-10-17 NOTE — GROUP NOTE
"Group Therapy Documentation    PATIENT'S NAME: Domenico Wilder  MRN:   [id number]  :   1990  ACCT. NUMBER: [id number]  DATE OF SERVICE: 10/17/24  START TIME: 12:30 PM  END TIME:  2:30 PM  FACILITATOR(S): Adriana Ibrahim LADC  TOPIC: BEH Group Therapy  Number of patients attending the group:  8  Group Length:  2 Hours    Dimensions addressed: 3, 4, and 5    Summary of Group / Topics Discussed:    Recovery Principles, Relationship/socialization, Emotions/expression, and Self-care activities    Patients completed daily check ins and the question of the day, \" What are quotes, mantras, slogans you live by?     Patients engaged in reading and processing daily meditations.    Patients engaged in a empowering, motivating, and self reflection activity with peers.       Group Attendance:  Attended group session    Patient's response to the group topic/interactions:  cooperative with task    Patient appeared to be Actively participating.        Client specific details:  Nitesh Attended small group therapy. Patient engaged in discussion and participated in sharing feedback to their peers.   .    "

## 2024-10-17 NOTE — TELEPHONE ENCOUNTER
[facility] Appointment Request   Patients with any form of [employer] insurance Cannot be scheduled with this clinic    Type of appointment Acute    Any specific provider? Miles Bragg    Appointment Request made by Willa Barrios RN: [phone]     Additional appointment medical/ information. Pt would like to get the  Nexplanon removed from her arm.     Date of discharge from [facility] 10/21     [facility]  Nurse Liaison / CD [facility] ()  [facility] [address]  O:[phone]  F:[phone]  RN [place] [id number]

## 2024-10-17 NOTE — GROUP NOTE
Group Therapy Documentation    PATIENT'S NAME: Domenico Wilder  MRN:   [id number]  :   1990  ACCT. NUMBER: [id number]  DATE OF SERVICE: 10/17/24  START TIME:  9:00 AM  END TIME: 11:00 AM  FACILITATOR(S): Rosa M Orr LADC  TOPIC: BEH Group Therapy  Number of patients attending the group:  7    Group Length:  2 Hours    Dimensions addressed: 4 and 5    Summary of Group / Topics Discussed:    Recovery Principles, Sober coping skills, Balanced lifestyle, and Relapse prevention      Group Attendance:  Attended group session    Patient's response to the group topic/interactions:  cooperative with task    Patient appeared to be Actively participating, Attentive, and Engaged.        Client specific details:  Patient attended group session and was attentive and participative.

## 2024-10-17 NOTE — GROUP NOTE
Psychoeducation Group Documentation    PATIENT'S NAME: Domenico Wilder  MRN:   [id number]  :   1990  ACCT. NUMBER: [id number]  DATE OF SERVICE: 10/17/24  START TIME:  3:00 PM  END TIME:  4:00 PM  FACILITATOR(S): Anisha Campbell [facility]; Dc Cope LADC  TOPIC: BEH Pyschoeducation  Number of patients attending the group:  21  Group Length:  1 Hours    Skills Group Therapy Type: Recovery skills and Medication education    Summary of Group / Topics Discussed:    Symptom management skills and Medication management skills    Patients attended a lecture by Dr. Lynch related to biology of addiction.          Group Attendance:  Attended group session    Patient's response to the group topic/interactions:  cooperative with task and listened actively    Patient appeared to be Attentive and Engaged.         Client specific details:  Patient appeared actively engaged during this lecture.

## 2024-10-18 ENCOUNTER — HOSPITAL ENCOUNTER (OUTPATIENT)
Dept: BEHAVIORAL HEALTH | Facility: CLINIC | Age: 34
Discharge: HOME | End: 2024-10-18
Attending: FAMILY MEDICINE
Payer: COMMERCIAL

## 2024-10-18 PROCEDURE — H2035 A/D TX PROGRAM, PER HOUR: HCPCS | Mod: HQ

## 2024-10-18 PROCEDURE — 1002N00001 HC LODGING PLUS FACILITY CHARGE ADULT

## 2024-10-18 NOTE — GROUP NOTE
Group Therapy Documentation    PATIENT'S NAME: Domenico Wilder  MRN:   [id number]  :   1990  ACCT. NUMBER: [id number]  DATE OF SERVICE: 10/18/24  START TIME: 12:30 PM  END TIME:  2:30 PM  FACILITATOR(S): Adriana Ibrahim LADC  TOPIC: BEH Group Therapy  Number of patients attending the group:  7  Group Length:  2 Hours    Dimensions addressed: 3, 4, 5, and 6    Summary of Group / Topics Discussed:    Recovery Principles, Sober coping skills, Relationship/socialization, Disease of addiction, Emotions/expression, and Relapse prevention      Patients viewed an addiction/mental health based film. This film addressed: addiction as a disease, relationships and addiction, engagement in AA, and evaluating priorities when getting sober.   Patients engaged in a thorough discussion about the film, and shared personal experiences, and how the film helped them process their own life events.       Group Attendance:  Attended group session    Patient's response to the group topic/interactions:  cooperative with task    Patient appeared to be Actively participating.        Client specific details:  Nitesh Attended small group therapy. Patient engaged in discussion and participated in sharing feedback to their peers.

## 2024-10-18 NOTE — GROUP NOTE
Group Therapy Documentation    PATIENT'S NAME: Domenico Wilder  MRN:   [id number]  :   1990  ACCT. NUMBER: [id number]  DATE OF SERVICE: 10/18/24  START TIME:  9:00 AM  END TIME: 11:00 AM  FACILITATOR(S): Jun Meza LADC  TOPIC: BEH Group Therapy  Number of patients attending the group:  7  Group Length:  2 Hours    Dimensions addressed: 3, 4, and 5    Summary of Group / Topics Discussed:    Recovery Principles, Cognitive behavioral therapy skills, Mindfulness/Relaxation, Coping/DBT informed care, Trauma informed care, Disease of addiction, Emotions/expression, Relapse prevention, and Self-care activities      Group Attendance:  Attended group session    Patient's response to the group topic/interactions:  cooperative with task    Patient appeared to be Attentive and Engaged.        Client specific details:  Domenico participated in morning group. She checked in and took part in the graduation of her peer. She then took part in the reading and discussion on how people might try and bring us down and affecting our self-esteem. The group ended with an activity and discussion on affirmations, and everyone in the group made three positive statements, one for themselves and two for the people next to them.

## 2024-10-19 ENCOUNTER — HOSPITAL ENCOUNTER (OUTPATIENT)
Dept: BEHAVIORAL HEALTH | Facility: CLINIC | Age: 34
Discharge: HOME | End: 2024-10-19
Attending: FAMILY MEDICINE
Payer: COMMERCIAL

## 2024-10-19 PROCEDURE — 1002N00001 HC LODGING PLUS FACILITY CHARGE ADULT

## 2024-10-19 PROCEDURE — H2035 A/D TX PROGRAM, PER HOUR: HCPCS | Mod: HQ

## 2024-10-19 NOTE — GROUP NOTE
Group Therapy Documentation    PATIENT'S NAME: Domenico Wilder  MRN:   [id number]  :   1990  ACCT. NUMBER: [id number]  DATE OF SERVICE: 10/19/24  START TIME: 12:30 PM  END TIME:  2:30 PM  FACILITATOR(S): Jun Meza LADC  TOPIC: BEH Group Therapy  Number of patients attending the group:  6  Group Length:  2 Hours    Dimensions addressed: 3, 4, 5, and 6    Summary of Group / Topics Discussed:    Recovery Principles, Relationship/socialization, Balanced lifestyle, and Disease of addiction      Group Attendance:  Attended group session    Patient's response to the group topic/interactions:  cooperative with task    Patient appeared to be Attentive and Engaged.        Client specific details:  The patient participated in the afternoon lecture on how families are defined and family roles within a family.

## 2024-10-19 NOTE — GROUP NOTE
Psychoeducation Group Documentation    PATIENT'S NAME: Domenico Wilder  MRN:   [id number]  :   1990  ACCT. NUMBER: [id number]  DATE OF SERVICE: 10/19/24  START TIME:  9:00 AM  END TIME: 11:00 AM  FACILITATOR(S): Teagan Hunt LADC; Jun Meza LADC  TOPIC: BEH Pyschoeducation  Number of patients attending the group:  23  Group Length:  2 Hours    Skills Group Therapy Type: Recovery skills    Summary of Group / Topics Discussed:    Balanced lifestyle skills and Relapse prevention skills    Group Attendance:  Attended group session    Patient's response to the group topic/interactions:  cooperative with task    Patient appeared to be Attentive and Engaged.         Client specific details: Pt listened respectfully to a presentation on relapse prevention and asked appropriate questions.

## 2024-10-19 NOTE — PROGRESS NOTES
Nursing Discharge Planning Meeting    Writer completed discharge planning meeting with patient. Discharge is planned for 10/21/24.    Discussed appropriate follow up care to manage medical and psychiatric health and to obtain medication refills. Patient given a copy of their current medications for reference. Questions were answered at this time and the patient verbalized an understanding of the post-discharge follow up plan.    Patient will f/u with PCP as recommended and/or is aware of upcoming appt:  Appt date and time: 10/28/24 10am  Clinic / Provider: Dr. Bragg       Patient will f/u with Addiction Medicine Provider as recommended and/or is aware of upcoming appt:  [facility]  [address] [place] [address], [address] [facility] [address] [phone]   Appt date and time: 11/5/24 4:00 pm  Provider: Litzy Rouse       Patient will f/u with Psychiatry as recommended and/or is aware of upcoming appt:  Appt date and time: 11/14/24  Clinic / Provider: Saurabh     Continue to support patient in discharge planning as needed to assure appropriate continuity of care.     Tobacco Cessation  Patient participated in the nicotine replacement therapy for tobacco cessation or reduction during their treatment programming: Yes    The patient was provided with community resources for follow-up to continue tobacco cessation support once in the community. Also the patient was encouraged to discuss their tobacco cessation efforts with the primary care provider.    [facility]  Nurse Liaison / CD [facility]  O: [phone]  Fax:[phone]  [facility] [id number]  M-F: 7AM to 5:30PM   Sat-Sun: 7AM to 3PM  After hours: [phone]

## 2024-10-20 ENCOUNTER — HOSPITAL ENCOUNTER (OUTPATIENT)
Dept: BEHAVIORAL HEALTH | Facility: CLINIC | Age: 34
Discharge: HOME | End: 2024-10-20
Attending: FAMILY MEDICINE
Payer: COMMERCIAL

## 2024-10-20 PROCEDURE — H2035 A/D TX PROGRAM, PER HOUR: HCPCS | Mod: HQ

## 2024-10-20 PROCEDURE — 1002N00001 HC LODGING PLUS FACILITY CHARGE ADULT

## 2024-10-20 NOTE — GROUP NOTE
Psychoeducation Group Documentation    PATIENT'S NAME: Domenico Wilder  MRN:   [id number]  :   1990  ACCT. NUMBER: [id number]  DATE OF SERVICE: 10/20/24  START TIME:  8:45 AM  END TIME: 10:45 AM  FACILITATOR(S): Kendell Davison LADC  TOPIC: BEH Pyschoeducation  Number of patients attending the group:  22  Group Length:  2 Hours    Skills Group Therapy Type: Healthy behaviors development    Summary of Group / Topics Discussed:    Symptom management skills        Group Attendance:  Attended group session    Patient's response to the group topic/interactions:  listened actively    Patient appeared to be Attentive and Engaged.         Client specific details:  Pt was attentive and engaged.

## 2024-10-20 NOTE — GROUP NOTE
Psychoeducation Group Documentation    PATIENT'S NAME: Domenico Wilder  MRN:   [id number]  :   1990  ACCT. NUMBER: [id number]  DATE OF SERVICE: 10/20/24  START TIME: 12:30 PM  END TIME:  1:30 PM  FACILITATOR(S): Kendell Davison LADC; Teagan Hunt LADC  TOPIC: BEH Pyschoeducation  Number of patients attending the group:  22  Group Length:  1 Hours    Skills Group Therapy Type: Healthy behaviors development    Summary of Group / Topics Discussed:    Relationship/social skills        Group Attendance:  Attended group session    Patient's response to the group topic/interactions:  listened actively    Patient appeared to be Attentive and Engaged.         Client specific details:  Pt was attentive and engaged.

## 2024-10-21 NOTE — ADDENDUM NOTE
Encounter addended by: Caitlyn Santoro LADC on: 10/21/2024 8:44 AM   Actions taken: Clinical Note Signed

## 2024-10-21 NOTE — ADDENDUM NOTE
Encounter addended by: Caitlyn Santoro LADC on: 10/21/2024 10:26 AM   Actions taken: Clinical Note Signed

## 2024-10-21 NOTE — PROGRESS NOTES
Patient left prior to signing discharge paperworks. Patient will be within [facility] for IOP, will collaborate with aftercare providers for the paperworks to be released. STEVE Marie on 10/21/2024 at 10:16 AM

## 2024-10-21 NOTE — PROGRESS NOTES
[address] and [address]oors  [place] [address]              Domenico Wilder, 1990 : was admitted for evaluation/treatment of chemical dependency at [facility].  She took part in these program(s):     ______ The Inpatient Program   ______ The Outpatient Program   ___X___ The Lodging Plus Program   ______ Lodging Day Outpatient         Date admitted: 9/23/2024    Date discharged: 10/21/2024     Type of discharge:     ____X__ Satisfactory - completed evaluation / treatment   ______ Discharged without completing   ______ Behavioral discharge   ______ Transferred to another chemical dependency program   ______ Transferred to another type of service   ______ Left against medical advice (AMA) / Eloped               Clinicians: STEVE Lazo & STEVE Marie     Date: 10/21/2024            Time: 1000 AM

## 2024-10-21 NOTE — PROGRESS NOTES
COUNSELOR S DISCHARGE SUMMARY    Date: 10/21/2024     Program Name:  [facility]    Client Name Domenico Wilder Date of Birth 1990      MR#  [id number]    Referred by STEVE Crabtree        Release copies to [facility].      Admit date 24  Discharge Date  10/21/24  # of Days Attended 28  Date Last Attended: 10/20/24     Discharge Status:  With Staff Approval    PROBLEMS PRESENTED AT ADMISSION:  (Include reasons & circumstances for admission)  Domenico Wilder is a 34 year old year old female who admitted to [facility] for substance use.      Admitting diagnosis:   Alcohol Use Disorder F10.20  Cannabis Use Disorder F12.20      PROGRAM PARTICIPATION:  While at [facility], Domenico Wilder received the following services to address substance use and mental health disorders.  she participated in:  Group Therapy, Individual Therapy, Psychiatric Medication Management, Recreation Activities, Spirituality Groups, DBT Skills Groups, AA/NA meetings , Life Skills Groups, and Psych-education Groups      PROGRESS:     Dimension 1 - Acute Intoxication/Withdrawal Potential:  Admission ASAM Risk Ratin Client displays full functioning with good ability to tolerate and cope with withdrawal discomfort. No signs or symptoms of intoxication or withdrawal or resolving signs or symptoms.  Discharge ASAM Risk Ratin Client displays full functioning with good ability to tolerate and cope with withdrawal discomfort. No signs or symptoms of intoxication or withdrawal or resolving signs or symptoms.    Treatment goal(s): Develop effective strategies to maintain sobriety. Be able to manage mild to moderate withdrawal symptoms.     Progress toward goal(s): Patient reports last use date was 2024. Patient completed medical detoxification while admitted to [facility]. Patient was able to tolerate mild withdrawal symptoms throughout treatment and participate in programming. No  "concerns at time of discharge.      Dimension 2 - Biomedical Conditions & Complications:  Admission ASAM Risk Ratin Client displays full functioning with good ability to cope with physical discomfort.  Discharge ASAM Risk Ratin Client displays full functioning with good ability to cope with physical discomfort.    Treatment goal(s): Follow recommendations of medical provider.  Improve and maintain overall physical health.    Progress toward goal(s): Patient maintained medication compliance and appeared able to access medical aid independently.      Dimension 3 - Emotional/Behavioral Conditions & Complications:  Admission ASAM Risk Ratin Client has difficulty with impulse control and lacks coping skills. Client has thoughts of suicide or harm to others without means; however, the thoughts may interfere with participation in some treatment activities. Client has difficulty functioning in significant life areas. Client has moderate symptoms of emotional, behavioral, or cognitive problems. Client is able to participate in most treatment activities.  Discharge ASAM Risk Ratin Client has difficulty with impulse control and lacks coping skills. Client has thoughts of suicide or harm to others without means; however, the thoughts may interfere with participation in some treatment activities. Client has difficulty functioning in significant life areas. Client has moderate symptoms of emotional, behavioral, or cognitive problems. Client is able to participate in most treatment activities.    Treatment goal(s): Become medication compliant. Improve self-esteem. Understand the relationship between addiction and mental health issues and learn skills to express emotions in a healthy and appropriate way. CSSRS to remain at low risk level.     Progress toward goal(s): The patient completed the following assignments: \"Grief and Loss   Forgiveness   Creating New Beliefs    Upon admission the patient reported that " "following mental health struggles as depression and anxiety. She also reported low self-esteem and grief and loss from things she did while she was using. The patient completed her assignments and appeared to make progress in this area as she reported that she was going to work on forgiving herself and looking toward the future. The patient participated in a DBT lecture where they learned skills on how to regulation and properly express their feelings and emotions using skills such as emotion regulation and using the Wise Mind. Patient completed a safety plan upon entering the [facility]. Upon discharge, patient denied any suicidal thoughts or ideations.    Dimension 4 - Treatment Acceptance/Resistance:  Admission ASAM Risk Ratin Client is cooperative, motivated, ready to change, admits problems, committed to change, and engaged in treatment as a responsible participant.  Discharge ASAM Risk Ratin Client is cooperative, motivated, ready to change, admits problems, committed to change, and engaged in treatment as a responsible participant.    Treatment goal(s): Understand the impact your substance use has had on you. Understand the impact your substance use has had on your family and significant relationships., Increase internal motivation., and connect with your spirituality.     Progress toward goal(s):  The patient completed the following assignments:  \"[address] [address] First Step\" \"Drug Use History\" \"Identifying Relapse Triggers and Cues\"   Upon admission the patient reported having consequences the themselves and other due to their use. The patient completed their first step assignment and was able to identify that they were powerless over alcohol and drugs. And that once they began using they couldn't stop and their lives became unmanageable. By completing and presenting their drug use history assignment they were able to follow how their use effected themselves and their relationships " "over the years.     Dimension 5 - Relapse/Continued Problem Potential:  Admission ASAM Risk Ratin No awareness of the negative impact of mental health problems or substance abuse. No coping skills to arrest mental health or addiction illnesses, or prevent relapse.  Discharge ASAM Risk Rating: 3 Client has poor recognition and understanding of relapse and recidivism issues and displays moderately high vulnerability for further substance use or mental health problems. Client has few coping skills and rarely applies coping skills.    Treatment goal(s): Develop sober coping and living skills., Identify personal triggers and relapse warning signs., Identify what led to your last relapse., Identify and understand personal relapse and self-sabotage patterns., and Develop insight on how cross-addiction has impacted your life     Progress toward goal(s): The patient completed the following assignments: \"Relapse Awareness and Prevention Plan\" 5 v 5 collage - 5 years in sobriety v 5 years in use \"Self Sabotage and Self Defeating Behaviors\"  Upon admission the patient lacked sober coping skills and struggled with identifying their relapse triggers and warning signs. The patient learned coping skills including distraction skills, riding the wave, and playing the tape forward. She created a relapse prevention plan that included going to AA meetings and getting a sponsor. Patient was able to verbalize sober coping skills and relapse prevention strategies prior to discharge.  Patient also participated in two weekend workshops on Relapse Prevention and gained insight into the emotional, mental, and physical cues that precede relapse.     Dimension 6 - Recovery Environment: (family, recreation, legal, education, etc.)  Admission ASAM Risk Rating: 3 Client is not engaged in structured, meaningful activity and the client's peers, family, significant other, and living environment are unsupportive, or there is significant criminal " justice system involvement.  Discharge ASAM Risk Rating: 3 Client is not engaged in structured, meaningful activity and the client's peers, family, significant other, and living environment are unsupportive, or there is significant criminal justice system involvement.    Treatment goal(s): Develop a sober support network., Develop boundaries., and reflect on treatment experience, celebrate accomplishments, anticipate challenges in new sober lifestyle and set goals.     Progress toward goal(s): Upon admission the patient lacked a sober support network. The patient appeared to make solid connections with the other woman in the program and reported that she planned to go to AA meetings with them. The patient participated in a lecture on sober resources and events and on how to find them.    Patient also participated in two weekend workshops on Relationships and gained insight into how relationships with family and loved ones can impact recovery, the need for solid boundaries, and better communication.    Client strengths identified during treatment were: The patient was a positive group member and they offered support and positive feedback to their peers.      Client needs identified during treatment were: withdrawal, cravings, depression, anxiety, self-esteem, grief and loss, forgiveness, relapse, lacks of coping skills, self-sabotage, lack of a sober support system.     Discharge Diagnosis:    Alcohol Use Disorder F10.20  Cannabis Use Disorder F12.20    Discharge Medications:   Current Outpatient Medications   Medication Sig Dispense Refill    buPROPion (WELLBUTRIN XL) 150 MG 24 hr tablet Take 1 tablet (150 mg) by mouth every morning. 30 tablet 0    cyclobenzaprine (FLEXERIL) 5 MG tablet Take 5 mg by mouth. Take 1-2 tabs (5-10mg) by mouth three times daily for muscle spasms      etonogestrel (NEXPLANON) 68 MG IMPL Inject 68 mg Subcutaneous      famotidine (PEPCID) 20 MG tablet Take 20 mg by mouth 2 times daily       gabapentin (NEURONTIN) 300 MG capsule Take 3 capsules (900 mg) by mouth 3 times daily for 15 days. 135 capsule 0    hydrOXYzine los (VISTARIL) 25 MG capsule Take 1 capsule (25 mg) by mouth 3 times daily as needed for anxiety. 60 capsule 0    naloxone (NARCAN) 4 MG/0.1ML nasal spray Spray 1 spray (4 mg) into one nostril alternating nostrils as needed for opioid reversal. every 2-3 minutes until assistance arrives 2 each 11    naltrexone (DEPADE/REVIA) 50 MG tablet Take 1 tablet (50 mg) by mouth daily. 30 tablet 0    nicotine polacrilex (NICORETTE) 4 MG gum Place 1 each (4 mg) inside cheek as needed. 220 each 1    OLANZapine (ZYPREXA) 10 MG tablet Take 1 tablet (10 mg) by mouth at bedtime. 30 tablet 0    pantoprazole (PROTONIX) 40 MG EC tablet Take 40 mg by mouth daily.      sertraline (ZOLOFT) 100 MG tablet Take 1 tablet (100 mg) by mouth daily. 30 tablet 0    traZODone (DESYREL) 100 MG tablet Take 1 tablet (100 mg) by mouth at bedtime. 30 tablet 0    Vitamin D3 (CHOLECALCIFEROL) 25 mcg (1000 units) tablet Take 1 tablet by mouth daily.       No current facility-administered medications for this encounter.     Facility-Administered Medications Ordered in Other Encounters   Medication Dose Route Frequency Provider Last Rate Last Admin    Self Administer Medications: Behavioral Services   Does not apply See Admin Instructions Christina Bhatia MD           Discharge Plan and Recommendations:    Living arrangements at discharge patient will be returning to her home.  Patient terminated services due to program completion. The following continued care recommendations have been made: [facility]. Prior to discharge, patient identified supportive resources in the community that include weekly 12-step meetings and sponsorship.  Prognosis: Prognosis is favorable if patient follows all aftercare recommendations and referrals.       STEVE Maguire

## 2024-10-21 NOTE — PROGRESS NOTES
[facility] Discharge/ Continuing Care Resources     Patient: Domenico Wilder          MRN: [id number]                   : 1990    Personal Safety/Management of Symptoms  - Contact local ER if symptoms increase or SI/HI ideation.     Call Crisis Lines As Needed  [facility] [phone]  Suicide Prevention [phone]  Crisis Connection [phone]   [facility] [phone]        [facility] [phone]                           National Suicide Prevention [phone]           Abstinence/Relapse Prevention  * Take all medicines as prescribed, contact provider with concerns.   * Use coping skills from treatment: Call someone, attend meeting, sober event, sponsor,   safe place, read big book, exercise, read prevention plan. SEEK SUPPORT!    Community Resources/Supports   [facility] AA:  [phone] ( )  [place]  Alcoholics Anonymous : [phone]  Narcotics Anonymous : [address] ( [phone])  [place] Recovery Connection: Free recovery resources.  [phone]    Discharge Recommendations:  -Admit/Complete [facility] ( 5 Days Per Week- ASAM 2.1), Follow all recommendations.   -Schedule/Attend Psychiatrist/ Medical Provider appointments as needed.  -Schedule/Attend psychotherapy appointment from the community  -Attend 3+weekly Support Meetings (AA, NA, TRU)   -Obtain/Maintain weekly contact with a Sponsor  -Continue to expand sober network, attend sober events/activities.  -Continue to monitor personal triggers, and implement prevention strategies.  -Remain in good standing with Probation and follow all recommendations (as needed)  - Remain in contact with Commitment Worker, and follow all recommendations. (As needed)      Patient Signature:     Date          Clinician Signature:      Date

## 2024-10-24 ENCOUNTER — VIRTUAL VISIT (OUTPATIENT)
Dept: BEHAVIORAL HEALTH | Facility: CLINIC | Age: 34
End: 2024-10-24
Payer: COMMERCIAL

## 2024-10-24 ENCOUNTER — BEH TREATMENT PLAN (OUTPATIENT)
Dept: BEHAVIORAL HEALTH | Facility: CLINIC | Age: 34
End: 2024-10-24

## 2024-10-24 ENCOUNTER — TELEPHONE (OUTPATIENT)
Dept: BEHAVIORAL HEALTH | Facility: CLINIC | Age: 34
End: 2024-10-24

## 2024-10-24 ENCOUNTER — THERAPY VISIT (OUTPATIENT)
Dept: ADDICTION MEDICINE | Facility: HOSPITAL | Age: 34
End: 2024-10-24
Attending: FAMILY MEDICINE
Payer: COMMERCIAL

## 2024-10-24 DIAGNOSIS — F10.20 SEVERE ALCOHOL USE DISORDER (H): Primary | ICD-10-CM

## 2024-10-24 DIAGNOSIS — F10.90 ALCOHOL USE DISORDER: Primary | ICD-10-CM

## 2024-10-24 DIAGNOSIS — F11.21 OPIOID USE DISORDER, SEVERE, IN SUSTAINED REMISSION (H): ICD-10-CM

## 2024-10-24 DIAGNOSIS — F17.200 NICOTINE USE DISORDER: ICD-10-CM

## 2024-10-24 PROCEDURE — H2035 A/D TX PROGRAM, PER HOUR: HCPCS

## 2024-10-24 PROCEDURE — 99204 OFFICE O/P NEW MOD 45 MIN: CPT | Mod: 95 | Performed by: FAMILY MEDICINE

## 2024-10-24 ASSESSMENT — PATIENT HEALTH QUESTIONNAIRE - PHQ9
SUM OF ALL RESPONSES TO PHQ QUESTIONS 1-9: 6
SUM OF ALL RESPONSES TO PHQ QUESTIONS 1-9: 6
10. IF YOU CHECKED OFF ANY PROBLEMS, HOW DIFFICULT HAVE THESE PROBLEMS MADE IT FOR YOU TO DO YOUR WORK, TAKE CARE OF THINGS AT HOME, OR GET ALONG WITH OTHER PEOPLE: SOMEWHAT DIFFICULT

## 2024-10-24 ASSESSMENT — COLUMBIA-SUICIDE SEVERITY RATING SCALE - C-SSRS
6. HAVE YOU EVER DONE ANYTHING, STARTED TO DO ANYTHING, OR PREPARED TO DO ANYTHING TO END YOUR LIFE?: NO
1. SINCE LAST CONTACT, HAVE YOU WISHED YOU WERE DEAD OR WISHED YOU COULD GO TO SLEEP AND NOT WAKE UP?: NO
2. HAVE YOU ACTUALLY HAD ANY THOUGHTS OF KILLING YOURSELF?: NO

## 2024-10-24 NOTE — TELEPHONE ENCOUNTER
----- Message from Ginette Pham sent at 10/24/2024  2:37 PM CDT -----  Scheduling Request    Patient Name: Domenico Wilder  Location of programming: [facility]  Start Date: October / 28 / 2024  Group: [id number] on Monday-(5:30-7:30 PM), Tuesday-(5:30-7:30 PM), Wednesday(5:30-8:30 PM), and Thursday (5:30-7:30 PM)  Attending Provider (MD): Christina Bhatia  Number of visits to be scheduled: 45  Duration of Appointment in minutes: 120 min  Visit Type: In-person or Treatment - 870    Additional notes: New patient, starting phase 1

## 2024-10-24 NOTE — PROGRESS NOTES
[facility] - Intensive Outpatient Program    ASSESSMENT/PLAN                                                      There are no diagnoses linked to this encounter.       No follow-ups on file.      Patient counseling completed today:  Discussed mechanism of action, potential risks/benefits/side effects of medications and other recommendations above.    ***Discussed risk of precipitated withdrawal with initiation of buprenorphine in the presence of full opioid agonists.    ***Reviewed directions for initiation of buprenorphine to reduce risk of precipitated withdrawal and maximize efficacy.    ***Harm reduction counseling including never use alone, availability of naloxone, avoiding combination of opioids with benzodiazepines, alcohol, or other sedatives, safer administration.      Discussed importance of avoiding isolation, building a network of supportive relationships, avoiding people/places/things associated with past use to reduce risk of relapse; including motivational interviewing regarding psychosocial treatment for addiction.     Physician Certification of Medical Necessity    Attending physician: Monique Murguia DO    Admission Certification:  I certify the above-named patient would require partial hospitalization care if intensive outpatient services were not provided and that the patient requires such IOP services for a minimum of 9 hours per week. These services are provided under the care and supervision of a physician and under an individualized plan of treatment that is established and periodically reviewed by a physician in consultation with appropriate staff participating in the program.    From admission date: 10/24/2024 to 60th day: [unfilled]      Monique Murguia DO on 10/24/2024 at 3:15 PM        SUBJECTIVE                                                      Video-Visit Details  Type of service:  Video Visit  Video Start Time: 3:16 PM  Video End Time: 3:36 PM  Originating  "Location (pt. Location): {video visit patient location:865845::\"Home\"}  Distant Location (provider location):  {virtual visit provider location:943177::\"[facility] office\"}   Platform used for Video Visit: {Virtual Visit Platforms:[id number]::\"Phanfare\"}      CC/HPI:  Domenico Wilder is a 34 year old female with PMHx of depression, anxiety, AUD, and OUD who presents for initial addiction medicine medicine consult and intensive outpatient programming certification.   She is followed by Litzy Rouse NP at [facility].      Began drinking more over the past 1.5 years due to to anxiety/depression  [facility] and then [facility]  Cravings have been OK  Taking naltrexone which is helpful, no side effects      Substance(s) of choice:  - ***         Withdrawal symptoms - ***  IV drug use - ***  Previous MAT - ***  Previous detox/treatment - ***  Current recovery activities:  AA meetings   Longest period of sobriety:  3 years   Significant protective factors:  being around supportive people, meetings   Medical complications from substance use - ***  History of overdose - ***  Narcan currently available - ***    Other Substance Use:  ALCOHOL:  ***  OPIATES:   Feb 2025 will be 6 years since last use  KRATOM: ***  STIMULANTS (cocaine, methamphetamine, MDMA/ecstasy): ***   SEDATIVES/HYPNOTICS/ANXIOLYTICS (benzodiazepines, GHB, Ambien, phenobarbital):   ***  CANNABIS:  ***  HALLUCINOGENS/DISSOCIATIVES (acid, mushrooms, ketamine);   ***   OTHER  (Gambling, shopping, eating disorder): ***  NICOTINE: vaping - cutting down, has nicotine gum and patches    Infectious Disease Screening:  Hep C:  ***  HIV: ***    Pregnancy Status:    [facility]      [place] Prescription Drug Monitoring Program Reviewed:  Yes; ***      PAST PSYCHIATRIC HISTORY:  Diagnoses- depression, anxiety  Suicide Attempts: No   Hospitalizations: No         9/23/2024    12:00 PM 10/2/2024     8:06 AM 10/24/2024     " 2:57 PM   PHQ   PHQ-9 Total Score 17 7 6    Q9: Thoughts of better off dead/self-harm past 2 weeks Not at all Not at all Not at all        Patient-reported         Social History  Living Situation/Housing Status: with parents  Relationship status: Partnered (x 15 years)  Children: 3 children  Support System:  family, friends  Employment status: currently on [employer] - [employer]   Legal Issues/Upcoming Court Date(s): none      Medications:  Current Outpatient Medications   Medication Sig Dispense Refill    buPROPion (WELLBUTRIN XL) 150 MG 24 hr tablet Take 1 tablet (150 mg) by mouth every morning. 30 tablet 0    cyclobenzaprine (FLEXERIL) 5 MG tablet Take 5 mg by mouth. Take 1-2 tabs (5-10mg) by mouth three times daily for muscle spasms      etonogestrel (NEXPLANON) 68 MG IMPL Inject 68 mg Subcutaneous      famotidine (PEPCID) 20 MG tablet Take 20 mg by mouth 2 times daily      gabapentin (NEURONTIN) 300 MG capsule Take 3 capsules (900 mg) by mouth 3 times daily for 15 days. 135 capsule 0    hydrOXYzine los (VISTARIL) 25 MG capsule Take 1 capsule (25 mg) by mouth 3 times daily as needed for anxiety. 60 capsule 0    naloxone (NARCAN) 4 MG/0.1ML nasal spray Spray 1 spray (4 mg) into one nostril alternating nostrils as needed for opioid reversal. every 2-3 minutes until assistance arrives 2 each 11    naltrexone (DEPADE/REVIA) 50 MG tablet Take 1 tablet (50 mg) by mouth daily. 30 tablet 0    nicotine polacrilex (NICORETTE) 4 MG gum Place 1 each (4 mg) inside cheek as needed. 220 each 1    OLANZapine (ZYPREXA) 10 MG tablet Take 1 tablet (10 mg) by mouth at bedtime. 30 tablet 0    pantoprazole (PROTONIX) 40 MG EC tablet Take 40 mg by mouth daily.      sertraline (ZOLOFT) 100 MG tablet Take 1 tablet (100 mg) by mouth daily. 30 tablet 0    traZODone (DESYREL) 100 MG tablet Take 1 tablet (100 mg) by mouth at bedtime. 30 tablet 0    Vitamin D3 (CHOLECALCIFEROL) 25 mcg (1000 units) tablet Take 1 tablet by mouth daily.        No current facility-administered medications for this visit.     Facility-Administered Medications Ordered in Other Visits   Medication Dose Route Frequency Provider Last Rate Last Admin    Self Administer Medications: Behavioral Services   Does not apply See Admin Instructions Christina Bhatia MD           Allergies   Allergen Reactions    Cats     Dogs        Past Medical History:   Diagnosis Date    Anxiety     Chickenpox     Depression     Depressive disorder     GERD (gastroesophageal reflux disease)     Substance abuse (H)        Past Surgical History:   Procedure Laterality Date    NO HISTORY OF SURGERY         Family History   Problem Relation Age of Onset    Allergies Mother     Depression Mother     Alcohol/Drug Mother     Prostate Cancer Paternal Grandfather     Diabetes Maternal Grandfather         non insulin dependent         REVIEW OF SYSTEMS:  No concerns.    OBJECTIVE                                                      There were no vitals taken for this visit.    Physical Exam    Labs:    Recent Results (from the past 720 hours)   Drugs of Abuse Screen Urine (POC CUPS) POCT    Collection Time: 10/09/24  2:13 PM   Result Value Ref Range    POCT Kit Lot Number [id number]     POCT Kit Expiration Date 2026-05-15     Temperature Urine POCT 94 F 90 F, 92 F, 94 F, 96 F, 98 F, 100 F    Specific [place] POCT 1.015 1.005, 1.015, 1.025    pH Qual Urine POCT 5 pH 4 pH, 5 pH, 7 pH, 9 pH    Creatinine Qual Urine POCT 50 mg/dL 20 mg/dL, 50 mg/dL, 100 mg/dL, 200 mg/dL    Internal QC Qual Urine POCT Valid Valid    Amphetamine Qual Urine POCT Negative Negative    Barbiturate Qual Urine POCT Negative Negative    Buprenorphine Qual Urine POCT Negative Negative    Benzodiazepine Qual Urine POCT Screen Positive (A) Negative    Cocaine Qual Urine POCT Negative Negative    Methamphetamine Qual Urine POCT Negative Negative    MDMA Qual Urine POCT Negative Negative    Methadone Qual Urine POCT Negative Negative     Opiate Qual Urine POCT Negative Negative    Oxycodone Qual Urine POCT Negative Negative    Phencyclidine Qual Urine POCT Negative Negative    THC Qual Urine POCT Screen Positive (A) Negative         Monique Murguia DO  [facility]  Intensive Outpatient Program

## 2024-10-24 NOTE — PROGRESS NOTES
"[facility]  MENTAL HEALTH AND ADDICTION    CO-OCCURRING RESIDENTIAL AND IOP TREATMENT PLAN    [facility] LEVEL OF CARE:  2.1 Intensive Outpatient Program Date: 10/24/2024    DIMENSION 1: Intoxication / Withdrawal Potential     Initial Risk Ratin  Identified areas of concern/focus: Alcohol     As evidenced by:  Domenico reports date of last use of alcohol on 2024    Client's Goal: \"staying sober\"  Clinical Goals:    Abstain from all mood altering substances.  Must be reached in order to have services terminated?  Yes  Target Date: 2025       Date/ Initials Methods/Interventions Target Date Extended Date Extended Date Stopped Completed Initials   10/24/24   KT Client will report any chemical use to staff. 2025        DIMENSION 2: Biomedical Conditions/Complications   Initial Risk Ratin  Identified areas of concern/focus:   Domenico reports arthritis in her back and is experiencing 5/10 pain.    As evidenced by:    Domenico medical condition .    Client's Goal: \"Exercise 3x a week\"  Clinical Goals:    Domenico will stay physically active throughout the week and report her progress.  Must be reached in order to have services terminated?  No  Target Date: 2025       Date/ Initials Methods/Interventions Target Date Extended Date Extended Date Stopped Completed Initials   10/24/2024  KT Client will consistently take medications as prescribed.  2025      10/24/2024   KT Client will report any health concerns/changes to staff. 2025      10/24/24   KT Cleint will report weekly progress on her physical activities.  2025        DIMENSION 3:Emotional/Behavioral Conditions/Complications   Initial Risk Ratin  Identified areas of concern/focus: Major depression disorder and Generalized Anxiety disorder.       As evidenced by: Domenico report of mental health diagnosis from experiencing symptoms.       Client's Goal: \"Stay on and keep taking meds\"  Clinical Goals:  " "  Client will strengthen protective factors.  Must be reached in order to have services terminated?  Yes  Target Date: 2025   Client will develop effective strategies for  anxiety symptoms and depression symptoms. Must be reached in order to have services terminated?  Yes  Target Date: 2025        Date/ Initials Methods/Interventions Target Date Extended Date Extended Date Stopped Completed Initials   10/24/2024  KT Client will participate in 9 hours of group therapy 4 days per week.  2025        10/24/2024  KT Client will increase and strengthen protective factors by learning about her values . 2025      10/24/2024  KT Anxiety/OCD/PTSD:  Client will develop relaxation activities to reduce anxiety level. 2025      10/24/24   KT Depression:  Client will replace negative self-talk with positive affirmations. 2025                                                                                                                                                          DIMENSION 4: Treatment Acceptance/Resistance   Initial Risk Ratin  Identified areas of concern/focus:    Alcohol Use Disorders;  303.90 (F10.20) Alcohol Use Disorder Severe  Moderate motivation for treatment    As evidenced by:  Meets DSM 5 criteria for substance use disorder.    Client's Goal: \"Coming and participating in group\"  Clinical Goals:    Client will fully engage in treatment and recovery process and begin to verbalize readiness for change.  Must be reached in order to have services terminated?  Yes  Target Date: 2025     Date/ Initials Methods/Interventions Target Date Extended Date Extended Date Stopped Completed Initials   10/24/2024  KT Client [facility] ones [facility] progress on treatment plan goals. 2025        10/24/2024  KT Client will identify benefits of treatment/sobriety.   2025      10/24/24   KT Client will identify sober support system.   " "2025                                          DIMENSION 5: Relapse/Continued Problem Potential   Initial Risk Rating: 3  Identified areas of concern/focus:  High risk for relapse    As Evidenced by:  Client unable to identify relapse triggers.    Client lacks coping skills for relapse prevention.        Client's Goal: \"Go to 2 meetings a week\"  Clinical Goals:    Client has established and utilizes a relapse prevention plan.  Must be reached in order to have services terminated?  Yes  Target Date: 2025   Develop an understanding of personal pattern of relapse in order to help sustain long-term recovery.  Must be reached in order to have services terminated?  Yes  Target Date: 2025       Date/ Initials Methods/Interventions Target Date Extended Date Extended Date Stopped Completed Initials   10/24/2024  KT Client will comply with urine drug screens at staff request to monitor for substance use. 2025        10/24/2024  KT Client will rate urges to use daily in group. 2025      10/24/2024  KT Client will increase coping skills for dealing with urges/triggers. 2025      10/24/2024  KT Client will develop written aftercare plan. 2025      10/24/2024  KT Client will identify and practice drug use refusal skills. 2025      10/24/2024  KT Client will complete relapse processing assignment identifying specific attitudes, thoughts, and behaviors leading up to relapse. 2025                        DIMENSION 6: Recovery Environment   Initial Risk Ratin  Identified areas of concern/focus: Lack of sober / recreational interests    As evidenced by:    Client reports most peer group uses.    Clients lacks sober activities.      Client's Goal: \"Stay around sober people, not hangout with using friends\"  Clinical Goals:   Establish a transition plan connecting to culturally informed services in the community for post-treatment follow up care.  Must be reached in order to have services " terminated?  Yes  Target Date: 1/24/2025   Establish sober support network. Must be reached in order to have services terminated?  Yes  Target Date: 1/24/2025       Date/ Initials Methods/Interventions Target Date Extended Date Extended Date Stopped Completed Initials   10/24/2024  KT Client will identify persons in his/her life that support recovery.   1/24/2025        10/24/2024  KT Client will increase sober support by attending recovery meetings regularly. 1/24/2025                                                                                                        Client Strengths: Positive and determined  Client Treatment Plan Adaptations:  Client does not need adjustments at this time.  The following adjustments will be made based on the above identified plan: N/A   Patient's primary therapist/counselor:  Ginette Pham [facility]  The following staff have also contributed to this plan: NA       Were family/support people involved in the treatment planning?  No - List reason why not:  Domenico was at her appointment by herself  Patient's progress will be reviewed at least every 30 days (for IOP patients)..    Resources  Resources to which the patient is being referred for problems when they are to be addressed concurrently by another provider: [facility] [phone]    [x] Contact insurance to ensure referrals are in network    Vulnerable Adult Review   [] Review of the facility Abuse Prevention plan was reviewed with the patient   [] No individual abuse plan is necessary   [] In addition to the facility Abuse Prevention plan, an Individual Abuse Plan will be put in place       Domenico attests their date of last use  of alcohol on 9/18/2024. Domenico attests they have participated in the creation of this treatment plan. Domenico has been provided a copy of this treatment plan and is in agreement with how this plan is written and will be stored in the electronic record.     Client Signature:   _______________________________ Date:___________________    LADC Signature:  _______________________________  Date: __________________

## 2024-10-24 NOTE — PROGRESS NOTES
"Comprehensive Assessment Update:    Comprehensive assessment dated 10/8/2024 was reviewed and updates are as follows: 10/24/2024  Reason for admission today:  \"I want to be happier and a better mom\".     Dates of last use and substance(s) used:  Alcohol on 2024  Patient has a history of opiate use and was give treatment options, including Medication Assisted Treatment, and information on the risks of opiod use disorder including recognizing and responding to opiod overdose. Pt. History of opiate use in 2019 and continues to take Naltrexone.     Vulnerability Assessment:  Does the patient have a history of vulnerability such as being teased, picked on, or other indications of potential safety issues with other residents?  No    Does this patient have a history of being the victim of abuse? No history of abuse reported or documented.    Does this patient have a history of victimizing others? No     Does the patient have a history of boundary violations?  No.    Does the patient have a history of other sexual acting out behaviors (e.g grooming)?   No    Does the patient have a history of threats to self or others? Fire setting, running away or other self-injurious behaviors?    No    Does the patient s history indicate the need for special precautions or particular staffing patterns in the facility?  No      NOTE: If this screening indicates that the patient is likely to have sexually abusive behavior, the license [place] must have written risk   management plans to protect the patient, other patients, staff, and the community.    Other safety concerns:  None.        Other:  N/A    Health Screening:  Given patient's past history, a medication, and physical condition, is there a fall risk?  No  Does the patient have any pain? Yes -  Pain ratin/10      Describe pain:  \"Back, shooting pain, I have arthritis in my back\".         When did it first begin?: \"one year ago\".   How long does each episode last?: " "\"constant\".  What causes or worsens it?:  \"bending and lifting\".   What relieves or lessens it?:  \"ice, hot shower\".   Would like this pain addressed during your stay: NO   Staff have requested client inform staff of any new or different pain issue(s) that arise during their treatment stay: Yes   Is the patient on a special diet? If yes, please explain: no  Does the patient have any concerns regarding your nutritional status? If yes, please explain: no  Has the patient had any appetite changes in the last 3 months?  Yes, \"eating mores, when I was drinking I did not eat much\".  Has the patient had any weight loss or weight gain in the last months? Yes, how much? \"10 pounds\".  Has the patient have a history of an eating disorder or been over-eating, avoiding meals, or inducing vomiting?  No  Does the patient have any dental concerns? (Problems with teeth, pain, cavities, braces)?  NO  Are immunizations up to date?  Yes  Any recent exposure to TB, Hepatitis, Measles, or Strep?  No    Brief Biosocial Gambling Screening:  Do you have any current or past concerns regarding gambling?  No      Dimension Scale Ratings:    Dimension 1: 0 Client displays full functioning with good ability to tolerate and cope with withdrawal discomfort. No signs or symptoms of intoxication or withdrawal or resolving signs or symptoms.    Summary to support rating:  Domenico reports date of last use of alcohol on 9/18/2024. Domenico reports no withdrawal concern at this time of SI.     Dimension 2: 1 Client tolerates and renee with physical discomfort and is able to get the services that the client needs.  Summary to support rating:  Domenico reports arthritis in her back and is experiencing 5/10 pain. Domenico reports having a PCP and is able to access services when needed. Domenico has active insurance.     Dimension 3: 2 Client has difficulty with impulse control and lacks coping skills. Client has thoughts of suicide or harm to others without means; " however, the thoughts may interfere with participation in some treatment activities. Client has difficulty functioning in significant life areas. Client has moderate symptoms of emotional, behavioral, or cognitive problems. Client is able to participate in most treatment activities.  Summary to support rating: Domenico diagnosis with Major depression disorder and Generalized Anxiety disorder. Domenico has therapy services set in place. Domenico  reports seeing a psychiatrist ones a month. Domenico reports no SI, SIB, VI, or HI at this time of SI.     Dimension 4: 1 Client is motivated with active reinforcement, to explore treatment and strategies for change, but ambivalent about illness or need for change.  Summary to support rating:  Domenico has internal motivation for her children and herself. Domenico reports she is excited to start group therapy. Domenico is in the preporation stage of change.     Dimension 5: 3 Client has poor recognition and understanding of relapse and recidivism issues and displays moderately high vulnerability for further substance use or mental health problems. Client has few coping skills and rarely applies coping skills.  Summary to support rating:  Domenico just got out of [facility] residential treatment program. Domenico has minium insight into her triggers. Domenico has minimum insight into her coping skills and relapse prevention skill.     Dimension 6: 1 Client has passive social  or family and significant other are not interested in the client's recovery. The client is engaged in structured meaningful activity.  Summary to support rating::  Domenico reports on a leave of absnence with her job. Domenico reports living in  stable home. Domenico reports no current or pending legal charges at this time. Domenico reports attending a sober support group.     Primary Diagnoses:  Major Depression disorder   Secondary Diagnoses:  General Anxiety disorder    Initial Service Plan (ISP)    Immediate health, safety,  and preliminary service needs identified and plan includes the following based on available information from clients, referral sources, and collateral information.    Safety (SI, SIB, suicide attempts, aggressive behaviors):  History of SI (suicidal ideation)?  No  History of SIB (self-injurious behavior)?  No  History of VI (violent ideation)?  No  History of HI (homicidal ideation)?  No    Health:  Client does NOT have health issues that would impede participation in treatment    Transportation: Patient will drive or get dropped off.      Other:  None    Patient does not have any identified barriers to participating in referred services.    Vulnerable Adult Assessment    Does the patient possess a physical or mental infirmity or other physical, mental, or emotional dysfunction?  No. Patient is not a vulnerable adult.    This patient is not a functional Vulnerable Adult according to [place] Statute 626.5572 subdivision 21.      Treatment suggestions for client for the time period until the initial treatment planning session:  10/28/2024  Service Initiation Individual Note    Start: 1:45  End:  2:20      Visit Purpose:    D: Writer met wit client to fa ciliate an individual session that focused on completion of client's service initiation and intake into the program. Reviewed the intake packet, client sign documents regarding general consent, HIPAA and other orientation documents. Client signed ROIS for Insurance and emergency contact.  We discussed the impact of their substance use on various areas of their life. The patient shared what they have been doing between the initial comprehensive assessment date and today's service initiation appointment. We discussed the plan for the patient between this session and their first group.  The patient also shared their individual motivation for recovery during this session.      I:     A:     P: Client is set to start group on 10/28/2024.       Attestation:   Dr. Bhatia  - Medical Director - Provides oversight and supervision of care.      STEVE Moore 10/24/2024 1:38 PM

## 2024-10-25 ENCOUNTER — TELEPHONE (OUTPATIENT)
Dept: FAMILY MEDICINE | Facility: CLINIC | Age: 34
End: 2024-10-25
Payer: COMMERCIAL

## 2024-10-25 NOTE — TELEPHONE ENCOUNTER
LVM - Construction notice   The access site was successfully closed using a (HEMOSTAT CMPR VASC REG 24CM) closure device.

## 2024-10-28 ENCOUNTER — THERAPY VISIT (OUTPATIENT)
Dept: ADDICTION MEDICINE | Facility: HOSPITAL | Age: 34
End: 2024-10-28
Attending: FAMILY MEDICINE
Payer: COMMERCIAL

## 2024-10-28 DIAGNOSIS — F10.90 ALCOHOL USE DISORDER: Primary | ICD-10-CM

## 2024-10-28 PROCEDURE — H2035 A/D TX PROGRAM, PER HOUR: HCPCS

## 2024-10-28 NOTE — PROGRESS NOTES
"Individual Session Summary   START TIME: 5:25PM   END TIME: 6:00PM   Duration:  35  min        Data:  Met with client on this date for an individual session. The session focused on client's treatment plan goal for DIM(s) 1,3,4,5 of her individual treatment plan. Domenico participated in mindfulness meditation and shared feeling calm. Domenico shared today she is feeling \"posative and optamistic\". Domenico rated her mental health 7/10 \"taking my medication and staying positive\". Domenico rated her physical health 8/10 \"I would like to start waking up early and being more active\". Domenico shared staying sober and having no thoughts or urges over the weekend. Domenico shared that her mom and brother are both in recovery and her partner is supportive of her being sober. Domenico shared no high risk over the weekend \"took my kids trunker treating, attending [employer], and going to the pumpkin patch with my family\". Domenico affirmation \"I am a good mom\". Domenico shared she is grateful for her family. Domenico self-care this week is focusing on reading and going on walks.     Intervention: Facilitated an individual session. Utilized MI, Person-Centered Approach, Active Listening, Validation, Clarifying Questions, Mindfulness Meditation.      Assessment: Domenico actively engaged and participated in the session. Domenico appears insightful in what she is wanting to work on her positive self-talk. Domenico appears to be in the preparation or action stage of change     Plan. Client will  Domenico to continue to engage and participate in treatment. Domenico to work on positive self-talk. Domenico to continue to practice utilizing mindfulness in her daily routine..         STEVE Moore   "

## 2024-10-29 ENCOUNTER — THERAPY VISIT (OUTPATIENT)
Dept: ADDICTION MEDICINE | Facility: HOSPITAL | Age: 34
End: 2024-10-29
Attending: FAMILY MEDICINE
Payer: COMMERCIAL

## 2024-10-29 DIAGNOSIS — F10.90 ALCOHOL USE DISORDER: Primary | ICD-10-CM

## 2024-10-29 PROCEDURE — H2035 A/D TX PROGRAM, PER HOUR: HCPCS

## 2024-10-30 NOTE — PROGRESS NOTES
"Individual Session Summary   START TIME: 5:30 PM   END TIME: 6:04PM   Duration:  34 min        Data:  Met with client on this date for an individual session. The session focused on client's treatment plan goal for DIM(s) 2,3,5 of her individual treatment plan. Domenico participated in mindfulness meditation and shared \"doing self-kindness to myself and other, thinking more positive about the future\". Domenico shared today she is feeling \"hopeful and bummed, my friends from treatment all relapsed\". Domenico shared having a job interview on 10/30/2024. Domenico shared she is wanting a change in her work life. Domenico shared having no thoughts, craving or urges to use. Domenico affirmation \"I am a good friend'. guillermo participated in learning how the natural chemicals in the brain were affected by substance use. Domenico  learned healthy and natural ways on how Dopamine, Endorphins, Oxytocin, and Serotonin can affect your mood/mental health. Domenico learned that the body takes a while to adjust with no substance use and can experiences symptoms of High, moderate, or Low emotions/symptoms. Domenico shared understanding of the topic and being able to relate with her emotions feeling at a base level of not to good and not too low emotions.    Intervention: Facilitated an individual session. Utilized MI, Person-Centered Approach, Active Listening, Validation, Clarifying Questions, Mindfulness Meditation and Education.     Assessment: Domenico actively engaged and participated in the session. Domenico appears insightful in the education on Dopamine, Endorphins, Oxytocin, and Serotonin.  Domenico is understanding how her use affected the natural chemicals in the brain.      Plan. Client will Domenico to continue to engage and participate in treatment. Domenico to work Domenico practice utilizing mindfulness in her daily routine..         STEVE Moore   "

## 2024-10-31 ENCOUNTER — DOCUMENTATION ONLY (OUTPATIENT)
Dept: BEHAVIORAL HEALTH | Facility: CLINIC | Age: 34
End: 2024-10-31
Payer: COMMERCIAL

## 2024-10-31 NOTE — PROGRESS NOTES
ABSENT NOTE:    Domenico Wilder was absent from group 10/31/2024 . This absence WAS excused. Domenico shared in group on 10/28/2024 that she will not be in group on 10/31/2024 due to spending time with her children trick or treating . Patient is expected to be in group on 11/04/2024.     Ginette Pham, [facility]  10/31/2024 , 2:46 PM

## 2024-11-04 ENCOUNTER — DOCUMENTATION ONLY (OUTPATIENT)
Dept: BEHAVIORAL HEALTH | Facility: CLINIC | Age: 34
End: 2024-11-04
Payer: COMMERCIAL

## 2024-11-05 ENCOUNTER — DOCUMENTATION ONLY (OUTPATIENT)
Dept: BEHAVIORAL HEALTH | Facility: CLINIC | Age: 34
End: 2024-11-05

## 2024-11-05 ENCOUNTER — TELEPHONE (OUTPATIENT)
Dept: BEHAVIORAL HEALTH | Facility: CLINIC | Age: 34
End: 2024-11-05

## 2024-11-05 NOTE — PROGRESS NOTES
"Pt. Sent an  email at 4:25 stating Angus Peng, I got the job I had an interview with. The hours are going to conflict with group. So I will no longer be attending group, I need to work. Sorry and take care\"    11/5/2024  4:37 pm  Ginette Pham [facility]  "

## 2024-11-05 NOTE — PROGRESS NOTES
ABSENT NOTE:    Domenico NIKKIE Wilder was absent from group 11/4/2024 . This absence WAS Not excused. Patient is expected to be in group on 11/5/2024.     STEVE Moore  11/4/2024 , 8:09 PM

## 2024-11-05 NOTE — PROGRESS NOTES
"     IOP DISCHARGE SUMMARY                                                                                                            Name:  Domenico Wilder   :  STEVE Moore   Admit Date: 10/28/2024   Discharge Date: 2024   :  1990   Hours Completed: 2   Initial Diagnosis:  303.90 (F10.20) Alcohol Use Disorder Severe   Final Diagnosis:  303.90 (F10.20) Alcohol Use Disorder Severe   Discharge Address:    [address] [place] [id number]   Funding Source:    [facility]      Program:  [facility] evening group     Discharge Type:  APR- At Patient Request     Patient was receiving residential services at the time of discharge:   NO      Reasons for and circumstances of service termination:  Domenico emailed Primary counselor on 2024 stating \"  I got the job I had an interview with. The hours are going to conflict with group. So I will no longer be attending group, I need to work. Sorry and take care\". Domenico requested to discharge from the program.        If program discharge status was At Staff Request, the license perry must identify the following:    Other interested parties conferred with: not applicable    Referrals provided: not applicable    Alternatives considered and attempted before deciding to discharge:  not applicable      Dimension/Course of Treatment/Individualized Care:   1.  Withdrawal Potential - Intake Risk level -  0 Discharge Risk level -0  Narrative supporting risk description:  Domenico reports date of last use of alcohol on 2024. Domenico reports no withdrawal concern.  Treatment plan goals and progress towards those goals:  Domenico has not made progress towards her goals due to not attending the program long enough to make progress.      2.  Biomedical Conditions and Complications - Intake Risk level - 1 Discharge Risk level - 1  Narrative supporting risk description:  Domenico reports arthritis in her back and is experiencing 5/10 pain. Domenico reports " having a PCP and is able to access services when needed. Domenico has active insurance.   Treatment plan goals and progress towards those goals:  Domenico has not made progress towards her goals due to not attending the program long enough to make progress.       3.  Emotional/Behavioral/Cognitive Conditions and Complications - Intake Risk level -  2 Discharge Risk level - 2  Narrative supporting risk description:  Domenico diagnosis with Major depression disorder and Generalized Anxiety disorder. Domenico has therapy services set in place. Domenico reports seeing a psychiatrist ones a month. Domenico reports no SI, SIB, VI, or HI   Treatment plan goals and progress towards those goals:  Domenico has not made progress towards her goals due to not attending the program long enough to make progress.      4.  Readiness for Change - Intake Risk level -  1  Discharge Risk level - 2  Narrative supporting risk description:  Domenico has internal motivation for her children and herself to be sober. Domenico is in the contemplation stage of change due to discharging from treatment.   Treatment plan goals and progress towards those goals:  Domenico has not made progress towards her goals due to not attending the program long enough to make progress.      5.  Relapse/Continued Use/Continued Problem Potential - Intake Risk level -  3 Discharge Risk level - 3  Narrative supporting risk description:  Domenico just got out of [facility] residential treatment program. Domenico has minium insight into her triggers. Domenico has minimum insight into her coping skills and relapse prevention skill.   Treatment plan goals and progress towards those goals:  Domenico has not made progress towards her goals due to not attending the program long enough to make progress.      6.  Recovery Environment - Intake Risk level -  1 Discharge Risk level - 1  Narrative supporting risk description:  Domenico reports a full-time job. Domenico reports living in stable home. Domenico reports no current  or pending legal charges at this time. Domenico reports attending a sober support group.   Treatment plan goals and progress towards those goals:  Domenico has not made progress towards her goals due to not attending the program long enough to make progress.      Strengths: Determined and open-minded  Needs: IOP treatment    Services Provided: Intake, assessment, treatment planning, education, group discussion, film, lectures, 1x1 therapy, and recommendations at discharge.      Program Involvement: poor  Attendance: poor  Ability to relate in group/   Other program activities: poor  Assignment Completion: poor  Overall Behavior: good  Reported Family/Significant   Other Involvement: poor    Prognosis: Guarded      Adult Substance Use Intensive Outpatient Discharge Instructions      Summary: Domenico Wilder were admitted to [facility] IOP [facility]  Program on 10/28/2024 and you are discharging from our program on 11/6/2024.  If you have any questions after you are discharged please call your primary counselor at [phone] and address your questions or concerns.        Recommendation:    Domenico is recommended to continued to engage and participate in IOP treatment through [facility], nap- [facility] , or [facility]   to continue to provide you the continued support in your recovery.      Follow-up Appointments and Continued Care Providers  I    Additional Resources/Support   Please continue to engage and participate in Sober Support meetings to provide you additional support with your recovery. If you don't have a support group please look at the following additional resources down below.       Managing Symptoms and Preventing Relapse  Abstinence/Relapse Prevention  Continue using healthy strategies to help manage daily stressors.   Maintain abstinence from all mood altering substances     Healthy Living/Socialization Skills:   Continue practicing assertiveness and maintain healthy  boundaries   Make sure you are reaching out to supportive people every day.   Take time for yourself and practice self care    Lifestyle Adjustment: Adjust your lifestyle to get enough sleep, relaxation, exercise and  good nutrition. Continue to develop healthy coping skills to decrease stress and promote a sober living environment. Do not use alcohol, illegal drugs or addictive medications other than what is currently prescribed. AA, NA, and  Sponsor are excellent resources for support.     General Medication Instructions:   See your medication sheet(s) for instructions.   Take all medicines as directed.  Make no changes unless your doctor suggests them.   Go to all your doctor visits.  Be sure to have all your required lab tests. This way, your medicines can be refilled on time.  Do not use any drugs not prescribed by your provider.  AA/NA and Sponsors are excellent resources for support  Avoid alcohol.      Symptoms to Report:  If you experience more anxiety, confusion, sleeplessness, deep sadness or thoughts of suicide, notify your treatment team or notify your primary care physician. IF ANY OF THE SYMPTOMS YOU ARE EXPERIENCING ARE A MEDICAL EMERGENCY CALL 911 IMMEDIATELY. If you or someone you know is struggling or in crisis, help is available.  Call or text 360Learning8 or chat  at Miaoyushang.Arkivum    Recovery Resources:  Some AA/NA meetings are being held online however most have returned to in-person or a hybrid combination please check online to verify*  AA meetings search for them at: https://aa-intergroup.org (worldwide meeting listings)  AA meetings for [place] area can be found online at: https://a[place].org (click local online meetings listings)  NA meetings for [place] area can be found online at: https://www.nam[place].org  (click find a meeting)  Alcoholics Anonymous (https://aa.org/): for information 24 hours/day  AA Intergroup service office in [place] (http://www.aastpaul.org/) 515.545.7289  AA Intergroup  service office in [facility]: [phone]. (http://www.DealdriveneaBlooBox.org/)  Narcotics Anonymous (www.nam[place].org) (853) 850-8927  https://aafairviewriverside.org/meetings  SMART Recovery - self management for addiction recovery:  www.smartrecovery.org  Pathways ~ A Health Crisis Resource & Support Center:  550.794.1360.  https://prescribetoprevent.org/patient-education/videos/  http://www.harmreduction.org  [place] Opioid Prevention Coalition: www.opioidcoalition.org      Recovery apps for your phone for educational purposes and to locate in person and zoom recovery meetings  Everything AA - educational purpose and arianne is a great resource  12 Step Toolkit - educational purpose learning about the 12 steps to recovery  Bloxom Cloud - meeting arianne  AA  - meeting arianne  Meeting guide - meeting arianne  Quick NA meeting - meeting arianne  BrookeCAPPTURE- has various apps  Sober Affaredelgiorno Activities: www.soberVertishearactivitiesKicksend/[facility]//[facility] Recovery Connection ([facility])  [facility] connects people seeking recovery to resources that help foster and sustain long-term recovery.  Whether you are seeking resources for treatment, transportation, housing, job training, education, health care or other pathways to recovery, [facility] is a great place to start. Please call  [phone] or go to their website at www.[facility].org      Mental Health Crisis Resources  Throughout [place]: call **CRISIS (**639480)  Crisis Text Line: is available for free, 24/7 by texting [place] to 520500  Suicide Awareness Voices of Education (SAVE) (www.save.org): 527-908-DUHL (8743)  The National Suicide Prevention Lifeline is now: 988 Suicide and Crisis Lifeline. Call 988 anytime.  National Hope Line  1.800.SUICIDE [6873017]  National [place] on Mental Illness (www.[place].jadyn.org): 233.573.3672 or 461-267-8694.  Vnds1mbuw: text the word LIFE to 60298 for immediate support and crisis intervention  Mental Health Consumer/Survivor Network of [place]  "(www.mhcsn.net): 878-560-3450 or 614-544-7600  [place] Mental [facility] Warm Line  Peer to peer support  Monday thru Saturday, 12 pm to 10 pm  394.150.2394 or 6.335.333.8707  Text \"Support\" to 79556  Mental Health Association of [place] (www.mentalhealth.org): 796.270.7703 or 964-095-3652  Crisis Text Line  Text 039119  You will be connected with a trained live crisis counselor to provide support. Por espanol, texto  CATRACHITA a 908133 o texto a 442-AYUDAME en WhatsApp  Peer Support Connection [place] Warmline (PSC) 1-930.919.5688 Available from 5pm - 9am (7 days a week/365 days a year)  Call or Text 988    Great Pod casts for nutrition and wellness  Understand the connection between what you eat and how you feel. Hosted by licensed nutritionists and dietitians from [facility] [facility] we share practical, real-life solutions for healthier living through nutrition.   Listen on Apple Podcasts  Dishing Up Nutrition   [facility]   Nutrition     Here are a list of additional numbers you can call if you are wanting to resume services through [facility]:   [facility] [place] Assessment Intake: [phone]  Outpatient  call: [phone]  [facility] Admissions [phone]    Recovery Clinic call: [phone]  [facility]: [phone]  Medical Records call: [phone]  Billing Department call: [phone]    THANK YOU FOR CHOOSING  [facility]      Counselor Name and Title:  STEVE Moore       Date:  11/5/2024  Time:  4:39 PM     "

## 2024-11-06 ENCOUNTER — TELEPHONE (OUTPATIENT)
Dept: BEHAVIORAL HEALTH | Facility: CLINIC | Age: 34
End: 2024-11-06
Payer: COMMERCIAL

## 2024-11-06 NOTE — TELEPHONE ENCOUNTER
----- Message from Ginette Pham sent at 11/6/2024 10:31 AM CST -----  Scheduling Request    Patient Name: Domenico Wilder  Location of programming: [place]  Start Date: November / 06 / 2024  Group: [id number] on Monday-at 5:30 PM to 7:30 PM, Tuesday-at 5:30 PM to 7:30 PM, Wednesday-at 5:30 PM to 8:30 PM, and Thursday at 5:30 PM to 7:30 PM  Attending Provider (MD): Christina Bhatia  Number of visits to be scheduled: 0  Duration of Appointment in minutes: 0  Visit Type: In-person or Treatment - 870    Additional notes: Patient is discharged from the program can you please cancel all appointments. Thank you

## 2025-05-06 PROCEDURE — 99285 EMERGENCY DEPT VISIT HI MDM: CPT | Mod: 25 | Performed by: EMERGENCY MEDICINE

## 2025-05-06 PROCEDURE — 93005 ELECTROCARDIOGRAM TRACING: CPT | Performed by: EMERGENCY MEDICINE

## 2025-05-06 PROCEDURE — 96365 THER/PROPH/DIAG IV INF INIT: CPT | Performed by: EMERGENCY MEDICINE

## 2025-05-06 PROCEDURE — 96375 TX/PRO/DX INJ NEW DRUG ADDON: CPT | Performed by: EMERGENCY MEDICINE

## 2025-05-06 PROCEDURE — 93010 ELECTROCARDIOGRAM REPORT: CPT | Performed by: EMERGENCY MEDICINE

## 2025-05-06 PROCEDURE — 99291 CRITICAL CARE FIRST HOUR: CPT | Performed by: EMERGENCY MEDICINE

## 2025-05-07 ENCOUNTER — HOSPITAL ENCOUNTER (INPATIENT)
Facility: CLINIC | Age: 35
End: 2025-05-07
Attending: EMERGENCY MEDICINE | Admitting: INTERNAL MEDICINE
Payer: COMMERCIAL

## 2025-05-07 ENCOUNTER — APPOINTMENT (OUTPATIENT)
Dept: GENERAL RADIOLOGY | Facility: CLINIC | Age: 35
End: 2025-05-07
Attending: EMERGENCY MEDICINE
Payer: COMMERCIAL

## 2025-05-07 DIAGNOSIS — R94.31 PROLONGED QT INTERVAL: ICD-10-CM

## 2025-05-07 DIAGNOSIS — F10.939 ALCOHOL WITHDRAWAL SYNDROME, WITH UNSPECIFIED COMPLICATION (H): ICD-10-CM

## 2025-05-07 DIAGNOSIS — L03.115 CELLULITIS OF RIGHT LOWER EXTREMITY: ICD-10-CM

## 2025-05-07 DIAGNOSIS — E87.6 HYPOKALEMIA: ICD-10-CM

## 2025-05-07 DIAGNOSIS — T74.21XA SEXUAL ASSAULT OF ADULT, INITIAL ENCOUNTER: ICD-10-CM

## 2025-05-07 DIAGNOSIS — D50.9 IRON DEFICIENCY ANEMIA, UNSPECIFIED IRON DEFICIENCY ANEMIA TYPE: ICD-10-CM
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
## 2025-05-07 LAB
ALBUMIN SERPL BCG-MCNC: 4.4 G/DL (ref 3.5–5.2)
ALCOHOL BREATH TEST: 0.18 (ref 0–0.01)
ALP SERPL-CCNC: 117 U/L (ref 40–150)
ALT SERPL W P-5'-P-CCNC: 36 U/L (ref 0–50)
AMPHETAMINES UR QL SCN: ABNORMAL
ANION GAP SERPL CALCULATED.3IONS-SCNC: 16 MMOL/L (ref 7–15)
AST SERPL W P-5'-P-CCNC: 38 U/L (ref 0–45)
ATRIAL RATE - MUSE: 94 BPM
BARBITURATES UR QL SCN: ABNORMAL
BASOPHILS # BLD AUTO: 0.1 10E3/UL (ref 0–0.2)
BASOPHILS NFR BLD AUTO: 0 %
BENZODIAZ UR QL SCN: ABNORMAL
BILIRUB SERPL-MCNC: 0.3 MG/DL
BUN SERPL-MCNC: 9.6 MG/DL (ref 6–20)
BZE UR QL SCN: ABNORMAL
CALCIUM SERPL-MCNC: 9.7 MG/DL (ref 8.8–10.4)
CANNABINOIDS UR QL SCN: ABNORMAL
CHLORIDE SERPL-SCNC: 91 MMOL/L (ref 98–107)
CREAT SERPL-MCNC: 0.67 MG/DL (ref 0.51–0.95)
CRP SERPL-MCNC: 56.46 MG/L
DIASTOLIC BLOOD PRESSURE - MUSE: NORMAL MMHG
EGFRCR SERPLBLD CKD-EPI 2021: >90 ML/MIN/1.73M2
EOSINOPHIL # BLD AUTO: 0.2 10E3/UL (ref 0–0.7)
EOSINOPHIL NFR BLD AUTO: 1 %
ERYTHROCYTE [DISTWIDTH] IN BLOOD BY AUTOMATED COUNT: 15.2 % (ref 10–15)
ERYTHROCYTE [SEDIMENTATION RATE] IN BLOOD BY WESTERGREN METHOD: 18 MM/HR (ref 0–20)
ETHANOL SERPL-MCNC: 0.14 G/DL
FENTANYL UR QL: ABNORMAL
GLUCOSE SERPL-MCNC: 108 MG/DL (ref 70–99)
HCG UR QL: NEGATIVE
HCO3 SERPL-SCNC: 28 MMOL/L (ref 22–29)
HCT VFR BLD AUTO: 41.2 % (ref 35–47)
HGB BLD-MCNC: 14.2 G/DL (ref 11.7–15.7)
IMM GRANULOCYTES # BLD: 0.1 10E3/UL
IMM GRANULOCYTES NFR BLD: 0 %
INTERPRETATION ECG - MUSE: NORMAL
LACTATE SERPL-SCNC: 2.4 MMOL/L (ref 0.7–2)
LACTATE SERPL-SCNC: 4.1 MMOL/L (ref 0.7–2)
LYMPHOCYTES # BLD AUTO: 1.8 10E3/UL (ref 0.8–5.3)
LYMPHOCYTES NFR BLD AUTO: 13 %
MAGNESIUM SERPL-MCNC: 2.5 MG/DL (ref 1.7–2.3)
MCH RBC QN AUTO: 26.8 PG (ref 26.5–33)
MCHC RBC AUTO-ENTMCNC: 34.5 G/DL (ref 31.5–36.5)
MCV RBC AUTO: 78 FL (ref 78–100)
MONOCYTES # BLD AUTO: 1.1 10E3/UL (ref 0–1.3)
MONOCYTES NFR BLD AUTO: 8 %
NEUTROPHILS # BLD AUTO: 10.9 10E3/UL (ref 1.6–8.3)
NEUTROPHILS NFR BLD AUTO: 77 %
NRBC # BLD AUTO: 0 10E3/UL
NRBC BLD AUTO-RTO: 0 /100
OPIATES UR QL SCN: ABNORMAL
P AXIS - MUSE: 61 DEGREES
PCP QUAL URINE (ROCHE): ABNORMAL
PLATELET # BLD AUTO: 506 10E3/UL (ref 150–450)
POTASSIUM SERPL-SCNC: 2.2 MMOL/L (ref 3.4–5.3)
POTASSIUM SERPL-SCNC: 3.3 MMOL/L (ref 3.4–5.3)
PR INTERVAL - MUSE: 142 MS
PROT SERPL-MCNC: 7.8 G/DL (ref 6.4–8.3)
QRS DURATION - MUSE: 86 MS
QT - MUSE: 468 MS
QTC - MUSE: 585 MS
R AXIS - MUSE: 61 DEGREES
RBC # BLD AUTO: 5.29 10E6/UL (ref 3.8–5.2)
SODIUM SERPL-SCNC: 135 MMOL/L (ref 135–145)
SYSTOLIC BLOOD PRESSURE - MUSE: NORMAL MMHG
T AXIS - MUSE: 50 DEGREES
VENTRICULAR RATE- MUSE: 94 BPM
WBC # BLD AUTO: 14.2 10E3/UL (ref 4–11)

## 2025-05-07 PROCEDURE — 83605 ASSAY OF LACTIC ACID: CPT | Performed by: EMERGENCY MEDICINE

## 2025-05-07 PROCEDURE — 250N000013 HC RX MED GY IP 250 OP 250 PS 637: Performed by: INTERNAL MEDICINE

## 2025-05-07 PROCEDURE — 250N000011 HC RX IP 250 OP 636: Performed by: INTERNAL MEDICINE

## 2025-05-07 PROCEDURE — 258N000003 HC RX IP 258 OP 636: Performed by: EMERGENCY MEDICINE

## 2025-05-07 PROCEDURE — 99207 PR APP CREDIT; MD BILLING SHARED VISIT: CPT | Performed by: INTERNAL MEDICINE

## 2025-05-07 PROCEDURE — 84132 ASSAY OF SERUM POTASSIUM: CPT | Performed by: INTERNAL MEDICINE

## 2025-05-07 PROCEDURE — 82077 ASSAY SPEC XCP UR&BREATH IA: CPT | Performed by: EMERGENCY MEDICINE

## 2025-05-07 PROCEDURE — 258N000003 HC RX IP 258 OP 636: Performed by: INTERNAL MEDICINE

## 2025-05-07 PROCEDURE — 80307 DRUG TEST PRSMV CHEM ANLYZR: CPT | Performed by: INTERNAL MEDICINE

## 2025-05-07 PROCEDURE — 36415 COLL VENOUS BLD VENIPUNCTURE: CPT | Performed by: INTERNAL MEDICINE

## 2025-05-07 PROCEDURE — 81025 URINE PREGNANCY TEST: CPT | Performed by: INTERNAL MEDICINE

## 2025-05-07 PROCEDURE — 36415 COLL VENOUS BLD VENIPUNCTURE: CPT | Performed by: EMERGENCY MEDICINE

## 2025-05-07 PROCEDURE — 86140 C-REACTIVE PROTEIN: CPT | Performed by: EMERGENCY MEDICINE

## 2025-05-07 PROCEDURE — 85025 COMPLETE CBC W/AUTO DIFF WBC: CPT | Performed by: EMERGENCY MEDICINE

## 2025-05-07 PROCEDURE — 82435 ASSAY OF BLOOD CHLORIDE: CPT | Performed by: EMERGENCY MEDICINE

## 2025-05-07 PROCEDURE — 73610 X-RAY EXAM OF ANKLE: CPT | Mod: RT

## 2025-05-07 PROCEDURE — HZ2ZZZZ DETOXIFICATION SERVICES FOR SUBSTANCE ABUSE TREATMENT: ICD-10-PCS | Performed by: INTERNAL MEDICINE

## 2025-05-07 PROCEDURE — 120N000002 HC R&B MED SURG/OB UMMC

## 2025-05-07 PROCEDURE — 250N000013 HC RX MED GY IP 250 OP 250 PS 637: Performed by: EMERGENCY MEDICINE

## 2025-05-07 PROCEDURE — 83735 ASSAY OF MAGNESIUM: CPT | Performed by: EMERGENCY MEDICINE

## 2025-05-07 PROCEDURE — 85652 RBC SED RATE AUTOMATED: CPT | Performed by: EMERGENCY MEDICINE

## 2025-05-07 PROCEDURE — 250N000011 HC RX IP 250 OP 636: Performed by: EMERGENCY MEDICINE

## 2025-05-07 PROCEDURE — 82565 ASSAY OF CREATININE: CPT | Performed by: EMERGENCY MEDICINE

## 2025-05-07 PROCEDURE — 99207 PR NO BILLABLE SERVICE THIS VISIT: CPT | Performed by: INTERNAL MEDICINE

## 2025-05-07 RX ORDER — VITAMIN B COMPLEX
25 TABLET ORAL DAILY
Status: DISPENSED | OUTPATIENT
Start: 2025-05-07

## 2025-05-07 RX ORDER — BUPRENORPHINE 2 MG/1
2 TABLET SUBLINGUAL
Status: DISPENSED | OUTPATIENT
Start: 2025-05-07 | End: 2025-05-08

## 2025-05-07 RX ORDER — DIAZEPAM 5 MG/1
5-20 TABLET ORAL EVERY 30 MIN PRN
Status: DISCONTINUED | OUTPATIENT
Start: 2025-05-07 | End: 2025-05-07

## 2025-05-07 RX ORDER — POTASSIUM CHLORIDE 1500 MG/1
40 TABLET, EXTENDED RELEASE ORAL ONCE
Status: COMPLETED | OUTPATIENT
Start: 2025-05-07 | End: 2025-05-07

## 2025-05-07 RX ORDER — GABAPENTIN 300 MG/1
900 CAPSULE ORAL 3 TIMES DAILY
Status: ON HOLD | COMMUNITY

## 2025-05-07 RX ORDER — POTASSIUM CHLORIDE 1500 MG/1
60 TABLET, EXTENDED RELEASE ORAL ONCE
Status: COMPLETED | OUTPATIENT
Start: 2025-05-07 | End: 2025-05-07

## 2025-05-07 RX ORDER — ENOXAPARIN SODIUM 100 MG/ML
40 INJECTION SUBCUTANEOUS EVERY 24 HOURS
Status: DISPENSED | OUTPATIENT
Start: 2025-05-07

## 2025-05-07 RX ORDER — GABAPENTIN 600 MG/1
1200 TABLET ORAL ONCE
Status: COMPLETED | OUTPATIENT
Start: 2025-05-07 | End: 2025-05-07

## 2025-05-07 RX ORDER — PROCHLORPERAZINE MALEATE 5 MG/1
10 TABLET ORAL EVERY 6 HOURS PRN
Status: ACTIVE | OUTPATIENT
Start: 2025-05-07

## 2025-05-07 RX ORDER — CEFTRIAXONE SODIUM 1 G
500 VIAL (EA) INJECTION ONCE
Status: COMPLETED | OUTPATIENT
Start: 2025-05-07 | End: 2025-05-07

## 2025-05-07 RX ORDER — LOPERAMIDE HYDROCHLORIDE 2 MG/1
2 CAPSULE ORAL EVERY 4 HOURS PRN
Status: ACTIVE | OUTPATIENT
Start: 2025-05-07

## 2025-05-07 RX ORDER — HALOPERIDOL 5 MG/ML
2.5-5 INJECTION INTRAMUSCULAR EVERY 6 HOURS PRN
Status: ACTIVE | OUTPATIENT
Start: 2025-05-07

## 2025-05-07 RX ORDER — GABAPENTIN 100 MG/1
100 CAPSULE ORAL EVERY 8 HOURS
Status: ACTIVE | OUTPATIENT
Start: 2025-05-14 | End: 2025-05-17

## 2025-05-07 RX ORDER — DICYCLOMINE HYDROCHLORIDE 10 MG/1
20 CAPSULE ORAL 3 TIMES DAILY PRN
Status: ACTIVE | OUTPATIENT
Start: 2025-05-07

## 2025-05-07 RX ORDER — BUPRENORPHINE 2 MG/1
2 TABLET SUBLINGUAL 4 TIMES DAILY
Status: COMPLETED | OUTPATIENT
Start: 2025-05-08 | End: 2025-05-08

## 2025-05-07 RX ORDER — CEPHALEXIN 500 MG/1
500 CAPSULE ORAL ONCE
Status: COMPLETED | OUTPATIENT
Start: 2025-05-07 | End: 2025-05-07

## 2025-05-07 RX ORDER — SODIUM CHLORIDE 9 MG/ML
INJECTION, SOLUTION INTRAVENOUS CONTINUOUS
Status: ACTIVE | OUTPATIENT
Start: 2025-05-07

## 2025-05-07 RX ORDER — LAMOTRIGINE 25 MG/1
25 TABLET ORAL DAILY
Status: ON HOLD | COMMUNITY
Start: 2025-04-25 | End: 2025-05-09

## 2025-05-07 RX ORDER — BUSPIRONE HYDROCHLORIDE 10 MG/1
10 TABLET ORAL 3 TIMES DAILY
Status: ON HOLD | COMMUNITY
Start: 2025-04-25

## 2025-05-07 RX ORDER — AMOXICILLIN 250 MG
2 CAPSULE ORAL 2 TIMES DAILY PRN
Status: ACTIVE | OUTPATIENT
Start: 2025-05-07

## 2025-05-07 RX ORDER — DOXYCYCLINE 100 MG/1
100 CAPSULE ORAL 2 TIMES DAILY
Status: ON HOLD | COMMUNITY

## 2025-05-07 RX ORDER — PANTOPRAZOLE SODIUM 40 MG/1
40 TABLET, DELAYED RELEASE ORAL DAILY
Status: DISPENSED | OUTPATIENT
Start: 2025-05-07

## 2025-05-07 RX ORDER — AMOXICILLIN 250 MG
1 CAPSULE ORAL 2 TIMES DAILY PRN
Status: ACTIVE | OUTPATIENT
Start: 2025-05-07

## 2025-05-07 RX ORDER — LAMOTRIGINE 25 MG/1
50 TABLET ORAL DAILY
Status: DISPENSED | OUTPATIENT
Start: 2025-05-07

## 2025-05-07 RX ORDER — LIDOCAINE 40 MG/G
CREAM TOPICAL
Status: ACTIVE | OUTPATIENT
Start: 2025-05-07

## 2025-05-07 RX ORDER — FLUMAZENIL 0.1 MG/ML
0.2 INJECTION, SOLUTION INTRAVENOUS
Status: ACTIVE | OUTPATIENT
Start: 2025-05-07

## 2025-05-07 RX ORDER — ONDANSETRON 4 MG/1
4 TABLET, ORALLY DISINTEGRATING ORAL EVERY 6 HOURS PRN
Status: DISPENSED | OUTPATIENT
Start: 2025-05-07

## 2025-05-07 RX ORDER — SODIUM CHLORIDE 9 MG/ML
INJECTION, SOLUTION INTRAVENOUS
Status: COMPLETED
Start: 2025-05-07 | End: 2025-05-07

## 2025-05-07 RX ORDER — BUSPIRONE HYDROCHLORIDE 10 MG/1
10 TABLET ORAL 2 TIMES DAILY
Status: DISPENSED | OUTPATIENT
Start: 2025-05-07

## 2025-05-07 RX ORDER — IBUPROFEN 600 MG/1
600 TABLET, FILM COATED ORAL EVERY 6 HOURS PRN
Status: ACTIVE | OUTPATIENT
Start: 2025-05-07

## 2025-05-07 RX ORDER — FAMOTIDINE 20 MG/1
20 TABLET, FILM COATED ORAL 2 TIMES DAILY
Status: DISPENSED | OUTPATIENT
Start: 2025-05-07

## 2025-05-07 RX ORDER — ONDANSETRON 2 MG/ML
4 INJECTION INTRAMUSCULAR; INTRAVENOUS EVERY 6 HOURS PRN
Status: ACTIVE | OUTPATIENT
Start: 2025-05-07

## 2025-05-07 RX ORDER — ACETAMINOPHEN 650 MG/1
650 SUPPOSITORY RECTAL EVERY 4 HOURS PRN
Status: ACTIVE | OUTPATIENT
Start: 2025-05-07

## 2025-05-07 RX ORDER — FOLIC ACID 1 MG/1
1 TABLET ORAL DAILY
Status: DISPENSED | OUTPATIENT
Start: 2025-05-07

## 2025-05-07 RX ORDER — GABAPENTIN 300 MG/1
300 CAPSULE ORAL EVERY 8 HOURS
Status: ACTIVE | OUTPATIENT
Start: 2025-05-12 | End: 2025-05-14

## 2025-05-07 RX ORDER — POTASSIUM CHLORIDE 7.45 MG/ML
10 INJECTION INTRAVENOUS ONCE
Status: COMPLETED | OUTPATIENT
Start: 2025-05-07 | End: 2025-05-07

## 2025-05-07 RX ORDER — METHOCARBAMOL 500 MG/1
500 TABLET, FILM COATED ORAL 3 TIMES DAILY PRN
Status: ACTIVE | OUTPATIENT
Start: 2025-05-07

## 2025-05-07 RX ORDER — MULTIPLE VITAMINS W/ MINERALS TAB 9MG-400MCG
1 TAB ORAL DAILY
Status: DISPENSED | OUTPATIENT
Start: 2025-05-07

## 2025-05-07 RX ORDER — GABAPENTIN 300 MG/1
600 CAPSULE ORAL EVERY 8 HOURS
Status: ACTIVE | OUTPATIENT
Start: 2025-05-10 | End: 2025-05-12

## 2025-05-07 RX ORDER — OLANZAPINE 5 MG/1
5-10 TABLET, ORALLY DISINTEGRATING ORAL EVERY 6 HOURS PRN
Status: ACTIVE | OUTPATIENT
Start: 2025-05-07

## 2025-05-07 RX ORDER — DIAZEPAM 10 MG/1
10 TABLET ORAL EVERY 30 MIN PRN
Status: DISPENSED | OUTPATIENT
Start: 2025-05-07

## 2025-05-07 RX ORDER — ACETAMINOPHEN 325 MG/1
650 TABLET ORAL EVERY 4 HOURS PRN
Status: ACTIVE | OUTPATIENT
Start: 2025-05-07

## 2025-05-07 RX ORDER — CALCIUM CARBONATE 500 MG/1
1000 TABLET, CHEWABLE ORAL 4 TIMES DAILY PRN
Status: ACTIVE | OUTPATIENT
Start: 2025-05-07

## 2025-05-07 RX ORDER — THERA TABS 400 MCG
1 TAB ORAL DAILY
Status: ON HOLD | COMMUNITY

## 2025-05-07 RX ORDER — DIAZEPAM 10 MG/2ML
5-10 INJECTION, SOLUTION INTRAMUSCULAR; INTRAVENOUS EVERY 30 MIN PRN
Status: ACTIVE | OUTPATIENT
Start: 2025-05-07

## 2025-05-07 RX ORDER — CEPHALEXIN 500 MG/1
500 CAPSULE ORAL 3 TIMES DAILY
Status: ON HOLD | COMMUNITY

## 2025-05-07 RX ORDER — CLONIDINE HYDROCHLORIDE 0.1 MG/1
0.1 TABLET ORAL EVERY 6 HOURS PRN
Status: ACTIVE | OUTPATIENT
Start: 2025-05-07

## 2025-05-07 RX ORDER — LANOLIN ALCOHOL/MO/W.PET/CERES
100 CREAM (GRAM) TOPICAL DAILY
Status: ON HOLD | COMMUNITY

## 2025-05-07 RX ORDER — GABAPENTIN 300 MG/1
900 CAPSULE ORAL EVERY 8 HOURS
Status: DISPENSED | OUTPATIENT
Start: 2025-05-07 | End: 2025-05-10

## 2025-05-07 RX ORDER — HYDROXYZINE HYDROCHLORIDE 25 MG/1
25 TABLET, FILM COATED ORAL 3 TIMES DAILY PRN
Status: DISPENSED | OUTPATIENT
Start: 2025-05-07

## 2025-05-07 RX ORDER — DOXYCYCLINE 100 MG/1
100 CAPSULE ORAL EVERY 12 HOURS SCHEDULED
Status: DISPENSED | OUTPATIENT
Start: 2025-05-07 | End: 2025-05-14

## 2025-05-07 RX ADMIN — ENOXAPARIN SODIUM 40 MG: 40 INJECTION SUBCUTANEOUS at 08:38

## 2025-05-07 RX ADMIN — CEPHALEXIN 500 MG: 250 CAPSULE ORAL at 17:19

## 2025-05-07 RX ADMIN — Medication 1 TABLET: at 08:38

## 2025-05-07 RX ADMIN — FOLIC ACID 1 MG: 1 TABLET ORAL at 03:41

## 2025-05-07 RX ADMIN — DIAZEPAM 10 MG: 10 TABLET ORAL at 13:20

## 2025-05-07 RX ADMIN — FAMOTIDINE 20 MG: 20 TABLET, FILM COATED ORAL at 19:38

## 2025-05-07 RX ADMIN — Medication 1 TABLET: at 03:41

## 2025-05-07 RX ADMIN — HYDROXYZINE HYDROCHLORIDE 25 MG: 25 TABLET, FILM COATED ORAL at 22:54

## 2025-05-07 RX ADMIN — POTASSIUM CHLORIDE 60 MEQ: 1500 TABLET, EXTENDED RELEASE ORAL at 03:10

## 2025-05-07 RX ADMIN — DIAZEPAM 10 MG: 5 TABLET ORAL at 06:26

## 2025-05-07 RX ADMIN — POTASSIUM CHLORIDE 10 MEQ: 7.46 INJECTION, SOLUTION INTRAVENOUS at 03:32

## 2025-05-07 RX ADMIN — FOLIC ACID 1 MG: 1 TABLET ORAL at 08:40

## 2025-05-07 RX ADMIN — DOXYCYCLINE HYCLATE 100 MG: 100 CAPSULE ORAL at 22:54

## 2025-05-07 RX ADMIN — Medication 25 MCG: at 08:39

## 2025-05-07 RX ADMIN — CEPHALEXIN 500 MG: 250 CAPSULE ORAL at 10:13

## 2025-05-07 RX ADMIN — Medication 5 MG: at 22:54

## 2025-05-07 RX ADMIN — SODIUM CHLORIDE: 0.9 INJECTION, SOLUTION INTRAVENOUS at 16:21

## 2025-05-07 RX ADMIN — GABAPENTIN 1200 MG: 600 TABLET, FILM COATED ORAL at 08:39

## 2025-05-07 RX ADMIN — DIAZEPAM 10 MG: 5 TABLET ORAL at 03:41

## 2025-05-07 RX ADMIN — BUSPIRONE HYDROCHLORIDE 10 MG: 10 TABLET ORAL at 08:39

## 2025-05-07 RX ADMIN — BUSPIRONE HYDROCHLORIDE 10 MG: 10 TABLET ORAL at 19:38

## 2025-05-07 RX ADMIN — CEPHALEXIN 500 MG: 500 CAPSULE ORAL at 03:41

## 2025-05-07 RX ADMIN — ONDANSETRON 4 MG: 4 TABLET, ORALLY DISINTEGRATING ORAL at 08:51

## 2025-05-07 RX ADMIN — SODIUM CHLORIDE 1000 ML: 0.9 INJECTION, SOLUTION INTRAVENOUS at 03:40

## 2025-05-07 RX ADMIN — LAMOTRIGINE 50 MG: 25 TABLET ORAL at 08:39

## 2025-05-07 RX ADMIN — THIAMINE HCL TAB 100 MG 100 MG: 100 TAB at 03:41

## 2025-05-07 RX ADMIN — POTASSIUM CHLORIDE 10 MEQ: 7.46 INJECTION, SOLUTION INTRAVENOUS at 04:36

## 2025-05-07 RX ADMIN — CEFTRIAXONE SODIUM 500 MG: 1 INJECTION, POWDER, FOR SOLUTION INTRAMUSCULAR; INTRAVENOUS at 22:53

## 2025-05-07 RX ADMIN — SODIUM CHLORIDE: 9 INJECTION, SOLUTION INTRAVENOUS at 16:21

## 2025-05-07 RX ADMIN — DIAZEPAM 10 MG: 10 TABLET ORAL at 08:51

## 2025-05-07 RX ADMIN — GABAPENTIN 900 MG: 300 CAPSULE ORAL at 16:24

## 2025-05-07 RX ADMIN — THIAMINE HCL TAB 100 MG 100 MG: 100 TAB at 08:45

## 2025-05-07 RX ADMIN — FAMOTIDINE 20 MG: 20 TABLET, FILM COATED ORAL at 08:40

## 2025-05-07 RX ADMIN — DIAZEPAM 10 MG: 10 TABLET ORAL at 20:06

## 2025-05-07 RX ADMIN — POTASSIUM CHLORIDE 40 MEQ: 1500 TABLET, EXTENDED RELEASE ORAL at 19:38

## 2025-05-07 RX ADMIN — PANTOPRAZOLE SODIUM 40 MG: 40 TABLET, DELAYED RELEASE ORAL at 08:39

## 2025-05-07 ASSESSMENT — ACTIVITIES OF DAILY LIVING (ADL)
ADLS_ACUITY_SCORE: 46

## 2025-05-07 ASSESSMENT — LIFESTYLE VARIABLES
HEADACHE, FULLNESS IN HEAD: NOT PRESENT
TREMOR: 2
TOTAL SCORE: 6
AUDITORY DISTURBANCES: NOT PRESENT
AGITATION: NORMAL ACTIVITY
NAUSEA AND VOMITING: 2
ORIENTATION AND CLOUDING OF SENSORIUM: ORIENTED AND CAN DO SERIAL ADDITIONS
TREMOR: NOT VISIBLE, BUT CAN BE FELT FINGERTIP TO FINGERTIP
ANXIETY: MILDLY ANXIOUS
PAROXYSMAL SWEATS: BEADS OF SWEAT OBVIOUS ON FOREHEAD
ANXIETY: MILDLY ANXIOUS
AUDITORY DISTURBANCES: NOT PRESENT
TREMOR: NO TREMOR
ANXIETY: NO ANXIETY, AT EASE
AUDITORY DISTURBANCES: NOT PRESENT
NAUSEA AND VOMITING: NO NAUSEA AND NO VOMITING
TOTAL SCORE: 9
AGITATION: NORMAL ACTIVITY
NAUSEA AND VOMITING: MILD NAUSEA WITH NO VOMITING
HEADACHE, FULLNESS IN HEAD: VERY MILD
AGITATION: SOMEWHAT MORE THAN NORMAL ACTIVITY
AUDITORY DISTURBANCES: VERY MILD HARSHNESS OR ABILITY TO FRIGHTEN
AGITATION: NORMAL ACTIVITY
HEADACHE, FULLNESS IN HEAD: NOT PRESENT
VISUAL DISTURBANCES: NOT PRESENT
TOTAL_SCORE: 3
VISUAL DISTURBANCES: NOT PRESENT
TACTILE DISTURBANCES: VERY MILD ITCHING, PINS AND NEEDLES, BURNING OR NUMBNESS
VISUAL DISTURBANCES: NOT PRESENT
ORIENTATION AND CLOUDING OF SENSORIUM: ORIENTED AND CAN DO SERIAL ADDITIONS
PAROXYSMAL SWEATS: BARELY PERCEPTIBLE SWEATING, PALMS MOIST
NAUSEA AND VOMITING: 2
PAROXYSMAL SWEATS: 2
PAROXYSMAL SWEATS: BARELY PERCEPTIBLE SWEATING, PALMS MOIST
HEADACHE, FULLNESS IN HEAD: NOT PRESENT
VISUAL DISTURBANCES: NOT PRESENT
ORIENTATION AND CLOUDING OF SENSORIUM: CANNOT DO SERIAL ADDITIONS OR IS UNCERTAIN ABOUT DATE
TACTILE DISTURBANCES: VERY MILD ITCHING, PINS AND NEEDLES, BURNING OR NUMBNESS
ORIENTATION AND CLOUDING OF SENSORIUM: ORIENTED AND CAN DO SERIAL ADDITIONS
TREMOR: NO TREMOR
ANXIETY: MILDLY ANXIOUS
TOTAL SCORE: 9
TOTAL SCORE: 1
TOTAL_SCORE: 3

## 2025-05-07 ASSESSMENT — COLUMBIA-SUICIDE SEVERITY RATING SCALE - C-SSRS
2. HAVE YOU ACTUALLY HAD ANY THOUGHTS OF KILLING YOURSELF IN THE PAST MONTH?: NO
1. IN THE PAST MONTH, HAVE YOU WISHED YOU WERE DEAD OR WISHED YOU COULD GO TO SLEEP AND NOT WAKE UP?: NO
6. HAVE YOU EVER DONE ANYTHING, STARTED TO DO ANYTHING, OR PREPARED TO DO ANYTHING TO END YOUR LIFE?: NO

## 2025-05-07 NOTE — ED NOTES
Pt continues to be A/O x 4 very pleasant, cooperative and soft spoken.  VSS. Denies any pain, SOB/CP.  Independent with ADL's and ambulation. Stable on her feet. Denies any LH/dizziness.  PIV infusing L AC G20:  ml/hr.  Blood, urine sent to lab as ordered.  Pt still refusing to be seen by [facility] RN. Pt was informed that any point she changes her mind and wants a [facility] RN to examine her to let nursing staffs know.  Pt continues to be voluntary on admission
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
## 2025-05-07 NOTE — PHARMACY-ADMISSION MEDICATION HISTORY
Pharmacist Admission Medication History    Admission medication history is complete. The information provided in this note is only as accurate as the sources available at the time of the update.    Information Source(s): Patient, Family member, and CareEverywhere/SureScripts via in-person    Pertinent Information:   Patient's mother (Thi) assisted in updating PTA med list.  Cepahlexin and doxycycline - these were prescribed yesterday however the patient never picked these up. Added to PTA med list however did not jojo as taking.   Lamotrigine - patient reports taking 25 mg daily   Pantoprazole - patient and patient's mother report that she takes this daily however per dispense report indicates that it is prescribed as twice daily. Kept as daily on PTA med list.    Changes made to PTA medication list:  Added:   Cephalexin 500 mg capsule   Doxycyline 100 mg capsule   Thiamine 100 mg tablet   Multivtiamin tablet   B complex-C-folic acid tablets   Deleted:   Bupropion   Cyclobenzaprine   Olanzapine   Sertraline   Changed:   Buspirone (added directions)   Lamotrigine 25 mg tablet, take 50 mg daily -> take 25 mg daily    Allergies reviewed with patient and updates made in EHR: yes    Medication History Completed By: Thai Meraz, Juan MD 5/7/2025 6:31 PM    PTA Med List   Medication Sig Last Dose/Taking    B Complex-C-Folic Acid TABS Take 1 tablet by mouth daily. Past Month    busPIRone (BUSPAR) 10 MG tablet Take 10 mg by mouth 3 times daily. 5/6/2025    etonogestrel (NEXPLANON) 68 MG IMPL Inject 68 mg Subcutaneous Taking    famotidine (PEPCID) 20 MG tablet Take 20 mg by mouth 2 times daily 5/6/2025    gabapentin (NEURONTIN) 300 MG capsule Take 900 mg by mouth 3 times daily. Taking    hydrOXYzine los (VISTARIL) 25 MG capsule Take 1 capsule (25 mg) by mouth 3 times daily as needed for anxiety. Past Month    lamoTRIgine (LAMICTAL) 25 MG tablet Take 25 mg by mouth daily. 5/6/2025    multivitamin, therapeutic (THERA-VIT)  TABS tablet Take 1 tablet by mouth daily. Past Month    naltrexone (DEPADE/REVIA) 50 MG tablet Take 1 tablet (50 mg) by mouth daily. Past Month    nicotine polacrilex (NICORETTE) 4 MG gum Place 1 each (4 mg) inside cheek as needed. Past Month    pantoprazole (PROTONIX) 40 MG EC tablet Take 40 mg by mouth daily. 5/6/2025    thiamine (B-1) 100 MG tablet Take 100 mg by mouth daily. Past Month    traZODone (DESYREL) 100 MG tablet Take 1 tablet (100 mg) by mouth at bedtime. Past Week    Vitamin D3 (CHOLECALCIFEROL) 25 mcg (1000 units) tablet Take 1 tablet by mouth daily. Past Week
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
## 2025-05-07 NOTE — PLAN OF CARE
Goal Outcome Evaluation:      Plan of Care Reviewed With: patient    Overall Patient Progress: improvingOverall Patient Progress: improving     6MS ADMISSION    D: Patient admitted/transferred from ED via cart for Hypokalemia and ETOH.     I: Upon arrival to the unit patient was oriented to room, unit, and call light. Patient s height, weight, and vital signs were obtained. Provider notified of patient s arrival on the unit. Adult AVS completed. Head to toe assessment not completed, pt wanted to sleep. Education assessment completed. Care plan initiated.    A: Vital signs stable upon admission. Patient rates pain at 2. Two RN skin assessment was not completed, pt wanted to sleep and said to do it later. Oncoming RN notified.   P: Continue to monitor patient s symptoms and intervene as needed. Continue with plan of care. Notify provider with any concerns or changes in patient status.
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
## 2025-05-08 LAB
ALBUMIN SERPL BCG-MCNC: 2.9 G/DL (ref 3.5–5.2)
ALP SERPL-CCNC: 70 U/L (ref 40–150)
ALT SERPL W P-5'-P-CCNC: 21 U/L (ref 0–50)
ANION GAP SERPL CALCULATED.3IONS-SCNC: 10 MMOL/L (ref 7–15)
AST SERPL W P-5'-P-CCNC: 19 U/L (ref 0–45)
ATRIAL RATE - MUSE: 67 BPM
BILIRUB SERPL-MCNC: 0.2 MG/DL
BUN SERPL-MCNC: 10.8 MG/DL (ref 6–20)
CALCIUM SERPL-MCNC: 8.1 MG/DL (ref 8.8–10.4)
CHLORIDE SERPL-SCNC: 103 MMOL/L (ref 98–107)
CREAT SERPL-MCNC: 0.63 MG/DL (ref 0.51–0.95)
DIASTOLIC BLOOD PRESSURE - MUSE: NORMAL MMHG
EGFRCR SERPLBLD CKD-EPI 2021: >90 ML/MIN/1.73M2
ERYTHROCYTE [DISTWIDTH] IN BLOOD BY AUTOMATED COUNT: 15.7 % (ref 10–15)
GLUCOSE SERPL-MCNC: 100 MG/DL (ref 70–99)
HCO3 SERPL-SCNC: 24 MMOL/L (ref 22–29)
HCT VFR BLD AUTO: 31.7 % (ref 35–47)
HGB BLD-MCNC: 10.7 G/DL (ref 11.7–15.7)
HIV 1+2 AB+HIV1 P24 AG SERPL QL IA: NONREACTIVE
INTERPRETATION ECG - MUSE: NORMAL
LACTATE SERPL-SCNC: 1.4 MMOL/L (ref 0.7–2)
MCH RBC QN AUTO: 27.4 PG (ref 26.5–33)
MCHC RBC AUTO-ENTMCNC: 33.8 G/DL (ref 31.5–36.5)
MCV RBC AUTO: 81 FL (ref 78–100)
P AXIS - MUSE: 50 DEGREES
PLATELET # BLD AUTO: 332 10E3/UL (ref 150–450)
POTASSIUM SERPL-SCNC: 3.1 MMOL/L (ref 3.4–5.3)
POTASSIUM SERPL-SCNC: 3.2 MMOL/L (ref 3.4–5.3)
POTASSIUM SERPL-SCNC: 3.4 MMOL/L (ref 3.4–5.3)
POTASSIUM SERPL-SCNC: 4.1 MMOL/L (ref 3.4–5.3)
PR INTERVAL - MUSE: 138 MS
PROCALCITONIN SERPL IA-MCNC: 0.04 NG/ML
PROT SERPL-MCNC: 5.2 G/DL (ref 6.4–8.3)
QRS DURATION - MUSE: 98 MS
QT - MUSE: 424 MS
QTC - MUSE: 448 MS
R AXIS - MUSE: 58 DEGREES
RBC # BLD AUTO: 3.9 10E6/UL (ref 3.8–5.2)
SODIUM SERPL-SCNC: 137 MMOL/L (ref 135–145)
SYSTOLIC BLOOD PRESSURE - MUSE: NORMAL MMHG
T AXIS - MUSE: 38 DEGREES
VENTRICULAR RATE- MUSE: 67 BPM
WBC # BLD AUTO: 6 10E3/UL (ref 4–11)

## 2025-05-08 PROCEDURE — 36415 COLL VENOUS BLD VENIPUNCTURE: CPT | Performed by: INTERNAL MEDICINE

## 2025-05-08 PROCEDURE — 120N000002 HC R&B MED SURG/OB UMMC

## 2025-05-08 PROCEDURE — 250N000013 HC RX MED GY IP 250 OP 250 PS 637: Performed by: INTERNAL MEDICINE

## 2025-05-08 PROCEDURE — 84132 ASSAY OF SERUM POTASSIUM: CPT | Performed by: INTERNAL MEDICINE

## 2025-05-08 PROCEDURE — 83605 ASSAY OF LACTIC ACID: CPT | Performed by: INTERNAL MEDICINE

## 2025-05-08 PROCEDURE — 84145 PROCALCITONIN (PCT): CPT | Performed by: INTERNAL MEDICINE

## 2025-05-08 PROCEDURE — 258N000003 HC RX IP 258 OP 636: Performed by: INTERNAL MEDICINE

## 2025-05-08 PROCEDURE — 93005 ELECTROCARDIOGRAM TRACING: CPT

## 2025-05-08 PROCEDURE — 87389 HIV-1 AG W/HIV-1&-2 AB AG IA: CPT | Performed by: INTERNAL MEDICINE

## 2025-05-08 PROCEDURE — 85041 AUTOMATED RBC COUNT: CPT | Performed by: INTERNAL MEDICINE

## 2025-05-08 PROCEDURE — 99232 SBSQ HOSP IP/OBS MODERATE 35: CPT | Performed by: INTERNAL MEDICINE

## 2025-05-08 PROCEDURE — 93010 ELECTROCARDIOGRAM REPORT: CPT | Performed by: INTERNAL MEDICINE

## 2025-05-08 PROCEDURE — 250N000012 HC RX MED GY IP 250 OP 636 PS 637: Performed by: INTERNAL MEDICINE

## 2025-05-08 PROCEDURE — 250N000011 HC RX IP 250 OP 636: Performed by: INTERNAL MEDICINE

## 2025-05-08 RX ORDER — POTASSIUM CHLORIDE 1500 MG/1
40 TABLET, EXTENDED RELEASE ORAL ONCE
Status: COMPLETED | OUTPATIENT
Start: 2025-05-08 | End: 2025-05-08

## 2025-05-08 RX ADMIN — THIAMINE HCL TAB 100 MG 100 MG: 100 TAB at 09:04

## 2025-05-08 RX ADMIN — BUPRENORPHINE HCL 2 MG: 2 TABLET SUBLINGUAL at 16:52

## 2025-05-08 RX ADMIN — FAMOTIDINE 20 MG: 20 TABLET, FILM COATED ORAL at 09:04

## 2025-05-08 RX ADMIN — DOXYCYCLINE HYCLATE 100 MG: 100 CAPSULE ORAL at 21:35

## 2025-05-08 RX ADMIN — CEPHALEXIN 500 MG: 250 CAPSULE ORAL at 06:13

## 2025-05-08 RX ADMIN — BUSPIRONE HYDROCHLORIDE 10 MG: 10 TABLET ORAL at 21:35

## 2025-05-08 RX ADMIN — POTASSIUM CHLORIDE 40 MEQ: 1500 TABLET, EXTENDED RELEASE ORAL at 03:59

## 2025-05-08 RX ADMIN — Medication 25 MCG: at 09:04

## 2025-05-08 RX ADMIN — SODIUM CHLORIDE: 0.9 INJECTION, SOLUTION INTRAVENOUS at 16:51

## 2025-05-08 RX ADMIN — Medication 1 TABLET: at 09:04

## 2025-05-08 RX ADMIN — GABAPENTIN 900 MG: 300 CAPSULE ORAL at 16:51

## 2025-05-08 RX ADMIN — BUPRENORPHINE HCL 2 MG: 2 TABLET SUBLINGUAL at 14:01

## 2025-05-08 RX ADMIN — BUSPIRONE HYDROCHLORIDE 10 MG: 10 TABLET ORAL at 09:04

## 2025-05-08 RX ADMIN — SODIUM CHLORIDE: 0.9 INJECTION, SOLUTION INTRAVENOUS at 07:43

## 2025-05-08 RX ADMIN — BUPRENORPHINE HCL 2 MG: 2 TABLET SUBLINGUAL at 22:12

## 2025-05-08 RX ADMIN — ENOXAPARIN SODIUM 40 MG: 40 INJECTION SUBCUTANEOUS at 09:33

## 2025-05-08 RX ADMIN — SODIUM CHLORIDE: 0.9 INJECTION, SOLUTION INTRAVENOUS at 22:56

## 2025-05-08 RX ADMIN — DOXYCYCLINE HYCLATE 100 MG: 100 CAPSULE ORAL at 09:04

## 2025-05-08 RX ADMIN — HYDROXYZINE HYDROCHLORIDE 25 MG: 25 TABLET, FILM COATED ORAL at 22:12

## 2025-05-08 RX ADMIN — DIAZEPAM 10 MG: 10 TABLET ORAL at 02:46

## 2025-05-08 RX ADMIN — BUPRENORPHINE HCL 2 MG: 2 TABLET SUBLINGUAL at 06:13

## 2025-05-08 RX ADMIN — PANTOPRAZOLE SODIUM 40 MG: 40 TABLET, DELAYED RELEASE ORAL at 09:05

## 2025-05-08 RX ADMIN — CEPHALEXIN 500 MG: 250 CAPSULE ORAL at 01:23

## 2025-05-08 RX ADMIN — POTASSIUM CHLORIDE 40 MEQ: 1500 TABLET, EXTENDED RELEASE ORAL at 16:52

## 2025-05-08 RX ADMIN — DIAZEPAM 10 MG: 10 TABLET ORAL at 09:40

## 2025-05-08 RX ADMIN — FOLIC ACID 1 MG: 1 TABLET ORAL at 09:04

## 2025-05-08 RX ADMIN — FAMOTIDINE 20 MG: 20 TABLET, FILM COATED ORAL at 21:35

## 2025-05-08 RX ADMIN — LAMOTRIGINE 50 MG: 25 TABLET ORAL at 09:04

## 2025-05-08 RX ADMIN — SODIUM CHLORIDE: 0.9 INJECTION, SOLUTION INTRAVENOUS at 02:28

## 2025-05-08 RX ADMIN — GABAPENTIN 900 MG: 300 CAPSULE ORAL at 01:23

## 2025-05-08 RX ADMIN — GABAPENTIN 900 MG: 300 CAPSULE ORAL at 09:04

## 2025-05-08 ASSESSMENT — ACTIVITIES OF DAILY LIVING (ADL)
WALKING_OR_CLIMBING_STAIRS_DIFFICULTY: NO
ADLS_ACUITY_SCORE: 20
DRESSING/BATHING_DIFFICULTY: NO
ADLS_ACUITY_SCORE: 20
ADLS_ACUITY_SCORE: 20
DIFFICULTY_EATING/SWALLOWING: NO
ADLS_ACUITY_SCORE: 20
ADLS_ACUITY_SCORE: 20
ADLS_ACUITY_SCORE: 46
ADLS_ACUITY_SCORE: 20
FALL_HISTORY_WITHIN_LAST_SIX_MONTHS: NO
ADLS_ACUITY_SCORE: 20
HEARING_DIFFICULTY_OR_DEAF: NO
ADLS_ACUITY_SCORE: 20
DOING_ERRANDS_INDEPENDENTLY_DIFFICULTY: NO
ADLS_ACUITY_SCORE: 20
ADLS_ACUITY_SCORE: 46
ADLS_ACUITY_SCORE: 20
WEAR_GLASSES_OR_BLIND: NO
CHANGE_IN_FUNCTIONAL_STATUS_SINCE_ONSET_OF_CURRENT_ILLNESS/INJURY: NO
CONCENTRATING,_REMEMBERING_OR_MAKING_DECISIONS_DIFFICULTY: NO
ADLS_ACUITY_SCORE: 46
ADLS_ACUITY_SCORE: 20
DIFFICULTY_COMMUNICATING: NO
ADLS_ACUITY_SCORE: 20
ADLS_ACUITY_SCORE: 46
TOILETING_ISSUES: NO
ADLS_ACUITY_SCORE: 20
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
## 2025-05-08 NOTE — PLAN OF CARE
"Goal Outcome Evaluation:          VS: BP (!) 125/92 (BP Location: Right arm)   Pulse 84   Temp 98  F (36.7  C) (Oral)   Resp 18   Ht 1.651 m (5' 5\")   Wt 80.7 kg (177 lb 14.4 oz)   LMP 04/29/2025   SpO2 96%   BMI 29.60 kg/m       O2: Sating >95% on RA, denies SOB/Chest pain   Output: Voids spontaneously in bathroom   Last BM: Unknown per pt, bowel sounds normoactive x4   Activity: Up independently in room   Up for meals? Yes   Skin: All visible skin intact. Refused assessment except R ankle/foot wound, mepilex applied.    Pain: Pain was managed with PRN    CMS: AO x4, Denies N/V   Dressing: None   Diet: Regular diet   LDA: PIV to R SL   Equipment:  IV pole, personal belongings   Plan: Call light within reach, bed in a low position. Able to make needs known. Continue to monitor with POC.   Additional Info:  RN Potassium replaced. Wants to see Chem dep-Verbal order from dr Schultz put in. NS cont infusion running at 125mls/hr                         "

## 2025-05-09 VITALS
WEIGHT: 177.9 LBS | SYSTOLIC BLOOD PRESSURE: 110 MMHG | HEIGHT: 65 IN | RESPIRATION RATE: 18 BRPM | DIASTOLIC BLOOD PRESSURE: 81 MMHG | OXYGEN SATURATION: 95 % | HEART RATE: 84 BPM | TEMPERATURE: 98 F | BODY MASS INDEX: 29.64 KG/M2

## 2025-05-09 LAB
CREAT SERPL-MCNC: 0.67 MG/DL (ref 0.51–0.95)
EGFRCR SERPLBLD CKD-EPI 2021: >90 ML/MIN/1.73M2

## 2025-05-09 PROCEDURE — 250N000011 HC RX IP 250 OP 636: Performed by: INTERNAL MEDICINE

## 2025-05-09 PROCEDURE — 250N000013 HC RX MED GY IP 250 OP 250 PS 637: Performed by: INTERNAL MEDICINE

## 2025-05-09 PROCEDURE — 99254 IP/OBS CNSLTJ NEW/EST MOD 60: CPT

## 2025-05-09 PROCEDURE — 120N000002 HC R&B MED SURG/OB UMMC

## 2025-05-09 PROCEDURE — 258N000003 HC RX IP 258 OP 636: Performed by: INTERNAL MEDICINE

## 2025-05-09 PROCEDURE — 99233 SBSQ HOSP IP/OBS HIGH 50: CPT | Performed by: INTERNAL MEDICINE

## 2025-05-09 RX ORDER — GABAPENTIN 300 MG/1
900 CAPSULE ORAL 3 TIMES DAILY
Status: DISCONTINUED | OUTPATIENT
Start: 2025-05-09 | End: 2025-05-12 | Stop reason: HOSPADM

## 2025-05-09 RX ORDER — METRONIDAZOLE 500 MG/1
500 TABLET ORAL 2 TIMES DAILY
Status: DISCONTINUED | OUTPATIENT
Start: 2025-05-09 | End: 2025-05-12 | Stop reason: HOSPADM

## 2025-05-09 RX ADMIN — METRONIDAZOLE 500 MG: 500 TABLET ORAL at 09:56

## 2025-05-09 RX ADMIN — Medication 25 MCG: at 09:57

## 2025-05-09 RX ADMIN — HYDROXYZINE HYDROCHLORIDE 25 MG: 25 TABLET, FILM COATED ORAL at 19:28

## 2025-05-09 RX ADMIN — THIAMINE HCL TAB 100 MG 100 MG: 100 TAB at 09:57

## 2025-05-09 RX ADMIN — GABAPENTIN 900 MG: 300 CAPSULE ORAL at 19:28

## 2025-05-09 RX ADMIN — LAMOTRIGINE 50 MG: 25 TABLET ORAL at 09:56

## 2025-05-09 RX ADMIN — Medication 1 TABLET: at 09:56

## 2025-05-09 RX ADMIN — FAMOTIDINE 20 MG: 20 TABLET, FILM COATED ORAL at 19:28

## 2025-05-09 RX ADMIN — SODIUM CHLORIDE: 0.9 INJECTION, SOLUTION INTRAVENOUS at 06:42

## 2025-05-09 RX ADMIN — BUSPIRONE HYDROCHLORIDE 10 MG: 10 TABLET ORAL at 19:28

## 2025-05-09 RX ADMIN — METRONIDAZOLE 500 MG: 500 TABLET ORAL at 19:28

## 2025-05-09 RX ADMIN — GABAPENTIN 900 MG: 300 CAPSULE ORAL at 00:39

## 2025-05-09 RX ADMIN — GABAPENTIN 900 MG: 300 CAPSULE ORAL at 09:56

## 2025-05-09 RX ADMIN — DOXYCYCLINE HYCLATE 100 MG: 100 CAPSULE ORAL at 19:28

## 2025-05-09 RX ADMIN — BUSPIRONE HYDROCHLORIDE 10 MG: 10 TABLET ORAL at 09:57

## 2025-05-09 RX ADMIN — Medication 1 MG: at 09:56

## 2025-05-09 RX ADMIN — PANTOPRAZOLE SODIUM 40 MG: 40 TABLET, DELAYED RELEASE ORAL at 09:56

## 2025-05-09 RX ADMIN — ENOXAPARIN SODIUM 40 MG: 40 INJECTION SUBCUTANEOUS at 09:57

## 2025-05-09 RX ADMIN — FAMOTIDINE 20 MG: 20 TABLET, FILM COATED ORAL at 09:57

## 2025-05-09 RX ADMIN — DOXYCYCLINE HYCLATE 100 MG: 100 CAPSULE ORAL at 09:59

## 2025-05-09 ASSESSMENT — ACTIVITIES OF DAILY LIVING (ADL)
ADLS_ACUITY_SCORE: 20
DEPENDENT_IADLS:: INDEPENDENT
ADLS_ACUITY_SCORE: 20

## 2025-05-09 NOTE — DISCHARGE INSTRUCTIONS
Met with patient to discuss possible YADY treatment options and to possibly complete an assessment. Assessment has been completed at this time and patient is being recommended:          Recommendations:   3.5 Clinically Managed Medium and High Intensity Residential Services    Patient should call the below programs daily until placement is found.      Referrals/ Alternatives:  [facility]  [address] [place] [address]  Phone: [phone]  [email or web address]     [facility]  [address] [place] [address]  Phone: [phone]  Fax: [phone]  [email or web address]    [facility]  [address]  Phone: [phone]  Fax: [phone]

## 2025-05-09 NOTE — PROGRESS NOTES
Assumed patient care around 2004.    Patient alert and oriented x4. Able to make needs known, call light within reach. Patient denies SOB, chest pain, pain, and nausea. Patient is on tele. CIWA scoring per protocol. Right PIV infusing NS at 125 ml/hr. Patient resting comfortably in bed.     Continue with plan of care

## 2025-05-09 NOTE — CONSULTS
Met with patient to discuss possible YADY treatment options and to possibly complete an assessment. Assessment has been completed at this time and patient is being recommended:         Recommendations:   3.5 Clinically Managed Medium and High Intensity Residential Services     Referrals/ Alternatives:  [facility]  [address] [place] [address]  Phone: [phone]  [email or web address]     [facility]  [address] [address]  Phone: [phone]  Fax: [phone]  [email or web address]    Referrals are being made at this time.  Patient is aware of the referrals and is in agreement.     STEVE Sorenson on 5/9/2025 at 12:02 PM

## 2025-05-09 NOTE — PLAN OF CARE
"  Problem: Adult Inpatient Plan of Care  Goal: Plan of Care Review  Description: The Plan of Care Review/Shift note should be completed every shift.  The Outcome Evaluation is a brief statement about your assessment that the patient is improving, declining, or no change.  This information will be displayed automatically on your shiftnote.  Outcome: Progressing  Flowsheets (Taken 5/9/2025 1401)  Outcome Evaluation: Discharge to home with parents with planned admit to IP CD [place] in the near future.  Plan of Care Reviewed With: patient  Overall Patient Progress: no change     Problem: Adult Inpatient Plan of Care  Goal: Plan of Care Review  Description: The Plan of Care Review/Shift note should be completed every shift.  The Outcome Evaluation is a brief statement about your assessment that the patient is improving, declining, or no change.  This information will be displayed automatically on your shiftnote.  Outcome: Progressing  Flowsheets (Taken 5/9/2025 1401)  Outcome Evaluation: Discharge to home with parents with planned admit to IP CD [place] in the near future.  Plan of Care Reviewed With: patient  Overall Patient Progress: no change  Goal: Patient-Specific Goal (Individualized)  Description: You can add care plan individualizations to a care plan. Examples of Individualization might be:  \"Parent requests to be called daily at 9am for status\", \"I have a hard time hearing out of my right ear\", or \"Do not touch me to wake me up as it startlesme\".  Outcome: Progressing  Goal: Absence of Hospital-Acquired Illness or Injury  Outcome: Progressing  Goal: Optimal Comfort and Wellbeing  Outcome: Progressing  Goal: Readiness for Transition of Care  Outcome: Progressing     Problem: Alcohol Withdrawal  Goal: Alcohol Withdrawal Symptom Control  Outcome: Progressing  Goal: Optimal Neurologic Function  Outcome: Progressing  Goal: Readiness for Change Identified  Outcome: Progressing     Problem: Skin Injury Risk Increased  Goal: " Skin Health and Integrity  Outcome: Progressing

## 2025-05-09 NOTE — PROGRESS NOTES
Per patient request additional referral has been sent to [facility].    Kareem Juarez [facility] on 5/9/2025 at 12:45 PM
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
## 2025-05-09 NOTE — CONSULTS
Care Management Initial Consult    General Information  Assessment completed with: Patient,    Type of CM/SW Visit: Initial Assessment    Primary Care Provider verified and updated as needed: Yes   Readmission within the last 30 days: no previous admission in last 30 days      Reason for Consult: discharge planning, substance use concerns  Advance Care Planning: Advance Care Planning Reviewed: no concerns identified        Communication Assessment  Patient's communication style: spoken language (English or Bilingual)    Hearing Difficulty or Deaf: no   Wear Glasses or Blind: no    Cognitive  Cognitive/Neuro/Behavioral: WDL                      Living Environment:   People in home: parent(s)     Current living Arrangements: house      Able to return to prior arrangements: yes     Family/Social Support:  Care provided by: self  Provides care for: no one  Marital Status: Single  Support system: Parent(s), Friend          Description of Support System: Supportive, Involved    Support Assessment: Adequate family and caregiver support, Adequate social supports    Current Resources:   Patient receiving home care services: No     Community Resources: None  Equipment currently used at home: none  Supplies currently used at home: None    Employment/Financial:  Employment Status: unemployed     Employment/ Comments: Not a   Financial Concerns: unemployed   Referral to Financial Worker: No     Does the patient's insurance plan have a 3 day qualifying hospital stay waiver?  No    Lifestyle & Psychosocial Needs:  Social Drivers of Health     Food Insecurity: No Food Insecurity (4/13/2025)    Received from [facility]    Food Insecurity     Do you worry your food will run out before you are able to buy more?: 1   Depression: Not at risk (4/25/2025)    Received from [facility]    PHQ-2     PHQ-2 TOTAL SCORE: 2   Housing Stability: Low Risk   (4/13/2025)    [facility]    Housing Stability     What is your housing situation today?: 1   Tobacco Use: High Risk (5/7/2025)    Patient History     Smoking Tobacco Use: Every Day     Smokeless Tobacco Use: Never     Passive Exposure: Not on file   Financial Resource Strain: Declined (4/15/2024)    Received from [employer]    Financial Resource Strain     Financial Resource Strain: 99   Alcohol Use: Not on file   Transportation Needs: No Transportation Needs (4/13/2025)    [facility]    Transportation Needs     Does lack of transportation keep you from medical appointments?: 1     Does lack of transportation keep you from work, meetings or getting things that you need?: 1   Physical Activity: Not on File (5/17/2022)    Received from [employer]    Physical Activity     Physical Activity: 0   Interpersonal Safety: Not on file   Stress: Not on File (5/17/2022)    Received from [employer]    Stress     Stress: 0   Social Connections: Socially Integrated (4/13/2025)    Received from [facility]    Social Connections     Do you often feel lonely or isolated from those around you?: 0   Health Literacy: Not on file     Functional Status:  Prior to admission patient needed assistance:   Dependent ADLs:: Independent  Dependent IADLs:: Independent  Assesssment of Functional Status: At functional baseline    Mental Health Status:  Mental Health Status: Current Concern  Mental Health Management: Medication    Chemical Dependency Status:  Chemical Dependency Status: Current Concern  Chemical Dependency Management: Previous treatment        Values/Beliefs:  Spiritual, Cultural Beliefs, Episcopal Practices, Values that affect care: no             Discussed  Partnership in Safe Discharge Planning  document with patient/family: No    Additional Information:  Per H&P, patient is a 34 year old female with alcohol use  disorder, opioid use admitted on 5/7/2025. She is admitted for alcohol detox, possible opioid withdrawal, RLE cellulitis as she was unable to attend [facility] with these multiple issues.     Writer completed initial assessment due to chemical dependency concerns. Writer introduced self, role and duties to patient. Patient resides in independent home with parents. She confirmed her address, insurance, telephone number and contact information on file. She reports she has a Primary Care Physician, but it is not on file. Writer was unable to add this at this time. Patient does not have a Healthcare Directive, this was not discussed at this time. Writer had patient sign Releases of Information for [facility] and [facility]. Writer explained the process of discharging to an IP CD [place], IP Psych or dual diagnosis program.     Writer spoke with MD and he states that patient will remain hospitalized over the weekend. [facility] has a bed available for patient on Tuesday. Writer made note to coverage SW to follow up on Monday. Patient is independent with ADL's and IADL's and does not use any DME or medical supplies. She reports that she does not wish to be on Suboxone. Writer relayed this message to her MD.     Accepted Referral     [facility]  [address] [address]  Phone: [phone]  Fax: [phone]  [email or web address]    Pending Referrals    [facility]  [address] [place] [address]  Phone: [phone]  [email or web address]    [facility]  [address]  Phone: [phone]  Fax: [phone]    Next Steps:     Talk with patient about HCD   Add PCP to chart  Confirm Psych has seen pt and review rec's  Discharge note    WESLY Martinez, MSW  6th Floor Medical Surgical Unit  Phone [phone]      Message me securely in [employer]

## 2025-05-09 NOTE — CONSULTS
Initial Psychiatric Consult   Consult date: May 9, 2025         Reason for Consult, requesting source:    Polysubstance abuse with recent relapse and psychiatric contributors to relapse   Requesting source: Scott Mullen    Labs and imaging reviewed. Patient seen and evaluated by JOSE Valdez CNP          HPI:   Domenico Wilder is a 34 year old female with alcohol use disorder, opioid use admitted on 5/7/2025. She is admitted for alcohol detox, possible opioid withdrawal, hypokalemia, RLE cellulitis as she was unable to attend outpatient detox through [facility] with these multiple issues.     Previously on naltrexone, last drink was 5/6. Has not received any valium on CIWA today. Patient was getting off the phone wwith the PHP program she had an intake with a week ago when I entered her room. She is very treatment motivated. She denies any SI, HI, AVH. Does have some anxiety given financial situation and bills that need to be paid. She has not been taking her psych meds for the last few weeks. She does not use opioids regularly and does not feel the need to be on MAT.         Past Psychiatric History:     IOP program 2024 at [facility]. Did Intake for PHP at [place] April 2025, but did not follow up and was discharged from the program.         Substance Use and History:   severe alcohol use disorder, cannabis use, opioid use disorder , stimulant use disorder    IVDA: yes, 7 years ago  Alcohol:   -age of first use: adolescence, struggled in HS and couple years after  -last few months using: binge use  -date of last use: 4.13.25  Marijuana/THC:  -last few months using: couple times/week  -date of last use: 4.13.25  Denies any other substance use at this time.  Sober from stimulants and opioids since 2019.   Over the Counter Drugs:   denies  Caffeine:   Pattern of use: not daily Quantity: 1 beverage   Non-Substance (gambling):   Denies  Tobacco:  reports that she has been smoking cigarettes. She started  smoking about 17 years ago. She has a 4.8 pack-year smoking history. She has been exposed to tobacco smoke. She has never used smokeless tobacco.        Past Medical History:   PAST MEDICAL HISTORY:   Past Medical History:   Diagnosis Date    Anxiety     Chickenpox     Depression     Depressive disorder     GERD (gastroesophageal reflux disease)     Substance abuse (H)        PAST SURGICAL HISTORY:   Past Surgical History:   Procedure Laterality Date    NO HISTORY OF SURGERY               Family History:   FAMILY HISTORY:   Family History   Problem Relation Age of Onset    Allergies Mother     Depression Mother     Alcohol/Drug Mother     Prostate Cancer Paternal Grandfather     Diabetes Maternal Grandfather         non insulin dependent       Family Psychiatric History: see above        Social History:   SOCIAL HISTORY:   Social History     Tobacco Use    Smoking status: Every Day     Current packs/day: 0.50     Average packs/day: 0.5 packs/day for 13.0 years (6.5 ttl pk-yrs)     Types: Cigarettes, Vaping Device     Start date: 12/28/2017    Smokeless tobacco: Never   Substance Use Topics    Alcohol use: Yes     Comment: 1 pint vodka per day                Physical ROS:   The 10 point Review of Systems is negative other than noted in the HPI or here.           Medications:     Current Facility-Administered Medications   Medication Dose Route Frequency Provider Last Rate Last Admin    busPIRone (BUSPAR) tablet 10 mg  10 mg Oral BID Scott Mullen MD   10 mg at 05/09/25 0957    doxycycline hyclate (VIBRAMYCIN) capsule 100 mg  100 mg Oral Q12H [facility] (08/20) Erika Gao MD   100 mg at 05/09/25 0959    enoxaparin ANTICOAGULANT (LOVENOX) injection 40 mg  40 mg Subcutaneous Q24H Scott Mullen MD   40 mg at 05/09/25 0957    famotidine (PEPCID) tablet 20 mg  20 mg Oral BID Scott Mullen MD   20 mg at 05/09/25 0957    folic acid (FOLVITE) tablet 1 mg  1 mg Oral Daily Scott Mullen MD   1 mg at 05/09/25 0956     [START ON 5/14/2025] gabapentin (NEURONTIN) capsule 100 mg  100 mg Oral Q8H Scott Mullen MD        [START ON 5/12/2025] gabapentin (NEURONTIN) capsule 300 mg  300 mg Oral Q8H Scott Mullen MD        [START ON 5/10/2025] gabapentin (NEURONTIN) capsule 600 mg  600 mg Oral Q8H Scott Mullen MD        gabapentin (NEURONTIN) capsule 900 mg  900 mg Oral Q8H Scott Mullen MD   900 mg at 05/09/25 0956    lamoTRIgine (LaMICtal) tablet 50 mg  50 mg Oral Daily Scott Mullen MD   50 mg at 05/09/25 0956    metroNIDAZOLE (FLAGYL) tablet 500 mg  500 mg Oral BID Daniele Cedillo MD   500 mg at 05/09/25 0956    multivitamin w/minerals (THERA-VIT-M) tablet 1 tablet  1 tablet Oral Daily Scott Mullen MD   1 tablet at 05/09/25 0956    pantoprazole (PROTONIX) EC tablet 40 mg  40 mg Oral Daily Scott Mullen MD   40 mg at 05/09/25 0956    sodium chloride (PF) 0.9% PF flush 3 mL  3 mL Intracatheter Q8H ADALGISA Scott Mullen MD   3 mL at 05/08/25 2136    thiamine (B-1) tablet 100 mg  100 mg Oral Daily Scott Mullen MD   100 mg at 05/09/25 0957    Vitamin D3 (CHOLECALCIFEROL) tablet 25 mcg  25 mcg Oral Daily Scott Mullen MD   25 mcg at 05/09/25 0957              Allergies:     Allergies   Allergen Reactions    Cats     Dogs           Labs:     Recent Results (from the past 48 hours)   Potassium    Collection Time: 05/07/25  4:25 PM   Result Value Ref Range    Potassium 3.3 (L) 3.4 - 5.3 mmol/L   HCG qualitative urine    Collection Time: 05/07/25  4:32 PM   Result Value Ref Range    hCG Urine Qualitative Negative Negative   Urine Drug Screen Panel    Collection Time: 05/07/25  4:32 PM   Result Value Ref Range    Amphetamines Urine Screen Positive (A) Screen Negative    Barbituates Urine Screen Negative Screen Negative    Benzodiazepine Urine Screen Positive (A) Screen Negative    Cannabinoids Urine Screen Positive (A) Screen Negative    Cocaine Urine Screen Positive (A) Screen Negative    Fentanyl Qual Urine Screen  Positive (A) Screen Negative    Opiates Urine Screen Negative Screen Negative    PCP Urine Screen Negative Screen Negative   Potassium    Collection Time: 05/08/25 12:34 AM   Result Value Ref Range    Potassium 3.4 3.4 - 5.3 mmol/L   Comprehensive metabolic panel    Collection Time: 05/08/25  6:13 AM   Result Value Ref Range    Sodium 137 135 - 145 mmol/L    Potassium 3.1 (L) 3.4 - 5.3 mmol/L    Carbon Dioxide (CO2) 24 22 - 29 mmol/L    Anion Gap 10 7 - 15 mmol/L    Urea Nitrogen 10.8 6.0 - 20.0 mg/dL    Creatinine 0.63 0.51 - 0.95 mg/dL    GFR Estimate >90 >60 mL/min/1.73m2    Calcium 8.1 (L) 8.8 - 10.4 mg/dL    Chloride 103 98 - 107 mmol/L    Glucose 100 (H) 70 - 99 mg/dL    Alkaline Phosphatase 70 40 - 150 U/L    AST 19 0 - 45 U/L    ALT 21 0 - 50 U/L    Protein Total 5.2 (L) 6.4 - 8.3 g/dL    Albumin 2.9 (L) 3.5 - 5.2 g/dL    Bilirubin Total 0.2 <=1.2 mg/dL   CBC with platelets    Collection Time: 05/08/25  6:13 AM   Result Value Ref Range    WBC Count 6.0 4.0 - 11.0 10e3/uL    RBC Count 3.90 3.80 - 5.20 10e6/uL    Hemoglobin 10.7 (L) 11.7 - 15.7 g/dL    Hematocrit 31.7 (L) 35.0 - 47.0 %    MCV 81 78 - 100 fL    MCH 27.4 26.5 - 33.0 pg    MCHC 33.8 31.5 - 36.5 g/dL    RDW 15.7 (H) 10.0 - 15.0 %    Platelet Count 332 150 - 450 10e3/uL   EKG 12-lead, tracing only    Collection Time: 05/08/25  7:19 AM   Result Value Ref Range    Systolic Blood Pressure  mmHg    Diastolic Blood Pressure  mmHg    Ventricular Rate 67 BPM    Atrial Rate 67 BPM    VT Interval 138 ms    QRS Duration 98 ms     ms    QTc 448 ms    P Axis 50 degrees    R AXIS 58 degrees    T Axis 38 degrees    Interpretation ECG       Sinus rhythm  Normal ECG  When compared with ECG of 07-May-2025 02:57,  Non-specific change in ST segment in Inferior leads  Non-specific change in ST segment in Anterior leads  QT has shortened  Confirmed by MD JC, RAJANI ([id number]) on 5/8/2025 5:58:58 PM     Potassium    Collection Time: 05/08/25  8:24 AM   Result  "Value Ref Range    Potassium 3.2 (L) 3.4 - 5.3 mmol/L   Procalcitonin    Collection Time: 05/08/25  8:24 AM   Result Value Ref Range    Procalcitonin 0.04 <0.50 ng/mL   HIV Antigen Antibody Combo Cascade    Collection Time: 05/08/25  8:24 AM   Result Value Ref Range    HIV Antigen Antibody Combo Nonreactive Nonreactive   Lactic acid whole blood    Collection Time: 05/08/25 10:22 AM   Result Value Ref Range    Lactic Acid 1.4 0.7 - 2.0 mmol/L   Potassium    Collection Time: 05/08/25  9:23 PM   Result Value Ref Range    Potassium 4.1 3.4 - 5.3 mmol/L          Physical and Psychiatric Examination:     BP (!) 123/98 (BP Location: Right arm)   Pulse 62   Temp 98.9  F (37.2  C) (Oral)   Resp 18   Ht 1.651 m (5' 5\")   Wt 83.4 kg (183 lb 12.8 oz)   LMP 04/29/2025   SpO2 98%   BMI 30.59 kg/m    Weight is 183 lbs 12.8 oz  Body mass index is 30.59 kg/m .    Physical Exam:  I have reviewed the physical exam as documented by by the medical team and agree with findings and assessment and have no additional findings to add at this time.    Mental Status Exam:    Appearance: awake, alert and adequately groomed  Attitude:  cooperative  Eye Contact:  good  Mood:  fine   Affect:  appropriate and in normal range and mood congruent  Speech:  clear, coherent  Language: Fluent in english   Psychomotor Behavior:  no evidence of tardive dyskinesia, dystonia, or tics  Thought Process:  logical, linear, and goal oriented  Associations:  no loose associations  Thought Content:  no evidence of suicidal ideation or homicidal ideation and no evidence of psychotic thought  Insight:  good  Judgement:  intact  Oriented to:  time, person, and place  Attention Span and Concentration:  intact  Recent and Remote Memory:  intact  Fund of Knowledge: Appropriate   Gait and Station: baseline                DSM-5 Diagnosis:   Alcohol use disorder, severe, dependence  Cannabis use  Methamphetamine use disorder, severe,   Opioid use disorder, severe, " in sustained remission  Mood disorder, unspecified  Rule out substance induced vs MDD vs bipolar  ROMANA          Assessment:   Domenico is a 34 year old female who presents for complicated detox after relapse and polysubstance use. She states she just used fentanyl for a day or two and doesn't want to be on bupenorphine or methadone. Does not appear to be in any acute withdrawal from substances and is motivated to go to treatment and engage in intensive mental health programming. Not psychotic, manic, nor suicidal, does not need inpatient psychiatry for acute stabilization. She has not been taking her psychotropics the last week+ so will restart and defer to outpatient med management to titrate and adjust.           Summary of Recommendations:     Can disconitnue CIWA -- has not received valium today, doing well without symptoms   Does not want bupenorphine   Continue gabapentin 900mg TID, and other psych meds    Patient does not meet criteria for inpatient psychiatry and she does not meet criteria for involuntary treatment or hold although it is my recommendation she discharge to inpatient treatment followed by PHP/Iop programming     Signing off -      DES Rodríguez-BC  Consult/Liaison Psychiatry   [facility]

## 2025-05-09 NOTE — PLAN OF CARE
"Goal Outcome Evaluation:      Plan of Care Reviewed With: patient    Overall Patient Progress: no changeOverall Patient Progress: no change      VS: BP (!) 123/98 (BP Location: Right arm)   Pulse 62   Temp 98.9  F (37.2  C) (Oral)   Resp 18   Ht 1.651 m (5' 5\")   Wt 83.4 kg (183 lb 12.8 oz)   LMP 04/29/2025   SpO2 98%   BMI 30.59 kg/m       O2: 98% on R/A   Output: Continent of bowel and bladder   Last BM:    Activity: IND in room   Up for meals? bedside   Skin: Right outer ankle wound   Pain: denies   CMS: Alert and oriented   Dressing: bandiad   Diet: Regular whole thin   LDA: Left PIV SL   Equipment: IV pole   Plan: Cont. poc   Additional Info: Patient remains alert and oriented, call light appropriate, denies chest pain, SOB, N/V. Not scoring on the CIWA protocal sopke with chemical dependence counselor about placement. Right lower ankle slight redness, swelling, elevation encouraged Continue on NS at 125ml/hr           "

## 2025-05-09 NOTE — PLAN OF CARE
"Goal Outcome Evaluation:      Plan of Care Reviewed With: patient    Overall Patient Progress: improving  Problem: Adult Inpatient Plan of Care  Goal: Plan of Care Review  Description: The Plan of Care Review/Shift note should be completed every shift.  The Outcome Evaluation is a brief statement about your assessment that the patient is improving, declining, or no change.  This information will be displayed automatically on your shiftnote.  Outcome: Progressing  Flowsheets (Taken 5/9/2025 0311)  Plan of Care Reviewed With: patient  Overall Patient Progress: improving  Goal: Patient-Specific Goal (Individualized)  Description: You can add care plan individualizations to a care plan. Examples of Individualization might be:  \"Parent requests to be called daily at 9am for status\", \"I have a hard time hearing out of my right ear\", or \"Do not touch me to wake me up as it startlesme\".  Outcome: Progressing  Goal: Absence of Hospital-Acquired Illness or Injury  Outcome: Progressing  Goal: Optimal Comfort and Wellbeing  Outcome: Progressing  Goal: Readiness for Transition of Care  Outcome: Progressing     Patient alert and oriented x4. Able to make needs known. Patient denies SOB, chest pain, pain, nausea and . Patient is on tele. CIWA scoring per protocol. Right PIV infusing NS at 125 ml/hr. Call light within reach. Continue with plan of care.             "
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
## 2025-05-10 LAB
ANION GAP SERPL CALCULATED.3IONS-SCNC: 8 MMOL/L (ref 7–15)
BASOPHILS # BLD AUTO: 0.1 10E3/UL (ref 0–0.2)
BASOPHILS NFR BLD AUTO: 1 %
BUN SERPL-MCNC: 3.9 MG/DL (ref 6–20)
CALCIUM SERPL-MCNC: 8.2 MG/DL (ref 8.8–10.4)
CHLORIDE SERPL-SCNC: 105 MMOL/L (ref 98–107)
CREAT SERPL-MCNC: 0.66 MG/DL (ref 0.51–0.95)
EGFRCR SERPLBLD CKD-EPI 2021: >90 ML/MIN/1.73M2
EOSINOPHIL # BLD AUTO: 0.3 10E3/UL (ref 0–0.7)
EOSINOPHIL NFR BLD AUTO: 5 %
ERYTHROCYTE [DISTWIDTH] IN BLOOD BY AUTOMATED COUNT: 15.6 % (ref 10–15)
FERRITIN SERPL-MCNC: 15 NG/ML (ref 6–175)
GLUCOSE SERPL-MCNC: 103 MG/DL (ref 70–99)
HCO3 SERPL-SCNC: 26 MMOL/L (ref 22–29)
HCT VFR BLD AUTO: 34.7 % (ref 35–47)
HGB BLD-MCNC: 11.4 G/DL (ref 11.7–15.7)
IMM GRANULOCYTES # BLD: 0 10E3/UL
IMM GRANULOCYTES NFR BLD: 1 %
IRON BINDING CAPACITY (ROCHE): 216 UG/DL (ref 240–430)
IRON SATN MFR SERPL: 8 % (ref 15–46)
IRON SERPL-MCNC: 18 UG/DL (ref 37–145)
LYMPHOCYTES # BLD AUTO: 1.8 10E3/UL (ref 0.8–5.3)
LYMPHOCYTES NFR BLD AUTO: 30 %
MCH RBC QN AUTO: 27.1 PG (ref 26.5–33)
MCHC RBC AUTO-ENTMCNC: 32.9 G/DL (ref 31.5–36.5)
MCV RBC AUTO: 83 FL (ref 78–100)
MONOCYTES # BLD AUTO: 0.5 10E3/UL (ref 0–1.3)
MONOCYTES NFR BLD AUTO: 9 %
NEUTROPHILS # BLD AUTO: 3.2 10E3/UL (ref 1.6–8.3)
NEUTROPHILS NFR BLD AUTO: 55 %
NRBC # BLD AUTO: 0 10E3/UL
NRBC BLD AUTO-RTO: 0 /100
PLATELET # BLD AUTO: 327 10E3/UL (ref 150–450)
POTASSIUM SERPL-SCNC: 3 MMOL/L (ref 3.4–5.3)
POTASSIUM SERPL-SCNC: 4.3 MMOL/L (ref 3.4–5.3)
RBC # BLD AUTO: 4.2 10E6/UL (ref 3.8–5.2)
SODIUM SERPL-SCNC: 139 MMOL/L (ref 135–145)
WBC # BLD AUTO: 5.8 10E3/UL (ref 4–11)

## 2025-05-10 PROCEDURE — 250N000013 HC RX MED GY IP 250 OP 250 PS 637: Performed by: INTERNAL MEDICINE

## 2025-05-10 PROCEDURE — 82310 ASSAY OF CALCIUM: CPT | Performed by: INTERNAL MEDICINE

## 2025-05-10 PROCEDURE — 82728 ASSAY OF FERRITIN: CPT | Performed by: INTERNAL MEDICINE

## 2025-05-10 PROCEDURE — 99207 PR NO BILLABLE SERVICE THIS VISIT: CPT | Performed by: INTERNAL MEDICINE

## 2025-05-10 PROCEDURE — 83540 ASSAY OF IRON: CPT | Performed by: INTERNAL MEDICINE

## 2025-05-10 PROCEDURE — 250N000011 HC RX IP 250 OP 636: Performed by: INTERNAL MEDICINE

## 2025-05-10 PROCEDURE — 36415 COLL VENOUS BLD VENIPUNCTURE: CPT | Performed by: INTERNAL MEDICINE

## 2025-05-10 PROCEDURE — 85025 COMPLETE CBC W/AUTO DIFF WBC: CPT | Performed by: INTERNAL MEDICINE

## 2025-05-10 PROCEDURE — 84132 ASSAY OF SERUM POTASSIUM: CPT | Performed by: INTERNAL MEDICINE

## 2025-05-10 PROCEDURE — 99232 SBSQ HOSP IP/OBS MODERATE 35: CPT | Performed by: INTERNAL MEDICINE

## 2025-05-10 PROCEDURE — 36416 COLLJ CAPILLARY BLOOD SPEC: CPT | Performed by: INTERNAL MEDICINE

## 2025-05-10 PROCEDURE — 120N000002 HC R&B MED SURG/OB UMMC

## 2025-05-10 RX ORDER — POTASSIUM CHLORIDE 1500 MG/1
20 TABLET, EXTENDED RELEASE ORAL ONCE
Status: COMPLETED | OUTPATIENT
Start: 2025-05-10 | End: 2025-05-10

## 2025-05-10 RX ORDER — FERROUS GLUCONATE 324(38)MG
324 TABLET ORAL
Status: DISCONTINUED | OUTPATIENT
Start: 2025-05-10 | End: 2025-05-12 | Stop reason: HOSPADM

## 2025-05-10 RX ORDER — POTASSIUM CHLORIDE 1500 MG/1
40 TABLET, EXTENDED RELEASE ORAL ONCE
Status: COMPLETED | OUTPATIENT
Start: 2025-05-10 | End: 2025-05-10

## 2025-05-10 RX ADMIN — DOXYCYCLINE HYCLATE 100 MG: 100 CAPSULE ORAL at 08:00

## 2025-05-10 RX ADMIN — FAMOTIDINE 20 MG: 20 TABLET, FILM COATED ORAL at 08:00

## 2025-05-10 RX ADMIN — HYDROXYZINE HYDROCHLORIDE 25 MG: 25 TABLET, FILM COATED ORAL at 22:38

## 2025-05-10 RX ADMIN — Medication 5 MG: at 22:38

## 2025-05-10 RX ADMIN — POTASSIUM CHLORIDE 40 MEQ: 1500 TABLET, EXTENDED RELEASE ORAL at 11:01

## 2025-05-10 RX ADMIN — BUSPIRONE HYDROCHLORIDE 10 MG: 10 TABLET ORAL at 08:01

## 2025-05-10 RX ADMIN — THIAMINE HCL TAB 100 MG 100 MG: 100 TAB at 08:00

## 2025-05-10 RX ADMIN — PANTOPRAZOLE SODIUM 40 MG: 40 TABLET, DELAYED RELEASE ORAL at 08:01

## 2025-05-10 RX ADMIN — LAMOTRIGINE 50 MG: 25 TABLET ORAL at 08:01

## 2025-05-10 RX ADMIN — ENOXAPARIN SODIUM 40 MG: 40 INJECTION SUBCUTANEOUS at 08:00

## 2025-05-10 RX ADMIN — FAMOTIDINE 20 MG: 20 TABLET, FILM COATED ORAL at 19:50

## 2025-05-10 RX ADMIN — DOXYCYCLINE HYCLATE 100 MG: 100 CAPSULE ORAL at 19:50

## 2025-05-10 RX ADMIN — GABAPENTIN 900 MG: 300 CAPSULE ORAL at 08:00

## 2025-05-10 RX ADMIN — GABAPENTIN 900 MG: 300 CAPSULE ORAL at 13:22

## 2025-05-10 RX ADMIN — FERROUS GLUCONATE 324 MG: 324 TABLET ORAL at 11:06

## 2025-05-10 RX ADMIN — GABAPENTIN 900 MG: 300 CAPSULE ORAL at 19:50

## 2025-05-10 RX ADMIN — Medication 1 MG: at 08:01

## 2025-05-10 RX ADMIN — Medication 1 TABLET: at 08:00

## 2025-05-10 RX ADMIN — BUSPIRONE HYDROCHLORIDE 10 MG: 10 TABLET ORAL at 19:50

## 2025-05-10 RX ADMIN — Medication 25 MCG: at 08:00

## 2025-05-10 RX ADMIN — POTASSIUM CHLORIDE 20 MEQ: 1500 TABLET, EXTENDED RELEASE ORAL at 12:39

## 2025-05-10 RX ADMIN — METRONIDAZOLE 500 MG: 500 TABLET ORAL at 19:50

## 2025-05-10 RX ADMIN — METRONIDAZOLE 500 MG: 500 TABLET ORAL at 08:01

## 2025-05-10 ASSESSMENT — ACTIVITIES OF DAILY LIVING (ADL)
ADLS_ACUITY_SCORE: 23
ADLS_ACUITY_SCORE: 22
ADLS_ACUITY_SCORE: 22
ADLS_ACUITY_SCORE: 23
ADLS_ACUITY_SCORE: 23
ADLS_ACUITY_SCORE: 22
ADLS_ACUITY_SCORE: 23
ADLS_ACUITY_SCORE: 22
ADLS_ACUITY_SCORE: 23
ADLS_ACUITY_SCORE: 22
ADLS_ACUITY_SCORE: 22
ADLS_ACUITY_SCORE: 23
ADLS_ACUITY_SCORE: 23
ADLS_ACUITY_SCORE: 22
ADLS_ACUITY_SCORE: 23
ADLS_ACUITY_SCORE: 22
ADLS_ACUITY_SCORE: 22
ADLS_ACUITY_SCORE: 23

## 2025-05-10 NOTE — PLAN OF CARE
"Goal Outcome Evaluation:      Plan of Care Reviewed With: patient    Overall Patient Progress: no change    Outcome Evaluation: pt has been stable w/o acute changes overnight.      Problem: Adult Inpatient Plan of Care  Goal: Plan of Care Review  Description: The Plan of Care Review/Shift note should be completed every shift.  The Outcome Evaluation is a brief statement about your assessment that the patient is improving, declining, or no change.  This information will be displayed automatically on your shiftnote.  Outcome: Progressing  Flowsheets (Taken 5/10/2025 0654)  Outcome Evaluation: pt has been stable w/o acute changes overnight.  Plan of Care Reviewed With: patient  Overall Patient Progress: no change  Goal: Patient-Specific Goal (Individualized)  Description: You can add care plan individualizations to a care plan. Examples of Individualization might be:  \"Parent requests to be called daily at 9am for status\", \"I have a hard time hearing out of my right ear\", or \"Do not touch me to wake me up as it startlesme\".  Outcome: Progressing  Goal: Absence of Hospital-Acquired Illness or Injury  Outcome: Progressing  Intervention: Identify and Manage Fall Risk  Recent Flowsheet Documentation  Taken 5/10/2025 0048 by Edwina Bellamy RN  Safety Promotion/Fall Prevention:   assistive device/personal items within reach   clutter free environment maintained   lighting adjusted   nonskid shoes/slippers when out of bed   room near nurse's station   safety round/check completed  Intervention: Prevent Skin Injury  Recent Flowsheet Documentation  Taken 5/10/2025 0048 by Edwina Bellamy RN  Body Position: position changed independently  Intervention: Prevent and Manage VTE (Venous Thromboembolism) Risk  Recent Flowsheet Documentation  Taken 5/10/2025 0048 by Edwina Bellamy RN  VTE Prevention/Management: compression stockings off  Intervention: Prevent Infection  Recent Flowsheet Documentation  Taken 5/10/2025 0048 by " Edwina Bellamy RN  Infection Prevention:   hand hygiene promoted   single patient room provided  Goal: Optimal Comfort and Wellbeing  Outcome: Progressing  Goal: Readiness for Transition of Care  Outcome: Progressing     Problem: Alcohol Withdrawal  Goal: Alcohol Withdrawal Symptom Control  Outcome: Progressing  Goal: Optimal Neurologic Function  Outcome: Progressing  Goal: Readiness for Change Identified  Outcome: Progressing     Problem: Skin Injury Risk Increased  Goal: Skin Health and Integrity  Outcome: Progressing  Intervention: Plan: Nurse Driven Intervention: Moisture Management  Recent Flowsheet Documentation  Taken 5/10/2025 0048 by Edwina Bellamy RN  Moisture Interventions: Encourage regular toileting  Intervention: Optimize Skin Protection  Recent Flowsheet Documentation  Taken 5/10/2025 0048 by Edwina Bellamy RN  Head of Bed (HOB) Positioning: HOB at 30-45 degrees

## 2025-05-10 NOTE — PROGRESS NOTES
"Patient has been educated on potential risks of choosing to leave the unit and that the responsibility for patient well-being will belong to the patient. Pt has been informed that admission to hospital is due to need for medical treatment. Education given to the patient on some of the potential risks included but are not limited to:      - lack of access to nursing intervention      - possible missed appointments with MD, therapies, tests      - possible missed medications, antibiotics, management of IV's    Patient Response:\"I'm going outside\"    Patient notified staff prior to leaving unit: yes    Patient returned at 1630.   "
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
## 2025-05-10 NOTE — PLAN OF CARE
Patient is A&Ox4. VSS. CIWA and COWS 0. LS clear, on RA. BS active, LBM on 5/9/2025. Voiding well. Patient denies pain. R ankle redness improving per pt. Patient up independently. PIV x2 patent and SL. K was 3.0, replaced 60meq. Recheck at 1630. Regular diet. Continue to monitor.

## 2025-05-11 PROCEDURE — 99232 SBSQ HOSP IP/OBS MODERATE 35: CPT | Performed by: INTERNAL MEDICINE

## 2025-05-11 PROCEDURE — 120N000002 HC R&B MED SURG/OB UMMC

## 2025-05-11 PROCEDURE — 250N000013 HC RX MED GY IP 250 OP 250 PS 637: Performed by: INTERNAL MEDICINE

## 2025-05-11 RX ADMIN — FERROUS GLUCONATE 324 MG: 324 TABLET ORAL at 12:04

## 2025-05-11 RX ADMIN — HYDROXYZINE HYDROCHLORIDE 25 MG: 25 TABLET, FILM COATED ORAL at 21:46

## 2025-05-11 RX ADMIN — Medication 5 MG: at 21:46

## 2025-05-11 RX ADMIN — FAMOTIDINE 20 MG: 20 TABLET, FILM COATED ORAL at 20:14

## 2025-05-11 RX ADMIN — GABAPENTIN 900 MG: 300 CAPSULE ORAL at 14:01

## 2025-05-11 RX ADMIN — FAMOTIDINE 20 MG: 20 TABLET, FILM COATED ORAL at 08:44

## 2025-05-11 RX ADMIN — METRONIDAZOLE 500 MG: 500 TABLET ORAL at 08:43

## 2025-05-11 RX ADMIN — Medication 1 TABLET: at 08:43

## 2025-05-11 RX ADMIN — BUSPIRONE HYDROCHLORIDE 10 MG: 10 TABLET ORAL at 20:14

## 2025-05-11 RX ADMIN — GABAPENTIN 900 MG: 300 CAPSULE ORAL at 20:14

## 2025-05-11 RX ADMIN — METRONIDAZOLE 500 MG: 500 TABLET ORAL at 20:14

## 2025-05-11 RX ADMIN — THIAMINE HCL TAB 100 MG 100 MG: 100 TAB at 08:43

## 2025-05-11 RX ADMIN — LAMOTRIGINE 50 MG: 25 TABLET ORAL at 08:44

## 2025-05-11 RX ADMIN — BUSPIRONE HYDROCHLORIDE 10 MG: 10 TABLET ORAL at 08:44

## 2025-05-11 RX ADMIN — DOXYCYCLINE HYCLATE 100 MG: 100 CAPSULE ORAL at 20:14

## 2025-05-11 RX ADMIN — Medication 1 MG: at 08:43

## 2025-05-11 RX ADMIN — DOXYCYCLINE HYCLATE 100 MG: 100 CAPSULE ORAL at 08:44

## 2025-05-11 RX ADMIN — PANTOPRAZOLE SODIUM 40 MG: 40 TABLET, DELAYED RELEASE ORAL at 08:43

## 2025-05-11 RX ADMIN — Medication 25 MCG: at 08:43

## 2025-05-11 RX ADMIN — GABAPENTIN 900 MG: 300 CAPSULE ORAL at 08:44

## 2025-05-11 ASSESSMENT — ACTIVITIES OF DAILY LIVING (ADL)
ADLS_ACUITY_SCORE: 23

## 2025-05-11 NOTE — PLAN OF CARE
"Goal Outcome Evaluation:   Shift 2769-2570  VS: Blood pressure 104/75, pulse 65, temperature 98.8  F (37.1  C), temperature source Oral, resp. rate 18, height 1.651 m (5' 5\"), weight 82.6 kg (182 lb 1.6 oz), last menstrual period 04/29/2025, SpO2 98%, unknown if currently breastfeeding.     O2: on RA, denies SOB/Chest pain   Output: Voids spontaneously in bathroom   Last BM: 5/10 per pt.  bowel sounds normoactive x4   Activity: Up independently in room   Up for meals? Yes   Skin: R ankle with open wound   Pain: Denies    CMS: AO x4, Denies N/V   Dressing: Band-Aid to R ankle    Diet: Regular diet   LDA: L PIV-SL   Equipment:  IV pole, personal belongings   Plan: Call light within reach, bed in a low position. Able to make needs known. Continue to monitor with POC.   Additional Info:  on CIWA scale. Scoring 0                               Problem: Adult Inpatient Plan of Care  Goal: Plan of Care Review  Description: The Plan of Care Review/Shift note should be completed every shift.  The Outcome Evaluation is a brief statement about your assessment that the patient is improving, declining, or no change.  This information will be displayed automatically on your shiftnote.  Outcome: Progressing  Goal: Patient-Specific Goal (Individualized)  Description: You can add care plan individualizations to a care plan. Examples of Individualization might be:  \"Parent requests to be called daily at 9am for status\", \"I have a hard time hearing out of my right ear\", or \"Do not touch me to wake me up as it startlesme\".  Outcome: Progressing  Goal: Absence of Hospital-Acquired Illness or Injury  Outcome: Progressing  Intervention: Identify and Manage Fall Risk  Recent Flowsheet Documentation  Taken 5/11/2025 0154 by Jayjay Qiu RN  Safety Promotion/Fall Prevention:   assistive device/personal items within reach   clutter free environment maintained   lighting adjusted   nonskid shoes/slippers when out of bed   room near nurse's " station   safety round/check completed  Intervention: Prevent Skin Injury  Recent Flowsheet Documentation  Taken 5/11/2025 0154 by Jayjay Qiu RN  Body Position: position changed independently  Intervention: Prevent and Manage VTE (Venous Thromboembolism) Risk  Recent Flowsheet Documentation  Taken 5/11/2025 0154 by Jayjay Qiu RN  VTE Prevention/Management: (ambulates)   SCDs off (sequential compression devices)   patient refused intervention  Intervention: Prevent Infection  Recent Flowsheet Documentation  Taken 5/11/2025 0154 by Jayjay Qiu RN  Infection Prevention:   hand hygiene promoted   single patient room provided  Taken 5/11/2025 0153 by Jayjay Qiu RN  Infection Prevention: hand hygiene promoted  Goal: Optimal Comfort and Wellbeing  Outcome: Progressing  Goal: Readiness for Transition of Care  Outcome: Progressing     Problem: Alcohol Withdrawal  Goal: Alcohol Withdrawal Symptom Control  Outcome: Progressing  Goal: Optimal Neurologic Function  Outcome: Progressing  Goal: Readiness for Change Identified  Outcome: Progressing     Problem: Skin Injury Risk Increased  Goal: Skin Health and Integrity  Outcome: Progressing  Intervention: Plan: Nurse Driven Intervention: Moisture Management  Recent Flowsheet Documentation  Taken 5/11/2025 0154 by Jayjay Qiu RN  Moisture Interventions: Encourage regular toileting  Taken 5/10/2025 1952 by Jayjay Qiu RN  Moisture Interventions: Encourage regular toileting  Bathing/Skin Care: linen changed  Intervention: Optimize Skin Protection  Recent Flowsheet Documentation  Taken 5/11/2025 0154 by Jayjay Qiu RN  Head of Bed (HOB) Positioning: HOB at 30-45 degrees

## 2025-05-11 NOTE — PLAN OF CARE
Goal Outcome Evaluation:  End of shift Summary: See flowsheet for VS and detail assessments.  Changes this Shift:   Pulmonary:Pt is sating adequately on RA. Denies SOB and chest pain.   Output: Pt is continent of B&B.    Activity:Independent.   Skin: R ankle scab/ wound covered with band aid.   Pain: Denies pain.   Neuro/CMS: Alert and oriented x 4.   Dressings/Drains: None  IV: Left PIV SL.   Additional info:Pt is on CIWA protocal. Able to make needs known. Son and mom at bedside. PRN Melatonin and atarax given.   Plan: Continue POC

## 2025-05-12 VITALS
DIASTOLIC BLOOD PRESSURE: 75 MMHG | RESPIRATION RATE: 16 BRPM | OXYGEN SATURATION: 97 % | TEMPERATURE: 98.5 F | HEART RATE: 73 BPM | HEIGHT: 65 IN | WEIGHT: 173.6 LBS | SYSTOLIC BLOOD PRESSURE: 104 MMHG | BODY MASS INDEX: 28.92 KG/M2

## 2025-05-12 PROCEDURE — 99239 HOSP IP/OBS DSCHRG MGMT >30: CPT | Performed by: INTERNAL MEDICINE

## 2025-05-12 PROCEDURE — 250N000013 HC RX MED GY IP 250 OP 250 PS 637: Performed by: INTERNAL MEDICINE

## 2025-05-12 RX ORDER — METRONIDAZOLE 500 MG/1
500 TABLET ORAL 2 TIMES DAILY
Qty: 8 TABLET | Refills: 0 | Status: SHIPPED | OUTPATIENT
Start: 2025-05-12 | End: 2025-05-16

## 2025-05-12 RX ORDER — FERROUS GLUCONATE 324(38)MG
324 TABLET ORAL
Qty: 30 TABLET | Refills: 0 | Status: SHIPPED | OUTPATIENT
Start: 2025-05-12

## 2025-05-12 RX ORDER — DOXYCYCLINE 100 MG/1
100 CAPSULE ORAL 2 TIMES DAILY
Qty: 6 CAPSULE | Refills: 0 | Status: SHIPPED | OUTPATIENT
Start: 2025-05-12 | End: 2025-05-15

## 2025-05-12 RX ADMIN — FAMOTIDINE 20 MG: 20 TABLET, FILM COATED ORAL at 08:22

## 2025-05-12 RX ADMIN — THIAMINE HCL TAB 100 MG 100 MG: 100 TAB at 08:22

## 2025-05-12 RX ADMIN — Medication 1 TABLET: at 08:21

## 2025-05-12 RX ADMIN — LAMOTRIGINE 50 MG: 25 TABLET ORAL at 08:21

## 2025-05-12 RX ADMIN — METRONIDAZOLE 500 MG: 500 TABLET ORAL at 08:21

## 2025-05-12 RX ADMIN — Medication 25 MCG: at 08:22

## 2025-05-12 RX ADMIN — DOXYCYCLINE HYCLATE 100 MG: 100 CAPSULE ORAL at 08:22

## 2025-05-12 RX ADMIN — PANTOPRAZOLE SODIUM 40 MG: 40 TABLET, DELAYED RELEASE ORAL at 08:22

## 2025-05-12 RX ADMIN — BUSPIRONE HYDROCHLORIDE 10 MG: 10 TABLET ORAL at 08:21

## 2025-05-12 RX ADMIN — GABAPENTIN 900 MG: 300 CAPSULE ORAL at 08:21

## 2025-05-12 RX ADMIN — Medication 1 MG: at 08:22

## 2025-05-12 ASSESSMENT — ACTIVITIES OF DAILY LIVING (ADL)
ADLS_ACUITY_SCORE: 23
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
## 2025-05-12 NOTE — PLAN OF CARE
Goal Outcome Evaluation:      Plan of Care Reviewed With: patient    Overall Patient Progress: no changeOverall Patient Progress: no change
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
## 2025-05-13 ENCOUNTER — PATIENT OUTREACH (OUTPATIENT)
Dept: CARE COORDINATION | Facility: CLINIC | Age: 35
End: 2025-05-13
Payer: COMMERCIAL

## 2025-05-13 NOTE — PROGRESS NOTES
[facility]: [facility]    Background: Transitional Care Management program identified per system criteria and reviewed by [facility] [place] team for possible outreach.    Assessment: Upon chart review, [facility] Team member will not proceed with patient outreach related to this episode of Transitional Care Management program due to reason below:    Pt discharged to Inpatient Chemical Dependency     Plan: Transitional Care Management episode addressed appropriately per reason noted above.      RAMIRO Bowens  [facility]    *Connected Care Resource Team does NOT follow patient ongoing. Referrals are identified based on internal discharge reports and the outreach is to ensure patient has an understanding of their discharge instructions.

## 2025-05-13 NOTE — PROGRESS NOTES
Pt. discharged at 12:35 pm to [facility], and left with personal belongings. Patient left with mother. Pt. received complete discharge paperwork and discharge medications as filled by discharge pharmacy. Pt. was given times of last dose for all discharge medications in writing on discharge medication sheets. Discharge teaching included antibiotic medication. Pt. to follow up with primary provider in 7 days. Pt. had no further questions at the time of discharge and no unmet needs were identified.